# Patient Record
Sex: FEMALE | Race: WHITE | NOT HISPANIC OR LATINO | Employment: FULL TIME | URBAN - METROPOLITAN AREA
[De-identification: names, ages, dates, MRNs, and addresses within clinical notes are randomized per-mention and may not be internally consistent; named-entity substitution may affect disease eponyms.]

---

## 2017-01-03 ENCOUNTER — OFFICE VISIT (OUTPATIENT)
Dept: PSYCHOLOGY | Facility: CLINIC | Age: 62
End: 2017-01-03
Payer: COMMERCIAL

## 2017-01-03 DIAGNOSIS — F41.1 GENERALIZED ANXIETY DISORDER: Primary | ICD-10-CM

## 2017-01-03 PROCEDURE — 90834 PSYTX W PT 45 MINUTES: CPT | Performed by: MARRIAGE & FAMILY THERAPIST

## 2017-01-03 NOTE — PROGRESS NOTES
Progress Note    Client Name: Brissa Mayer  Date: 1/3/17         Service Type: Individual      Session Start Time: 8:00 a  Session End Time: 8:50 p      Session Length: 60     Session #: 4     Attendees: Client attended alone    Treatment Plan Last Reviewed: 12/13/16  PHQ-9 / JOCELINE-7 : 11/22/16     DATA      Progress Since Last Session (Related to Symptoms / Goals / Homework):   Symptoms: Improved    Homework: Achieved / completed to satisfaction - keeps cognitive distortions list at her desk at work      Episode of Care Goals: Satisfactory progress - ACTION (Actively working towards change); Intervened by reinforcing change plan / affirming steps taken. Brissa has made efforts to remove herself when a person is becoming angry or defensive. She states that she wishes she could control the anxiety and tightness she feels in her body when these things happen. Shared examples of co-workers and a friend who seek her support and get angry with her when she does not do things their way. Examined feelings of guilt and whether they are appropriate.     Current / Ongoing Stressors and Concerns:   Strain at work  Social problems     Treatment Objective(s) Addressed in This Session:    Alternative thinking used to decrease intense emotions   Practice acceptance of self and others      Intervention:    Identify healthy self-talk and challenge core beliefs that have contributed to negative mood and thinking  Focused on REBT concepts and practice alternative thinking       ASSESSMENT: Current Emotional / Mental Status (status of significant symptoms):   Risk status (Self / Other harm or suicidal ideation)   Client does not report  current fears or concerns for personal safety.   Client does not report  current or recent suicidal ideation or behaviors.   Client does not report  current or recent homicidal ideation or behaviors.   Client does not report  current or recent self  injurious behavior or ideation.   Client does not report  other safety concerns.   A safety and risk management plan has not been developed at this time, however client was given the after-hours number / 911 should there be a change in any of these risk factors.     Appearance:   Appropriate    Eye Contact:   Good    Psychomotor Behavior: Normal    Attitude:   Cooperative    Orientation:   All   Speech   Rate / Production:  Normal    Volume:   Normal    Mood:    Anxious  Normal   Affect:    Appropriate    Thought Content:  Clear    Thought Form:  Coherent  Logical    Insight:    Good      Medication Review:   No changes to current psychiatric medication(s)     Medication Compliance:   Yes     Changes in Health Issues:   Yes: Improved breathing and is using inhalers less/not at all     Chemical Use Review:   Substance Use: Chemical use reviewed, no active concerns identified      Tobacco Use: No current tobacco use.       Collateral Reports Completed:   Not Applicable    PLAN: (Client Tasks / Therapist Tasks / Other)  Follow-up on psychological health hand-out and QTIP concepts. Follow-up on examining guilty response and considering its appropriate Re-frame idea that anxiety symptoms are bad and use them instead as a helpful cue. Update PHQ-9 and JOCELINE-7.        DC Limon                                                         ________________________________________________________________________    Treatment Plan    Client's Name: Brissa Mayer  YOB: 1955    Date: 12/13/16    DSM-V Diagnoses: 300.02 (F41.1) Generalized Anxiety Disorder  Psychosocial / Contextual Factors: Strain at work; Social problems  WHODAS: Difficulties with work and joining social activities and completing household tasks    Referral / Collaboration:  Referral to another professional/service is not indicated at this time.    Anticipated number of session or this episode of care: 6-8      Measurable Treatment  Goal(s) related to diagnosis / functional impairment(s)  Goal 1: Client will report an even mood at least 8 out of 10 days.    I will know I've met my goal when I have more of a sense of calm or peace.      Objective #A (Client Action)    Client will share at each session about alternative thinking used to decrease intense emotions  Status: New - Date: 12/13/16     Intervention(s)  Therapist will work with client to identify healthy self-talk and challenge core beliefs that have contributed to negative mood and thinking    Objective #B  Client will practice acceptance of self and others between sessions and share at each session  Status: New - Date: 12/13/16     Intervention(s)  Therapist will work with client to practice acceptance and rational thinking  Therapist will teach client breathing and calming techniques to increase rational thinking    Goal 2: Client will     I will know I've met my goal when I do not feel the urge to do a negative behavior like shop or spend to make me feel better.      Objective #A (Client Action)    Status: New - Date: 12/16/16     Client will identify worries and stress and practice healthy ways of coping.    Intervention(s)  Therapist will work with client to address underlying issues leading to unhealthy coping    Objective #B  Client will use rational thinking patterns to off-set distress/stressful situations  Status: New - Date: 12/13/16     Intervention(s)  Therapist will teach REBT concepts and practice alternative thinking        Client has reviewed and agreed to the above plan.      Irena Cortes, LMFT  December 13, 2016

## 2017-01-03 NOTE — Clinical Note
Nicho Darby. I met with Brissa this morning. She shared that she is doing very well on the Paxil. She is actively participating in therapy and practicing outside of session. She notices a decrease in anger and continues to work on reducing anxiety. She has also noticed that she has not needed her inhalers recently and is feeling good about that. She will talk to you more about how this is going when she see you again.  Have a good day.  Rosie Cortes MS, LMFT

## 2017-02-02 ENCOUNTER — TELEPHONE (OUTPATIENT)
Dept: FAMILY MEDICINE | Facility: CLINIC | Age: 62
End: 2017-02-02

## 2017-02-02 NOTE — TELEPHONE ENCOUNTER
Brissa Mayer is a 61 year old female who calls with cold and cough symptoms.  Patient reports that this would be her second round of being sick, reports that she was sick about a month ago.  Reports that she has had nasal congestion, mild nasal drainage, cough.  Reports that she thinks she has had some fevers, but is not checking regularly.  Reports some chills, headaches, increase in fatigue and non productive cough.  Denies ear pain, throat pain.  Patient denies any nausea, vomiting, reports that she is able to take fluids.      NURSING ASSESSMENT:  Description:  Cough and cold symptoms.   Onset/duration:  Started over the weekend,   Precip. factors:  Same symptoms about a month ago.    Associated symptoms:  Headache, nasal drainage, cough  Improves/worsens symptoms:  No improvement.   Pain scale (0-10)   4/10  Last exam/Treatment:  10/10/2016  Allergies:   Allergies   Allergen Reactions     Compazine      Anxiety     Flagyl [Metronidazole Hcl] Nausea and Vomiting     NURSING PLAN: Routed to provider Yes    RECOMMENDED DISPOSITION:  Patient is requesting a appointment with PCP, she is aware that PCP is currently out of office.  Patient was encouraged to increase fluids, monitor temps, OTC pain medications for headaches, OTC cold medications, OTC cough medications, saline spray 4 times daily and PRN, and rest.   Will comply with recommendation: Yes  If further questions/concerns or if symptoms do not improve, worsen or new symptoms develop, call your PCP or [facility] as soon as possible.    Guideline used:  Cold   Telephone Triage Protocols for Nurses, Fifth Edition, Yue Tolentino RN

## 2017-02-02 NOTE — TELEPHONE ENCOUNTER
Reason for call:  Patient reporting a symptom    Symptom or request: chest cold, congestion at beginning of week, now head congestion, no energy    Duration (how long have symptoms been present): around a week    Have you been treated for this before? No    Additional comments: patient is asking to talk to a nurse, she is wondering about going to the er or being seen    Phone Number patient can be reached at:  Cell number on file:    Telephone Information:   Mobile [phone]       Best Time:  As soon as possible    Can we leave a detailed message on this number:  YES    Call taken on 2/2/2017 at 8:09 AM by Carlos Martinez

## 2017-02-03 NOTE — TELEPHONE ENCOUNTER
Pt called back and stated she is feeling a little better today. She is at work and didn't see her messages til now. She would like to wait out the weekend and see how it goes. If she's feeling worse on Monday or still not feeling better, she will call back then and schedule. If you need to talk to her, please call her back.

## 2017-03-30 ENCOUNTER — OFFICE VISIT (OUTPATIENT)
Dept: PSYCHOLOGY | Facility: CLINIC | Age: 62
End: 2017-03-30
Payer: COMMERCIAL

## 2017-03-30 DIAGNOSIS — F32.9 MAJOR DEPRESSIVE DISORDER, SINGLE EPISODE, UNSPECIFIED: ICD-10-CM

## 2017-03-30 DIAGNOSIS — F41.1 GENERALIZED ANXIETY DISORDER: Primary | ICD-10-CM

## 2017-03-30 PROCEDURE — 90834 PSYTX W PT 45 MINUTES: CPT | Performed by: MARRIAGE & FAMILY THERAPIST

## 2017-03-30 ASSESSMENT — ANXIETY QUESTIONNAIRES
1. FEELING NERVOUS, ANXIOUS, OR ON EDGE: SEVERAL DAYS
3. WORRYING TOO MUCH ABOUT DIFFERENT THINGS: NOT AT ALL
2. NOT BEING ABLE TO STOP OR CONTROL WORRYING: NOT AT ALL
IF YOU CHECKED OFF ANY PROBLEMS ON THIS QUESTIONNAIRE, HOW DIFFICULT HAVE THESE PROBLEMS MADE IT FOR YOU TO DO YOUR WORK, TAKE CARE OF THINGS AT HOME, OR GET ALONG WITH OTHER PEOPLE: NOT DIFFICULT AT ALL
7. FEELING AFRAID AS IF SOMETHING AWFUL MIGHT HAPPEN: NOT AT ALL
6. BECOMING EASILY ANNOYED OR IRRITABLE: NOT AT ALL
5. BEING SO RESTLESS THAT IT IS HARD TO SIT STILL: NOT AT ALL
GAD7 TOTAL SCORE: 1

## 2017-03-30 ASSESSMENT — PATIENT HEALTH QUESTIONNAIRE - PHQ9: 5. POOR APPETITE OR OVEREATING: NOT AT ALL

## 2017-03-30 NOTE — MR AVS SNAPSHOT
MRN:[id number]                      After Visit Summary   3/30/2017    Brissa Mayer    MRN: [id number]           Visit Information        Provider Department      3/30/2017 8:00 AM Irena Cortes LMFT [facility]      Your next 10 appointments already scheduled     Apr 14, 2017  2:30 PM CDT   [facility] with [facility] Haq [facility]   [facility]    [address] [place] [id number] [phone]            Apr 27, 2017  8:00 AM CDT   Return Visit with DC Limon   [facility]    [address]  [facility] [address] [phone]              MyChart Information     [employer] gives you secure access to your electronic health record. If you see a primary care provider, you can also send messages to your care team and make appointments. If you have questions, please call your primary care clinic.  If you do not have a primary care provider, please call [phone] and they will assist you.        Care EveryWhere ID     This is your Care EveryWhere ID. This could be used by other organizations to access your [facility] medical records  [id number]

## 2017-03-30 NOTE — PROGRESS NOTES
"                                             Progress Note    Client Name: Brissa Mayer  Date: 3/30/17         Service Type: Individual      Session Start Time: 8:00 a  Session End Time: 8:50 p      Session Length: 50     Session #: 5     Attendees: Client attended alone    Treatment Plan Last Reviewed: 12/13/16  PHQ-9 / JOCELINE-7: 3/30/17     DATA      Progress Since Last Session (Related to Symptoms / Goals / Homework):   Symptoms: Improved    Homework: Achieved / completed to satisfaction       Episode of Care Goals: Satisfactory progress - MAINTENANCE (Working to maintain change, with risk of relapse); Intervened by continuing to positively reinforce healthy behavior choice . Brissa shared that she had one \"small meltdown\" Monday but was able to work through it and not get too down on herself. She recognized that the Prednisone she was on, along with the mounting stress with a work event. Examined her reactivity to people's dysfunction and ways she can help herself let go.     Current / Ongoing Stressors and Concerns:   Strain at work  Social problems     Treatment Objective(s) Addressed in This Session:    Alternative thinking used to decrease intense emotions   Practice acceptance of self and others      Intervention:    Identify healthy self-talk and challenge core beliefs that have contributed to negative mood and thinking  Focused on REBT concepts and practice alternative thinking       ASSESSMENT: Current Emotional / Mental Status (status of significant symptoms):   Risk status (Self / Other harm or suicidal ideation)   Client does not report  current fears or concerns for personal safety.   Client does not report  current or recent suicidal ideation or behaviors.   Client does not report  current or recent homicidal ideation or behaviors.   Client does not report  current or recent self injurious behavior or ideation.   Client does not report  other safety concerns.   A safety and risk management plan has " not been developed at this time, however client was given the after-hours number / 911 should there be a change in any of these risk factors.     Appearance:   Appropriate    Eye Contact:   Good    Psychomotor Behavior: Normal    Attitude:   Cooperative    Orientation:   All   Speech   Rate / Production:  Normal    Volume:   Normal    Mood:    Anxious  Normal   Affect:    Appropriate    Thought Content:  Clear    Thought Form:  Coherent  Logical    Insight:    Good      Medication Review:   No changes to current psychiatric medication(s)     Medication Compliance:   Yes     Changes in Health Issues:   Yes: Recent illness and asthma attack, mood reactivity to using Prednisone     Chemical Use Review:   Substance Use: Chemical use reviewed, no active concerns identified      Tobacco Use: No current tobacco use.       Collateral Reports Completed:   Not Applicable    PLAN: (Client Tasks / Therapist Tasks / Other)  Prepare for closing; follow-up on coaching self to accept others as they are and appreciate self.        DC Limon                                                         ________________________________________________________________________    Treatment Plan    Client's Name: Brissa Mayer  YOB: 1955    Date: 12/13/16    DSM-V Diagnoses: 300.02 (F41.1) Generalized Anxiety Disorder  Psychosocial / Contextual Factors: Strain at work; Social problems  WHODAS: Difficulties with work and joining social activities and completing household tasks    Referral / Collaboration:  Referral to another professional/service is not indicated at this time.    Anticipated number of session or this episode of care: 6-8      Measurable Treatment Goal(s) related to diagnosis / functional impairment(s)  Goal 1: Client will report an even mood at least 8 out of 10 days.    I will know I've met my goal when I have more of a sense of calm or peace.      Objective #A (Client Action)    Client will  share at each session about alternative thinking used to decrease intense emotions  Status: New - Date: 12/13/16     Intervention(s)  Therapist will work with client to identify healthy self-talk and challenge core beliefs that have contributed to negative mood and thinking    Objective #B  Client will practice acceptance of self and others between sessions and share at each session  Status: New - Date: 12/13/16     Intervention(s)  Therapist will work with client to practice acceptance and rational thinking  Therapist will teach client breathing and calming techniques to increase rational thinking    Goal 2: Client will     I will know I've met my goal when I do not feel the urge to do a negative behavior like shop or spend to make me feel better.      Objective #A (Client Action)    Status: New - Date: 12/16/16     Client will identify worries and stress and practice healthy ways of coping.    Intervention(s)  Therapist will work with client to address underlying issues leading to unhealthy coping    Objective #B  Client will use rational thinking patterns to off-set distress/stressful situations  Status: New - Date: 12/13/16     Intervention(s)  Therapist will teach REBT concepts and practice alternative thinking        Client has reviewed and agreed to the above plan.      Irena Cortes, LMFT  December 13, 2016

## 2017-03-31 ASSESSMENT — ANXIETY QUESTIONNAIRES: GAD7 TOTAL SCORE: 1

## 2017-03-31 ASSESSMENT — PATIENT HEALTH QUESTIONNAIRE - PHQ9: SUM OF ALL RESPONSES TO PHQ QUESTIONS 1-9: 0

## 2017-04-05 ENCOUNTER — TELEPHONE (OUTPATIENT)
Dept: DERMATOLOGY | Facility: CLINIC | Age: 62
End: 2017-04-05

## 2017-04-05 NOTE — TELEPHONE ENCOUNTER
Dermatology Pre-visit Call:    Reason for visit : Moles / Red bumps on face    Any personal history of skin cancers: No    Was the patient referred: No    If the patient was referred, are records obtained: No. (If no, then obtain records).    Has the patient seen a dermatologist in the past: No. (If yes, obtain records)    Patient Reminders Given:  --Please, make sure you bring an updated list of your medications.   --Plan on being in our facility for approximately one hour, this includes the registration process, office visit, education and check-out process.  If you are having a procedure, more time may be required.     --If you are having a procedure, please, present 15 minutes early.  --Location reviewed.   --If you need to cancel or reschedule, call XXXX  --We look forward to seeing you in Dermatology Clinic.     José Antonio Rossi Medical Assistant

## 2017-05-09 ENCOUNTER — FCC EXTENDED DOCUMENTATION (OUTPATIENT)
Dept: PSYCHOLOGY | Facility: CLINIC | Age: 62
End: 2017-05-09

## 2017-05-09 NOTE — PROGRESS NOTES
Discharge Summary  Multiple Sessions    Client Name: Brissa Mayer MRN#: [id number] YOB: 1955      Intake / Discharge Date: 16-17      DSM5 Diagnoses: (Sustained by DSM5 Criteria Listed Above)  300.02 (F41.1) Generalized Anxiety Disorder, in Partial Remission  Psychosocial & Contextual Factors: Recent death of father and now both parents are , worry about sister's health, family strain, financial issues, issues with co-workers/management  WHODAS 2.0 (12 item) Score: 18          Presenting Concern:  Anxiety and anger interfering with work relationships      Reason for Discharge:  Client is satisfied with progress and Client did not return      Disposition at Time of Last Encounter:   Comments:   Stable     Risk Management:   Client denies a history of suicidal ideation, suicide attempts, self-injurious behavior, homicidal ideation, homicidal behavior and and other safety concerns  A safety and risk management plan has not been developed at this time, however client was given the after-hours number / 911 should there be a change in any of these risk factors.      Referred To:  Return to [facility] if needed.        Irena Cortes, DC   2017

## 2017-05-09 NOTE — Clinical Note
Hi Dr. Darby. I met with Brissa for several psychotherapy sessions. She was a pleasure to work with and made great progress toward her goals. Rosie Cortes MS, LMFT

## 2017-06-23 ENCOUNTER — TELEPHONE (OUTPATIENT)
Dept: FAMILY MEDICINE | Facility: CLINIC | Age: 62
End: 2017-06-23

## 2017-06-23 NOTE — TELEPHONE ENCOUNTER
----- Message from Brissa Mayer sent at 6/23/2017 10:39 AM CDT -----  Regarding: Appointment Request ()  Contact: [phone]  Appointment Request From: Brissa Mayer    With Provider: Fuentes Darby MD [-Primary Care Physician-]    Preferred Date Range: From 6/26/2017 To 6/30/2017    Preferred Times: Any    Reason: To address the following health maintenance concerns.  Asthma Control Test Q6 Mos    Comments:  Could other overdue tests be done at this appointment?

## 2017-07-24 ENCOUNTER — OFFICE VISIT (OUTPATIENT)
Dept: INTERNAL MEDICINE | Facility: CLINIC | Age: 62
End: 2017-07-24
Payer: COMMERCIAL

## 2017-07-24 VITALS
HEART RATE: 86 BPM | WEIGHT: 120 LBS | BODY MASS INDEX: 22.08 KG/M2 | RESPIRATION RATE: 16 BRPM | HEIGHT: 62 IN | TEMPERATURE: 97.5 F | SYSTOLIC BLOOD PRESSURE: 98 MMHG | OXYGEN SATURATION: 98 % | DIASTOLIC BLOOD PRESSURE: 66 MMHG

## 2017-07-24 DIAGNOSIS — J45.20 MILD INTERMITTENT ASTHMA WITHOUT COMPLICATION: Primary | ICD-10-CM

## 2017-07-24 DIAGNOSIS — F33.0 MAJOR DEPRESSIVE DISORDER, RECURRENT EPISODE, MILD (H): ICD-10-CM

## 2017-07-24 DIAGNOSIS — J30.1 CHRONIC SEASONAL ALLERGIC RHINITIS DUE TO POLLEN: ICD-10-CM

## 2017-07-24 PROCEDURE — 99214 OFFICE O/P EST MOD 30 MIN: CPT | Performed by: INTERNAL MEDICINE

## 2017-07-24 RX ORDER — CETIRIZINE HYDROCHLORIDE 10 MG/1
10 TABLET ORAL EVERY EVENING
Qty: 30 TABLET | Refills: 1 | COMMUNITY
Start: 2017-07-24 | End: 2022-01-05

## 2017-07-24 ASSESSMENT — PAIN SCALES - GENERAL: PAINLEVEL: NO PAIN (0)

## 2017-07-24 NOTE — MR AVS SNAPSHOT
"              After Visit Summary   7/24/2017    Brissa Mayer    MRN: [id number]           Patient Information     Date Of Birth          1955        Visit Information        Provider Department      7/24/2017 4:00 PM Fuentes Darby MD [facility]         Follow-ups after your visit        Who to contact     If you have questions or need follow up information about today's clinic visit or your schedule please contact [facility] directly at [phone].  Normal or non-critical lab and imaging results will be communicated to you by PeopleMatterhart, letter or phone within 4 business days after the clinic has received the results. If you do not hear from us within 7 days, please contact the clinic through PeopleMatterhart or phone. If you have a critical or abnormal lab result, we will notify you by phone as soon as possible.  Submit refill requests through goodideazs or call your pharmacy and they will forward the refill request to us. Please allow 3 business days for your refill to be completed.          Additional Information About Your Visit        MyChart Information     [employer] gives you secure access to your electronic health record. If you see a primary care provider, you can also send messages to your care team and make appointments. If you have questions, please call your primary care clinic.  If you do not have a primary care provider, please call [phone] and they will assist you.        Care EveryWhere ID     This is your Care EveryWhere ID. This could be used by other organizations to access your [facility] medical records  [id number]        Your Vitals Were     Pulse Temperature Respirations Height Pulse Oximetry BMI (Body Mass Index)    86 97.5  F (36.4  C) (Temporal) 16 5' 1.75\" (1.568 m) 98% 22.13 kg/m2       Blood Pressure from Last 3 Encounters:   07/24/17 98/66   10/10/16 104/76   04/19/16 128/84    Weight from Last 3 Encounters:   07/24/17 120 lb (54.4 kg)   10/10/16 111 lb " (50.3 kg)   04/19/16 112 lb (50.8 kg)              Today, you had the following     No orders found for display         Today's Medication Changes          These changes are accurate as of: 7/24/17  4:17 PM.  If you have any questions, ask your nurse or doctor.               These medicines have changed or have updated prescriptions.        Dose/Directions    fluticasone-salmeterol 250-50 MCG/DOSE diskus inhaler   Commonly known as:  ADVAIR   This may have changed:  Another medication with the same name was removed. Continue taking this medication, and follow the directions you see here.   Used for:  Mild intermittent asthma without complication   Changed by:  Fuentes Darby MD        Dose:  1 puff   Inhale 1 puff into the lungs 2 times daily   Quantity:  3 Inhaler   Refills:  1                Primary Care Provider Office Phone # Fax #    Fuentes Darby [phone]       [facility] [address] [place] [address]        Equal Access to Services     NATHANAEL [facility] AH: Hadii aad ku hadasho Soomaali, waaxda luqadaha, qaybta kaalmada adeegyada, waxay idiin hayaan gladys kharash liam . So [facility] [phone].    ATENCIÓN: Si habla español, tiene a ashby disposición servicios gratuitos de asistencia lingüística. Llame al [phone].    We comply with applicable federal civil rights laws and [place] laws. We do not discriminate on the basis of race, color, national origin, age, disability sex, sexual orientation or gender identity.            Thank you!     Thank you for choosing [facility]  for your care. Our goal is always to provide you with excellent care. Hearing back from our patients is one way we can continue to improve our services. Please take a few minutes to complete the written survey that you may receive in the mail after your visit with us. Thank you!             Your Updated Medication List - Protect others around you: Learn how to safely use, store and  throw away your medicines at www.disposemymeds.org.          This list is accurate as of: 7/24/17  4:17 PM.  Always use your most recent med list.                   Brand Name Dispense Instructions for use Diagnosis    albuterol 108 (90 BASE) MCG/ACT Inhaler    PROAIR HFA/PROVENTIL HFA/VENTOLIN HFA    1 Inhaler    Inhale 2 puffs into the lungs every 6 hours as needed for shortness of breath / dyspnea    Mild persistent asthma without complication       fluticasone 50 MCG/ACT spray    FLONASE    1 Package    Spray 2 sprays into both nostrils daily    Chronic rhinitis       fluticasone-salmeterol 250-50 MCG/DOSE diskus inhaler    ADVAIR    3 Inhaler    Inhale 1 puff into the lungs 2 times daily    Mild intermittent asthma without complication       levothyroxine 50 MCG tablet    SYNTHROID/LEVOTHROID    90 tablet    Take 1 tablet (50 mcg) by mouth daily    Hypothyroidism due to acquired atrophy of thyroid       OMEPRAZOLE PO      Take 40 mg by mouth as needed        PARoxetine 12.5 MG 24 hr tablet    PAXIL-CR    180 tablet    Take 2 tablets (25 mg) by mouth every morning    Generalized anxiety disorder, Major depressive disorder, single episode, unspecified

## 2017-07-24 NOTE — NURSING NOTE
"Chief Complaint   Patient presents with     Health Maintenance     AAP, ACT, DAP and Hept C     Allergies       Initial BP 98/66  Pulse 86  Temp 97.5  F (36.4  C) (Temporal)  Resp 16  Ht 5' 1.75\" (1.568 m)  Wt 120 lb (54.4 kg)  SpO2 98%  BMI 22.13 kg/m2 Estimated body mass index is 22.13 kg/(m^2) as calculated from the following:    Height as of this encounter: 5' 1.75\" (1.568 m).    Weight as of this encounter: 120 lb (54.4 kg).  Medication Reconciliation: complete    "

## 2017-07-24 NOTE — PROGRESS NOTES
SUBJECTIVE:                                                    Brissa Mayer is a 62 year old female who presents to clinic today for the following health issues:      Chief Complaint   Patient presents with     Health Maintenance     AAP, ACT, DAP and Hept C     Allergies     Allergies are being worse then usual, trying some nasal spray doing a mist style.      Lump on the neck has been up and down. Asthma is hit and miss, worse with the humidity. Uses her inhaler some.  Ran out of the Advair and then better.      Sinuses drain quite a bit as well, work has had a summer cold.    Not on antihistamine but will try some zyrtec.      Paxil is doing ok, helps her quite a bit.  Helps with stress and sister's illness.  Was seeing a counselor and doesn't think she needs any right now. Work is going well, some stress there.      Past Medical History:   Diagnosis Date     Anxiety      Asthma, mild intermittent      Migraines      Tension headaches      Unspecified hypothyroidism      Current Outpatient Prescriptions   Medication     cetirizine (ZYRTEC) 10 MG tablet     OMEPRAZOLE PO     fluticasone-salmeterol (ADVAIR) 250-50 MCG/DOSE diskus inhaler     albuterol (PROAIR HFA, PROVENTIL HFA, VENTOLIN HFA) 108 (90 BASE) MCG/ACT inhaler     levothyroxine (SYNTHROID, LEVOTHROID) 50 MCG tablet     PARoxetine (PAXIL-CR) 12.5 MG 24 hr tablet     fluticasone (FLONASE) 50 MCG/ACT nasal spray     [DISCONTINUED] ADVAIR DISKUS 500-50 MCG/DOSE diskus inhaler     [DISCONTINUED] simvastatin (ZOCOR) 20 MG tablet     No current facility-administered medications for this visit.      Social History   Substance Use Topics     Smoking status: Never Smoker     Smokeless tobacco: Never Used     Alcohol use Yes      Comment: Occasional wine 3X's /week     Review of Systems  Constitutional-No fevers, chills, or weight changes..  ENT-No earpain, sore throat, voice changes or rhinitis.  Cardiac-No chest pain or palpitations.  Respiratory-No cough,  "sob, or hemoptysis.  GI-No nausea, vomitting, diarrhea, constipation, or blood in the stool.  Psych- depresso is doigwell..    Physical Exam  BP 98/66  Pulse 86  Temp 97.5  F (36.4  C) (Temporal)  Resp 16  Ht 5' 1.75\" (1.568 m)  Wt 120 lb (54.4 kg)  SpO2 98%  BMI 22.13 kg/m2  General Appearance-healthy, alert, no distress  ENT-ENT exam normal, no neck nodes or sinus tenderness  Cardiac-regular rate and rhythm  with normal S1, S2 ; no murmur, rub or gallops  Lungs-clear to auscultation      ASSESSMENT:   depression -patient is overall doing well, she'll continue Paxil. She is not needing counseling     Asthma-not controlled due to allergies now, should stay inside when extremely hot and humid, will get better when allergies are improved, zyrtec may help this.    Allergies-zyrtec will start this daily and should help allergy symtoms.    Electronically signed by Fuentes Darby MD    "

## 2017-07-25 DIAGNOSIS — F41.1 GENERALIZED ANXIETY DISORDER: ICD-10-CM

## 2017-07-25 DIAGNOSIS — F32.9 MAJOR DEPRESSIVE DISORDER, SINGLE EPISODE, UNSPECIFIED: ICD-10-CM

## 2017-07-25 ASSESSMENT — ASTHMA QUESTIONNAIRES: ACT_TOTALSCORE: 17

## 2017-07-25 NOTE — TELEPHONE ENCOUNTER
PARoxetine (PAXIL-CR) 12.5 MG 24 hr tablet     Last Written Prescription Date: 7/14/16  Last Fill Quantity: 180, # refills: 3  Last Office Visit with [facility] primary care provider:  7/25/17        Last PHQ-9 score on record=   PHQ-9 SCORE 3/30/2017   Total Score -   Total Score 0

## 2017-07-26 RX ORDER — PAROXETINE HYDROCHLORIDE HEMIHYDRATE 25 MG/1
25 TABLET, FILM COATED, EXTENDED RELEASE ORAL EVERY MORNING
Qty: 90 TABLET | Refills: 1 | Status: SHIPPED | OUTPATIENT
Start: 2017-07-26 | End: 2018-04-03

## 2017-07-26 NOTE — TELEPHONE ENCOUNTER
Please clarify dose/ directions.... Brissa states dose should have been increased at last appointment.   Pharmacy last filled Paroxetine ER 25mg with directions to take one tablet daily... This was last filled #30 March 2nd    Thanks  Roopa Lezama [facility]   [phone]

## 2017-09-04 ENCOUNTER — NURSE TRIAGE (OUTPATIENT)
Dept: NURSING | Facility: CLINIC | Age: 62
End: 2017-09-04

## 2017-09-04 NOTE — TELEPHONE ENCOUNTER
Reason for Disposition    [1] SEVERE pain (e.g., excruciating, unable to do any normal activities) AND [2] not improved after 2 hours of pain medicine    Additional Information    Negative: Shock suspected (e.g., cold/pale/clammy skin, too weak to stand, low BP, rapid pulse)    Negative: Passed out (i.e., lost consciousness, collapsed and was not responding)    Negative: Sounds like a life-threatening emergency to the triager    Negative: Looks like a broken bone or dislocated joint (e.g., crooked or deformed)    Negative: Sounds like a life-threatening emergency to the triager    Negative: Followed a hip injury    Negative: Leg pain is main symptom    Negative: Back pain radiating (shooting) into hip    Negative: [1] SEVERE pain (e.g., excruciating, unable to do any normal activities) AND [2] fever    Negative: Can't stand (bear weight) or walk    Negative: [1] Red area or streak AND [2] fever    Negative: Patient sounds very sick or weak to the triager    Protocols used: HIP PAIN-ADULT-AH, PELVIC PAIN - FEMALE-ADULT-AH  Patient is having pain in right hip area. Patient did not injure the hip. States pain started after tooth extraction and started on antibiotic.

## 2017-09-05 ENCOUNTER — TELEPHONE (OUTPATIENT)
Dept: INTERNAL MEDICINE | Facility: CLINIC | Age: 62
End: 2017-09-05

## 2017-09-05 NOTE — TELEPHONE ENCOUNTER
Reason for call:  Patient reporting a symptom    Symptom or request: Patient had a tooth extracted on Thursday and surgeon put her on an antibiotic. Since then she has had extreme body aches. Is is difficult for her to walk. Wondering if she should come in to be seen.     Duration (how long have symptoms been present): 4 days    Have you been treated for this before? No    Additional comments:     Phone Number patient can be reached at:  Home number on file [phone] (home)    Best Time:  any    Can we leave a detailed message on this number:  YES    Call taken on 9/5/2017 at 7:34 AM by Mariana Leonard

## 2017-09-05 NOTE — TELEPHONE ENCOUNTER
": 1955  PHONE #'s: [phone] (home) [phone] (work)    PRESENTING PROBLEM:  Pt had a molar tooth pulled and the oral surgeon's office on . C/O feeling achy all over the very next day and is wondering if it is from the infection in her tooth or what?    NURSING ASSESSMENT  Description:   \" I was given Vicodin and Amoxicillin to take for the infection. I started feeling achy the very next day. I am NOT running a fever, but just not feeling good since this whole ordeal.\"  Onset/duration:   17.  Precip. factors:   Had an infected tooth pulled. \"It was a crown that had a crack in the root.\"  Assoc. Sx:  Her joints are sore since onset of Amoxicillin, Especially her Right hip.   Improves/worsens Sx:   same  Pain scale (1-10)   NA  Sx specific meds:   Vicodin and Amoxicilling  LMP/preg/breast feeding:   NA  Last exam/Tx:  17- day of tooth extraction.     RECOMMENDED DISPOSITION:  Home care advice - I would have her call the oral surgeon to report her symptoms. They may want to switch antibiotics for her. She is welcome to call back if they cannot help her.   Will comply with recommendation: YES   If further questions/concerns or if Sx do not improve, worsen or new Sx develop, call your PCP or [facility] as soon as possible.    NOTES:  Disposition was determined by the first positive assessment question, therefore all previous assessment questions were negative.  Informed to check provider manual or call insurance company to assure coverage.    Guideline used:  Amoxicillin Clinical References    Colleen Jackson RN    "

## 2017-09-27 ENCOUNTER — NURSE TRIAGE (OUTPATIENT)
Dept: NURSING | Facility: CLINIC | Age: 62
End: 2017-09-27

## 2017-09-27 ENCOUNTER — TELEPHONE (OUTPATIENT)
Dept: FAMILY MEDICINE | Facility: CLINIC | Age: 62
End: 2017-09-27

## 2017-09-27 ENCOUNTER — OFFICE VISIT (OUTPATIENT)
Dept: INTERNAL MEDICINE | Facility: CLINIC | Age: 62
End: 2017-09-27
Payer: OTHER MISCELLANEOUS

## 2017-09-27 VITALS
SYSTOLIC BLOOD PRESSURE: 122 MMHG | DIASTOLIC BLOOD PRESSURE: 78 MMHG | RESPIRATION RATE: 16 BRPM | BODY MASS INDEX: 22.83 KG/M2 | WEIGHT: 123.8 LBS | OXYGEN SATURATION: 96 % | HEART RATE: 102 BPM | TEMPERATURE: 97.6 F

## 2017-09-27 DIAGNOSIS — S39.011A ABDOMINAL MUSCLE STRAIN, INITIAL ENCOUNTER: Primary | ICD-10-CM

## 2017-09-27 PROCEDURE — 99213 OFFICE O/P EST LOW 20 MIN: CPT | Performed by: INTERNAL MEDICINE

## 2017-09-27 ASSESSMENT — PATIENT HEALTH QUESTIONNAIRE - PHQ9: SUM OF ALL RESPONSES TO PHQ QUESTIONS 1-9: 3

## 2017-09-27 ASSESSMENT — PAIN SCALES - GENERAL: PAINLEVEL: SEVERE PAIN (7)

## 2017-09-27 NOTE — PROGRESS NOTES
SUBJECTIVE:   Brissa Mayer is a 62 year old female who presents to clinic today for the following health issues:    Work Comp 9/26/17 was injured. Left side of rib cage    Yesterday was pulling  down to cut and felt something in her left upper abdomen, couldn't do it anymore with pulling lever down or pushing down.  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  Has a convention next week.      No nausea, no vomiting.      Past Medical History:   Diagnosis Date     Anxiety      Asthma, mild intermittent      Migraines      Tension headaches      Unspecified hypothyroidism      Current Outpatient Prescriptions   Medication     PARoxetine (PAXIL-CR) 25 MG 24 hr tablet     cetirizine (ZYRTEC) 10 MG tablet     fluticasone-salmeterol (ADVAIR) 250-50 MCG/DOSE diskus inhaler     albuterol (PROAIR HFA, PROVENTIL HFA, VENTOLIN HFA) 108 (90 BASE) MCG/ACT inhaler     levothyroxine (SYNTHROID, LEVOTHROID) 50 MCG tablet     fluticasone (FLONASE) 50 MCG/ACT nasal spray     [DISCONTINUED] simvastatin (ZOCOR) 20 MG tablet     No current facility-administered medications for this visit.      Physical Exam  /78 (BP Location: Right arm, Patient Position: Chair, Cuff Size: Adult Regular)  Pulse 102  Temp 97.6  F (36.4  C) (Temporal)  Resp 16  Wt 123 lb 12.8 oz (56.2 kg)  SpO2 96%  Breastfeeding? No  BMI 22.83 kg/m2  General Appearance-healthy, alert, no distress  Cardiac-regular rate and rhythm  with normal S1, S2 ; no murmur, rub or gallops  Lungs-clear to auscultation  GI-Soft, nontender.  Normal bowel sounds.  No hepatosplenomegaly or abnormal masses--tender in the upper left abdomen area.    ASSESSMENT:  Patient has a abdominal muscular strain from work. She was pulling down on a  and strained the left upper abdominal muscles. She has no palpable hernias. She can use NSAIDS and rest and ice to the area. She is given work restrictions, no more than 15 pounds and pull or push for the next 7  days.    Electronically signed by Fuentes Darby MD

## 2017-09-27 NOTE — TELEPHONE ENCOUNTER
Tried to reach patient, left message for patient to call the clinic back.  Alyssa Galarza, [facility]

## 2017-09-27 NOTE — TELEPHONE ENCOUNTER
Reason for Call:  Same Day Appointment, Requested Provider:  Fuentes Darby MD    PCP: Fuentes Darby    Reason for visit: WC pulled muscle in rib cage     Duration of symptoms: 1 day   Have you been treated for this in the past? No    Additional comments: Would like to be seen today if possible.     Can we leave a detailed message on this number? YES    Phone number patient can be reached at: Cell number on file:    Telephone Information:   Mobile [phone]       Best Time:      Call taken on 9/27/2017 at 10:40 AM by Sylwia Barkley

## 2017-09-27 NOTE — MR AVS SNAPSHOT
After Visit Summary   9/27/2017    Brissa Mayer    MRN: [id number]           Patient Information     Date Of Birth          1955        Visit Information        Provider Department      9/27/2017 3:45 PM Fuentes Darby MD [facility]         Follow-ups after your visit        Who to contact     If you have questions or need follow up information about today's clinic visit or your schedule please contact [facility] directly at [phone].  Normal or non-critical lab and imaging results will be communicated to you by Stylehivehart, letter or phone within 4 business days after the clinic has received the results. If you do not hear from us within 7 days, please contact the clinic through Live Calendarst or phone. If you have a critical or abnormal lab result, we will notify you by phone as soon as possible.  Submit refill requests through Vayable or call your pharmacy and they will forward the refill request to us. Please allow 3 business days for your refill to be completed.          Additional Information About Your Visit        Stylehivehart Information     Vayable gives you secure access to your electronic health record. If you see a primary care provider, you can also send messages to your care team and make appointments. If you have questions, please call your primary care clinic.  If you do not have a primary care provider, please call [phone] and they will assist you.        Care EveryWhere ID     This is your Care EveryWhere ID. This could be used by other organizations to access your [facility] [place] medical records  [id number]        Your Vitals Were     Pulse Temperature Respirations Pulse Oximetry Breastfeeding? BMI (Body Mass Index)    102 97.6  F (36.4  C) (Temporal) 16 96% No 22.83 kg/m2       Blood Pressure from Last 3 Encounters:   09/27/17 122/78   07/24/17 98/66   10/10/16 104/76    Weight from Last 3 Encounters:   09/27/17 123 lb 12.8 oz (56.2 kg)   07/24/17 120 lb  (54.4 kg)   10/10/16 111 lb (50.3 kg)              Today, you had the following     No orders found for display       Primary Care Provider Office Phone # Fax #    Fuentes Darby [phone]       [facility] [address] [place] [address]        Equal Access to Services     [facility] HARITHA : Hadii aad ku hadasho Soomaali, waaxda luqadaha, qaybta kaalmada adeegyada, waxay idiin hayaan adeeg kharash la'aan . So [facility] [phone].    ATENCIÓN: Si habla español, tiene a ashby disposición servicios gratuitos de asistencia lingüística. Llame al [phone].    We comply with applicable federal civil rights laws and [place] laws. We do not discriminate on the basis of race, color, national origin, age, disability sex, sexual orientation or gender identity.            Thank you!     Thank you for choosing [facility]  for your care. Our goal is always to provide you with excellent care. Hearing back from our patients is one way we can continue to improve our services. Please take a few minutes to complete the written survey that you may receive in the mail after your visit with us. Thank you!             Your Updated Medication List - Protect others around you: Learn how to safely use, store and throw away your medicines at www.disposemymeds.org.          This list is accurate as of: 9/27/17  3:56 PM.  Always use your most recent med list.                   Brand Name Dispense Instructions for use Diagnosis    albuterol 108 (90 BASE) MCG/ACT Inhaler    PROAIR HFA/PROVENTIL HFA/VENTOLIN HFA    1 Inhaler    Inhale 2 puffs into the lungs every 6 hours as needed for shortness of breath / dyspnea    Mild persistent asthma without complication       cetirizine 10 MG tablet    zyrTEC    30 tablet    Take 1 tablet (10 mg) by mouth every evening    Chronic seasonal allergic rhinitis due to pollen       fluticasone 50 MCG/ACT spray    FLONASE    1 Package    Spray 2 sprays into both  nostrils daily    Chronic rhinitis       fluticasone-salmeterol 250-50 MCG/DOSE diskus inhaler    ADVAIR    3 Inhaler    Inhale 1 puff into the lungs 2 times daily    Mild intermittent asthma without complication       levothyroxine 50 MCG tablet    SYNTHROID/LEVOTHROID    90 tablet    Take 1 tablet (50 mcg) by mouth daily    Hypothyroidism due to acquired atrophy of thyroid       PARoxetine 25 MG 24 hr tablet    PAXIL-CR    90 tablet    Take 1 tablet (25 mg) by mouth every morning    Generalized anxiety disorder, Major depressive disorder, single episode, unspecified

## 2017-09-27 NOTE — TELEPHONE ENCOUNTER
Reason for Disposition    [1] MODERATE pain (e.g., interferes with normal activities) AND [2] pain is WORSE with breathing    Additional Information    Negative: Sounds like a life-threatening emergency to the triager    Negative: Chest Injury from a direct blow or fall    Negative: Chest injury knife, gun shot, or other penetrating trauma    Negative: Chest injury with cut or laceration    Negative: Chest pain not from an injury    Negative: Sounds like a serious injury to the triager    Negative: SEVERE chest or rib pain (e.g., excruciating, unable to do any normal activities)    Protocols used: CHEST INJURY - BENDING, LIFTING, OR TWISTING-ADULT-

## 2017-09-27 NOTE — NURSING NOTE
"Chief Complaint   Patient presents with     Work Comp       Initial /78 (BP Location: Right arm, Patient Position: Chair, Cuff Size: Adult Regular)  Pulse 102  Temp 97.6  F (36.4  C) (Temporal)  Resp 16  Wt 123 lb 12.8 oz (56.2 kg)  SpO2 96%  Breastfeeding? No  BMI 22.83 kg/m2 Estimated body mass index is 22.83 kg/(m^2) as calculated from the following:    Height as of 7/24/17: 5' 1.75\" (1.568 m).    Weight as of this encounter: 123 lb 12.8 oz (56.2 kg).  Medication Reconciliation: complete     Health Maintenance Due   Topic Date Due     HEPATITIS C SCREENING  03/03/1971     ADVANCE DIRECTIVE PLANNING Q5 YRS  07/26/2016     INFLUENZA VACCINE (SYSTEM ASSIGNED)  09/01/2017     PHQ-9 Q6 MONTHS  09/30/2017     Alyssa Galarza CMA      "

## 2017-09-27 NOTE — LETTER
[address] [address]  Phone: [phone]    September 27, 2017        Brissa Mayer  [address] [address]          To whom it may concern:    RE: Brissa Mayer    Patient was seen and treated today at our clinic and missed work.  Patient may return to work 9/28/2017 with the following:  Limited lifting and not more then 15 pounds, no pulling or pushing the lever on cutter, restricted for the next 7 days.      Please contact me for questions or concerns.      Sincerely,        Fuentes Darby MD

## 2017-09-27 NOTE — TELEPHONE ENCOUNTER
"Patient calling reporting yesterday at work she was using a \"cutter machine.\" Reporting after pushing down on machine \"I pulled something right under left rib.\"     Patient reporting area is \"swollen.\" Tender to touch.     Harper Brown RN  [facility]      "

## 2017-10-01 DIAGNOSIS — J45.20 MILD INTERMITTENT ASTHMA WITHOUT COMPLICATION: ICD-10-CM

## 2017-10-02 NOTE — TELEPHONE ENCOUNTER
fluticasone-salmeterol (ADVAIR) 250-50 MCG/DOSE diskus inhaler       Last Written Prescription Date: 10/10/16  Last Fill Quantity: 3, # refills: 1    Last Office Visit with [facility], P or [facility] prescribing provider:  9/27/17   Future Office Visit:       Date of Last Asthma Action Plan Letter:   Asthma Action Plan Q1 Year    Topic Date Due     Asthma Action Plan - yearly  07/24/2018      Asthma Control Test:   ACT Total Scores 7/24/2017   ACT TOTAL SCORE -   ASTHMA ER VISITS -   ASTHMA HOSPITALIZATIONS -   ACT TOTAL SCORE (Goal Greater than or Equal to 20) 17   In the past 12 months, how many times did you visit the emergency room for your asthma without being admitted to the hospital? 0   In the past 12 months, how many times were you hospitalized overnight because of your asthma? 0       Date of Last Spirometry Test:   No results found for this or any previous visit.

## 2017-10-03 NOTE — TELEPHONE ENCOUNTER
Routing refill request to provider for review/approval because:  ACT score of less then 20.    Shawnee Tolentino RN

## 2017-10-19 ENCOUNTER — E-VISIT (OUTPATIENT)
Dept: INTERNAL MEDICINE | Facility: CLINIC | Age: 62
End: 2017-10-19

## 2017-10-19 DIAGNOSIS — F41.1 GENERALIZED ANXIETY DISORDER: Primary | ICD-10-CM

## 2017-10-19 NOTE — TELEPHONE ENCOUNTER
Brissa Mayer is a 62 year old female-     NURSING ASSESSMENT:  Description:  Patient has been experiencing heart palpitations, feeling jittery and lightheaded on/off for a few days. Currently she denies all of these symptoms. She felt as though it was related to a new  of her Paxil. She discussed this with her pharmacist and they did not feel as though it was likely, but recommended that she address this with Dr. Darby.  She states that the symptoms only happen in the morning and subside and today they were less than before. She has been dealing with a cold as well so thinks that this may play a part. She denies pain, difficulty breathing, lightheadedness, numbness or tingling, heart racing or palpitations or fevers. She is drinking plenty of water and appetite is normal. Patient declines an office appt at this time. She would like to monitor and will schedule if needed. Will route to provider to review.     Last exam/Treatment:  10/19/2017  Allergies:   Allergies   Allergen Reactions     Compazine      Anxiety     Flagyl [Metronidazole Hcl] Nausea and Vomiting       Trisha Graves RN

## 2017-10-19 NOTE — TELEPHONE ENCOUNTER
I can't say this is from the medication.     I think she needs to be triaged and maybe a visit if jittery heart and other symptoms

## 2017-10-19 NOTE — TELEPHONE ENCOUNTER
I have contacted pt and scheduled her for 10/24/2017. Kyung Hilliard CMA ([facility])

## 2017-10-19 NOTE — MR AVS SNAPSHOT
After Visit Summary   10/19/2017    Brissa Mayer    MRN: [id number]           Patient Information     Date Of Birth          1955        Visit Information        Provider Department      10/19/2017 9:48 AM Fuentes Darby MD [facility]        Today's Diagnoses     Generalized anxiety disorder    -  1       Follow-ups after your visit        Your next 10 appointments already scheduled     Oct 24, 2017  2:00 PM CDT   SHORT with Fuentes Darby MD   [facility]    [address] [place] [address] [phone]              Who to contact     If you have questions or need follow up information about today's clinic visit or your schedule please contact [facility] directly at [phone].  Normal or non-critical lab and imaging results will be communicated to you by MyChart, letter or phone within 4 business days after the clinic has received the results. If you do not hear from us within 7 days, please contact the clinic through MyChart or phone. If you have a critical or abnormal lab result, we will notify you by phone as soon as possible.  Submit refill requests through [employer] or call your pharmacy and they will forward the refill request to us. Please allow 3 business days for your refill to be completed.          Additional Information About Your Visit        MyChart Information     [employer] gives you secure access to your electronic health record. If you see a primary care provider, you can also send messages to your care team and make appointments. If you have questions, please call your primary care clinic.  If you do not have a primary care provider, please call [phone] and they will assist you.        Care EveryWhere ID     This is your Care EveryWhere ID. This could be used by other organizations to access your [place] medical records  [id number]         Blood Pressure from Last 3 Encounters:    09/27/17 122/78   07/24/17 98/66   10/10/16 104/76    Weight from Last 3 Encounters:   09/27/17 123 lb 12.8 oz (56.2 kg)   07/24/17 120 lb (54.4 kg)   10/10/16 111 lb (50.3 kg)              Today, you had the following     No orders found for display       Primary Care Provider Office Phone # Fax #    Fuentes Darby [phone]       [facility] [address] [place] [address]        Equal Access to Services     : Hadii aad ku hadasho Soomaali, waaxda luqadaha, qaybta kaalmada adeegyamelanie, susannah wheeler . So [facility] [phone].    ATENCIÓN: Si habla español, tiene a ashby disposición servicios gratuitos de asistencia lingüística. Ll[facility] [phone].    We comply with applicable federal civil rights laws and [place] laws. We do not discriminate on the basis of race, color, national origin, age, disability, sex, sexual orientation, or gender identity.            Thank you!     Thank you for choosing [facility]  for your care. Our goal is always to provide you with excellent care. Hearing back from our patients is one way we can continue to improve our services. Please take a few minutes to complete the written survey that you may receive in the mail after your visit with us. Thank you!             Your Updated Medication List - Protect others around you: Learn how to safely use, store and throw away your medicines at www.disposemymeds.org.          This list is accurate as of: 10/19/17  5:40 PM.  Always use your most recent med list.                   Brand Name Dispense Instructions for use Diagnosis    ADVAIR DISKUS 250-50 MCG/DOSE diskus inhaler   Generic drug:  fluticasone-salmeterol     3 Inhaler    INHALE ONE PUFF BY MOUTH TWICE A DAY    Mild intermittent asthma without complication       albuterol 108 (90 BASE) MCG/ACT Inhaler    PROAIR HFA/PROVENTIL HFA/VENTOLIN HFA    1 Inhaler    Inhale 2 puffs into the lungs every 6  hours as needed for shortness of breath / dyspnea    Mild persistent asthma without complication       cetirizine 10 MG tablet    zyrTEC    30 tablet    Take 1 tablet (10 mg) by mouth every evening    Chronic seasonal allergic rhinitis due to pollen       fluticasone 50 MCG/ACT spray    FLONASE    1 Package    Spray 2 sprays into both nostrils daily    Chronic rhinitis       levothyroxine 50 MCG tablet    SYNTHROID/LEVOTHROID    90 tablet    Take 1 tablet (50 mcg) by mouth daily    Hypothyroidism due to acquired atrophy of thyroid       PARoxetine 25 MG 24 hr tablet    PAXIL-CR    90 tablet    Take 1 tablet (25 mg) by mouth every morning    Generalized anxiety disorder, Major depressive disorder, single episode, unspecified

## 2017-10-19 NOTE — TELEPHONE ENCOUNTER
See 10-24-16 Tel Enc.  (scanned in 12-2-16)  We faxed ok on strips 11-8-16 to [facility] 3-4 times daily for a years supply.      Was this form coming from somewhere else?   Left message patient home ph to call clinic.      PRINCESS Redmond      She probably needs an office visit, can do an acute next week.

## 2017-10-30 DIAGNOSIS — E03.4 HYPOTHYROIDISM DUE TO ACQUIRED ATROPHY OF THYROID: ICD-10-CM

## 2017-10-31 RX ORDER — LEVOTHYROXINE SODIUM 50 UG/1
TABLET ORAL
Qty: 90 TABLET | Refills: 3 | Status: SHIPPED | OUTPATIENT
Start: 2017-10-31 | End: 2018-10-29

## 2017-10-31 NOTE — TELEPHONE ENCOUNTER
Prescription approved per [facility] Refill Protocol.  Vera Barkley RN

## 2017-11-01 ENCOUNTER — MYC MEDICAL ADVICE (OUTPATIENT)
Dept: INTERNAL MEDICINE | Facility: CLINIC | Age: 62
End: 2017-11-01

## 2017-11-01 ENCOUNTER — TELEPHONE (OUTPATIENT)
Dept: BEHAVIORAL HEALTH | Facility: CLINIC | Age: 62
End: 2017-11-01

## 2017-11-01 NOTE — TELEPHONE ENCOUNTER
Phone Encounter   C made attempt to reach patient, by PCP request. LM for patient requesting a returned call and provided call back number.

## 2017-11-01 NOTE — TELEPHONE ENCOUNTER
Phone Encounter   Patient returned call to [facility]. She states that she was interested in finding support group for caregivers as she feels she has taken on this role with her sister. Patient reports that her sister owns the house they live in and her sister struggles with diabetes and depression. Patient reports that she has had to take on almost every task at home and she finds this frustrating and notices her anxiety and depression symptoms have increased, somewhat. Informed patient that she can contact [facility] as they have a family support group that was offered in [place]. Patient was provided the number to look into this, [phone]. Patient also asked about meeting with this writer to do some counseling and process everything that she has been coping with. Patient scheduled an appointment with this writer for 11/8/17.

## 2017-11-08 ENCOUNTER — OFFICE VISIT (OUTPATIENT)
Dept: BEHAVIORAL HEALTH | Facility: CLINIC | Age: 62
End: 2017-11-08
Payer: COMMERCIAL

## 2017-11-08 DIAGNOSIS — F41.1 GENERALIZED ANXIETY DISORDER: Primary | ICD-10-CM

## 2017-11-08 PROCEDURE — 90832 PSYTX W PT 30 MINUTES: CPT | Performed by: MARRIAGE & FAMILY THERAPIST

## 2017-11-08 ASSESSMENT — PATIENT HEALTH QUESTIONNAIRE - PHQ9
5. POOR APPETITE OR OVEREATING: SEVERAL DAYS
SUM OF ALL RESPONSES TO PHQ QUESTIONS 1-9: 6

## 2017-11-08 ASSESSMENT — ANXIETY QUESTIONNAIRES
3. WORRYING TOO MUCH ABOUT DIFFERENT THINGS: SEVERAL DAYS
6. BECOMING EASILY ANNOYED OR IRRITABLE: SEVERAL DAYS
7. FEELING AFRAID AS IF SOMETHING AWFUL MIGHT HAPPEN: SEVERAL DAYS
1. FEELING NERVOUS, ANXIOUS, OR ON EDGE: SEVERAL DAYS
5. BEING SO RESTLESS THAT IT IS HARD TO SIT STILL: NOT AT ALL
GAD7 TOTAL SCORE: 6
2. NOT BEING ABLE TO STOP OR CONTROL WORRYING: SEVERAL DAYS
IF YOU CHECKED OFF ANY PROBLEMS ON THIS QUESTIONNAIRE, HOW DIFFICULT HAVE THESE PROBLEMS MADE IT FOR YOU TO DO YOUR WORK, TAKE CARE OF THINGS AT HOME, OR GET ALONG WITH OTHER PEOPLE: SOMEWHAT DIFFICULT

## 2017-11-08 NOTE — Clinical Note
Hi Dr. Darby, I met with Lara last week. She states that she feels she is doing better and knows she can schedule with me, as needed. She plans to try a support group through [facility] as it is for family members of those with mental health issues. Thanks, Trisha

## 2017-11-08 NOTE — MR AVS SNAPSHOT
After Visit Summary   11/8/2017    Brissa Mayer    MRN: [id number]           Patient Information     Date Of Birth          1955        Visit Information        Provider Department      11/8/2017 2:00 PM Trisha Oconnell LMFT [facility]        Today's Diagnoses     Generalized anxiety disorder    -  1       Follow-ups after your visit        Who to contact     If you have questions or need follow up information about today's clinic visit or your schedule please contact [facility] directly at [phone].  Normal or non-critical lab and imaging results will be communicated to you by ThirdLovehart, letter or phone within 4 business days after the clinic has received the results. If you do not hear from us within 7 days, please contact the clinic through myFairPartnert or phone. If you have a critical or abnormal lab result, we will notify you by phone as soon as possible.  Submit refill requests through Addepar or call your pharmacy and they will forward the refill request to us. Please allow 3 business days for your refill to be completed.          Additional Information About Your Visit        MyChart Information     [facility] gives you secure access to your electronic health record. If you see a primary care provider, you can also send messages to your care team and make appointments. If you have questions, please call your primary care clinic.  If you do not have a primary care provider, please call [phone] and they will assist you.        Care EveryWhere ID     This is your Care EveryWhere ID. This could be used by other organizations to access your [place] medical records  [id number]         Blood Pressure from Last 3 Encounters:   09/27/17 122/78   07/24/17 98/66   10/10/16 104/76    Weight from Last 3 Encounters:   09/27/17 56.2 kg (123 lb 12.8 oz)   07/24/17 54.4 kg (120 lb)   10/10/16 50.3 kg (111 lb)              Today, you had the following     No orders  found for display       Primary Care Provider Office Phone # Fax #    Fuentes Darby [phone]       [facility] [address] [place] [address]        Equal Access to Services     PILAR MG : Hadii aad ku hadasho Soomaali, waaxda luqadaha, qaybta kaalmada adeegyada, waxjo ninain bintan gladys bernard laEsvindeidre mcnair. So [facility] [phone].    ATENCIÓN: Si habla español, tiene a ashby disposición servicios gratuitos de asistencia lingüística. Llame al [phone].    We comply with applicable federal civil rights laws and [place] laws. We do not discriminate on the basis of race, color, national origin, age, disability, sex, sexual orientation, or gender identity.            Thank you!     Thank you for choosing [facility]  for your care. Our goal is always to provide you with excellent care. Hearing back from our patients is one way we can continue to improve our services. Please take a few minutes to complete the written survey that you may receive in the mail after your visit with us. Thank you!             Your Updated Medication List - Protect others around you: Learn how to safely use, store and throw away your medicines at www.disposemymeds.org.          This list is accurate as of: 11/8/17 11:59 PM.  Always use your most recent med list.                   Brand Name Dispense Instructions for use Diagnosis    ADVAIR DISKUS 250-50 MCG/DOSE diskus inhaler   Generic drug:  fluticasone-salmeterol     3 Inhaler    INHALE ONE PUFF BY MOUTH TWICE A DAY    Mild intermittent asthma without complication       albuterol 108 (90 BASE) MCG/ACT Inhaler    PROAIR HFA/PROVENTIL HFA/VENTOLIN HFA    1 Inhaler    Inhale 2 puffs into the lungs every 6 hours as needed for shortness of breath / dyspnea    Mild persistent asthma without complication       cetirizine 10 MG tablet    zyrTEC    30 tablet    Take 1 tablet (10 mg) by mouth every evening    Chronic seasonal allergic rhinitis  due to pollen       fluticasone 50 MCG/ACT spray    FLONASE    1 Package    Spray 2 sprays into both nostrils daily    Chronic rhinitis       levothyroxine 50 MCG tablet    SYNTHROID/LEVOTHROID    90 tablet    TAKE ONE TABLET BY MOUTH DAILY    Hypothyroidism due to acquired atrophy of thyroid       PARoxetine 25 MG 24 hr tablet    PAXIL-CR    90 tablet    Take 1 tablet (25 mg) by mouth every morning    Generalized anxiety disorder, Major depressive disorder, single episode, unspecified

## 2017-11-08 NOTE — PROGRESS NOTES
[facility]3]  November 8, 2017      Behavioral Health Clinician Progress Note    Patient Name: Brissa Mayer           Service Type:  Individual      Service Location:   Face to Face in Clinic     Session Start Time: 2:10  Session End Time: 2:40      Session Length: 16 - 37      Attendees: Patient    Visit Activities (Refresh list every visit): [facility] Only    Diagnostic Assessment Date: 11/22/2016   Treatment Plan Review Date: due 3rd visit  See Flowsheets for today's PHQ-9 and JOCELINE-7 results  Previous PHQ-9:   PHQ-9 SCORE 11/22/2016 3/30/2017 9/27/2017   Total Score - - -   Total Score 5 0 3     Previous JOCELINE-7:   JOCELINE-7 SCORE 10/12/2015 11/22/2016 3/30/2017   Total Score - - -   Total Score 0 7 1       AMALIA LEVEL:  AMALIA Score (Last Two) 1/17/2011 10/23/2012   AMALIA Raw Score 48 51   Activation Score 80 91.6   AMALIA Level 4 4       DATA  Extended Session (60+ minutes): No  Interactive Complexity: No  Crisis: No  [facility] Patient: No    Treatment Objective(s) Addressed in This Session:  Target Behavior(s): anxiety, relationships    Anxiety: will experience a reduction in anxiety, will develop more effective coping skills to manage anxiety symptoms, will develop healthy cognitive patterns and beliefs and will increase ability to function adaptively  Relationship Problems: will address relationship difficulties in a more adaptive manner    Current Stressors / Issues:  Patient reports that there has been a lot going on. She is experiencing stress with her sister and dynamics at home. She states that bills weren't being paid. Patient is having to use part of her pension to get caught up. She and her sister have agreed for her to take over the finances and her sister will be giving her money to contribute. Patient states that she contacted [facility] and will try attending the support group in [place] for family members of those with mental illness. Reinforced patient attending this group for additional  support.    Patient talked about increased stress at work. She states that she has talked things through with her supervisor and she feels better with how things stand now. Processed recent events. Discussed interpersonal effectiveness skills and boundary setting in the workplace.      Progress on Treatment Objective(s) / Homework:  Minimal progress - ACTION (Actively working towards change); Intervened by reinforcing change plan / affirming steps taken    Motivational Interviewing    MI Intervention: Expressed Empathy/Understanding, Supported Autonomy, Collaboration, Evocation, Permission to raise concern or advise, Open-ended questions, Reflections: simple and complex, Change talk (evoked) and Reframe     Change Talk Expressed by the Patient: Desire to change Ability to change Reasons to change Need to change Committment to change Activation Taking steps    Provider Response to Change Talk: E - Evoked more info from patient about behavior change, A - Affirmed patient's thoughts, decisions, or attempts at behavior change, R - Reflected patient's change talk and S - Summarized patient's change talk statements    Also provided psychoeducation about behavioral health condition, symptoms, and treatment options      Care Plan review completed: Yes    Medication Review:No changes to current psychiatric medication(s)    Medication Compliance:  Yes    Changes in Health Issues:   None reported    Chemical Use Review:   Substance Use: Chemical use reviewed, no active concerns identified      Tobacco Use: No current tobacco use.      Assessment: Current Emotional / Mental Status (status of significant symptoms):  Risk status (Self / Other harm or suicidal ideation)  Patient denies a history of suicidal ideation, suicide attempts, self-injurious behavior, homicidal ideation, homicidal behavior and and other safety concerns  Patient denies current fears or concerns for personal safety.  Patient denies current or recent suicidal  ideation or behaviors.  Patient denies current or recent homicidal ideation or behaviors.  Patient denies current or recent self injurious behavior or ideation.  Patient denies other safety concerns.  A safety and risk management plan has not been developed at this time, however patient was encouraged to call [place] [phone] should there be a change in any of these risk factors.    Appearance:   Appropriate   Eye Contact:   Good   Psychomotor Behavior: Normal   Attitude:   Cooperative   Orientation:   All  Speech   Rate / Production: Normal    Volume:  Normal   Mood:    Anxious  Normal  Affect:    Appropriate   Thought Content:  Clear   Thought Form:  Coherent  Logical   Insight:    Good     Diagnoses:  1. Generalized anxiety disorder        Collateral Reports Completed:  Routed note to PCP    Plan: (Homework, other):  Patient was given information about behavioral services and encouraged to schedule a follow up appointment with the clinic [facility] as needed.  She was also given information about mental health symptoms and treatment options  and Cognitive Behavioral Therapy skills to practice when experiencing anxiety.  CD Recommendations: No indications of CD issues. Patient plans to start a support group through [facility]. DC [place], [facility]

## 2017-11-09 ASSESSMENT — ANXIETY QUESTIONNAIRES: GAD7 TOTAL SCORE: 6

## 2017-12-06 ENCOUNTER — NURSE ONLY (OUTPATIENT)
Dept: LAB | Age: 62
End: 2017-12-06
Payer: OTHER GOVERNMENT

## 2017-12-06 DIAGNOSIS — Z23 NEED FOR PROPHYLACTIC VACCINATION AND INOCULATION AGAINST INFLUENZA: Primary | ICD-10-CM

## 2017-12-06 PROCEDURE — 90471 IMMUNIZATION ADMIN: CPT | Performed by: FAMILY MEDICINE

## 2017-12-06 PROCEDURE — 90686 IIV4 VACC NO PRSV 0.5 ML IM: CPT | Performed by: FAMILY MEDICINE

## 2017-12-06 NOTE — PROGRESS NOTES
Have you had an allergic reaction to the flu (influenza) shot? no  Are you allergic to eggs or any component of the flu vaccine? no  Do you have a history of Guillain-Ansonia Syndrome (GBS), which is paralysis after receiving the flu vaccine? no  Are you feeling well today? yes  Flu vaccine given as ordered. Patient tolerated it well. No questions re: VIS information.

## 2018-03-06 ENCOUNTER — TELEPHONE (OUTPATIENT)
Dept: INTERNAL MEDICINE | Facility: CLINIC | Age: 63
End: 2018-03-06

## 2018-03-06 DIAGNOSIS — J11.1 INFLUENZA-LIKE SYNDROME: Primary | ICD-10-CM

## 2018-03-06 RX ORDER — OSELTAMIVIR PHOSPHATE 75 MG/1
75 CAPSULE ORAL 2 TIMES DAILY
Qty: 10 CAPSULE | Refills: 0 | Status: SHIPPED | OUTPATIENT
Start: 2018-03-06 | End: 2018-05-23

## 2018-03-06 NOTE — TELEPHONE ENCOUNTER
RN called pt to inform her that DR. Darby has approved the Kathleen-Flu for her. RX has been sent to the pharmacy for her. She may pick that up.......LAQUITA Jack

## 2018-03-06 NOTE — TELEPHONE ENCOUNTER
Reason for call:  Patient reporting a symptom    Symptom or request: Flu - bodyaches, fever, Chills, etc...    Duration (how long have symptoms been present): na    Have you been treated for this before? No    Additional comments: Pt requesting Tamiflu    Phone Number patient can be reached at:  Home number on file [phone] (home)    Best Time:  any    Can we leave a detailed message on this number:  YES    Call taken on 3/6/2018 at 8:27 AM by Janel Duarte

## 2018-03-06 NOTE — TELEPHONE ENCOUNTER
Influenza-Like Illness (ROMERO) Protocol    Brissa Mayer      Age: 63 year old     YOB: 1955    Are you currently sick or have you had close contact with someone who is currently sick?   Yes, this patient is currently sick. Wondering if has the influenza? Aches all over, S. T. KHAN, stomach upset. Fatigue, nausea.  NO cough. UNKNOWN temp. Day 2 of Sx. Asking for KRISTINA-FLU.  [facility]      Adult Clinical Evaluation    Is this patient experiencing ANY of the following?  Unconsciousness or unresponsiveness No   Difficulty breathing or swallowing No   Blue or dusky lips, skin, or nail beds No   Chest pain No   Severe confusion or delirium No   Seizure activity: ongoing or stopped No   Severe dehydration or signs of shock NO   Patient sounds very sick on the phone Yes, she sounds tired and congested in the nose.      Is this patient experiencing ANY of the following?  Fever > 104 or shaking chills No   Wheezing with minimal response to usual wheezing medications or new wheezing NO   Repeated vomiting or diarrhea with signs of dehydration (no urination within last 12 hours) No   Flu-like symptoms that initially improved but returned with fever and a worse cough No   Stiff or painful neck No   Severe headache HA, but NOT severe.      Does the patient have any of the following?  Measured fever > 100 degrees unknown   Chills or feels very warm to the touch Yes   Cough No   Sore throat Yes   Muscle/ body aches Yes   Headaches Yes   Fatigue (tiredness) Yes     Nursing Plan  Routed chart to provider to review and make recommendation of TamiFlu or not ( NO fever no cough. )   I had it about 6 years ago and didn't have any issues with it.  I use [facility]    Provided home care instructions    General home care instruction:      Avoid contact with people in your household who are at increased risk for more severe complications of influenza (such as pregnant women or people who have a  chronic health condition, for example diabetes, heart disease, asthma, or emphysema).    Stay home from work, school, childcare or other public places until your fever (37.8 degrees Celsius [100 degrees Fahrenheit]) has been gone for at least 24 hours, except to seek medical care. (Fever should be gone without the use of fever-reducing medications.) Use a surgical mask if available, or cover your mouth and nose with a tissue if possible if you need to seek medical care. Contact your work place, school, or  as they may have longer exclusion times.    You may continue to shed virus after your fever is gone. Limit your contact with high-risk individuals for 10 days after your symptoms started and be especially careful to cover your coughs/sneezes and wash your hands.    Cover your cough and wash your hands often, and especially after coughing, sneezing, blowing your nose.    Drink plenty of fluids (such as water, broth, sports drinks, electrolyte beverages for children) to prevent dehydration.    Avoid tobacco and second hand smoke.    Get plenty of rest.    Use over-the-counter pain relievers as needed per  instructions.    Do not give aspirin (acetylsalicylic acid) or products that contain aspirin (e.g. bismuth subsalicylate - Pepto Bismol) to children or teenagers 18 years or younger.    A small number of people with influenza do not have fever. If you have respiratory symptoms and are at increased risk for complications of influenza, contact your health care provider to discuss these symptoms.    For parents of infants:    If possible, only family members who are not sick should care for infants.    Wash your hands with soap and water, or an alcohol-based hand rub (if your hands are not visibly soiled) before caring for your infant.    Cover your mouth and nose with a tissue when coughing or sneezing, and clean your hands.    Contact a health care provider to discuss your illness within 1-2 days  if you are    Pregnant    Immunocompromised    Call 911 if you experience:    Difficulty breathing or shortness of breath    Pain or pressure in the chest    Confusion or less responsive than normal    Seizure activity: ongoing or stopped    Severe dehydration or signs of shock     Blue or dusky lips, skin, or nail beds    If further questions/concerns or if new symptoms develop, call your PCP or [facility] as soon as possible.    When to seek medical attention    Contact your health care provider right away if you experience:    A painful sore throat accompanied by fever persists for more than 48 hours    Ear pain, sinus pain, persistent vomiting and/or diarrhea    Oral temperature greater than 104  Fahrenheit (40  Celsius)    Dehydration (e.g., mouth feeling dry, dizzy when sitting/standing, decreased urine output)    Severe or persistent vomiting; unable to keep fluids down    Improvement in flu-like symptoms (fever and cough or sore throat) but then return of fever and worse cough or sore throat    Not drinking enough fluid    Any other concerns not stated above      Additional educational resources include:    http://www.[place].com    http://www.cdc.gov/flu/  Colleen Jackson RN

## 2018-04-03 PROBLEM — F43.23 ADJUSTMENT DISORDER WITH MIXED ANXIETY AND DEPRESSED MOOD: Status: ACTIVE | Noted: 2018-04-03

## 2018-04-26 ENCOUNTER — TELEPHONE (OUTPATIENT)
Dept: INTERNAL MEDICINE | Facility: CLINIC | Age: 63
End: 2018-04-26

## 2018-04-26 NOTE — TELEPHONE ENCOUNTER
"Brissa Mayer is a 63 year old female who calls with abdominal pain.    NURSING ASSESSMENT:  Description:  Patient is reporting that for the past 3 weeks she has been having neck pain from repetitive work.  She was taking \"mass quantities\" of Ibuprofen for the past 3 weeks and knows she should not have done that due to Ibuprofen causing her stomach issues in the past.  She is reporting that the past 2 days she has stopped taking Ibuprofen, but started having severe pain that is getting worse every day.  She states she is vomiting stomach bile every morning and is having a lot of bloating and gas pain.  She is rating her pain at a constant 9/10.  She states she was thinking of going to the ED as she is in so much pain.  She is informed that if she is in that much pain, she should be seen in the ED.  Patient understands and agrees to this plan.     Onset/duration:  2 days  Precip. factors:  Ibuprofen excess  Associated symptoms:  See above  Improves/worsens symptoms:  worsening  Pain scale (0-10)   9/10  Last exam/Treatment:  9/27/17  Allergies:   Allergies   Allergen Reactions     Compazine      Anxiety     Flagyl [Metronidazole Hcl] Nausea and Vomiting       NURSING PLAN: Nursing advice to patient to ED for pain    RECOMMENDED DISPOSITION:  To ED, another person to drive   Will comply with recommendation: Yes  If further questions/concerns or if symptoms do not improve, worsen or new symptoms develop, call your PCP or [facility] as soon as possible.      Guideline used:  Abdominal Pain, Adult  Telephone Triage Protocols for Nurses, Fifth Edition, Yue Owens RN    "

## 2018-05-16 ENCOUNTER — TELEPHONE (OUTPATIENT)
Dept: INTERNAL MEDICINE | Facility: CLINIC | Age: 63
End: 2018-05-16

## 2018-05-16 DIAGNOSIS — J45.20 MILD INTERMITTENT ASTHMA WITHOUT COMPLICATION: ICD-10-CM

## 2018-05-16 NOTE — TELEPHONE ENCOUNTER
Reason for Call:  Other prescription    Detailed comments: Brissa states she needs her Advair filled ASAP as she is completely out.  She would like to pick this up tonight.    Brissa states she just called this refill in to Pharm    Phone Number Patient can be reached at: Home number on file [phone] (home)    Best Time: any    Can we leave a detailed message on this number? YES    Call taken on 5/16/2018 at 10:30 AM by Janel Duarte

## 2018-05-23 ENCOUNTER — OFFICE VISIT (OUTPATIENT)
Dept: INTERNAL MEDICINE | Facility: CLINIC | Age: 63
End: 2018-05-23
Payer: COMMERCIAL

## 2018-05-23 VITALS
HEART RATE: 74 BPM | SYSTOLIC BLOOD PRESSURE: 134 MMHG | BODY MASS INDEX: 21.71 KG/M2 | RESPIRATION RATE: 16 BRPM | HEIGHT: 62 IN | WEIGHT: 118 LBS | DIASTOLIC BLOOD PRESSURE: 84 MMHG | OXYGEN SATURATION: 100 % | TEMPERATURE: 97.5 F

## 2018-05-23 DIAGNOSIS — M21.611 BUNION, RIGHT: Primary | ICD-10-CM

## 2018-05-23 DIAGNOSIS — J45.20 MILD INTERMITTENT ASTHMA WITHOUT COMPLICATION: ICD-10-CM

## 2018-05-23 DIAGNOSIS — Z01.818 PREOP GENERAL PHYSICAL EXAM: ICD-10-CM

## 2018-05-23 DIAGNOSIS — F43.23 ADJUSTMENT DISORDER WITH MIXED ANXIETY AND DEPRESSED MOOD: ICD-10-CM

## 2018-05-23 DIAGNOSIS — E03.4 HYPOTHYROIDISM DUE TO ACQUIRED ATROPHY OF THYROID: ICD-10-CM

## 2018-05-23 LAB
ANION GAP SERPL CALCULATED.3IONS-SCNC: 7 MMOL/L (ref 3–14)
BUN SERPL-MCNC: 16 MG/DL (ref 7–30)
CALCIUM SERPL-MCNC: 8.8 MG/DL (ref 8.5–10.1)
CHLORIDE SERPL-SCNC: 104 MMOL/L (ref 94–109)
CO2 SERPL-SCNC: 29 MMOL/L (ref 20–32)
CREAT SERPL-MCNC: 0.61 MG/DL (ref 0.52–1.04)
ERYTHROCYTE [DISTWIDTH] IN BLOOD BY AUTOMATED COUNT: 13.3 % (ref 10–15)
GFR SERPL CREATININE-BSD FRML MDRD: >90 ML/MIN/1.7M2
GLUCOSE SERPL-MCNC: 85 MG/DL (ref 70–99)
HCT VFR BLD AUTO: 39.6 % (ref 35–47)
HGB BLD-MCNC: 12.8 G/DL (ref 11.7–15.7)
MCH RBC QN AUTO: 30.2 PG (ref 26.5–33)
MCHC RBC AUTO-ENTMCNC: 32.3 G/DL (ref 31.5–36.5)
MCV RBC AUTO: 93 FL (ref 78–100)
PLATELET # BLD AUTO: 203 10E9/L (ref 150–450)
POTASSIUM SERPL-SCNC: 3.8 MMOL/L (ref 3.4–5.3)
RBC # BLD AUTO: 4.24 10E12/L (ref 3.8–5.2)
SODIUM SERPL-SCNC: 140 MMOL/L (ref 133–144)
TSH SERPL DL<=0.005 MIU/L-ACNC: 1.41 MU/L (ref 0.4–4)
WBC # BLD AUTO: 3.8 10E9/L (ref 4–11)

## 2018-05-23 PROCEDURE — 93000 ELECTROCARDIOGRAM COMPLETE: CPT | Performed by: INTERNAL MEDICINE

## 2018-05-23 PROCEDURE — 80048 BASIC METABOLIC PNL TOTAL CA: CPT | Performed by: INTERNAL MEDICINE

## 2018-05-23 PROCEDURE — 36415 COLL VENOUS BLD VENIPUNCTURE: CPT | Performed by: INTERNAL MEDICINE

## 2018-05-23 PROCEDURE — 84443 ASSAY THYROID STIM HORMONE: CPT | Performed by: INTERNAL MEDICINE

## 2018-05-23 PROCEDURE — 85027 COMPLETE CBC AUTOMATED: CPT | Performed by: INTERNAL MEDICINE

## 2018-05-23 PROCEDURE — 99214 OFFICE O/P EST MOD 30 MIN: CPT | Performed by: INTERNAL MEDICINE

## 2018-05-23 ASSESSMENT — PAIN SCALES - GENERAL: PAINLEVEL: NO PAIN (0)

## 2018-05-23 NOTE — MR AVS SNAPSHOT
After Visit Summary   5/23/2018    Brissa Mayer    MRN: [id number]           Patient Information     Date Of Birth          1955        Visit Information        Provider Department      5/23/2018 7:30 AM Fuentes Darby MD [facility]        Today's Diagnoses     Preop general physical exam    -  1      Care Instructions      Before Your Surgery      Call your surgeon if there is any change in your health. This includes signs of a cold or flu (such as a sore throat, runny nose, cough, rash or fever).    Do not smoke, drink alcohol or take over the counter medicine (unless your surgeon or primary care doctor tells you to) for the 24 hours before and after surgery.    If you take prescribed drugs: Follow your doctor s orders about which medicines to take and which to stop until after surgery.    Eating and drinking prior to surgery: follow the instructions from your surgeon    Take a shower or bath the night before surgery. Use the soap your surgeon gave you to gently clean your skin. If you do not have soap from your surgeon, use your regular soap. Do not shave or scrub the surgery site.  Wear clean pajamas and have clean sheets on your bed.           Follow-ups after your visit        Who to contact     If you have questions or need follow up information about today's clinic visit or your schedule please contact [facility] directly at [phone].  Normal or non-critical lab and imaging results will be communicated to you by MyChart, letter or phone within 4 business days after the clinic has received the results. If you do not hear from us within 7 days, please contact the clinic through MyChart or phone. If you have a critical or abnormal lab result, we will notify you by phone as soon as possible.  Submit refill requests through [employer] or call your pharmacy and they will forward the refill request to us. Please allow 3 business days for your refill to be  "completed.          Additional Information About Your Visit        [employer] or [employer] Information     [employer] gives you secure access to your electronic health record. If you see a primary care provider, you can also send messages to your care team and make appointments. If you have questions, please call your primary care clinic.  If you do not have a primary care provider, please call [phone] and they will assist you.        Care EveryWhere ID     This is your Care EveryWhere ID. This could be used by other organizations to access your [facility] medical records  [id number]        Your Vitals Were     Pulse Temperature Respirations Height Pulse Oximetry BMI (Body Mass Index)    74 97.5  F (36.4  C) (Temporal) 16 5' 1.75\" (1.568 m) 100% 21.76 kg/m2       Blood Pressure from Last 3 Encounters:   05/23/18 144/74   09/27/17 122/78   07/24/17 98/66    Weight from Last 3 Encounters:   05/23/18 118 lb (53.5 kg)   09/27/17 123 lb 12.8 oz (56.2 kg)   07/24/17 120 lb (54.4 kg)              Today, you had the following     No orders found for display       Primary Care Provider Office Phone # Fax #    Fuentes Darby [phone]       [address] [facility] [address]        Equal Access to Services     PILAR MG : Hadii aad ku hadasho Soomaali, waaxda luqadaha, qaybta kaalmada adeegyada, waxay idiin hayaan gladys kharaenrique wheeler . So [facility] [phone].    ATENCIÓN: Si habla español, tiene a ashby disposición servicios gratuitos de asistencia lingüística. Llame al [phone].    We comply with applicable federal civil rights laws and [place] laws. We do not discriminate on the basis of race, color, national origin, age, disability, sex, sexual orientation, or gender identity.            Thank you!     Thank you for choosing [facility]  for your care. Our goal is always to provide you with excellent care. Hearing back from our patients is one way we can continue to improve our services. " Please take a few minutes to complete the written survey that you may receive in the mail after your visit with us. Thank you!             Your Updated Medication List - Protect others around you: Learn how to safely use, store and throw away your medicines at www.disposemymeds.org.          This list is accurate as of 5/23/18  7:34 AM.  Always use your most recent med list.                   Brand Name Dispense Instructions for use Diagnosis    ADVAIR DISKUS 250-50 MCG/DOSE diskus inhaler   Generic drug:  fluticasone-salmeterol     1 Inhaler    INHALE ONE PUFF BY MOUTH TWICE A DAY    Mild intermittent asthma without complication       albuterol 108 (90 Base) MCG/ACT Inhaler    PROAIR HFA/PROVENTIL HFA/VENTOLIN HFA    1 Inhaler    Inhale 2 puffs into the lungs every 6 hours as needed for shortness of breath / dyspnea    Mild persistent asthma without complication       cetirizine 10 MG tablet    zyrTEC    30 tablet    Take 1 tablet (10 mg) by mouth every evening    Chronic seasonal allergic rhinitis due to pollen       fluticasone 50 MCG/ACT spray    FLONASE    1 Package    Spray 2 sprays into both nostrils daily    Chronic rhinitis       levothyroxine 50 MCG tablet    SYNTHROID/LEVOTHROID    90 tablet    TAKE ONE TABLET BY MOUTH DAILY    Hypothyroidism due to acquired atrophy of thyroid       PARoxetine 25 MG 24 hr tablet    PAXIL-CR    90 tablet    TAKE ONE TABLET BY MOUTH EVERY MORNING    Generalized anxiety disorder, Adjustment disorder with mixed anxiety and depressed mood

## 2018-05-23 NOTE — PROGRESS NOTES
[address] [address] [phone]  Dept: [phone]    PRE-OP EVALUATION:  Today's date: 2018    Brissa Mayer (: 1955) presents for pre-operative evaluation assessment as requested by Dr. Kline.  She requires evaluation and anesthesia risk assessment prior to undergoing surgery/procedure for treatment of right bunion .    Fax number for surgical facility: [phone]  Primary Physician: Fuentes Darby  Type of Anesthesia Anticipated:     Patient has a Health Care Directive or Living Will:  NO    Preop Questions 2018   Who is doing your surgery? [facility]   What are you having done? bunionectomy   Date of Surgery/Procedure: 2018   Facility or Hospital where procedure/surgery will be performed: [address]   1.  Do you have a history of Heart attack, stroke, stent, coronary bypass surgery, or other heart surgery? No   2.  Do you ever have any pain or discomfort in your chest? No   3.  Do you have a history of  Heart Failure? No   4.   Are you troubled by shortness of breath when:  walking on a level surface, or up a slight hill, or at night? No   5.  Do you currently have a cold, bronchitis or other respiratory infection? No   6.  Do you have a cough, shortness of breath, or wheezing? No   7.  Do you sometimes get pains in the calves of your legs when you walk? No   8. Do you or anyone in your family have previous history of blood clots? No   9.  Do you or does anyone in your family have a serious bleeding problem such as prolonged bleeding following surgeries or cuts? No   10. Have you ever had problems with anemia or been told to take iron pills? No   11. Have you had any abnormal blood loss such as black, tarry or bloody stools, or abnormal vaginal bleeding? No   12. Have you ever had a blood transfusion? No   13. Have you or any of your relatives ever had problems with anesthesia? No   14. Do you have sleep  apnea, excessive snoring or daytime drowsiness? No   15. Do you have any prosthetic heart valves? No   16. Do you have prosthetic joints? No   17. Is there any chance that you may be pregnant? No         HPI:     HPI related to upcoming procedure:bunion on the right foot, worse with jogging, painful        See problem list for active medical problems.  Problems all longstanding and stable, except as noted/documented.  See ROS for pertinent symptoms related to these conditions.                                                                                                                                                          .    MEDICAL HISTORY:     Patient Active Problem List    Diagnosis Date Noted     Adjustment disorder with mixed anxiety and depressed mood 04/03/2018     Priority: Medium     Gastroesophageal reflux disease without esophagitis 10/10/2016     Priority: Medium     Hypothyroidism 08/01/2016     Priority: Medium     Major depressive disorder, recurrent episode, mild (HCC) 01/22/2016     Priority: Medium     Generalized anxiety disorder 10/07/2013     Priority: Medium     Hyperlipidemia LDL goal <130 10/23/2012     Priority: Medium     Advanced directives, counseling/discussion 07/26/2011     Priority: Medium     Parent voices understanding and acceptance of this advice and will call back if any further questions or concerns.         CARDIOVASCULAR SCREENING; LDL GOAL LESS THAN 160 10/31/2010     Priority: Medium     Asthma, mild intermittent      Priority: Medium      Past Medical History:   Diagnosis Date     Anxiety      Asthma, mild intermittent      Migraines      Tension headaches      Unspecified hypothyroidism      Past Surgical History:   Procedure Laterality Date     BUNIONECTOMY       OTHER SURGICAL HISTORY  1980    Bunionectomy     Current Outpatient Prescriptions   Medication Sig Dispense Refill     ADVAIR DISKUS 250-50 MCG/DOSE diskus inhaler INHALE ONE PUFF BY MOUTH TWICE A DAY 1  "Inhaler 5     albuterol (PROAIR HFA, PROVENTIL HFA, VENTOLIN HFA) 108 (90 BASE) MCG/ACT inhaler Inhale 2 puffs into the lungs every 6 hours as needed for shortness of breath / dyspnea 1 Inhaler 12     cetirizine (ZYRTEC) 10 MG tablet Take 1 tablet (10 mg) by mouth every evening 30 tablet 1     fluticasone (FLONASE) 50 MCG/ACT nasal spray Spray 2 sprays into both nostrils daily 1 Package 1     levothyroxine (SYNTHROID/LEVOTHROID) 50 MCG tablet TAKE ONE TABLET BY MOUTH DAILY 90 tablet 3     PARoxetine (PAXIL-CR) 25 MG 24 hr tablet TAKE ONE TABLET BY MOUTH EVERY MORNING 90 tablet 0     [DISCONTINUED] simvastatin (ZOCOR) 20 MG tablet Take 1 tablet (20 mg) by mouth At Bedtime 90 tablet 3     OTC products: None, except as noted above    Allergies   Allergen Reactions     Compazine      Anxiety     Flagyl [Metronidazole Hcl] Nausea and Vomiting      Latex Allergy: NO    Social History   Substance Use Topics     Smoking status: Never Smoker     Smokeless tobacco: Never Used     Alcohol use Yes      Comment: Occasional wine 3X's /week     History   Drug Use No       REVIEW OF SYSTEMS:   Constitutional, neuro, ENT, endocrine, pulmonary, cardiac, gastrointestinal, genitourinary, musculoskeletal, integument and psychiatric systems are negative, except as otherwise noted.    EXAM:   /84  Pulse 74  Temp 97.5  F (36.4  C) (Temporal)  Resp 16  Ht 5' 1.75\" (1.568 m)  Wt 118 lb (53.5 kg)  SpO2 100%  BMI 21.76 kg/m2    GENERAL APPEARANCE: healthy, alert and no distress     HENT: ear canals and TM's normal and nose and mouth without ulcers or lesions     NECK: no adenopathy, no asymmetry, masses, or scars and thyroid normal to palpation     RESP: lungs clear to auscultation - no rales, rhonchi or wheezes     CV: regular rates and rhythm, normal S1 S2, no S3 or S4 and no murmur, click or rub     MS: extremities normal- no gross deformities noted, no evidence of inflammation in joints, FROM in all extremities.     SKIN: no " suspicious lesions or rashes     NEURO: Normal strength and tone, sensory exam grossly normal, mentation intact and speech normal    DIAGNOSTICS:   EKG: appears normal, NSR, normal axis, normal intervals, no acute ST/T changes c/w ischemia, no LVH by voltage criteria, unchanged from previous tracings    Recent Labs   Lab Test  12/16/15   1645  10/12/15   0917   HGB  12.5  13.7   PLT  195  223   NA  140  138   POTASSIUM  3.4  3.6   CR  0.62  0.44*         IMPRESSION:   Reason for surgery/procedure: bunion    The proposed surgical procedure is considered LOW risk.    REVISED CARDIAC RISK INDEX  The patient has the following serious cardiovascular risks for perioperative complications such as (MI, PE, VFib and 3  AV Block):  No serious cardiac risks  INTERPRETATION: 1 risks: Class II (low risk - 0.9% complication rate)    The patient has the following additional risks for perioperative complications:  No identified additional risks      ICD-10-CM    1. Preop general physical exam Z01.818        RECOMMENDATIONS:         --Patient is to take all scheduled medications on the day of surgery EXCEPT for modifications listed below.    APPROVAL GIVEN to proceed with proposed procedure, without further diagnostic evaluation       Signed Electronically by: Fuentes Darby MD    Copy of this evaluation report is provided to requesting physician.    [facility] Preop Guidelines    Revised Cardiac Risk Index

## 2018-05-24 ASSESSMENT — ASTHMA QUESTIONNAIRES: ACT_TOTALSCORE: 25

## 2018-06-06 ENCOUNTER — TELEPHONE (OUTPATIENT)
Dept: FAMILY MEDICINE | Facility: CLINIC | Age: 63
End: 2018-06-06

## 2018-06-06 NOTE — TELEPHONE ENCOUNTER
Reason for Call: Request for an order or referral:    Order or referral being requested: [facility] [place] [facility] stating that they need the pre-op information because the patient is having surgery tomorrow.     Date needed: as soon as possible    Has the patient been seen by the PCP for this problem? YES    Additional comments: please fax to [phone]    Phone number Patient can be reached at:  Home number on file [phone] (home)    Best Time:  any    Can we leave a detailed message on this number?  YES    Call taken on 6/6/2018 at 10:15 AM by Linda Hudson

## 2018-06-28 ENCOUNTER — TRANSFERRED RECORDS (OUTPATIENT)
Dept: HEALTH INFORMATION MANAGEMENT | Facility: CLINIC | Age: 63
End: 2018-06-28

## 2018-07-15 DIAGNOSIS — F41.1 GENERALIZED ANXIETY DISORDER: ICD-10-CM

## 2018-07-15 DIAGNOSIS — F43.23 ADJUSTMENT DISORDER WITH MIXED ANXIETY AND DEPRESSED MOOD: ICD-10-CM

## 2018-07-16 NOTE — TELEPHONE ENCOUNTER
"Requested Prescriptions   Pending Prescriptions Disp Refills     PARoxetine (PAXIL-CR) 25 MG 24 hr tablet [Pharmacy Med Name: PAROXETINE HCL ER 25MG TB24] 90 tablet 0    Last Written Prescription Date:  04/03/2018  Last Fill Quantity: 90,  # refills: 0   Last office visit: 5/23/2018 with prescribing provider:  05/23/2018   Future Office Visit:     Sig: TAKE ONE TABLET BY MOUTH EVERY MORNING    SSRIs Protocol Failed    7/15/2018  5:36 PM       Failed - Recent (12 mo) or future (30 days) visit within the authorizing provider's specialty    Patient had office visit in the last 12 months or has a visit in the next 30 days with authorizing provider or within the authorizing provider's specialty.  See \"Patient Info\" tab in inbasket, or \"Choose Columns\" in Meds & Orders section of the refill encounter.           Passed - Patient is age 18 or older       Passed - No active pregnancy on record       Passed - No positive pregnancy test in last 12 months          "

## 2018-07-18 RX ORDER — PAROXETINE HYDROCHLORIDE HEMIHYDRATE 25 MG/1
TABLET, FILM COATED, EXTENDED RELEASE ORAL
Qty: 90 TABLET | Refills: 0 | Status: SHIPPED | OUTPATIENT
Start: 2018-07-18 | End: 2018-10-24

## 2018-08-27 ENCOUNTER — TRANSFERRED RECORDS (OUTPATIENT)
Dept: HEALTH INFORMATION MANAGEMENT | Facility: CLINIC | Age: 63
End: 2018-08-27

## 2018-10-24 DIAGNOSIS — F41.1 GENERALIZED ANXIETY DISORDER: ICD-10-CM

## 2018-10-24 DIAGNOSIS — F43.23 ADJUSTMENT DISORDER WITH MIXED ANXIETY AND DEPRESSED MOOD: ICD-10-CM

## 2018-10-24 RX ORDER — PAROXETINE HYDROCHLORIDE HEMIHYDRATE 25 MG/1
TABLET, FILM COATED, EXTENDED RELEASE ORAL
Qty: 90 TABLET | Refills: 0 | Status: SHIPPED | OUTPATIENT
Start: 2018-10-24 | End: 2019-01-31

## 2018-10-24 NOTE — TELEPHONE ENCOUNTER
"paroxetine   Last Written Prescription Date:  07/18/2018  Last Fill Quantity: 90,  # refills: 0   Last office visit: 5/23/2018 with prescribing provider:     Future Office Visit:      Requested Prescriptions   Pending Prescriptions Disp Refills     PARoxetine (PAXIL-CR) 25 MG 24 hr tablet [Pharmacy Med Name: PAROXETINE HCL ER 25MG TB24] 90 tablet 0     Sig: TAKE ONE TABLET BY MOUTH EVERY MORNING    SSRIs Protocol Failed    10/24/2018 11:34 AM       Failed - Recent (12 mo) or future (30 days) visit within the authorizing provider's specialty    Patient had office visit in the last 12 months or has a visit in the next 30 days with authorizing provider or within the authorizing provider's specialty.  See \"Patient Info\" tab in inbasket, or \"Choose Columns\" in Meds & Orders section of the refill encounter.             Passed - Patient is age 18 or older       Passed - No active pregnancy on record       Passed - No positive pregnancy test in last 12 months        Prescription approved per [facility] Refill Protocol.  Vera Barkley RN on 10/24/2018 at 12:14 PM    "

## 2018-10-29 DIAGNOSIS — E03.4 HYPOTHYROIDISM DUE TO ACQUIRED ATROPHY OF THYROID: ICD-10-CM

## 2018-10-30 RX ORDER — LEVOTHYROXINE SODIUM 50 UG/1
TABLET ORAL
Qty: 90 TABLET | Refills: 1 | Status: SHIPPED | OUTPATIENT
Start: 2018-10-30 | End: 2019-05-01

## 2018-10-30 NOTE — TELEPHONE ENCOUNTER
"Prescription approved per RN refill protocol.  Trisha Graves RN, BSN        Synthroid   Last Written Prescription Date:  10/31/2017  Last Fill Quantity: 90,  # refills: 3   Last office visit: 5/23/2018 with prescribing provider:  5/23/2018   Future Office Visit:      Requested Prescriptions   Pending Prescriptions Disp Refills     levothyroxine (SYNTHROID/LEVOTHROID) 50 MCG tablet [Pharmacy Med Name: LEVOTHYROXINE SODIUM 50MCG TABS] 90 tablet 3     Sig: TAKE ONE TABLET BY MOUTH DAILY    Thyroid Protocol Passed    10/29/2018 11:09 AM       Passed - Patient is 12 years or older       Passed - Recent (12 mo) or future (30 days) visit within the authorizing provider's specialty    Patient had office visit in the last 12 months or has a visit in the next 30 days with authorizing provider or within the authorizing provider's specialty.  See \"Patient Info\" tab in inbasket, or \"Choose Columns\" in Meds & Orders section of the refill encounter.             Passed - Normal TSH on file in past 12 months    Recent Labs   Lab Test  05/23/18   0812   TSH  1.41             Passed - No active pregnancy on record    If patient is pregnant or has had a positive pregnancy test, please check TSH.         Passed - No positive pregnancy test in past 12 months    If patient is pregnant or has had a positive pregnancy test, please check TSH.          Trisha Graves RN on 10/30/2018 at 2:53 PM  "

## 2019-01-02 DIAGNOSIS — J45.30 MILD PERSISTENT ASTHMA WITHOUT COMPLICATION: ICD-10-CM

## 2019-01-03 NOTE — TELEPHONE ENCOUNTER
"Requested Prescriptions   Pending Prescriptions Disp Refills     VENTOLIN  (90 Base) MCG/ACT inhaler [Pharmacy Med Name: VENTOLIN HFA 108MCG/ACT AERS] 18 g 12    Last Written Prescription Date:  10/10/16  Last Fill Quantity: 1 inhaler,  # refills: 12   Last office visit: 5/23/2018 with prescribing provider:     Future Office Visit:     Sig: INHALE TWO PUFFS BY MOUTH EVERY 6 HOURS AS NEEDED FOR SHORTNESS OF BREATH/DYSPNEA    Asthma Maintenance Inhalers - Anticholinergics Failed - 1/2/2019  9:45 AM       Failed - Asthma control assessment score within normal limits in last 6 months    Please review ACT score.   ACT Total Scores 10/10/2016 7/24/2017 5/23/2018   ACT TOTAL SCORE - - -   ASTHMA ER VISITS - - -   ASTHMA HOSPITALIZATIONS - - -   ACT TOTAL SCORE (Goal Greater than or Equal to 20) 25 17 25   In the past 12 months, how many times did you visit the emergency room for your asthma without being admitted to the hospital? 0 0 0   In the past 12 months, how many times were you hospitalized overnight because of your asthma? 0 0 0            Failed - Recent (6 mo) or future (30 days) visit within the authorizing provider's specialty    Patient had office visit in the last 6 months or has a visit in the next 30 days with authorizing provider or within the authorizing provider's specialty.  See \"Patient Info\" tab in inbasket, or \"Choose Columns\" in Meds & Orders section of the refill encounter.           Passed - Patient is age 12 years or older      Routing refill request to provider for review/approval because:  Labs not current:  ACT    T'd up refill request for provider review.  Carol Ann Temple RN              "

## 2019-01-04 RX ORDER — ALBUTEROL SULFATE 90 UG/1
AEROSOL, METERED RESPIRATORY (INHALATION)
Qty: 18 G | Refills: 2 | Status: SHIPPED | OUTPATIENT
Start: 2019-01-04 | End: 2020-03-10

## 2019-01-10 ENCOUNTER — MYC MEDICAL ADVICE (OUTPATIENT)
Dept: INTERNAL MEDICINE | Facility: CLINIC | Age: 64
End: 2019-01-10

## 2019-01-10 DIAGNOSIS — E78.5 HYPERLIPIDEMIA LDL GOAL <130: Primary | ICD-10-CM

## 2019-01-14 LAB — TSH SERPL DL<=0.005 MIU/L-ACNC: 1.59 MU/L (ref 0.4–4)

## 2019-01-14 PROCEDURE — 84443 ASSAY THYROID STIM HORMONE: CPT | Performed by: INTERNAL MEDICINE

## 2019-01-14 PROCEDURE — 36415 COLL VENOUS BLD VENIPUNCTURE: CPT | Performed by: INTERNAL MEDICINE

## 2019-01-20 DIAGNOSIS — E78.5 HYPERLIPIDEMIA LDL GOAL <130: ICD-10-CM

## 2019-01-24 ENCOUNTER — TRANSFERRED RECORDS (OUTPATIENT)
Dept: HEALTH INFORMATION MANAGEMENT | Facility: CLINIC | Age: 64
End: 2019-01-24

## 2019-01-31 DIAGNOSIS — F43.23 ADJUSTMENT DISORDER WITH MIXED ANXIETY AND DEPRESSED MOOD: ICD-10-CM

## 2019-01-31 DIAGNOSIS — F41.1 GENERALIZED ANXIETY DISORDER: ICD-10-CM

## 2019-01-31 RX ORDER — PAROXETINE HYDROCHLORIDE HEMIHYDRATE 25 MG/1
TABLET, FILM COATED, EXTENDED RELEASE ORAL
Qty: 30 TABLET | Refills: 0 | Status: SHIPPED | OUTPATIENT
Start: 2019-01-31 | End: 2019-03-04

## 2019-01-31 NOTE — TELEPHONE ENCOUNTER
"Requested Prescriptions   Pending Prescriptions Disp Refills     PARoxetine (PAXIL-CR) 25 MG 24 hr tablet [Pharmacy Med Name: PAROXETINE HCL ER 25MG TB24] 90 tablet 0    Last Written Prescription Date:  10/24/18  Last Fill Quantity: 90,  # refills: 0   Last office visit: 5/23/2018 with prescribing provider:     Future Office Visit:     Sig: TAKE ONE TABLET BY MOUTH EVERY MORNING    SSRIs Protocol Failed - 1/31/2019  8:20 AM       Failed - Recent (12 mo) or future (30 days) visit within the authorizing provider's specialty    Patient had office visit in the last 12 months or has a visit in the next 30 days with authorizing provider or within the authorizing provider's specialty.  See \"Patient Info\" tab in inbasket, or \"Choose Columns\" in Meds & Orders section of the refill encounter.    PHQ-9 score:    PHQ-9 SCORE 4/4/2018   PHQ-9 Total Score -   PHQ-9 Total Score MyChart 3 (Minimal depression)   PHQ-9 Total Score 3          Passed - Medication is active on med list       Passed - Patient is age 18 or older       Passed - No active pregnancy on record       Passed - No positive pregnancy test in last 12 months      Routing refill request to provider for review/approval because:  Labs not current:  PHQ9    T'd up 1 month for provider review.    Carol Ann Temple RN          "

## 2019-02-07 ENCOUNTER — NURSE TRIAGE (OUTPATIENT)
Dept: NURSING | Facility: CLINIC | Age: 64
End: 2019-02-07

## 2019-02-07 NOTE — TELEPHONE ENCOUNTER
Monday Lara had chest pain and feels sick.  Today chest does not hurt but has a head cold and chills.  Lara was feeling very faint in shower today.  Lara is coughing.  Lara is not able to sleep at night.  Lara states that she will drive to urgent care tomorrow after snowing has stopped.     Reason for Disposition    [1] Continuous (nonstop) coughing interferes with work or school AND [2] no improvement using cough treatment per Care Advice    Additional Information    Negative: Severe difficulty breathing (e.g., struggling for each breath, speaks in single words)    Negative: Bluish lips, tongue, or face now    Negative: [1] Difficulty breathing AND [2] exposure to flames, smoke, or fumes    Negative: [1] Stridor AND [2] difficulty breathing    Negative: Sounds like a life-threatening emergency to the triager    Negative: Chest pain  (Exception: MILD central chest pain, present only when coughing)    Negative: Difficulty breathing    Negative: Patient sounds very sick or weak to the triager    Negative: [1] Coughed up blood AND [2] > 1 tablespoon (15 ml) (Exception: blood-tinged sputum)    Negative: Fever > 103 F (39.4 C)    Negative: [1] Fever > 101 F (38.3 C) AND [2] age > 60    Negative: [1] Fever > 101 F (38.3 C) AND [2] bedridden (e.g., nursing home patient, CVA, chronic illness, recovering from surgery)    Negative: [1] Fever > 100.5 F (38.1 C) AND [2] diabetes mellitus or weak immune system (e.g., HIV positive, cancer chemo, splenectomy, organ transplant, chronic steroids)    Negative: Wheezing is present    Negative: SEVERE coughing spells (e.g., whooping sound after coughing, vomiting after coughing)    Protocols used: COUGH - ACUTE PRODUCTIVE-ADULT-

## 2019-02-08 ENCOUNTER — HOSPITAL ENCOUNTER (EMERGENCY)
Facility: CLINIC | Age: 64
Discharge: HOME | End: 2019-02-08
Attending: EMERGENCY MEDICINE | Admitting: EMERGENCY MEDICINE
Payer: COMMERCIAL

## 2019-02-08 ENCOUNTER — APPOINTMENT (OUTPATIENT)
Dept: GENERAL RADIOLOGY | Facility: CLINIC | Age: 64
End: 2019-02-08
Attending: EMERGENCY MEDICINE
Payer: COMMERCIAL

## 2019-02-08 VITALS
BODY MASS INDEX: 25.63 KG/M2 | WEIGHT: 139 LBS | HEART RATE: 82 BPM | OXYGEN SATURATION: 99 % | TEMPERATURE: 98.3 F | DIASTOLIC BLOOD PRESSURE: 66 MMHG | SYSTOLIC BLOOD PRESSURE: 111 MMHG

## 2019-02-08 DIAGNOSIS — D64.9 ANEMIA, UNSPECIFIED TYPE: ICD-10-CM

## 2019-02-08 DIAGNOSIS — J10.1 INFLUENZA A: ICD-10-CM

## 2019-02-08 LAB
ANION GAP SERPL CALCULATED.3IONS-SCNC: 7 MMOL/L (ref 3–14)
BASOPHILS # BLD AUTO: 0.1 10E9/L (ref 0–0.2)
BASOPHILS NFR BLD AUTO: 2 %
BUN SERPL-MCNC: 12 MG/DL (ref 7–30)
CALCIUM SERPL-MCNC: 8.2 MG/DL (ref 8.5–10.1)
CHLORIDE SERPL-SCNC: 107 MMOL/L (ref 94–109)
CO2 SERPL-SCNC: 25 MMOL/L (ref 20–32)
CREAT SERPL-MCNC: 0.56 MG/DL (ref 0.52–1.04)
DIFFERENTIAL METHOD BLD: ABNORMAL
EOSINOPHIL # BLD AUTO: 0.2 10E9/L (ref 0–0.7)
EOSINOPHIL NFR BLD AUTO: 5.3 %
ERYTHROCYTE [DISTWIDTH] IN BLOOD BY AUTOMATED COUNT: 13.9 % (ref 10–15)
FLUAV+FLUBV AG SPEC QL: NEGATIVE
FLUAV+FLUBV AG SPEC QL: POSITIVE
GFR SERPL CREATININE-BSD FRML MDRD: >90 ML/MIN/{1.73_M2}
GLUCOSE SERPL-MCNC: 111 MG/DL (ref 70–99)
HCT VFR BLD AUTO: 28.6 % (ref 35–47)
HGB BLD-MCNC: 9.3 G/DL (ref 11.7–15.7)
LACTATE BLD-SCNC: 0.7 MMOL/L (ref 0.7–2)
LYMPHOCYTES # BLD AUTO: 0.7 10E9/L (ref 0.8–5.3)
LYMPHOCYTES NFR BLD AUTO: 24.3 %
MCH RBC QN AUTO: 31.3 PG (ref 26.5–33)
MCHC RBC AUTO-ENTMCNC: 32.5 G/DL (ref 31.5–36.5)
MCV RBC AUTO: 96 FL (ref 78–100)
MONOCYTES # BLD AUTO: 0.6 10E9/L (ref 0–1.3)
MONOCYTES NFR BLD AUTO: 21.3 %
NEUTROPHILS # BLD AUTO: 1.4 10E9/L (ref 1.6–8.3)
NEUTROPHILS NFR BLD AUTO: 47.1 %
PLATELET # BLD AUTO: 193 10E9/L (ref 150–450)
POTASSIUM SERPL-SCNC: 3.6 MMOL/L (ref 3.4–5.3)
RBC # BLD AUTO: 2.97 10E12/L (ref 3.8–5.2)
SODIUM SERPL-SCNC: 139 MMOL/L (ref 133–144)
SPECIMEN SOURCE: ABNORMAL
WBC # BLD AUTO: 3 10E9/L (ref 4–11)

## 2019-02-08 PROCEDURE — 85025 COMPLETE CBC W/AUTO DIFF WBC: CPT | Performed by: EMERGENCY MEDICINE

## 2019-02-08 PROCEDURE — 83605 ASSAY OF LACTIC ACID: CPT | Performed by: EMERGENCY MEDICINE

## 2019-02-08 PROCEDURE — 99284 EMERGENCY DEPT VISIT MOD MDM: CPT | Mod: Z6 | Performed by: EMERGENCY MEDICINE

## 2019-02-08 PROCEDURE — 96361 HYDRATE IV INFUSION ADD-ON: CPT | Performed by: EMERGENCY MEDICINE

## 2019-02-08 PROCEDURE — 80048 BASIC METABOLIC PNL TOTAL CA: CPT | Performed by: EMERGENCY MEDICINE

## 2019-02-08 PROCEDURE — 25000128 H RX IP 250 OP 636: Performed by: EMERGENCY MEDICINE

## 2019-02-08 PROCEDURE — 87804 INFLUENZA ASSAY W/OPTIC: CPT | Performed by: EMERGENCY MEDICINE

## 2019-02-08 PROCEDURE — 99284 EMERGENCY DEPT VISIT MOD MDM: CPT | Mod: 25 | Performed by: EMERGENCY MEDICINE

## 2019-02-08 PROCEDURE — 96374 THER/PROPH/DIAG INJ IV PUSH: CPT | Performed by: EMERGENCY MEDICINE

## 2019-02-08 PROCEDURE — 71046 X-RAY EXAM CHEST 2 VIEWS: CPT | Mod: TC

## 2019-02-08 RX ORDER — KETOROLAC TROMETHAMINE 15 MG/ML
15 INJECTION, SOLUTION INTRAMUSCULAR; INTRAVENOUS ONCE
Status: COMPLETED | OUTPATIENT
Start: 2019-02-08 | End: 2019-02-08

## 2019-02-08 RX ORDER — SODIUM CHLORIDE 9 MG/ML
1000 INJECTION, SOLUTION INTRAVENOUS CONTINUOUS
Status: DISCONTINUED | OUTPATIENT
Start: 2019-02-08 | End: 2019-02-08 | Stop reason: HOSPADM

## 2019-02-08 RX ADMIN — SODIUM CHLORIDE 1000 ML: 9 INJECTION, SOLUTION INTRAVENOUS at 08:46

## 2019-02-08 RX ADMIN — KETOROLAC TROMETHAMINE 15 MG: 15 INJECTION, SOLUTION INTRAMUSCULAR; INTRAVENOUS at 08:47

## 2019-02-08 NOTE — ED AVS SNAPSHOT
[facility] Emergency Department  [address] [place] [address]  Phone:  [phone]  Fax:  [phone]                                    Brissa Mayer   MRN: [id number]    Department:  [facility] Emergency Department   Date of Visit:  2/8/2019           After Visit Summary Signature Page    I have received my discharge instructions, and my questions have been answered. I have discussed any challenges I see with this plan with the nurse or doctor.    ..........................................................................................................................................  Patient/Patient Representative Signature      ..........................................................................................................................................  Patient Representative Print Name and Relationship to Patient    ..................................................               ................................................  Date                                   Time    ..........................................................................................................................................  Reviewed by Signature/Title    ...................................................              ..............................................  Date                                               Time          22EPIC Rev 08/18

## 2019-02-08 NOTE — ED PROVIDER NOTES
History     Chief Complaint   Patient presents with     Cough     HPI  Brissamarco a Mayer is a 63 year old female who presents to the ER with a cough. Sunday started having chest congestion and aching body and generalized weakness. Progressively it has worsened with chest congestion, nasal congestion, head congestion. Took mucinex but it thought it was making her sick - was nauseated. Hurts to cough, blow nose. Had ear congestion.  Feels like she has a sinus and chest infection.  Tried to go to work on Thursday, but took a shower and almost passed out. Yesterday she tried shoveling but felt so weak, tired and nauseated. Beginning of the week thought she might have had a fever. Goes between chills and feeling hot. Has been using her ventalin which helped. Feels like she can't sleep at night due to breathing problems and congestion and cough. No vomiting.     Allergies:  Allergies   Allergen Reactions     Compazine      Anxiety     Flagyl [Metronidazole Hcl] Nausea and Vomiting       Problem List:    Patient Active Problem List    Diagnosis Date Noted     Adjustment disorder with mixed anxiety and depressed mood 04/03/2018     Priority: Medium     Gastroesophageal reflux disease without esophagitis 10/10/2016     Priority: Medium     Hypothyroidism 08/01/2016     Priority: Medium     Major depressive disorder, recurrent episode, mild (HCC) 01/22/2016     Priority: Medium     Generalized anxiety disorder 10/07/2013     Priority: Medium     Hyperlipidemia LDL goal <130 10/23/2012     Priority: Medium     Advanced directives, counseling/discussion 07/26/2011     Priority: Medium     Parent voices understanding and acceptance of this advice and will call back if any further questions or concerns.         CARDIOVASCULAR SCREENING; LDL GOAL LESS THAN 160 10/31/2010     Priority: Medium     Asthma, mild intermittent      Priority: Medium        Past Medical History:    Past Medical History:   Diagnosis Date     Anxiety       Asthma, mild intermittent      Migraines      Tension headaches      Unspecified hypothyroidism        Past Surgical History:    Past Surgical History:   Procedure Laterality Date     BUNIONECTOMY       OTHER SURGICAL HISTORY  1980    Bunionectomy       Family History:    Family History   Problem Relation Age of Onset     Asthma Mother      Diabetes Mother      Cancer - colorectal Maternal Grandmother      Heart Disease Father         MI at age 55     Prostate Cancer Father      Asthma Father      Asthma Sister      Obesity Sister        Social History:  Marital Status:  Single [1]  Social History     Tobacco Use     Smoking status: Never Smoker     Smokeless tobacco: Never Used   Substance Use Topics     Alcohol use: Yes     Comment: Occasional wine 3X's /week     Drug use: No        Medications:      ADVAIR DISKUS 250-50 MCG/DOSE diskus inhaler   cetirizine (ZYRTEC) 10 MG tablet   fluticasone (FLONASE) 50 MCG/ACT nasal spray   levothyroxine (SYNTHROID/LEVOTHROID) 50 MCG tablet   PARoxetine (PAXIL-CR) 25 MG 24 hr tablet   VENTOLIN  (90 Base) MCG/ACT inhaler         Review of Systems   All other systems reviewed and are negative.      Physical Exam   BP: 129/74  Pulse: 82  Heart Rate: 88  Temp: 98.3  F (36.8  C)  Weight: 63 kg (139 lb)  SpO2: 99 %      Physical Exam   Constitutional: She appears well-developed. No distress.   Thin   HENT:   Head: Normocephalic and atraumatic.   Nose: Nose normal.   Mouth/Throat: Oropharynx is clear and moist. No oropharyngeal exudate.   Eyes: Conjunctivae and EOM are normal. Right eye exhibits no discharge. Left eye exhibits no discharge. No scleral icterus.   Neck: Normal range of motion. Neck supple.   Cardiovascular: Normal rate and normal heart sounds. Exam reveals no gallop and no friction rub.   No murmur heard.  Pulmonary/Chest: Effort normal and breath sounds normal. No stridor. No respiratory distress.   Abdominal: Soft. She exhibits no distension. There is no  tenderness. There is no rebound.   Musculoskeletal: Normal range of motion. She exhibits no edema.   Neurological: She is alert. No cranial nerve deficit. Coordination normal.   Skin: Skin is warm and dry. No rash noted. She is not diaphoretic. No erythema. No pallor.   Psychiatric: She has a normal mood and affect. Her behavior is normal.   Nursing note and vitals reviewed.      ED Course        Procedures            Results for orders placed or performed during the hospital encounter of 02/08/19 (from the past 24 hour(s))   CBC with platelets differential   Result Value Ref Range    WBC 3.0 (L) 4.0 - 11.0 10e9/L    RBC Count 2.97 (L) 3.8 - 5.2 10e12/L    Hemoglobin 9.3 (L) 11.7 - 15.7 g/dL    Hematocrit 28.6 (L) 35.0 - 47.0 %    MCV 96 78 - 100 fl    MCH 31.3 26.5 - 33.0 pg    MCHC 32.5 31.5 - 36.5 g/dL    RDW 13.9 10.0 - 15.0 %    Platelet Count 193 150 - 450 10e9/L    % Neutrophils 47.1 %    % Lymphocytes 24.3 %    % Monocytes 21.3 %    % Eosinophils 5.3 %    % Basophils 2.0 %    Absolute Neutrophil 1.4 (L) 1.6 - 8.3 10e9/L    Absolute Lymphocytes 0.7 (L) 0.8 - 5.3 10e9/L    Absolute Monocytes 0.6 0.0 - 1.3 10e9/L    Absolute Eosinophils 0.2 0.0 - 0.7 10e9/L    Absolute Basophils 0.1 0.0 - 0.2 10e9/L    Diff Method Automated Method    Basic metabolic panel   Result Value Ref Range    Sodium 139 133 - 144 mmol/L    Potassium 3.6 3.4 - 5.3 mmol/L    Chloride 107 94 - 109 mmol/L    Carbon Dioxide 25 20 - 32 mmol/L    Anion Gap 7 3 - 14 mmol/L    Glucose 111 (H) 70 - 99 mg/dL    Urea Nitrogen 12 7 - 30 mg/dL    Creatinine 0.56 0.52 - 1.04 mg/dL    GFR Estimate >90 >60 mL/min/[1.73_m2]    GFR Estimate If Black >90 >60 mL/min/[1.73_m2]    Calcium 8.2 (L) 8.5 - 10.1 mg/dL   Lactic acid whole blood   Result Value Ref Range    Lactic Acid 0.7 0.7 - 2.0 mmol/L   Influenza A/B antigen   Result Value Ref Range    Influenza A/B Agn Specimen Nasal     Influenza A Positive (A) NEG^Negative    Influenza B Negative NEG^Negative    XR Chest 2 Views    Narrative    CHEST TWO VIEW   2/8/2019 10:08 AM     HISTORY: Cough, shortness of breath.    COMPARISON: Chest x-rays dated 9/6/2015.    FINDINGS:  The lungs are minimally hyperaerated with mild flattening  of the hemidiaphragms on the lateral view. Lungs are otherwise grossly  clear. Heart size, mediastinum and pulmonary vascularity are within  normal limits. No fracture.      Impression    IMPRESSION:  1. Minimal hyperaeration could represent reactive airways disease or  chronic obstructive pulmonary disease in the appropriate setting.  2. No other evidence of acute cardiopulmonary disease is seen.         Medications   ketorolac (TORADOL) injection 15 mg (15 mg Intravenous Given 2/8/19 8357)   0.9% sodium chloride BOLUS (0 mLs Intravenous Stopped 2/8/19 0202)       Assessments & Plan (with Medical Decision Making)  63-year-old female here with influenza.  There is no evidence for pneumonia.  She has been symptomatic for greater than 48 hours so would Tamiflu.  She was treated with IV fluids and Toradol with improvement of her symptoms.  Secondly she has a new anemia.  The leukopenia is not new.  There is no thrombocytopenia. she has been having dark stools but not black stools.  She takes ranitidine but has not been having epigastric pain.  I have advised her to switch to omeprazole and avoid any NSAIDs.  I have also asked her to follow-up with her primary care provider to repeat her hemoglobin and check in on this as she may be having a slow GI bleed.  She may need an endoscopy.  Return to ER precautions were discussed specifically.       I have reviewed the nursing notes.    I have reviewed the findings, diagnosis, plan and need for follow up with the patient.         Medication List      There are no discharge medications for this visit.         Final diagnoses:   Influenza A   Anemia, unspecified type       2/8/2019   [facility] EMERGENCY DEPARTMENT     Mustapha Navarro,  MD  02/08/19 1457

## 2019-02-08 NOTE — ED TRIAGE NOTES
Here with cough, chest congestion, and nasal drainage times six days. States she has body aches and may have had a fever earlier in the week.

## 2019-02-08 NOTE — DISCHARGE INSTRUCTIONS
Return to the ER if new or worsening symptoms.  Follow-up with your doctor regarding your anemia.  Take Prilosec daily.  Return to the ER if you have bloody stools or black stools.  Avoid nonsteroidal anti-inflammatory drugs such as ibuprofen, Aleve.

## 2019-02-28 ENCOUNTER — OFFICE VISIT (OUTPATIENT)
Dept: FAMILY MEDICINE | Facility: CLINIC | Age: 64
End: 2019-02-28
Payer: COMMERCIAL

## 2019-02-28 VITALS
HEART RATE: 82 BPM | OXYGEN SATURATION: 99 % | HEIGHT: 61 IN | SYSTOLIC BLOOD PRESSURE: 118 MMHG | BODY MASS INDEX: 25.47 KG/M2 | WEIGHT: 134.9 LBS | TEMPERATURE: 97.7 F | DIASTOLIC BLOOD PRESSURE: 62 MMHG | RESPIRATION RATE: 14 BRPM

## 2019-02-28 DIAGNOSIS — Z87.19 HISTORY OF MELENA: ICD-10-CM

## 2019-02-28 DIAGNOSIS — D64.9 ANEMIA, UNSPECIFIED TYPE: Primary | ICD-10-CM

## 2019-02-28 LAB
ERYTHROCYTE [DISTWIDTH] IN BLOOD BY AUTOMATED COUNT: 13.7 % (ref 10–15)
HCT VFR BLD AUTO: 30 % (ref 35–47)
HGB BLD-MCNC: 9.4 G/DL (ref 11.7–15.7)
MCH RBC QN AUTO: 28.7 PG (ref 26.5–33)
MCHC RBC AUTO-ENTMCNC: 31.3 G/DL (ref 31.5–36.5)
MCV RBC AUTO: 92 FL (ref 78–100)
PLATELET # BLD AUTO: 322 10E9/L (ref 150–450)
RBC # BLD AUTO: 3.27 10E12/L (ref 3.8–5.2)
WBC # BLD AUTO: 3.6 10E9/L (ref 4–11)

## 2019-02-28 PROCEDURE — 85027 COMPLETE CBC AUTOMATED: CPT | Performed by: FAMILY MEDICINE

## 2019-02-28 PROCEDURE — 99214 OFFICE O/P EST MOD 30 MIN: CPT | Performed by: FAMILY MEDICINE

## 2019-02-28 PROCEDURE — 36415 COLL VENOUS BLD VENIPUNCTURE: CPT | Performed by: FAMILY MEDICINE

## 2019-02-28 ASSESSMENT — MIFFLIN-ST. JEOR: SCORE: 1100.89

## 2019-02-28 NOTE — PROGRESS NOTES
SUBJECTIVE:   Brissa Mayer is a 63 year old female who presents to clinic today for the following health issues:      ED/UC Followup:    Facility:  [facility]  Date of visit: 2/8/19  Reason for visit: Cough  Current Status: states still feels tired, states feels better             Problem list and histories reviewed & adjusted, as indicated.  Additional history: as documented        Reviewed and updated as needed this visit by clinical staff  Tobacco  Allergies  Meds  Med Hx  Surg Hx  Fam Hx  Soc Hx      Reviewed and updated as needed this visit by Provider        SUBJECTIVE:  Brissa  is a 63 year old female who presents for: Follow-up of her emergency room visit 3 weeks ago.  She was seen for body aches weakness slight cough.  Found to be anemic.  In interviewing her it sounds as though she had episode where she passed out.  And felt real weak when shoveling.  She does say she had some black stools.  She is been under a lot of stress she is taking care of her sister who has very fragile health.  He is in for follow-up today and to check on her hemoglobin.  She is had somewhat of a leukopenia in the past.  She has not had any more.  She was started on Prilosec and feels better.  Had a colonoscopy in 2014 which showed polyps and was recommended a 3-5-year repeat colonoscopy for surveillance.    Past Medical History:   Diagnosis Date     Anxiety      Asthma, mild intermittent      Migraines      Tension headaches      Unspecified hypothyroidism      Past Surgical History:   Procedure Laterality Date     BUNIONECTOMY       OTHER SURGICAL HISTORY  1980    Bunionectomy     Social History     Tobacco Use     Smoking status: Never Smoker     Smokeless tobacco: Never Used   Substance Use Topics     Alcohol use: Yes     Comment: Occasional wine 3X's /week     Current Outpatient Medications   Medication Sig Dispense Refill     ADVAIR DISKUS 250-50 MCG/DOSE diskus inhaler INHALE ONE PUFF BY MOUTH TWICE A DAY 1  "Inhaler 5     cetirizine (ZYRTEC) 10 MG tablet Take 1 tablet (10 mg) by mouth every evening 30 tablet 1     fluticasone (FLONASE) 50 MCG/ACT nasal spray Spray 2 sprays into both nostrils daily 1 Package 1     levothyroxine (SYNTHROID/LEVOTHROID) 50 MCG tablet TAKE ONE TABLET BY MOUTH DAILY 90 tablet 1     PARoxetine (PAXIL-CR) 25 MG 24 hr tablet TAKE ONE TABLET BY MOUTH EVERY MORNING 30 tablet 0     VENTOLIN  (90 Base) MCG/ACT inhaler INHALE TWO PUFFS BY MOUTH EVERY 6 HOURS AS NEEDED FOR SHORTNESS OF BREATH/DYSPNEA 18 g 2       REVIEW OF SYSTEMS:   5 point ROS negative except as noted above in HPI, including Gen., Resp, CV, GI &  system review.     OBJECTIVE:  Vitals: /62   Pulse 82   Temp 97.7  F (36.5  C) (Temporal)   Resp 14   Ht 1.56 m (5' 1.42\")   Wt 61.2 kg (134 lb 14.4 oz)   SpO2 99%   BMI 25.14 kg/m    BMI= Body mass index is 25.14 kg/m .  Appears well.  Eyes are normal.  Neck supple.  Lungs are clear.  Heart regular rhythm rate about 80.  Extremities normal.  Skin is a little pale.  Hemoglobin 9.4.  It was 9.3  3 weeks ago when she left the emergency room.    ASSESSMENT:  #1 anemia #2 melanotic stools    PLAN:  We will set her up for an EGD and colonoscopy.  To continue to avoid nonsteroidals will continue on Prilosec.  Follow-up with her primary following the results of these.  Discussed her lab today her hemoglobin is stable.  Her vitals are stable.        Fabrizio Barkley MD  [facility]            "

## 2019-03-01 ENCOUNTER — TELEPHONE (OUTPATIENT)
Dept: INTERNAL MEDICINE | Facility: CLINIC | Age: 64
End: 2019-03-01

## 2019-03-01 NOTE — LETTER
[address] [id number] [phone]        March 1, 2019    Brissa Mayer  [address] [facility] [address]

## 2019-03-01 NOTE — LETTER

## 2019-03-01 NOTE — TELEPHONE ENCOUNTER
Date of colonoscopy/EGD: 4/2  Surgeon: Dr. Cherry  Prep:Miralax  Packet:Colonoscopy/EGD instructions mailed to patient's home address.   Date: 3/1/2019      Surgery Scheduler

## 2019-03-04 DIAGNOSIS — F41.1 GENERALIZED ANXIETY DISORDER: ICD-10-CM

## 2019-03-04 DIAGNOSIS — F43.23 ADJUSTMENT DISORDER WITH MIXED ANXIETY AND DEPRESSED MOOD: ICD-10-CM

## 2019-03-06 RX ORDER — PAROXETINE HYDROCHLORIDE HEMIHYDRATE 25 MG/1
25 TABLET, FILM COATED, EXTENDED RELEASE ORAL EVERY MORNING
Qty: 30 TABLET | Refills: 0 | Status: SHIPPED | OUTPATIENT
Start: 2019-03-06 | End: 2019-04-07

## 2019-03-06 NOTE — TELEPHONE ENCOUNTER
"Requested Prescriptions   Pending Prescriptions Disp Refills     PARoxetine (PAXIL-CR) 25 MG 24 hr tablet [Pharmacy Med Name: PAROXETINE HCL ER 25MG TB24] 30 tablet 0    Last Written Prescription Date:  1/31/19  Last Fill Quantity: 30,  # refills: 0   Last office visit: 5/23/2018 with prescribing provider:     Future Office Visit:   Next 5 appointments (look out 90 days)    Mar 15, 2019  7:00 AM CDT  PHYSICAL with Fuentes Darby MD  [facility] ([address] [address] [phone]          Sig: TAKE ONE TABLET BY MOUTH EVERY MORNING    SSRIs Protocol Passed - 3/4/2019 11:08 AM       Passed - Recent (12 mo) or future (30 days) visit within the authorizing provider's specialty    Patient had office visit in the last 12 months or has a visit in the next 30 days with authorizing provider or within the authorizing provider's specialty.  See \"Patient Info\" tab in inbasket, or \"Choose Columns\" in Meds & Orders section of the refill encounter.      PHQ-9 score:    PHQ-9 SCORE 4/4/2018   PHQ-9 Total Score -   PHQ-9 Total Score MyChart 3 (Minimal depression)   PHQ-9 Total Score 3          Passed - Medication is active on med list       Passed - Patient is age 18 or older       Passed - No active pregnancy on record       Passed - No positive pregnancy test in last 12 months      Routing refill request to provider for review/approval because:  Patient needs to be seen because:  Follow up on this diagnosis is 6 months.    T'd up 1 month for provider review.    Will forward to schedulers to schedule patient for follow up office visit.  Carol Ann Temple RN            "

## 2019-04-01 ENCOUNTER — ANESTHESIA EVENT (OUTPATIENT)
Dept: GASTROENTEROLOGY | Facility: CLINIC | Age: 64
End: 2019-04-01
Payer: COMMERCIAL

## 2019-04-01 ENCOUNTER — TELEPHONE (OUTPATIENT)
Dept: INTERNAL MEDICINE | Facility: CLINIC | Age: 64
End: 2019-04-01

## 2019-04-01 NOTE — TELEPHONE ENCOUNTER
Prep mailed 3/1, the OR nurse called and spoke to patient on 3/28.  Radha in [place] was notified and will call patient.

## 2019-04-01 NOTE — TELEPHONE ENCOUNTER
Brissa has not had anyone call or give her instructions yet for her colonoscopy/endoscopy that she is having tomorrow.    Please call

## 2019-04-01 NOTE — ANESTHESIA PREPROCEDURE EVALUATION
Anesthesia Pre-Procedure Evaluation    Patient: Brissa Mayer   MRN: [id number] : 1955          Preoperative Diagnosis: Anemia   History of melena    Procedure(s):  ESOPHAGOSCOPY, GASTROSCOPY, DUODENOSCOPY (EGD)  COLONOSCOPY    Past Medical History:   Diagnosis Date     Anxiety      Asthma, mild intermittent      Migraines      Tension headaches      Unspecified hypothyroidism      Past Surgical History:   Procedure Laterality Date     BUNIONECTOMY       OTHER SURGICAL HISTORY      Bunionectomy       Anesthesia Evaluation     . Pt has had prior anesthetic. Type: General           ROS/MED HX    ENT/Pulmonary:     (+)Mild Persistent asthma , . .    Neurologic:     (+)migraines,     Cardiovascular:     (+) Dyslipidemia, ----. : . . . :. .       METS/Exercise Tolerance:  >4 METS   Hematologic:     (+) Anemia, -      Musculoskeletal:  - neg musculoskeletal ROS       GI/Hepatic:     (+) GERD       Renal/Genitourinary:         Endo:     (+) thyroid problem hypothyroidism, .      Psychiatric:     (+) psychiatric history anxiety and depression      Infectious Disease:  - neg infectious disease ROS       Malignancy:      - no malignancy   Other:    - neg other ROS                      Physical Exam  Normal systems: cardiovascular, pulmonary and dental    Airway   Mallampati: I  TM distance: >3 FB  Neck ROM: full    Dental     Cardiovascular   Rhythm and rate: regular and normal      Pulmonary    breath sounds clear to auscultation            Lab Results   Component Value Date    WBC 3.6 (L) 2019    HGB 9.4 (L) 2019    HCT 30.0 (L) 2019     2019    CRP <2.9 2015    SED 17 2010     2019    POTASSIUM 3.6 2019    CHLORIDE 107 2019    CO2 25 2019    BUN 12 2019    CR 0.56 2019     (H) 2019    NAY 8.2 (L) 2019    ALBUMIN 4.1 2015    PROTTOTAL 7.4 2015    ALT 20 2015    AST 19 2015    ALKPHOS  "64 12/16/2015    BILITOTAL 0.3 12/16/2015    LIPASE 135 12/16/2015    AMYLASE 84 04/01/2014    PTT 29 05/18/2005    INR 0.97 05/18/2005    TSH 1.59 01/14/2019    T4 1.12 02/23/2010       Preop Vitals  BP Readings from Last 3 Encounters:   02/28/19 118/62   02/08/19 111/66   05/23/18 134/84    Pulse Readings from Last 3 Encounters:   02/28/19 82   02/08/19 82   05/23/18 74      Resp Readings from Last 3 Encounters:   02/28/19 14   05/23/18 16   09/27/17 16    SpO2 Readings from Last 3 Encounters:   02/28/19 99%   02/08/19 99%   05/23/18 100%      Temp Readings from Last 1 Encounters:   02/28/19 97.7  F (36.5  C) (Temporal)    Ht Readings from Last 1 Encounters:   02/28/19 1.56 m (5' 1.42\")      Wt Readings from Last 1 Encounters:   02/28/19 61.2 kg (134 lb 14.4 oz)    Estimated body mass index is 25.14 kg/m  as calculated from the following:    Height as of 2/28/19: 1.56 m (5' 1.42\").    Weight as of 2/28/19: 61.2 kg (134 lb 14.4 oz).       Anesthesia Plan      History & Physical Review  History and physical reviewed and following examination; no interval change.    ASA Status:  2 .    NPO Status:  > 8 hours    Plan for MAC with Intravenous and Propofol induction. Reason for MAC:  Deep or markedly invasive procedure (G8)         Postoperative Care  Postoperative pain management:  IV analgesics.      Consents  Anesthetic plan, risks, benefits and alternatives discussed with:  Patient..                 HAZEL Barber CRNA  "

## 2019-04-02 ENCOUNTER — ANESTHESIA (OUTPATIENT)
Dept: GASTROENTEROLOGY | Facility: CLINIC | Age: 64
End: 2019-04-02
Payer: COMMERCIAL

## 2019-04-02 ENCOUNTER — HOSPITAL ENCOUNTER (OUTPATIENT)
Facility: CLINIC | Age: 64
Discharge: HOME | End: 2019-04-02
Attending: SURGERY | Admitting: SURGERY
Payer: COMMERCIAL

## 2019-04-02 VITALS
HEART RATE: 72 BPM | OXYGEN SATURATION: 99 % | SYSTOLIC BLOOD PRESSURE: 122 MMHG | DIASTOLIC BLOOD PRESSURE: 79 MMHG | TEMPERATURE: 98.1 F | RESPIRATION RATE: 18 BRPM

## 2019-04-02 LAB
COLONOSCOPY: NORMAL
UPPER GI ENDOSCOPY: NORMAL

## 2019-04-02 PROCEDURE — 25800030 ZZH RX IP 258 OP 636: Performed by: NURSE ANESTHETIST, CERTIFIED REGISTERED

## 2019-04-02 PROCEDURE — 37000008 ZZH ANESTHESIA TECHNICAL FEE, 1ST 30 MIN: Performed by: SURGERY

## 2019-04-02 PROCEDURE — 25000125 ZZHC RX 250: Performed by: SURGERY

## 2019-04-02 PROCEDURE — 25000125 ZZHC RX 250: Performed by: NURSE ANESTHETIST, CERTIFIED REGISTERED

## 2019-04-02 PROCEDURE — 37000009 ZZH ANESTHESIA TECHNICAL FEE, EACH ADDTL 15 MIN: Performed by: SURGERY

## 2019-04-02 PROCEDURE — 45385 COLONOSCOPY W/LESION REMOVAL: CPT | Performed by: SURGERY

## 2019-04-02 PROCEDURE — 43239 EGD BIOPSY SINGLE/MULTIPLE: CPT | Mod: 51 | Performed by: SURGERY

## 2019-04-02 PROCEDURE — 25000128 H RX IP 250 OP 636: Performed by: NURSE ANESTHETIST, CERTIFIED REGISTERED

## 2019-04-02 PROCEDURE — 43239 EGD BIOPSY SINGLE/MULTIPLE: CPT | Performed by: SURGERY

## 2019-04-02 PROCEDURE — 88305 TISSUE EXAM BY PATHOLOGIST: CPT | Performed by: SURGERY

## 2019-04-02 PROCEDURE — 88305 TISSUE EXAM BY PATHOLOGIST: CPT | Mod: 26 | Performed by: SURGERY

## 2019-04-02 RX ORDER — SODIUM CHLORIDE, SODIUM LACTATE, POTASSIUM CHLORIDE, CALCIUM CHLORIDE 600; 310; 30; 20 MG/100ML; MG/100ML; MG/100ML; MG/100ML
INJECTION, SOLUTION INTRAVENOUS CONTINUOUS
Status: DISCONTINUED | OUTPATIENT
Start: 2019-04-02 | End: 2019-04-02 | Stop reason: HOSPADM

## 2019-04-02 RX ORDER — ONDANSETRON 2 MG/ML
4 INJECTION INTRAMUSCULAR; INTRAVENOUS EVERY 6 HOURS PRN
Status: DISCONTINUED | OUTPATIENT
Start: 2019-04-02 | End: 2019-04-02 | Stop reason: HOSPADM

## 2019-04-02 RX ORDER — ACETAMINOPHEN 325 MG/1
325-650 TABLET ORAL PRN
COMMUNITY
End: 2020-03-10 | Stop reason: ALTCHOICE

## 2019-04-02 RX ORDER — LIDOCAINE 40 MG/G
CREAM TOPICAL
Status: DISCONTINUED | OUTPATIENT
Start: 2019-04-02 | End: 2019-04-02 | Stop reason: HOSPADM

## 2019-04-02 RX ORDER — OMEGA-3 FATTY ACIDS/FISH OIL 300-1000MG
400 CAPSULE ORAL PRN
COMMUNITY
End: 2022-01-27

## 2019-04-02 RX ORDER — LIDOCAINE 40 MG/G
CREAM TOPICAL
Status: DISCONTINUED | OUTPATIENT
Start: 2019-04-02 | End: 2019-04-02

## 2019-04-02 RX ORDER — FLUMAZENIL 0.1 MG/ML
0.2 INJECTION, SOLUTION INTRAVENOUS
Status: DISCONTINUED | OUTPATIENT
Start: 2019-04-02 | End: 2019-04-02 | Stop reason: HOSPADM

## 2019-04-02 RX ORDER — PROPOFOL 10 MG/ML
INJECTION, EMULSION INTRAVENOUS CONTINUOUS PRN
Status: DISCONTINUED | OUTPATIENT
Start: 2019-04-02 | End: 2019-04-02

## 2019-04-02 RX ORDER — PROPOFOL 10 MG/ML
INJECTION, EMULSION INTRAVENOUS PRN
Status: DISCONTINUED | OUTPATIENT
Start: 2019-04-02 | End: 2019-04-02

## 2019-04-02 RX ORDER — LIDOCAINE HYDROCHLORIDE 20 MG/ML
INJECTION, SOLUTION INFILTRATION; PERINEURAL PRN
Status: DISCONTINUED | OUTPATIENT
Start: 2019-04-02 | End: 2019-04-02

## 2019-04-02 RX ORDER — ONDANSETRON 4 MG/1
4 TABLET, ORALLY DISINTEGRATING ORAL EVERY 6 HOURS PRN
Status: DISCONTINUED | OUTPATIENT
Start: 2019-04-02 | End: 2019-04-02 | Stop reason: HOSPADM

## 2019-04-02 RX ORDER — FERROUS GLUCONATE 324(38)MG
324 TABLET ORAL
COMMUNITY
End: 2019-11-21

## 2019-04-02 RX ORDER — ONDANSETRON 2 MG/ML
4 INJECTION INTRAMUSCULAR; INTRAVENOUS
Status: DISCONTINUED | OUTPATIENT
Start: 2019-04-02 | End: 2019-04-02 | Stop reason: HOSPADM

## 2019-04-02 RX ORDER — NALOXONE HYDROCHLORIDE 0.4 MG/ML
.1-.4 INJECTION, SOLUTION INTRAMUSCULAR; INTRAVENOUS; SUBCUTANEOUS
Status: DISCONTINUED | OUTPATIENT
Start: 2019-04-02 | End: 2019-04-02 | Stop reason: HOSPADM

## 2019-04-02 RX ADMIN — PROPOFOL 50 MG: 10 INJECTION, EMULSION INTRAVENOUS at 07:36

## 2019-04-02 RX ADMIN — SODIUM CHLORIDE, POTASSIUM CHLORIDE, SODIUM LACTATE AND CALCIUM CHLORIDE: 600; 310; 30; 20 INJECTION, SOLUTION INTRAVENOUS at 08:04

## 2019-04-02 RX ADMIN — PROPOFOL 150 MCG/KG/MIN: 10 INJECTION, EMULSION INTRAVENOUS at 07:31

## 2019-04-02 RX ADMIN — LIDOCAINE HYDROCHLORIDE 1 ML: 10 INJECTION, SOLUTION EPIDURAL; INFILTRATION; INTRACAUDAL; PERINEURAL at 07:18

## 2019-04-02 RX ADMIN — SODIUM CHLORIDE, POTASSIUM CHLORIDE, SODIUM LACTATE AND CALCIUM CHLORIDE: 600; 310; 30; 20 INJECTION, SOLUTION INTRAVENOUS at 07:18

## 2019-04-02 RX ADMIN — PROPOFOL 20 MG: 10 INJECTION, EMULSION INTRAVENOUS at 07:40

## 2019-04-02 RX ADMIN — LIDOCAINE HYDROCHLORIDE 50 MG: 20 INJECTION, SOLUTION INFILTRATION; PERINEURAL at 07:31

## 2019-04-02 RX ADMIN — PROPOFOL 20 MG: 10 INJECTION, EMULSION INTRAVENOUS at 07:45

## 2019-04-02 NOTE — H&P
Patient seen for Endoscopy    HPI:  Patient is a 64 year old female with anemia and melena. She's also had adenomas of her colon in the past as well. Not taking blood thinning medications. No MI or CVA history. No issues with previous sedation. No recent acute illness.    Review Of Systems    Skin: negative  Ears/Nose/Throat: negative  Respiratory: No shortness of breath, dyspnea on exertion, cough, or hemoptysis  Cardiovascular: negative  Gastrointestinal: negative  Genitourinary: negative  Musculoskeletal: negative  Neurologic: negative  Hematologic/Lymphatic/Immunologic: negative  Endocrine: negative      Past Medical History:   Diagnosis Date     Anxiety      Asthma, mild intermittent      Migraines      Tension headaches      Unspecified hypothyroidism        Past Surgical History:   Procedure Laterality Date     BUNIONECTOMY       OTHER SURGICAL HISTORY  1980    Bunionectomy       Family History   Problem Relation Age of Onset     Asthma Mother      Diabetes Mother      Cancer - colorectal Maternal Grandmother      Heart Disease Father         MI at age 55     Prostate Cancer Father      Asthma Father      Asthma Sister      Obesity Sister        Social History     Socioeconomic History     Marital status: Single     Spouse name: Not on file     Number of children: Not on file     Years of education: Not on file     Highest education level: Not on file   Occupational History     Not on file   Social Needs     Financial resource strain: Not on file     Food insecurity:     Worry: Not on file     Inability: Not on file     Transportation needs:     Medical: Not on file     Non-medical: Not on file   Tobacco Use     Smoking status: Never Smoker     Smokeless tobacco: Never Used   Substance and Sexual Activity     Alcohol use: Yes     Comment: Occasional wine 3X's /week     Drug use: No     Sexual activity: Not Currently   Lifestyle     Physical activity:     Days per week: Not on file     Minutes per session: Not  on file     Stress: Not on file   Relationships     Social connections:     Talks on phone: Not on file     Gets together: Not on file     Attends Sabianism service: Not on file     Active member of club or organization: Not on file     Attends meetings of clubs or organizations: Not on file     Relationship status: Not on file     Intimate partner violence:     Fear of current or ex partner: Not on file     Emotionally abused: Not on file     Physically abused: Not on file     Forced sexual activity: Not on file   Other Topics Concern     Parent/sibling w/ CABG, MI or angioplasty before 65F 55M? No   Social History Narrative     Not on file       No current outpatient medications on file.       Medications and history reviewed    Physical exam:  Vitals: /74   Temp 98.1  F (36.7  C) (Oral)   Resp 16   SpO2 98%   BMI= There is no height or weight on file to calculate BMI.    Constitutional: Healthy, alert, non-distressed   Head: Normo-cephalic, atraumatic, no lesions, masses or tenderness   Cardiovascular: RRR, no new murmurs, +S1, +S2 heart sounds, no clicks, rubs or gallops   Respiratory: CTAB, no rales, rhonchi or wheezing, equal chest rise, good respiratory effort   Gastrointestinal: Soft, non-tender, non distended, no rebound rigidity or guarding, no masses or hernias palpated   : Deferred  Musculoskeletal: Moves all extremities, normal  strength, no deformities noted   Skin: No suspicious lesions or rashes   Psychiatric: Mentation appears normal, affect appropriate   Hematologic/Lymphatic/Immunologic: Normal cervical and supraclavicular lymph nodes   Patient able to get up on table without difficulty.    Labs show:  No results found for this or any previous visit (from the past 24 hour(s)).    Assessment: Endoscopy  Plan: Pt cleared for anesthesia for proposed procedure.    Ochoa Cherry DO

## 2019-04-02 NOTE — ANESTHESIA POSTPROCEDURE EVALUATION
Patient: Brissa Mayer    Procedure(s):  ESOPHAGOSCOPY, GASTROSCOPY, DUODENOSCOPY (EGD)  COMBINED COLONOSCOPY, REMOVE TUMOR/POLYP/LESION BY SNARE    Diagnosis:Anemia   History of melena  Diagnosis Additional Information: No value filed.    Anesthesia Type:  MAC    Note:  Anesthesia Post Evaluation    Patient location during evaluation: Phase 2  Patient participation: Able to fully participate in evaluation  Level of consciousness: awake and alert  Pain management: adequate  Airway patency: patent  Cardiovascular status: acceptable  Respiratory status: acceptable  Hydration status: acceptable  PONV: none     Anesthetic complications: None          Last vitals:  Vitals:    04/02/19 0705 04/02/19 0820   BP: 123/74 122/79   Resp: 16 18   Temp: 98.1  F (36.7  C)    SpO2: 98% 99%         Electronically Signed By: HAZEL Barber CRNA  April 2, 2019  8:26 AM

## 2019-04-02 NOTE — DISCHARGE INSTRUCTIONS
[facility]    Home Care Following Endoscopy          Activity:    You have just undergone an endoscopic procedure usually performed with conscious sedation.  Do not work or operate machinery (including a car) for at least 12 hours.      I encourage you to walk and attempt to pass this air as soon as possible.    Diet:    Return to the diet you were on before your procedure but eat lightly for the first 12-24 hours.    Drink plenty of water.    Resume any regular medications unless otherwise advised by your physician.  Please begin any new medication prescribed as a result of your procedure as directed by your physician.     If you had any biopsy or polyp removed please refrain from aspirin or aspirin products for 2 days.  If on Coumadin please restart as instructed by your physician.   Pain:    You may take Tylenol as needed for pain.  Expected Recovery:    You can expect some mild abdominal fullness and/or discomfort due to the air used to inflate your intestinal tract. It is also normal to have a mild sore throat after upper endoscopy.    Call Your Physician if You Have:    After Upper Endoscopy:  o Shoulder, back or chest pain.  o Difficulty breathing or swallowing.  o Vomiting blood.    After Colonoscopy:  o Worsening persisting abdominal pain which is worse with activity.  o Fevers (>101 degrees F), chills or shakes.  o Passage of continued blood with bowel movements.   Any questions or concerns about your recovery, please call [phone] or after hours [phone] Nurse Advice Line.    Follow-up Care:    You should receive a call or letter with your results within 1 week. Please call if you have not received a notification of your results.  If asked to return to clinic please make an appointment 1 week after your procedure.  Call [phone].

## 2019-04-02 NOTE — ANESTHESIA CARE TRANSFER NOTE
Patient: Brissa Mayer    Procedure(s):  ESOPHAGOSCOPY, GASTROSCOPY, DUODENOSCOPY (EGD)  COMBINED COLONOSCOPY, REMOVE TUMOR/POLYP/LESION BY SNARE    Diagnosis: Anemia   History of melena  Diagnosis Additional Information: No value filed.    Anesthesia Type:   MAC     Note:  Airway :Room Air  Patient transferred to:Phase II  Handoff Report: Identifed the Patient, Identified the Reponsible Provider, Reviewed the pertinent medical history, Discussed the surgical course, Reviewed Intra-OP anesthesia mangement and issues during anesthesia, Set expectations for post-procedure period and Allowed opportunity for questions and acknowledgement of understanding      Vitals: (Last set prior to Anesthesia Care Transfer)    CRNA VITALS  4/2/2019 0746 - 4/2/2019 0822      4/2/2019             Pulse:  82    SpO2:  100 %                Electronically Signed By: HAZEL Barber CRNA  April 2, 2019  8:22 AM

## 2019-04-02 NOTE — LETTER
Brissa Mayer  [address] [address]    April 8, 2019      Dear Brissa,  This letter is to inform you of the results of your pathology report from your endoscopies.  Your pathology report was:  FINAL DIAGNOSIS:   A. Gastroesophageal junction biopsies:   - Benign squamous and gastric glandular mucosa consistent with   gastroesophageal junction.   - Negative for Salguero's mucosa, atypical cellular proliferations or   inflammation.     B. Ascending colon polyp:   - Tubular adenoma.   - Negative for high grade dysplasia or malignancy.  No concerning findings from your upper endoscopy.  In the colon we found one adenomatous polyp. These are benign polyps. These types of polyps do carry a small risk of developing into a cancer over time if not removed. Yours were completely removed at the time of your colonoscopy. You should have another surveillance colonoscopy in 5 years.  If you have further questions please don t hesitate to call our clinic at [phone].   Sincerely,     Ochoa Cherry, DO

## 2019-04-05 ENCOUNTER — TELEPHONE (OUTPATIENT)
Dept: INTERNAL MEDICINE | Facility: CLINIC | Age: 64
End: 2019-04-05

## 2019-04-05 LAB — COPATH REPORT: NORMAL

## 2019-04-05 NOTE — TELEPHONE ENCOUNTER
Reason for call:  Patient reporting a symptom    Symptom or request: cough, not feeling well, feeling sick like a URI    Duration (how long have symptoms been present): couple days    Have you been treated for this before? No    Additional comments: Pt had an endoscopy on Tuesday and has these symptoms since then and getting worse. Pt wants to talk about symptoms and see if this is normal or if she caught a bug? Please call and advise.     Phone Number patient can be reached at:  Home number on file [phone] (home)    Best Time:  anytime    Can we leave a detailed message on this number:  YES    Call taken on 4/5/2019 at 9:41 AM by Kimi Metz

## 2019-04-05 NOTE — TELEPHONE ENCOUNTER
Brissa Mayer is a 64 year old female who calls with chest congestion, nasal congestion and phlemy cough.  She is certain that it is a common cold.  No further quesitons answered. Reviewed cold care.    RECOMMENDED DISPOSITION:  Home care advice - Common Cold Symptoms  Home Care Instructions      Take pain reliever of choice for fever and discomfort.  Do not give ASA to children.      Rest    Drink 6-8 glasses of liquid daily, especially warm liquids such as tea with lemon or honey    Take decongestant of choice for cough.  For child >1 year of age, give 1/2 TSP lemon juice    Suction secretions from infant's nose with a soft rubber suction bulb    Use saline nose drops or spray as needed for nasal congestion.  Place three drops in each nostril and wait 1 minute, then attempt to blow nose or suction with rubber suction bulb.    Apply petroleum jelly to nasal opening to protect from irritation.    Use vaporizer or humidifier to keep air moist, especially at night, change the water daily.    If throat is sore, gargle several times a day with warm water.  Use frozen cough drops or hard candy or sip warm chicken broth for additional relief if age >4 years old.    Clear nose of child before breast or bottle feeding    Avoid smoking or second hand smoke exposure    Will comply with recommendation: Yes  If further questions/concerns or if symptoms do not improve, worsen or new symptoms develop, call your PCP or [facility] as soon as possible.      Guideline used:  Telephone Triage Protocols for Nurses, Fifth Edition, Yue Barkley RN

## 2019-04-07 DIAGNOSIS — F43.23 ADJUSTMENT DISORDER WITH MIXED ANXIETY AND DEPRESSED MOOD: ICD-10-CM

## 2019-04-07 DIAGNOSIS — F41.1 GENERALIZED ANXIETY DISORDER: ICD-10-CM

## 2019-04-09 NOTE — TELEPHONE ENCOUNTER
"Routing refill request to provider for review/approval because:  Evelin given x1 and patient did not follow up, please advise  Pt due for updated Phq-9     Paxil  Last Written Prescription Date:  3/6/2019  Last Fill Quantity: 30,  # refills: 0   Last office visit: 5/23/2018 with prescribing provider:  Wilner   Future Office Visit:    PHQ-9 SCORE 9/27/2017 11/8/2017 4/4/2018   PHQ-9 Total Score - - -   PHQ-9 Total Score MyChart - - 3 (Minimal depression)   PHQ-9 Total Score 3 6 3     Requested Prescriptions   Pending Prescriptions Disp Refills     PARoxetine (PAXIL-CR) 25 MG 24 hr tablet [Pharmacy Med Name: PAROXETINE HCL ER 25MG TB24] 30 tablet 0     Sig: TAKE ONE TABLET BY MOUTH EVERY MORNING ( DUE FOR OFFICE VISIT  BEFORE FURTHER REFILLS )       SSRIs Protocol Passed - 4/7/2019  9:25 AM        Passed - Recent (12 mo) or future (30 days) visit within the authorizing provider's specialty     Patient had office visit in the last 12 months or has a visit in the next 30 days with authorizing provider or within the authorizing provider's specialty.  See \"Patient Info\" tab in inbasket, or \"Choose Columns\" in Meds & Orders section of the refill encounter.              Passed - Medication is active on med list        Passed - Patient is age 18 or older        Passed - No active pregnancy on record        Passed - No positive pregnancy test in last 12 months          Trisha Graves RN on 4/9/2019 at 11:35 AM    "

## 2019-04-10 RX ORDER — PAROXETINE HYDROCHLORIDE HEMIHYDRATE 25 MG/1
TABLET, FILM COATED, EXTENDED RELEASE ORAL
Qty: 30 TABLET | Refills: 0 | Status: SHIPPED | OUTPATIENT
Start: 2019-04-10 | End: 2019-05-10

## 2019-04-19 DIAGNOSIS — J45.20 MILD INTERMITTENT ASTHMA WITHOUT COMPLICATION: ICD-10-CM

## 2019-04-22 NOTE — TELEPHONE ENCOUNTER
ADVAIR DISKUS  Last Written Prescription Date:  5/16/19  Last Fill Quantity: 1 ihaler,  # refills: 5   Last office visit: 5/23/2018 with prescribing provider:  2/18/19 with Dr. Darby.   Future Office Visit:   Next 5 appointments (look out 90 days)    May 10, 2019  4:00 PM CDT  Office Visit with Fuentes Darby MD  [facility] ([address] [address] [phone]         Prescription approved per [facility] Refill Protocol for one month as ACTis below score of 20..................LAQUITA Jack

## 2019-04-30 ENCOUNTER — ANCILLARY PROCEDURE (OUTPATIENT)
Dept: GENERAL RADIOLOGY | Facility: CLINIC | Age: 64
End: 2019-04-30
Attending: PHYSICIAN ASSISTANT
Payer: COMMERCIAL

## 2019-04-30 ENCOUNTER — OFFICE VISIT (OUTPATIENT)
Dept: ORTHOPEDICS | Facility: CLINIC | Age: 64
End: 2019-04-30
Payer: COMMERCIAL

## 2019-04-30 VITALS — BODY MASS INDEX: 25.3 KG/M2 | HEIGHT: 61 IN | WEIGHT: 134 LBS

## 2019-04-30 DIAGNOSIS — M75.52 BURSITIS OF LEFT SHOULDER: Primary | ICD-10-CM

## 2019-04-30 DIAGNOSIS — M25.512 LEFT SHOULDER PAIN, UNSPECIFIED CHRONICITY: ICD-10-CM

## 2019-04-30 PROCEDURE — 73030 X-RAY EXAM OF SHOULDER: CPT | Mod: TC | Performed by: FAMILY MEDICINE

## 2019-04-30 PROCEDURE — 20610 DRAIN/INJ JOINT/BURSA W/O US: CPT | Performed by: PHYSICIAN ASSISTANT

## 2019-04-30 PROCEDURE — 99203 OFFICE O/P NEW LOW 30 MIN: CPT | Mod: 25 | Performed by: PHYSICIAN ASSISTANT

## 2019-04-30 RX ORDER — TRIAMCINOLONE ACETONIDE 40 MG/ML
40 INJECTION, SUSPENSION INTRA-ARTICULAR; INTRAMUSCULAR ONCE
Status: COMPLETED | OUTPATIENT
Start: 2019-04-30 | End: 2019-04-30

## 2019-04-30 RX ADMIN — TRIAMCINOLONE ACETONIDE 40 MG: 40 INJECTION, SUSPENSION INTRA-ARTICULAR; INTRAMUSCULAR at 12:38

## 2019-04-30 ASSESSMENT — PAIN SCALES - GENERAL: PAINLEVEL: EXTREME PAIN (8)

## 2019-04-30 ASSESSMENT — MIFFLIN-ST. JEOR: SCORE: 1091.86

## 2019-04-30 NOTE — PROGRESS NOTES
Sports Medicine Clinic Visit    PCP: Dr. Fuentes Darby    Brissa Mayer is a 64 year old female who is seen   presenting with left shoulder     Injury: lifting furniture about 4 months ago    Location of Pain: left shoulder  Duration of Pain: 4 months  month(s)  Rating of Pain at worst:10/10  Rating of Pain Currently: 8/10  Symptoms are better with: Ice, Heat and Other medications: biofreeze advil  Symptoms are worse with: extension and pressure   Additional Features:   Positive: numbness and clicking   Negative: instability, numbness and weakness  Other evaluation and/or treatments so far consists of: Ice and biofreeze  Prior History of related problems: no    Social History: enjoys running, however has not done that as of late    Review of Systems  Musculoskeletal: as above  Remainder of review of systems is negative including constitutional, CV, pulmonary, GI, Skin and Neurologic except as noted in HPI or medical history.    Past Medical History:   Diagnosis Date     Anxiety      Asthma, mild intermittent      Migraines      Tension headaches      Unspecified hypothyroidism      Past Surgical History:   Procedure Laterality Date     BUNIONECTOMY       ESOPHAGOSCOPY, GASTROSCOPY, DUODENOSCOPY (EGD), COMBINED N/A 4/2/2019    Procedure: ESOPHAGOSCOPY, GASTROSCOPY, DUODENOSCOPY (EGD);  Surgeon: Ochoa Cherry DO;  Location: [facility]     OTHER SURGICAL HISTORY  1980    Bunionectomy     Family History   Problem Relation Age of Onset     Asthma Mother      Diabetes Mother      Cancer - colorectal Maternal Grandmother      Heart Disease Father         MI at age 55     Prostate Cancer Father      Asthma Father      Asthma Sister      Obesity Sister      Social History     Socioeconomic History     Marital status: Single     Spouse name: Not on file     Number of children: Not on file     Years of education: Not on file     Highest education level: Not on file   Occupational History     Not on file   Social Needs  "    Financial resource strain: Not on file     Food insecurity:     Worry: Not on file     Inability: Not on file     Transportation needs:     Medical: Not on file     Non-medical: Not on file   Tobacco Use     Smoking status: Never Smoker     Smokeless tobacco: Never Used   Substance and Sexual Activity     Alcohol use: Yes     Comment: Occasional wine 3X's /week     Drug use: No     Sexual activity: Not Currently   Lifestyle     Physical activity:     Days per week: Not on file     Minutes per session: Not on file     Stress: Not on file   Relationships     Social connections:     Talks on phone: Not on file     Gets together: Not on file     Attends [employer] service: Not on file     Active member of club or organization: Not on file     Attends meetings of clubs or organizations: Not on file     Relationship status: Not on file     Intimate partner violence:     Fear of current or ex partner: Not on file     Emotionally abused: Not on file     Physically abused: Not on file     Forced sexual activity: Not on file   Other Topics Concern     Parent/sibling w/ CABG, MI or angioplasty before 65F 55M? No   Social History Narrative     Not on file       Objective  Ht 1.56 m (5' 1.42\")   Wt 60.8 kg (134 lb)   BMI 24.97 kg/m      GENERAL APPEARANCE: healthy, alert and no distress   GAIT: NORMAL  SKIN: no suspicious lesions or rashes  NEURO: Normal strength and tone, sensory exam grossly normal, mentation intact, speech normal and DTR symmetrically normal in lower extremities  PSYCH:  mentation appears normal and affect normal/bright    Exam:  Left Shoulder exam    ROM:        forward flexion 160        abduction 100       internal rotation complete       external rotation complete    Tender:        posterior shoulder       periscapular region    Non Tender:       remainder of shoulder    Strength:        abduction 4/5       internal rotation 5/5       external rotation 5/5       adduction 5/5    Impingement " testing:        neg (-) Neer       neg (-) Alvarez       neg (-) empty can       neg (-) crossover    Stability testing:       neg (-) apprehension       neg (-) sulcus sign    Skin:        no visible deformities       well perfused       capillary refill brisk    Sensation:        normal sensation over shoulder and upper extremity       Skin:       well perfused       capillary refill brisk    Radiology:  Study Result     XR SHOULDER LT G/E 3 VW 4/30/2019 10:14 AM     HISTORY: Left shoulder pain.     COMPARISON: None.     FINDINGS: Mild acromioclavicular degenerative change. Glenohumeral  joint appears normal. No fracture or malalignment.                                                                      IMPRESSION: Mild degenerative change.     LIZETH REDDY MD         Assessment:  1. Left shoulder pain, unspecified chronicity    2. Bursitis of left shoulder         Plan:  Discussed the assessment with the patient.  I am recommending she have a steroid injection today into the bursitis.  I have also recommended she begin with gentle range of motion exercises.  She is being referred for massage therapy.  If symptoms continue, then I am recommending physical therapy as well.  She is to return to clinic in 6 weeks if needed.    PROCEEDURE:  Benefits, risks, and alternatives of the procedure were discussed with the patient, including bleeding, infection, and pain.  Patient elected to proceed and informed consent was signed. Area was prepped with betadine and alcohol preps and Ethyl Chloride was used for topical anesthetic purposes.  Using standard precautions, a 25-gauge, 1.5-inch was used to inject 2 cc of 1% Lidocaine without Epinephrine and 40 mg Kenalog into epicenter of pain of posterior bursae.  I have recommended ibuprofen and ice should soreness and stiffness develop.  An educational handout was provided to the patient.  Prior to injection, verified patient identity using patient's name and date of  birth.  Due to injection administration, patient instructed to remain in clinic for 15 minutes  afterwards, and to report any adverse reaction to me immediately.    Joint injection was performed.      Drug Amount Wasted:  None.  Vial/Syringe: Single dose vial  Expiration Date:  11/2020    Kathy Galicia PA-C  [facility]                      Disclaimer: This note consists of symbols derived from keyboarding, dictation and/or voice recognition software. As a result, there may be errors in the script that have gone undetected. Please consider this when interpreting information found in this chart.

## 2019-04-30 NOTE — LETTER
4/30/2019         RE: Brissa Mayer  [address] [facility] [address]        Dear Colleague,    Thank you for referring your patient, Brissa Mayer, to the [facility]. Please see a copy of my visit note below.    Sports Medicine Clinic Visit    PCP: Dr. Fuentes Darby    Brissa Mayer is a 64 year old female who is seen   presenting with left shoulder     Injury: lifting furniture about 4 months ago    Location of Pain: left shoulder  Duration of Pain: 4 months  month(s)  Rating of Pain at worst:10/10  Rating of Pain Currently: 8/10  Symptoms are better with: Ice, Heat and Other medications: biofreeze advil  Symptoms are worse with: extension and pressure   Additional Features:   Positive: numbness and clicking   Negative: instability, numbness and weakness  Other evaluation and/or treatments so far consists of: Ice and biofreeze  Prior History of related problems: no    Social History: enjoys running, however has not done that as of late    Review of Systems  Musculoskeletal: as above  Remainder of review of systems is negative including constitutional, CV, pulmonary, GI, Skin and Neurologic except as noted in HPI or medical history.    Past Medical History:   Diagnosis Date     Anxiety      Asthma, mild intermittent      Migraines      Tension headaches      Unspecified hypothyroidism      Past Surgical History:   Procedure Laterality Date     BUNIONECTOMY       ESOPHAGOSCOPY, GASTROSCOPY, DUODENOSCOPY (EGD), COMBINED N/A 4/2/2019    Procedure: ESOPHAGOSCOPY, GASTROSCOPY, DUODENOSCOPY (EGD);  Surgeon: Ochoa Cherry DO;  Location:  GI     OTHER SURGICAL HISTORY  1980    Bunionectomy     Family History   Problem Relation Age of Onset     Asthma Mother      Diabetes Mother      Cancer - colorectal Maternal Grandmother      Heart Disease Father         MI at age 55     Prostate Cancer Father      Asthma Father      Asthma Sister      Obesity Sister      Social History  "    Socioeconomic History     Marital status: Single     Spouse name: Not on file     Number of children: Not on file     Years of education: Not on file     Highest education level: Not on file   Occupational History     Not on file   Social Needs     Financial resource strain: Not on file     Food insecurity:     Worry: Not on file     Inability: Not on file     Transportation needs:     Medical: Not on file     Non-medical: Not on file   Tobacco Use     Smoking status: Never Smoker     Smokeless tobacco: Never Used   Substance and Sexual Activity     Alcohol use: Yes     Comment: Occasional wine 3X's /week     Drug use: No     Sexual activity: Not Currently   Lifestyle     Physical activity:     Days per week: Not on file     Minutes per session: Not on file     Stress: Not on file   Relationships     Social connections:     Talks on phone: Not on file     Gets together: Not on file     Attends Sabianist service: Not on file     Active member of club or organization: Not on file     Attends meetings of clubs or organizations: Not on file     Relationship status: Not on file     Intimate partner violence:     Fear of current or ex partner: Not on file     Emotionally abused: Not on file     Physically abused: Not on file     Forced sexual activity: Not on file   Other Topics Concern     Parent/sibling w/ CABG, MI or angioplasty before 65F 55M? No   Social History Narrative     Not on file       Objective  Ht 1.56 m (5' 1.42\")   Wt 60.8 kg (134 lb)   BMI 24.97 kg/m       GENERAL APPEARANCE: healthy, alert and no distress   GAIT: NORMAL  SKIN: no suspicious lesions or rashes  NEURO: Normal strength and tone, sensory exam grossly normal, mentation intact, speech normal and DTR symmetrically normal in lower extremities  PSYCH:  mentation appears normal and affect normal/bright    Exam:  Left Shoulder exam    ROM:        forward flexion 160        abduction 100       internal rotation complete       external rotation " complete    Tender:        posterior shoulder       periscapular region    Non Tender:       remainder of shoulder    Strength:        abduction 4/5       internal rotation 5/5       external rotation 5/5       adduction 5/5    Impingement testing:        neg (-) Neer       neg (-) Alvarez       neg (-) empty can       neg (-) crossover    Stability testing:       neg (-) apprehension       neg (-) sulcus sign    Skin:        no visible deformities       well perfused       capillary refill brisk    Sensation:        normal sensation over shoulder and upper extremity       Skin:       well perfused       capillary refill brisk    Radiology:  Study Result     XR SHOULDER LT G/E 3 VW 4/30/2019 10:14 AM     HISTORY: Left shoulder pain.     COMPARISON: None.     FINDINGS: Mild acromioclavicular degenerative change. Glenohumeral  joint appears normal. No fracture or malalignment.                                                                      IMPRESSION: Mild degenerative change.     LIZETH REDDY MD         Assessment:  1. Left shoulder pain, unspecified chronicity    2. Bursitis of left shoulder         Plan:  Discussed the assessment with the patient.  I am recommending she have a steroid injection today into the bursitis.  I have also recommended she begin with gentle range of motion exercises.  She is being referred for massage therapy.  If symptoms continue, then I am recommending physical therapy as well.  She is to return to clinic in 6 weeks if needed.    PROCEEDURE:  Benefits, risks, and alternatives of the procedure were discussed with the patient, including bleeding, infection, and pain.  Patient elected to proceed and informed consent was signed. Area was prepped with betadine and alcohol preps and Ethyl Chloride was used for topical anesthetic purposes.  Using standard precautions, a 25-gauge, 1.5-inch was used to inject 2 cc of 1% Lidocaine without Epinephrine and 40 mg Kenalog into epicenter of pain  of posterior bursae.  I have recommended ibuprofen and ice should soreness and stiffness develop.  An educational handout was provided to the patient.    Kathy Galicia PA-C  [facility]                      Disclaimer: This note consists of symbols derived from keyboarding, dictation and/or voice recognition software. As a result, there may be errors in the script that have gone undetected. Please consider this when interpreting information found in this chart.        Again, thank you for allowing me to participate in the care of your patient.        Sincerely,        Kathy Galicia PA-C, SINDY

## 2019-05-01 ENCOUNTER — TELEPHONE (OUTPATIENT)
Dept: INTERNAL MEDICINE | Facility: CLINIC | Age: 64
End: 2019-05-01

## 2019-05-01 DIAGNOSIS — E03.4 HYPOTHYROIDISM DUE TO ACQUIRED ATROPHY OF THYROID: ICD-10-CM

## 2019-05-01 NOTE — TELEPHONE ENCOUNTER
Reason for call:  Form   Our goal is to have forms completed within 72 hours, however some forms may require a visit or additional information.     Who is the form from? Patient's employer (if other please explain)  Where did the form come from? Patient or family brought in     What clinic location was the form placed at? [facility]  Where was the form placed? DR box  What number is listed as a contact on the form? patient    Phone call message - patient request for a letter, form or note:     Date needed: by next week  Please fax to [phone]  Has the patient signed a consent form for release of information? NO    Additional comments: would like [facility] paperwork completed by end of this week if possible    Type of letter, form or note: [facility]    Phone number to reach patient:  Home number on file [phone] (home)    Best Time:  Anytime      Can we leave a detailed message on this number?  YES

## 2019-05-02 RX ORDER — LEVOTHYROXINE SODIUM 50 UG/1
50 TABLET ORAL DAILY
Qty: 30 TABLET | Refills: 0 | Status: SHIPPED | OUTPATIENT
Start: 2019-05-02 | End: 2019-05-10

## 2019-05-02 NOTE — TELEPHONE ENCOUNTER
"Requested Prescriptions   Pending Prescriptions Disp Refills     levothyroxine (SYNTHROID/LEVOTHROID) 50 MCG tablet [Pharmacy Med Name: LEVOTHYROXINE SODIUM 50MCG TABS] 90 tablet 1     Sig: TAKE ONE TABLET BY MOUTH DAILY   Last Written Prescription Date:  10/30/18  Last Fill Quantity: 90,  # refills: 1   Last office visit: 5/23/2018 with prescribing provider:     Future Office Visit:   Next 5 appointments (look out 90 days)    May 10, 2019  4:00 PM CDT  [facility] [address] [place] [id number]  [phone]             Thyroid Protocol Passed - 5/1/2019  8:27 AM        Passed - Patient is 12 years or older        Passed - Recent (12 mo) or future (30 days) visit within the authorizing provider's specialty     Patient had office visit in the last 12 months or has a visit in the next 30 days with authorizing provider or within the authorizing provider's specialty.  See \"Patient Info\" tab in inbasket, or \"Choose Columns\" in Meds & Orders section of the refill encounter.              Passed - Medication is active on med list        Passed - Normal TSH on file in past 12 months     Recent Labs   Lab Test 01/14/19  0715   TSH 1.59              Passed - No active pregnancy on record     If patient is pregnant or has had a positive pregnancy test, please check TSH.          Passed - No positive pregnancy test in past 12 months     If patient is pregnant or has had a positive pregnancy test, please check TSH.            Rx refilled per RN protocol.  1 month to get to OV on 5/10/19.  CASTRO RicoN, RN      "

## 2019-05-10 ENCOUNTER — OFFICE VISIT (OUTPATIENT)
Dept: INTERNAL MEDICINE | Facility: CLINIC | Age: 64
End: 2019-05-10
Payer: COMMERCIAL

## 2019-05-10 VITALS
DIASTOLIC BLOOD PRESSURE: 66 MMHG | SYSTOLIC BLOOD PRESSURE: 118 MMHG | WEIGHT: 137 LBS | OXYGEN SATURATION: 98 % | BODY MASS INDEX: 25.53 KG/M2 | HEART RATE: 84 BPM | RESPIRATION RATE: 16 BRPM | TEMPERATURE: 97.5 F

## 2019-05-10 DIAGNOSIS — D64.9 ANEMIA, UNSPECIFIED TYPE: ICD-10-CM

## 2019-05-10 DIAGNOSIS — J45.20 MILD INTERMITTENT ASTHMA WITHOUT COMPLICATION: ICD-10-CM

## 2019-05-10 DIAGNOSIS — E03.4 HYPOTHYROIDISM DUE TO ACQUIRED ATROPHY OF THYROID: ICD-10-CM

## 2019-05-10 DIAGNOSIS — F43.23 ADJUSTMENT DISORDER WITH MIXED ANXIETY AND DEPRESSED MOOD: Primary | ICD-10-CM

## 2019-05-10 LAB
ERYTHROCYTE [DISTWIDTH] IN BLOOD BY AUTOMATED COUNT: 17.7 % (ref 10–15)
HCT VFR BLD AUTO: 39.3 % (ref 35–47)
HGB BLD-MCNC: 12.1 G/DL (ref 11.7–15.7)
MCH RBC QN AUTO: 27.8 PG (ref 26.5–33)
MCHC RBC AUTO-ENTMCNC: 30.8 G/DL (ref 31.5–36.5)
MCV RBC AUTO: 90 FL (ref 78–100)
PLATELET # BLD AUTO: 287 10E9/L (ref 150–450)
RBC # BLD AUTO: 4.36 10E12/L (ref 3.8–5.2)
TSH SERPL DL<=0.005 MIU/L-ACNC: 1.84 MU/L (ref 0.4–4)
WBC # BLD AUTO: 5.2 10E9/L (ref 4–11)

## 2019-05-10 PROCEDURE — 99214 OFFICE O/P EST MOD 30 MIN: CPT | Performed by: INTERNAL MEDICINE

## 2019-05-10 PROCEDURE — 84443 ASSAY THYROID STIM HORMONE: CPT | Performed by: INTERNAL MEDICINE

## 2019-05-10 PROCEDURE — 85027 COMPLETE CBC AUTOMATED: CPT | Performed by: INTERNAL MEDICINE

## 2019-05-10 PROCEDURE — 36415 COLL VENOUS BLD VENIPUNCTURE: CPT | Performed by: INTERNAL MEDICINE

## 2019-05-10 RX ORDER — LEVOTHYROXINE SODIUM 50 UG/1
50 TABLET ORAL DAILY
Qty: 90 TABLET | Refills: 3 | Status: SHIPPED | OUTPATIENT
Start: 2019-05-10 | End: 2020-03-10

## 2019-05-10 RX ORDER — PAROXETINE HYDROCHLORIDE HEMIHYDRATE 37.5 MG/1
37.5 TABLET, FILM COATED, EXTENDED RELEASE ORAL EVERY MORNING
Qty: 90 TABLET | Refills: 1 | Status: SHIPPED | OUTPATIENT
Start: 2019-05-10 | End: 2019-11-01

## 2019-05-10 ASSESSMENT — PAIN SCALES - GENERAL: PAINLEVEL: NO PAIN (0)

## 2019-05-10 ASSESSMENT — PATIENT HEALTH QUESTIONNAIRE - PHQ9: SUM OF ALL RESPONSES TO PHQ QUESTIONS 1-9: 3

## 2019-05-10 NOTE — PROGRESS NOTES
SUBJECTIVE:   Brissa Mayer is a 64 year old female who presents to clinic today for the following   health issues:    Chief Complaint   Patient presents with     Stress       She has to everything at home as her sister is sick and not helping her much.  Feels like when she cared for her dad.  Sister can't breath very well.      She has been off work to care for her sister. Not really any support at work.  Patient is going back to work on Monday.  Stressed at work, works at [employer].  Lots of work.  Not treated well with her age at work.      Saw a counselor at [facility] this week, hour session.  He specializes in caregiver support.  Has session weekly for the next month.      She has been on paxil for years.      She needs advair refill, tried off it but realized she needs it. Made a difference.    Needs thyroid recheck, stable on 50 mcg a day.      Also had anemia and needs recheck today.     Past Medical History:   Diagnosis Date     Anxiety      Asthma, mild intermittent      Migraines      Tension headaches      Unspecified hypothyroidism        Current Outpatient Medications   Medication     acetaminophen (TYLENOL) 325 MG tablet     ADVAIR DISKUS 250-50 MCG/DOSE inhaler     cetirizine (ZYRTEC) 10 MG tablet     fluticasone (FLONASE) 50 MCG/ACT nasal spray     ibuprofen (ADVIL/MOTRIN) 200 MG capsule     levothyroxine (SYNTHROID/LEVOTHROID) 50 MCG tablet     PARoxetine (PAXIL-CR) 25 MG 24 hr tablet     VENTOLIN  (90 Base) MCG/ACT inhaler     ferrous gluconate (FERGON) 324 (38 Fe) MG tablet     No current facility-administered medications for this visit.        Social History     Tobacco Use     Smoking status: Never Smoker     Smokeless tobacco: Never Used   Substance Use Topics     Alcohol use: Yes     Comment: Occasional wine 3X's /week     Drug use: No       Review of Systems  Constitutional-No fevers, chills, or weight changes..  Cardiac-No chest pain or palpitations.  Respiratory-No cough,  sob, or hemoptysis.  GI-No nausea, vomitting, diarrhea, constipation, or blood in the stool.  Psych-Depressed.    Physical Exam  Vitals: /66   Pulse 84   Temp 97.5  F (36.4  C) (Temporal)   Resp 16   Wt 62.1 kg (137 lb)   SpO2 98%   BMI 25.53 kg/m    BMI= Body mass index is 25.53 kg/m .    General Appearance-healthy, alert, no distress  Cardiac-regular rate and rhythm  with normal S1, S2 ; no murmur, rub or gallops  Lungs-clear to auscultation  Extremities-no peripheral edema, peripheral pulses normal  Psych-appears appropriate, stable.      ASSESSMENT:  64-year-old woman who is here because of more stress in her life.  She is now taking care of her sister who has multiple health issues.  This is given her flashbacks to when her dad was sick.  She also has stress at work she feels she is having age discrimination.  She would like to work for another year or 2.  Her work is very demanding and stressful she states.  She has been on Paxil 25 mg a day.  We discussed increasing this to 37.5 mg.  She is already seeing a counselor at [facility] and she had a very nice conversation for an hour there she will continue this weekly.    Fatigue and history of anemia she did have endoscopy performed which was okay but she needs to repeat hemoglobin labs drawn today.    With fatigue we will check her thyroid and then do thyroid refills for her.  TSH and hemoglobin were both normal today.      Electronically signed by Fuentes Darby MD

## 2019-05-11 ASSESSMENT — ASTHMA QUESTIONNAIRES: ACT_TOTALSCORE: 25

## 2019-08-01 ENCOUNTER — TRANSFERRED RECORDS (OUTPATIENT)
Dept: HEALTH INFORMATION MANAGEMENT | Facility: CLINIC | Age: 64
End: 2019-08-01

## 2019-08-08 ENCOUNTER — TRANSFERRED RECORDS (OUTPATIENT)
Dept: HEALTH INFORMATION MANAGEMENT | Facility: CLINIC | Age: 64
End: 2019-08-08

## 2019-08-08 LAB — PHQ9 SCORE: 2

## 2019-09-11 ENCOUNTER — TRANSFERRED RECORDS (OUTPATIENT)
Dept: HEALTH INFORMATION MANAGEMENT | Facility: CLINIC | Age: 64
End: 2019-09-11

## 2019-11-01 DIAGNOSIS — F43.23 ADJUSTMENT DISORDER WITH MIXED ANXIETY AND DEPRESSED MOOD: ICD-10-CM

## 2019-11-01 RX ORDER — PAROXETINE HYDROCHLORIDE HEMIHYDRATE 37.5 MG/1
TABLET, FILM COATED, EXTENDED RELEASE ORAL
Qty: 30 TABLET | Refills: 0 | Status: SHIPPED | OUTPATIENT
Start: 2019-11-01 | End: 2019-12-02

## 2019-11-01 NOTE — TELEPHONE ENCOUNTER
Routing refill request to provider for review/approval because:  Drug interaction warning    Luz Martínez RN on 11/1/2019 at 1:40 PM

## 2019-11-01 NOTE — TELEPHONE ENCOUNTER
"Requested Prescriptions   Pending Prescriptions Disp Refills     PARoxetine (PAXIL-CR) 37.5 MG 24 hr tablet [Pharmacy Med Name: PAROXETINE HCL ER 37.5MG TB24] 90 tablet 1     Sig: TAKE ONE TABLET BY MOUTH EVERY MORNING   Last Written Prescription Date:  5/10/19  Last Fill Quantity: 90,  # refills: 1   Last office visit: 5/10/2019 with prescribing provider:  Wilner   Future Office Visit:        SSRIs Protocol Passed - 11/1/2019 12:53 PM        Passed - PHQ-9 score less than 5 in past 6 months     Please review last PHQ-9 score.   PHQ-9 score:    PHQ-9 SCORE 5/10/2019   PHQ-9 Total Score -   PHQ-9 Total Score MyChart -   PHQ-9 Total Score 3           Passed - Medication is active on med list        Passed - Patient is age 18 or older        Passed - No active pregnancy on record        Passed - No positive pregnancy test in last 12 months        Passed - Recent (6 mo) or future (30 days) visit within the authorizing provider's specialty     Patient had office visit in the last 6 months or has a visit in the next 30 days with authorizing provider or within the authorizing provider's specialty.  See \"Patient Info\" tab in inbasket, or \"Choose Columns\" in Meds & Orders section of the refill encounter.              "

## 2019-11-21 ENCOUNTER — OFFICE VISIT (OUTPATIENT)
Dept: FAMILY MEDICINE | Facility: CLINIC | Age: 64
End: 2019-11-21
Payer: COMMERCIAL

## 2019-11-21 VITALS
BODY MASS INDEX: 25.63 KG/M2 | WEIGHT: 137.5 LBS | HEART RATE: 107 BPM | RESPIRATION RATE: 22 BRPM | DIASTOLIC BLOOD PRESSURE: 80 MMHG | TEMPERATURE: 97.5 F | OXYGEN SATURATION: 97 % | SYSTOLIC BLOOD PRESSURE: 128 MMHG

## 2019-11-21 DIAGNOSIS — J01.10 ACUTE FRONTAL SINUSITIS, RECURRENCE NOT SPECIFIED: Primary | ICD-10-CM

## 2019-11-21 PROCEDURE — 99213 OFFICE O/P EST LOW 20 MIN: CPT | Performed by: NURSE PRACTITIONER

## 2019-11-21 RX ORDER — CEFUROXIME AXETIL 500 MG/1
500 TABLET ORAL 2 TIMES DAILY
Qty: 20 TABLET | Refills: 0 | Status: SHIPPED | OUTPATIENT
Start: 2019-11-21 | End: 2020-03-10

## 2019-11-21 NOTE — PROGRESS NOTES
Subjective     Brissa Mayer is a 64 year old female who presents to clinic today for the following health issues:    HPI   Acute Illness   Acute illness concerns: uri, tired  Onset: about 1 month    Fever: YES- feels feverish, but not checked. 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  She was very congested, had a headache, drainage, sore throat.  And it went to her chest and she had a cough.  She states the cough has resolved.  She still has some bloody nasal drainage in the morning when she blows her nose.  She is in the habit of taking an antihistamine on a daily basis, so that has helped with the nasal drainage.  She just feels very tired, generalized malaise.    BP Readings from Last 3 Encounters:   11/21/19 128/80   05/10/19 118/66   04/02/19 122/79    Wt Readings from Last 3 Encounters:   11/21/19 62.4 kg (137 lb 8 oz)   05/10/19 62.1 kg (137 lb)   04/30/19 60.8 kg (134 lb)                    Reviewed and updated as needed this visit by Provider         Review of Systems   ROS COMP: Constitutional, HEENT, cardiovascular, pulmonary, gi and gu systems are negative, except as otherwise noted.      Objective    /80   Pulse 107   Temp 97.5  F (36.4  C) (Temporal)   Resp 22   Wt 62.4 kg (137 lb 8 oz)   SpO2 97%   BMI 25.63 kg/m    Body mass index is 25.63 kg/m .  Physical Exam   GENERAL: Well-nourished, well-hydrated.  In no  acute distress  EYES: Eyes grossly normal to inspection, PERRL and conjunctivae and sclerae normal  HENT: Ear canals are clear, TMs pearly gray.  Oropharynx unremarkable.  No tenderness over the maxillary sinuses, but she has some tenderness over the frontal sinuses and reports increased pressure with forward bending.    NECK: no adenopathy, no asymmetry, masses, or scars and thyroid normal to palpation  RESP: lungs clear to auscultation - no rales, rhonchi or wheezes  CV: regular rate and rhythm, normal S1 S2, no S3 or S4, no murmur, click or rub, no peripheral edema and peripheral pulses strong  ABDOMEN: soft, nontender, no hepatosplenomegaly, no masses and bowel sounds normal  MS: no gross musculoskeletal defects noted, no edema            Assessment & Plan       ICD-10-CM    1. Acute frontal sinusitis, recurrence not specified J01.10 cefuroxime (CEFTIN) 500 MG tablet          Ceftin 500 mg twice daily for 10 days.  Saline nasal spray, warm moist packs to the face.  Increase oral fluids and rest.    No follow-ups on file.    HAZEL Real CNP  [facility]

## 2019-11-22 ASSESSMENT — ASTHMA QUESTIONNAIRES: ACT_TOTALSCORE: 19

## 2019-12-19 ENCOUNTER — TRANSFERRED RECORDS (OUTPATIENT)
Dept: HEALTH INFORMATION MANAGEMENT | Facility: CLINIC | Age: 64
End: 2019-12-19

## 2019-12-30 DIAGNOSIS — J45.20 MILD INTERMITTENT ASTHMA WITHOUT COMPLICATION: ICD-10-CM

## 2019-12-30 NOTE — TELEPHONE ENCOUNTER
Advair Diskus  Last Written Prescription Date:  5/10/19  Last Fill Quantity: 1 inhaler,  # refills: 3   Last office visit: 5/10/2019 with Deb Crooks CNP  Future Office Visit: NONE  ACT Total Scores 5/23/2018 5/10/2019 11/21/2019   ACT TOTAL SCORE - - -   ASTHMA ER VISITS - - -   ASTHMA HOSPITALIZATIONS - - -   ACT TOTAL SCORE (Goal Greater than or Equal to 20) 25 25 19   In the past 12 months, how many times did you visit the emergency room for your asthma without being admitted to the hospital? 0 0 0   In the past 12 months, how many times were you hospitalized overnight because of your asthma? 0 0 0     Prescription approved per [facility] Refill Protocol.  For one month as her ACT score < 20. She needs to Follow-up with provider.   LAQUITA Jack

## 2020-02-11 DIAGNOSIS — F43.23 ADJUSTMENT DISORDER WITH MIXED ANXIETY AND DEPRESSED MOOD: ICD-10-CM

## 2020-02-12 RX ORDER — PAROXETINE HYDROCHLORIDE HEMIHYDRATE 37.5 MG/1
TABLET, FILM COATED, EXTENDED RELEASE ORAL
Qty: 30 TABLET | Refills: 1 | Status: SHIPPED | OUTPATIENT
Start: 2020-02-12 | End: 2020-03-10

## 2020-02-12 NOTE — TELEPHONE ENCOUNTER
"Requested Prescriptions   Pending Prescriptions Disp Refills     PARoxetine (PAXIL-CR) 37.5 MG 24 hr tablet [Pharmacy Med Name: PAROXETINE HCL ER 37.5MG TB24] 30 tablet 1     Sig: TAKE ONE TABLET BY MOUTH EVERY MORNING (NEEDS OFFICE VISIT FOR FURTHER REFILLS)   Last Written Prescription Date:  12/4/19  Last Fill Quantity: 30,  # refills: 1   Last office visit: 5/10/2019 with prescribing provider:  Wilner   Future Office Visit:        SSRIs Protocol Failed - 2/11/2020  8:01 AM        Failed - PHQ-9 score less than 5 in past 6 months     Please review last PHQ-9 score.   PHQ-9 score:    PHQ-9 SCORE 12/4/2019   PHQ-9 Total Score -   PHQ-9 Total Score MyChart -   PHQ-9 Total Score 11           Passed - Medication is active on med list        Passed - Patient is age 18 or older        Passed - No active pregnancy on record        Passed - No positive pregnancy test in last 12 months        Passed - Recent (6 mo) or future (30 days) visit within the authorizing provider's specialty     Patient had office visit in the last 6 months or has a visit in the next 30 days with authorizing provider or within the authorizing provider's specialty.  See \"Patient Info\" tab in inbasket, or \"Choose Columns\" in Meds & Orders section of the refill encounter.              "

## 2020-02-12 NOTE — TELEPHONE ENCOUNTER
Routing refill request to provider for review/approval because:  Labs out of range:  phq9    Luz Martínez RN on 2/12/2020 at 9:28 AM

## 2020-02-24 DIAGNOSIS — J45.20 MILD INTERMITTENT ASTHMA WITHOUT COMPLICATION: ICD-10-CM

## 2020-02-26 NOTE — TELEPHONE ENCOUNTER
ADVAIR DISKUS  Last Written Prescription Date:  12/30/19  Last Fill Quantity: 1 inhaler,  # refills: 0 Pt was supposed to schedule an appt with provider as ACT < 20   Last office visit: 5/10/2019 with prescribing provider:  Dr. Darby   Future Office Visit:  NONE    Associated Diagnoses   Mild intermittent asthma without complication [J45.20]        ACT Total Scores 5/23/2018 5/10/2019 11/21/2019   ACT TOTAL SCORE - - -   ASTHMA ER VISITS - - -   ASTHMA HOSPITALIZATIONS - - -   ACT TOTAL SCORE (Goal Greater than or Equal to 20) 25 25 19   In the past 12 months, how many times did you visit the emergency room for your asthma without being admitted to the hospital? 0 0 0   In the past 12 months, how many times were you hospitalized overnight because of your asthma? 0 0 0   Prescription approved per [facility] Refill Protocol. For one time refill. MUST BE SEEN FOR CONT MED USE.   RN will forward to schedulers to call and assist scheduling an appt. Before the end of March to be seen in clinic for her Asthma. ....LAQUITA Jack

## 2020-03-10 ENCOUNTER — OFFICE VISIT (OUTPATIENT)
Dept: INTERNAL MEDICINE | Facility: CLINIC | Age: 65
End: 2020-03-10
Payer: COMMERCIAL

## 2020-03-10 VITALS
DIASTOLIC BLOOD PRESSURE: 74 MMHG | HEIGHT: 61 IN | TEMPERATURE: 97.2 F | OXYGEN SATURATION: 98 % | BODY MASS INDEX: 24.35 KG/M2 | RESPIRATION RATE: 16 BRPM | HEART RATE: 96 BPM | SYSTOLIC BLOOD PRESSURE: 122 MMHG | WEIGHT: 129 LBS

## 2020-03-10 DIAGNOSIS — Z11.59 NEED FOR HEPATITIS C SCREENING TEST: ICD-10-CM

## 2020-03-10 DIAGNOSIS — E78.5 HYPERLIPIDEMIA LDL GOAL <130: ICD-10-CM

## 2020-03-10 DIAGNOSIS — E28.39 ESTROGEN DEFICIENCY: ICD-10-CM

## 2020-03-10 DIAGNOSIS — Z12.31 VISIT FOR SCREENING MAMMOGRAM: ICD-10-CM

## 2020-03-10 DIAGNOSIS — J45.20 MILD INTERMITTENT ASTHMA WITHOUT COMPLICATION: ICD-10-CM

## 2020-03-10 DIAGNOSIS — J45.30 MILD PERSISTENT ASTHMA WITHOUT COMPLICATION: ICD-10-CM

## 2020-03-10 DIAGNOSIS — Z23 NEED FOR PNEUMOCOCCAL VACCINE: ICD-10-CM

## 2020-03-10 DIAGNOSIS — E03.4 HYPOTHYROIDISM DUE TO ACQUIRED ATROPHY OF THYROID: ICD-10-CM

## 2020-03-10 DIAGNOSIS — F43.23 ADJUSTMENT DISORDER WITH MIXED ANXIETY AND DEPRESSED MOOD: Primary | ICD-10-CM

## 2020-03-10 PROCEDURE — 99214 OFFICE O/P EST MOD 30 MIN: CPT | Performed by: INTERNAL MEDICINE

## 2020-03-10 PROCEDURE — 90732 PPSV23 VACC 2 YRS+ SUBQ/IM: CPT | Performed by: INTERNAL MEDICINE

## 2020-03-10 PROCEDURE — 90471 IMMUNIZATION ADMIN: CPT | Performed by: INTERNAL MEDICINE

## 2020-03-10 RX ORDER — LEVOTHYROXINE SODIUM 50 UG/1
50 TABLET ORAL DAILY
Qty: 90 TABLET | Refills: 3 | Status: SHIPPED | OUTPATIENT
Start: 2020-03-10 | End: 2021-06-03

## 2020-03-10 RX ORDER — PAROXETINE HYDROCHLORIDE HEMIHYDRATE 37.5 MG/1
TABLET, FILM COATED, EXTENDED RELEASE ORAL
Qty: 90 TABLET | Refills: 3 | Status: SHIPPED | OUTPATIENT
Start: 2020-03-10 | End: 2021-03-17

## 2020-03-10 RX ORDER — ALBUTEROL SULFATE 90 UG/1
AEROSOL, METERED RESPIRATORY (INHALATION)
Qty: 18 G | Refills: 3 | Status: SHIPPED | OUTPATIENT
Start: 2020-03-10 | End: 2020-06-18

## 2020-03-10 ASSESSMENT — MIFFLIN-ST. JEOR: SCORE: 1061.48

## 2020-03-10 ASSESSMENT — PAIN SCALES - GENERAL: PAINLEVEL: NO PAIN (0)

## 2020-03-10 NOTE — PROGRESS NOTES
Prior to immunization administration, verified patients identity using patient s name and date of birth. Please see Immunization Activity for additional information.     Screening Questionnaire for Adult Immunization    Are you sick today?   No   Do you have allergies to medications, food, a vaccine component or latex?   Yes   Have you ever had a serious reaction after receiving a vaccination?   No   Do you have a long-term health problem with heart, lung, kidney, or metabolic disease (e.g., diabetes), asthma, a blood disorder, no spleen, complement component deficiency, a cochlear implant, or a spinal fluid leak?  Are you on long-term aspirin therapy?   Yes   Do you have cancer, leukemia, HIV/AIDS, or any other immune system problem?   No   Do you have a parent, brother, or sister with an immune system problem?   No   In the past 3 months, have you taken medications that affect  your immune system, such as prednisone, other steroids, or anticancer drugs; drugs for the treatment of rheumatoid arthritis, Crohn s disease, or psoriasis; or have you had radiation treatments?   No   Have you had a seizure, or a brain or other nervous system problem?   No   During the past year, have you received a transfusion of blood or blood    products, or been given immune (gamma) globulin or antiviral drug?   No   For women: Are you pregnant or is there a chance you could become       pregnant during the next month?   No   Have you received any vaccinations in the past 4 weeks?   No     Immunization questionnaire was positive for at least one answer.  Notified yes.        Per orders of Dr. Fuentes Darby, injection of pneumococcal 23 given by Twila Barkley CMA. Patient instructed to remain in clinic for 15 minutes afterwards, and to report any adverse reaction to me immediately.       Screening performed by Twila Barkley CMA on 3/10/2020 at 8:36 AM.

## 2020-03-10 NOTE — PROGRESS NOTES
"Subjective     Brissa Mayer is a 65 year old female who presents to clinic today for the following health issues:    HPI   Chief Complaint   Patient presents with     Recheck Medication     Thyroid  Depression/Anxiety     Feeling ok other then some allergies with the weather change.Uses zyrtec.      Had two days without levothyroxine.      Saw Orthopedics and had an injection in her back, doing well.      Due for mammogram.     Mood is doing well, taking Paxil.      Past Medical History:   Diagnosis Date     Anxiety      Asthma, mild intermittent      Migraines      Tension headaches      Unspecified hypothyroidism      Current Outpatient Medications   Medication     albuterol (VENTOLIN HFA) 108 (90 Base) MCG/ACT inhaler     cetirizine (ZYRTEC) 10 MG tablet     fluticasone (FLONASE) 50 MCG/ACT nasal spray     fluticasone-salmeterol (ADVAIR DISKUS) 250-50 MCG/DOSE inhaler     ibuprofen (ADVIL/MOTRIN) 200 MG capsule     levothyroxine (SYNTHROID/LEVOTHROID) 50 MCG tablet     PARoxetine (PAXIL-CR) 37.5 MG 24 hr tablet     No current facility-administered medications for this visit.      Social History     Tobacco Use     Smoking status: Never Smoker     Smokeless tobacco: Never Used   Substance Use Topics     Alcohol use: Yes     Comment: Occasional wine 3X's /week     Drug use: No     Review of Systems  Constitutional-No fevers, chills, or weight changes..  ENT-Rhinitis and allergies.  Cardiac-No chest pain or palpitations.  Respiratory-No cough, sob, or hemoptysis.  GI-No nausea, vomitting, diarrhea, constipation, or blood in the stool.    Physical Exam  /74   Pulse 96   Temp 97.2  F (36.2  C) (Temporal)   Resp 16   Ht 1.556 m (5' 1.25\")   Wt 58.5 kg (129 lb)   SpO2 98%   BMI 24.18 kg/m    General Appearance-healthy, alert, no distress  Cardiac-regular rate and rhythm  with normal S1, S2 ; no murmur, rub or gallops  Lungs-clear to auscultation  Extremities-no peripheral edema, peripheral pulses " normal    ASSESSMENT:  65-year-old woman who is here for recheck of her medications.  She is overall doing well she is having a few more allergies.  She takes care of her sister.  She continues to work and is active physically at her job.    Depression and mood she is doing well with Paxil she sees a counselor every 2 weeks she will continue her Paxil dose medication is refilled.    Asthma is stable she does use Advair and albuterol as needed.  She needed more albuterol when she ran out of her Advair.  We will update her pneumonia shot today.    Hypothyroidism she missed 2 doses of levothyroxine as well on a recheck her TSH in a week when she is been taking her meds consistently.    We will do a screening for her cholesterol and hepatitis C.  We will also do a screening for breast cancer and osteoporosis.    Follow-up in 1 year.    Electronically signed by Fuentes Darby MD

## 2020-04-30 ENCOUNTER — VIRTUAL VISIT (OUTPATIENT)
Dept: FAMILY MEDICINE | Facility: OTHER | Age: 65
End: 2020-04-30

## 2020-04-30 NOTE — PROGRESS NOTES
"Date: 2020 11:47:43  Clinician: Nadia Stone  Clinician NPI: [id number]  Patient: Brissa Mayer  Patient : 1955  Patient Address: [address] [place] [address]  Patient Phone: [phone]  Visit Protocol: URI  Patient Summary:  Brissa is a 65 year old ( : 1955 ) female who initiated a Visit for COVID-19 (Coronavirus) evaluation and screening. When asked the question \"Please sign me up to receive news, health information and promotions. \", Brissa responded \"Yes\".    Brissa states her symptoms started gradually 3-4 days ago. After her symptoms started, they improved and then got worse again.   Her symptoms consist of malaise, myalgia, rhinitis, facial pain or pressure, a sore throat, a cough, nasal congestion, nausea, tooth pain, a headache, and chills. Brissa also feels feverish.   Symptom details     Nasal secretions: The color of her mucus is blood-tinged.    Cough: Brissa coughs a few times an hour and her cough is not more bothersome at night. Phlegm comes into her throat when she coughs. She believes her cough is caused by post-nasal drip. The color of the phlegm is white.     Sore throat: Brissa reports having moderate throat pain (4-6 on a 10 point pain scale), does not have exudate on her tonsils, and can swallow liquids. She is not sure if the lymph nodes in her neck are enlarged. A rash has not appeared on the skin since the sore throat started.     Temperature: Her current temperature is 99.2 degrees Fahrenheit.     Facial pain or pressure: The facial pain or pressure feels worse when bending over or leaning forward.     Headache: She states the headache is moderate (4-6 on a 10 point pain scale).     Tooth pain: The tooth pain is not caused by a cavity, recent dental work, or other mouth problems.      Brissa denies having vomiting, ageusia, anosmia, diarrhea, ear pain, and wheezing. She also denies taking antibiotic medication for the symptoms and having recent " facial or sinus surgery in the past 60 days. She is not experiencing dyspnea.   Precipitating events  Within the past week, Brissa has not been exposed to someone with strep throat. She has not recently been exposed to someone with influenza. Brissa has been in close contact with the following high risk individuals: people with asthma, heart disease or diabetes and immunocompromised people.   Pertinent COVID-19 (Coronavirus) information  Brissa does not work or volunteer as healthcare worker or a  and does not work or volunteer in a healthcare facility.   She does not live with a healthcare worker.   Brissa has not had a close contact with a laboratory-confirmed COVID-19 patient within 14 days of symptom onset. She also has not had a close contact with a suspected COVID-19 patient within 14 days of symptom onset.   Pertinent medical history  Brissa had 1 sinus infection within the past year.   Brissa does not get yeast infections when she takes antibiotics.   Brissa does not need a return to work/school note.   Weight: 123 lbs   Brissa does not smoke or use smokeless tobacco.   Additional information as reported by the patient (free text): I have asthma   Weight: 123 lbs    MEDICATIONS: Flonase Allergy Relief nasal, albuterol sulfate inhalation, Paxil oral, levothyroxine oral, Advair Diskus inhalation, ALLERGIES: Flagyl, Compazine  Clinician Response:  Dear Brissa,  Based on the information provided, you have viral sinusitis, also known as a sinus infection. Sinus infections are caused by bacteria or a virus and symptoms are almost always identical. The difference between the 2 types of infections is timing.  Sinus infections start as viral infections and symptoms improve on their own in about 7 days. If symptoms have not improved after 7 days or have even worsened, a bacterial infection may have developed.  Medication information  Because you have a viral infection, antibiotics will not help  you get better. Treating a viral infection with antibiotics could actually make you feel worse.  Self care  Steps you can take to be as comfortable as possible:     Rest.    Drink plenty of fluids.    Take a warm shower to loosen congestion    Use a cool-mist humidifier.    Use throat lozenges.    Suck on frozen items such as popsicles.    Drink hot tea with lemon and honey.    Gargle with warm salt water (1/4 teaspoon of salt per 8 ounce glass of water).    Take a spoonful of honey to reduce your cough.     When to seek care  Please be seen in a clinic or urgent care if any of the following occur:   New symptoms develop, or symptoms become worse   Call ahead before going to the clinic or urgent care.  Call [phone] or go to the emergency room if you feel that your throat is closing off, you suddenly develop a rash, you are unable to swallow fluids, you are drooling, or you are having difficulty breathing.  Additional treatment plan   Your symptoms show that you may have coronavirus (COVID-19). This illness can cause fever, cough and trouble breathing. Many people get a mild case and get better on their own. Some people can get very sick.   Will I be tested for COVID-19?  We would recommend you be tested for coronavirus. Here is how to get that scheduled:   Call [phone]. Tell them you were referred by [facility] to have a COVID-19 test. You will be scheduled at one of our [facility] testing locations (drive-up). Please have your [facility] visit information ready when you call including your visit ID number to verify you were referred.    How can I protect others in the meantime?  First, stay home and away from others (self-isolate) until:   You've had no fever---and no medicine that reduces fever---for 3 full days (72 hours). And...    Your other symptoms have gotten better. For example, your cough or breathing has improved. And...    At least 7 days have passed since your symptoms started.   During this time:   Don't go to  work, school or anywhere else.     Stay away from others in your home. No hugging, kissing or shaking hands.    Don't let anyone visit.    Cover your mouth and nose with a mask, tissue or wash cloth to avoid spreading germs.    Wash your hands and face often. Use soap and water.   How can I take care of myself?  1.Take Tylenol (acetaminophen) for fever or pain. If you have liver or kidney problems, ask your family doctor if it's okay to take Tylenol.   Adults can take either:    650 mg (two 325 mg pills) every 4 to 6 hours, or...    1,000 mg (two 500 mg pills) every 8 hours as needed.     Note: Don't take more than 3,000 mg in one day.   For children, check the Tylenol bottle for the right dose. The dose is based on the child's age or weight.  2.If you have other health problems (like cancer, heart failure, an organ transplant or severe kidney disease): Call your specialty clinic if you don't feel better in the next 2 days.  3.Know when to call 911: If your breathing is so bad that it keeps you from doing normal activities, call 911 or go to the emergency room. Tell them that you've been staying home and may have COVID-19.  4.Sign up for [employer]. We know it's scary to hear that you might have COVID-19. We want to track your symptoms to make sure you're okay over the next 2 weeks. Please look for an email from [employer]---this is a free, online program that we'll use to keep in touch. To sign up, follow the link in the email. Learn more at [email or web address].  Where can I get more information?  To learn more about COVID-19 and how to care for yourself at home, please visit the CDC website at https://www.cdc.gov/coronavirus/2019-ncov/about/steps-when-sick.html.  For more about your care at [facility], please visit [email or web address]     Diagnosis: Cough  Diagnosis ICD: R05

## 2020-05-01 ENCOUNTER — OFFICE VISIT (OUTPATIENT)
Dept: URGENT CARE | Facility: URGENT CARE | Age: 65
End: 2020-05-01
Payer: COMMERCIAL

## 2020-05-01 DIAGNOSIS — Z20.822 SUSPECTED 2019 NOVEL CORONAVIRUS INFECTION: Primary | ICD-10-CM

## 2020-05-01 PROCEDURE — 87635 SARS-COV-2 COVID-19 AMP PRB: CPT | Mod: 90 | Performed by: FAMILY MEDICINE

## 2020-05-01 PROCEDURE — 99000 SPECIMEN HANDLING OFFICE-LAB: CPT | Performed by: FAMILY MEDICINE

## 2020-05-01 PROCEDURE — 99207 ZZC NO BILLABLE SERVICE THIS VISIT: CPT

## 2020-05-02 LAB
SARS-COV-2 RNA SPEC QL NAA+PROBE: NOT DETECTED
SPECIMEN SOURCE: NORMAL

## 2020-05-05 ENCOUNTER — VIRTUAL VISIT (OUTPATIENT)
Dept: FAMILY MEDICINE | Facility: OTHER | Age: 65
End: 2020-05-05

## 2020-05-05 NOTE — PROGRESS NOTES
"Date: 2020 13:07:42  Clinician: Hesham Lin  Clinician NPI: [id number]  Patient: Brissa Mayer  Patient : 1955  Patient Address: [address] [place] [address]  Patient Phone: [phone]  Visit Protocol: URI  Patient Summary:  Brissa is a 65 year old ( : 1955 ) female who initiated a Visit for cold, sinus infection, or influenza. When asked the question \"Please sign me up to receive news, health information and promotions. \", Brissa responded \"Yes\".    Brissa states her symptoms started gradually 5-6 days ago. After her symptoms started, they improved and then got worse again.   Her symptoms consist of myalgia, rhinitis, facial pain or pressure, a cough, nasal congestion, nausea, ear pain, a headache, malaise, and chills.   Symptom details     Nasal secretions: The color of her mucus is blood-tinged.    Cough: rBissa coughs a few times an hour and her cough is not more bothersome at night. Phlegm comes into her throat when she coughs. She believes her cough is caused by post-nasal drip. The color of the phlegm is clear.     Facial pain or pressure: The facial pain or pressure feels worse when bending over or leaning forward.     Headache: She states the headache is moderate (4-6 on a 10 point pain scale).      Brissa denies having fever, sore throat, vomiting, teeth pain, ageusia, anosmia, diarrhea, and wheezing. She also denies taking antibiotic medication for the symptoms and having recent facial or sinus surgery in the past 60 days. She is not experiencing dyspnea.   Precipitating events  She has not recently been exposed to someone with influenza. Brissa has been in close contact with the following high risk individuals: people with asthma, heart disease or diabetes and immunocompromised people.   Pertinent COVID-19 (Coronavirus) information  Brissa does not work or volunteer as healthcare worker or a  and does not work or volunteer in a healthcare facility. "   She does not live with a healthcare worker.   Brissa has not had a close contact with a laboratory-confirmed COVID-19 patient within 14 days of symptom onset. She also has not had a close contact with a suspected COVID-19 patient within 14 days of symptom onset.   Pertinent medical history  Brissa had 2 sinus infections within the past year.   Brissa does not get yeast infections when she takes antibiotics.   Brissa does not need a return to work/school note.   Weight: 123 lbs   Brissa does not smoke or use smokeless tobacco.   Additional information as reported by the patient (free text): Asthma   Weight: 123 lbs    MEDICATIONS: Flonase Allergy Relief nasal, albuterol sulfate inhalation, Paxil oral, levothyroxine oral, Advair Diskus inhalation, ALLERGIES: Compazine, Flagyl  Clinician Response:  Dear Brissa,   Your symptoms show that you may have coronavirus (COVID-19). This illness can cause fever, cough and trouble breathing. Many people get a mild case and get better on their own. Some people can get very sick.   Will I be tested for COVID-19?  We would recommend you be tested for coronavirus. Here is how to get that scheduled:   Call [phone]. Tell them you were [facility] COVID-19 [facility]. You will be scheduled at one of our [facility] testing locations (drive-up). Please have your [facility] visit information ready when you call including your visit ID number to verify you were referred.    How can I protect others in the meantime?  First, stay home and away from others (self-isolate) until:   You've had no fever---and no medicine that reduces fever---for 3 full days (72 hours). And...    Your other symptoms have gotten better. For example, your cough or breathing has improved. And...    At least 7 days have passed since your symptoms started.   During this time:   Don't go to work, school or anywhere else.     Stay away from others in your home. No hugging, kissing or shaking hands.     Don't let anyone visit.    Cover your mouth and nose with a mask, tissue or wash cloth to avoid spreading germs.    Wash your hands and face often. Use soap and water.   How can I take care of myself?  1.Take Tylenol (acetaminophen) for fever or pain. If you have liver or kidney problems, ask your family doctor if it's okay to take Tylenol.   Adults can take either:    650 mg (two 325 mg pills) every 4 to 6 hours, or...    1,000 mg (two 500 mg pills) every 8 hours as needed.     Note: Don't take more than 3,000 mg in one day.   For children, check the Tylenol bottle for the right dose. The dose is based on the child's age or weight.  2.If you have other health problems (like cancer, heart failure, an organ transplant or severe kidney disease): Call your specialty clinic if you don't feel better in the next 2 days.  3.Know when to call 911: If your breathing is so bad that it keeps you from doing normal activities, call 911 or go to the emergency room. Tell them that you've been staying home and may have COVID-19.  4.Sign up for [employer]. We know it's scary to hear that you might have COVID-19. We want to track your symptoms to make sure you're okay over the next 2 weeks. Please look for an email from [employer]---this is a free, online program that we'll use to keep in touch. To sign up, follow the link in the email. Learn more at [email or web address].  Where can I get more information?  To learn more about COVID-19 and how to care for yourself at home, please visit the CDC website at https://www.cdc.gov/coronavirus/2019-ncov/about/steps-when-sick.html.  For more about your care at [facility], please visit [email or web address]     Diagnosis: Mild intermittent asthma with status asthmaticus  Diagnosis ICD: J45.22

## 2020-05-07 ENCOUNTER — VIRTUAL VISIT (OUTPATIENT)
Dept: FAMILY MEDICINE | Facility: OTHER | Age: 65
End: 2020-05-07

## 2020-05-07 ENCOUNTER — NURSE TRIAGE (OUTPATIENT)
Dept: NURSING | Facility: CLINIC | Age: 65
End: 2020-05-07

## 2020-05-07 NOTE — PROGRESS NOTES
"Date: 2020 09:14:30  Clinician: Yovana Hopkins  Clinician NPI: [id number]  Patient: Brissa Mayer  Patient : 1955  Patient Address: [address] [place] [address]  Patient Phone: [phone]  Visit Protocol: URI  Patient Summary:  Brissa is a 65 year old ( : 1955 ) female who initiated a Visit for COVID-19 (Coronavirus) evaluation and screening. When asked the question \"Please sign me up to receive news, health information and promotions. \", Brissa responded \"Yes\".    Brissa states her symptoms started gradually 5-6 days ago. After her symptoms started, they improved and then got worse again.   Her symptoms consist of myalgia, rhinitis, facial pain or pressure, a cough, nasal congestion, nausea, tooth pain, ear pain, a headache, malaise, enlarged lymph nodes, and chills. She is experiencing mild difficulty breathing with activities but can speak normally in full sentences. Brissa also feels feverish.   Symptom details     Nasal secretions: The color of her mucus is blood-tinged and white.    Cough: Brissa coughs a few times an hour and her cough is not more bothersome at night. Phlegm comes into her throat when she coughs. She believes her cough is caused by post-nasal drip. The color of the phlegm is white.     Temperature: Her current temperature is 98.6 degrees Fahrenheit.     Facial pain or pressure: The facial pain or pressure feels worse when bending over or leaning forward.     Headache: She states the headache is moderate (4-6 on a 10 point pain scale).     Tooth pain: The tooth pain is not caused by a cavity, recent dental work, or other mouth problems.      Brissa denies having sore throat, vomiting, ageusia, anosmia, diarrhea, and wheezing. She also denies taking antibiotic medication for the symptoms and having recent facial or sinus surgery in the past 60 days.   Precipitating events  She has not recently been exposed to someone with influenza. Brissa has been in " close contact with the following high risk individuals: people with asthma, heart disease or diabetes and immunocompromised people.   Pertinent COVID-19 (Coronavirus) information  Brissa does not work or volunteer as healthcare worker or a  and does not work or volunteer in a healthcare facility.   She does not live with a healthcare worker.   Brissa has not had a close contact with a laboratory-confirmed COVID-19 patient within 14 days of symptom onset. She also has not had a close contact with a suspected COVID-19 patient within 14 days of symptom onset.   Pertinent medical history  Brissa had 2 sinus infections within the past year.   Brissa does not get yeast infections when she takes antibiotics.   Brissa does not need a return to work/school note.   Weight: 123 lbs   Brissa does not smoke or use smokeless tobacco.   Additional information as reported by the patient (free text): I had the Covid-19 test on Friday May 1- haven't heard back. Haven't gotten an email from [facility]- I feel weak and achy all the time.   Weight: 123 lbs    MEDICATIONS: Flonase Allergy Relief nasal, albuterol sulfate inhalation, Paxil oral, levothyroxine oral, Advair Diskus inhalation, ALLERGIES: Compazine, Flagyl  Clinician Response:  Dear Brissa,   Dear Brissa  It is unfortunate you have not heard back on the COVID-19 test results. &nbsp;Please know this may take longer than expected due to an increase in tests over the past 3 weeks.&nbsp;  Below you will find information on signing up for the [facility] - With your symptoms, I feel this is very important. &nbsp;I have submitted your information to the [facility] team again today, I am sorry you have not heard back from the initial visit. &nbsp;Please look for that email today.&nbsp;  How can I protect others?  Without a test, we can't know for sure that you have COVID-19. For safety, it's very important to follow these rules.  First, stay home and away from  others (self-isolate) until:   You've had no fever---and no medicine that reduces fever---for 3 full days (72 hours). And...    Your other symptoms have gotten better. For example, your cough or breathing has improved. And...   At least 10 days have passed since your symptoms started.   During this time:   Don't go to work, school or anywhere else.    Stay away from others in your home. No hugging, kissing or shaking hands.   Don't let anyone visit.   Cover your mouth and nose with a mask, tissue or wash cloth to avoid spreading germs.   Wash your hands and face often. Use soap and water.   How can I take care of myself?  1.Take Tylenol (acetaminophen) for fever or pain. If you have liver or kidney problems, ask your family doctor if it's okay to take Tylenol.   Adults can take either:    650 mg (two 325 mg pills) every 4 to 6 hours, or...   1,000 mg (two 500 mg pills) every 8 hours as needed.    Note: Don't take more than 3,000 mg in one day.  For children, check the Tylenol bottle for the right dose. The dose is based on the child's age or weight.   2.If you have other health problems (like cancer, heart failure, an organ transplant or severe kidney disease): Call your specialty clinic if you don't feel better in the next 2 days.  3.Know when to call 911: If your breathing is so bad that it keeps you from doing normal activities, call 911 or go to the emergency room. Tell them that you've been staying home and may have COVID-19.  4.Sign up for [email or web address]. We know it's scary to hear that you might have COVID-19. We want to track your symptoms to make sure you're okay over the next 2 weeks. Please look for an email from [email or web address]---this is a free, online program that we'll use to keep in touch. To sign up, follow the link in the email. Learn more at [email or web address].  Where can I get more information?  To learn more about COVID-19 and how to care for yourself at home, please visit the CDC  website at https://www.cdc.gov/coronavirus/2019-ncov/about/steps-when-sick.html.  For more options for care at [facility], please visit our website at [email or web address]   If you are interested in becoming part of a [facility] clinic trial related to COVID19 please go to [email or web address] for information, if you qualify.     Diagnosis: Cough  Diagnosis ICD: R05

## 2020-05-07 NOTE — TELEPHONE ENCOUNTER
"  Call from pt       Last time checked in with [facility] was the 5th     -- COVID test on Friday (throat swab)      \"Feeling a lot worse\" - since then      > Generalized \"feel like I have the flu\"    > Chills Aches HA - \"really bad weakness\"      >Cough seems to be be getting a little bit worse to her       > Fever is gone     Not sound SOB on the phone - clear voice - full sentences         A/P:   > Directed her to re-connect with [facility] for further eval with change in status   - I did note the [facility] contact information as well       Con't with home care as previously directed       She will reconnect with [facility] now         Jeff Layton RN         Additional Information    Nursing judgment     Directed back to [facility] for further evaluation    Protocols used: NO PROTOCOL AVAILABLE - SICK ADULT-A-[place]      "

## 2020-05-18 ENCOUNTER — TELEPHONE (OUTPATIENT)
Dept: INTERNAL MEDICINE | Facility: CLINIC | Age: 65
End: 2020-05-18

## 2020-05-18 ENCOUNTER — OFFICE VISIT (OUTPATIENT)
Dept: INTERNAL MEDICINE | Facility: CLINIC | Age: 65
End: 2020-05-18
Payer: COMMERCIAL

## 2020-05-18 ENCOUNTER — TELEPHONE (OUTPATIENT)
Dept: URGENT CARE | Facility: CLINIC | Age: 65
End: 2020-05-18

## 2020-05-18 ENCOUNTER — HOSPITAL ENCOUNTER (OUTPATIENT)
Dept: GENERAL RADIOLOGY | Facility: CLINIC | Age: 65
Discharge: HOME | End: 2020-05-18
Attending: INTERNAL MEDICINE | Admitting: INTERNAL MEDICINE
Payer: COMMERCIAL

## 2020-05-18 VITALS — OXYGEN SATURATION: 100 % | HEART RATE: 68 BPM | DIASTOLIC BLOOD PRESSURE: 95 MMHG | SYSTOLIC BLOOD PRESSURE: 137 MMHG

## 2020-05-18 DIAGNOSIS — Z20.822 SUSPECTED COVID-19 VIRUS INFECTION: ICD-10-CM

## 2020-05-18 DIAGNOSIS — R07.9 CHEST PAIN, UNSPECIFIED TYPE: ICD-10-CM

## 2020-05-18 DIAGNOSIS — R06.02 SOB (SHORTNESS OF BREATH): ICD-10-CM

## 2020-05-18 DIAGNOSIS — R05.9 COUGH: Primary | ICD-10-CM

## 2020-05-18 DIAGNOSIS — R05.9 COUGH: ICD-10-CM

## 2020-05-18 DIAGNOSIS — R06.02 SHORTNESS OF BREATH: Primary | ICD-10-CM

## 2020-05-18 LAB
ALBUMIN SERPL-MCNC: 4.5 G/DL (ref 3.4–5)
ALP SERPL-CCNC: 54 U/L (ref 40–150)
ALT SERPL W P-5'-P-CCNC: 21 U/L (ref 0–50)
ANION GAP SERPL CALCULATED.3IONS-SCNC: 5 MMOL/L (ref 3–14)
AST SERPL W P-5'-P-CCNC: 21 U/L (ref 0–45)
BILIRUB SERPL-MCNC: 0.6 MG/DL (ref 0.2–1.3)
BUN SERPL-MCNC: 8 MG/DL (ref 7–30)
CALCIUM SERPL-MCNC: 8.7 MG/DL (ref 8.5–10.1)
CHLORIDE SERPL-SCNC: 105 MMOL/L (ref 94–109)
CO2 SERPL-SCNC: 28 MMOL/L (ref 20–32)
CREAT SERPL-MCNC: 0.61 MG/DL (ref 0.52–1.04)
ERYTHROCYTE [DISTWIDTH] IN BLOOD BY AUTOMATED COUNT: 14 % (ref 10–15)
GFR SERPL CREATININE-BSD FRML MDRD: >90 ML/MIN/{1.73_M2}
GLUCOSE SERPL-MCNC: 99 MG/DL (ref 70–99)
HCT VFR BLD AUTO: 43.5 % (ref 35–47)
HGB BLD-MCNC: 14.3 G/DL (ref 11.7–15.7)
MCH RBC QN AUTO: 30.6 PG (ref 26.5–33)
MCHC RBC AUTO-ENTMCNC: 32.9 G/DL (ref 31.5–36.5)
MCV RBC AUTO: 93 FL (ref 78–100)
NT-PROBNP SERPL-MCNC: 40 PG/ML (ref 0–125)
PLATELET # BLD AUTO: 230 10E9/L (ref 150–450)
POTASSIUM SERPL-SCNC: 4 MMOL/L (ref 3.4–5.3)
PROT SERPL-MCNC: 8.4 G/DL (ref 6.8–8.8)
RBC # BLD AUTO: 4.68 10E12/L (ref 3.8–5.2)
SODIUM SERPL-SCNC: 138 MMOL/L (ref 133–144)
TROPONIN I SERPL-MCNC: <0.015 UG/L (ref 0–0.04)
WBC # BLD AUTO: 4.3 10E9/L (ref 4–11)

## 2020-05-18 PROCEDURE — 85027 COMPLETE CBC AUTOMATED: CPT | Performed by: INTERNAL MEDICINE

## 2020-05-18 PROCEDURE — 99207 ZZC NO BILLABLE SERVICE THIS VISIT: CPT | Performed by: FAMILY MEDICINE

## 2020-05-18 PROCEDURE — 80053 COMPREHEN METABOLIC PANEL: CPT | Performed by: INTERNAL MEDICINE

## 2020-05-18 PROCEDURE — 83880 ASSAY OF NATRIURETIC PEPTIDE: CPT | Performed by: INTERNAL MEDICINE

## 2020-05-18 PROCEDURE — 99214 OFFICE O/P EST MOD 30 MIN: CPT | Performed by: INTERNAL MEDICINE

## 2020-05-18 PROCEDURE — 84484 ASSAY OF TROPONIN QUANT: CPT | Performed by: INTERNAL MEDICINE

## 2020-05-18 PROCEDURE — 71046 X-RAY EXAM CHEST 2 VIEWS: CPT | Mod: TC

## 2020-05-18 PROCEDURE — 36415 COLL VENOUS BLD VENIPUNCTURE: CPT | Performed by: INTERNAL MEDICINE

## 2020-05-18 RX ORDER — AZITHROMYCIN 250 MG/1
TABLET, FILM COATED ORAL
Qty: 6 TABLET | Refills: 0 | Status: SHIPPED | OUTPATIENT
Start: 2020-05-18 | End: 2020-07-31

## 2020-05-18 RX ORDER — PREDNISONE 20 MG/1
20 TABLET ORAL DAILY
Qty: 7 TABLET | Refills: 0 | Status: SHIPPED | OUTPATIENT
Start: 2020-05-18 | End: 2020-07-31

## 2020-05-18 NOTE — TELEPHONE ENCOUNTER
Reason for Call:  Other appointment    Detailed comments: Patient calling was seen by [facility] and did the get well loop was tested for COVID and was negative, has had SOB for 3+ weeks and was told to contact primary doctor for appointment. Please advise     Phone Number Patient can be reached at: Other phone number:  [phone]    Best Time: Anytime today     Can we leave a detailed message on this number? YES    Call taken on 5/18/2020 at 9:48 AM by Miranda Seipiel Vaccari

## 2020-05-18 NOTE — PROGRESS NOTES
Subjective     Brissa Mayer is a 65 year old female who presents to clinic today for the following health issues:    HPI   Chief Complaint   Patient presents with     Shortness of Breath     increasing sob for three weeks, chest hurts all the time and just doesn't feel like herself     She did have aches and chills, some headaches. Sore throat is gone.  First thought she had a sinus infection but now that is good.      Sob is the main problem.      She has done [facility] and had negative testing in May    She can still smell things.      No recent fevers.      She normally does the housework and walks her dogs but that is getting harder.  She doesn't feel like herself, like she could pass out.      Has cough at night and some during the day. Some phlegm but not much.      Working from home but not doing much work, no energy to do that work. Sleeping a lot .      Patient Active Problem List   Diagnosis     Asthma, mild intermittent     CARDIOVASCULAR SCREENING; LDL GOAL LESS THAN 160     Advanced directives, counseling/discussion     Hyperlipidemia LDL goal <130     Generalized anxiety disorder     Major depressive disorder, recurrent episode, mild (HCC)     Hypothyroidism     Gastroesophageal reflux disease without esophagitis     Adjustment disorder with mixed anxiety and depressed mood     Past Surgical History:   Procedure Laterality Date     BUNIONECTOMY       ESOPHAGOSCOPY, GASTROSCOPY, DUODENOSCOPY (EGD), COMBINED N/A 4/2/2019    Procedure: ESOPHAGOSCOPY, GASTROSCOPY, DUODENOSCOPY (EGD);  Surgeon: Ochoa Cherry DO;  Location:  GI     OTHER SURGICAL HISTORY  1980    Bunionectomy       Social History     Tobacco Use     Smoking status: Never Smoker     Smokeless tobacco: Never Used   Substance Use Topics     Alcohol use: Yes     Comment: Occasional wine 3X's /week     Family History   Problem Relation Age of Onset     Asthma Mother      Diabetes Mother      Cancer - colorectal Maternal Grandmother       Heart Disease Father         MI at age 55     Prostate Cancer Father      Asthma Father      Asthma Sister      Obesity Sister          Current Outpatient Medications   Medication Sig Dispense Refill     albuterol (VENTOLIN HFA) 108 (90 Base) MCG/ACT inhaler INHALE TWO PUFFS BY MOUTH EVERY 6 HOURS AS NEEDED FOR SHORTNESS OF BREATH/DYSPNEA 18 g 3     cetirizine (ZYRTEC) 10 MG tablet Take 1 tablet (10 mg) by mouth every evening 30 tablet 1     fluticasone (FLONASE) 50 MCG/ACT nasal spray Spray 2 sprays into both nostrils daily 1 Package 1     fluticasone-salmeterol (ADVAIR DISKUS) 250-50 MCG/DOSE inhaler INHALE ONE PUFF BY MOUTH TWICE A DAY 3 Inhaler 3     ibuprofen (ADVIL/MOTRIN) 200 MG capsule Take 400 mg by mouth as needed for fever       levothyroxine (SYNTHROID/LEVOTHROID) 50 MCG tablet Take 1 tablet (50 mcg) by mouth daily 90 tablet 3     PARoxetine (PAXIL-CR) 37.5 MG 24 hr tablet TAKE ONE TABLET BY MOUTH EVERY MORNING 90 tablet 3         Reviewed and updated as needed this visit by Provider         Review of Systems   CONSTITUTIONAL:NEGATIVE for fever, chills, change in weight  ENT/MOUTH: sore throat and sinuses are better   RESP:sob with exertion   CV: atypical chest pain   GI: NEGATIVE for nausea, abdominal pain, heartburn, or change in bowel habits  MUSCULOSKELETAL: NEGATIVE for significant arthralgias or myalgia  NEURO: NEGATIVE for weakness, dizziness or paresthesias      Objective    There were no vitals taken for this visit.  There is no height or weight on file to calculate BMI.  Physical Exam   GENERAL: healthy, alert and no distress  HENT: ear canals and TM's normal, nose and mouth without ulcers or lesions  NECK: no adenopathy, no asymmetry, masses, or scars and thyroid normal to palpation  RESP: lungs clear to auscultation - no rales, rhonchi or wheezes  CV: regular rate and rhythm, normal S1 S2, no S3 or S4, no murmur, click or rub, no peripheral edema and peripheral pulses strong  MS: no gross  musculoskeletal defects noted, no edema  SKIN: no suspicious lesions or rashes  NEURO: Normal strength and tone, mentation intact and speech normal  PSYCH: mentation appears normal, affect normal/bright    Diagnostic Test Results:  Labs reviewed in Epic        Assessment & Plan       ICD-10-CM    1. Cough  R05 CBC with platelets     Comprehensive metabolic panel     XR Chest 2 Views     BNP-N terminal pro     azithromycin (ZITHROMAX) 250 MG tablet     predniSONE (DELTASONE) 20 MG tablet   2. SOB (shortness of breath)  R06.02 Troponin I     CBC with platelets     Comprehensive metabolic panel     XR Chest 2 Views     BNP-N terminal pro     azithromycin (ZITHROMAX) 250 MG tablet     predniSONE (DELTASONE) 20 MG tablet   3. Chest pain, unspecified type  R07.9 Troponin I     CBC with platelets     Comprehensive metabolic panel     XR Chest 2 Views     BNP-N terminal pro   4. Suspected Covid-19 Virus Infection  Z20.828      This is a 65-year-old woman who has a history of asthma.  She is normally on Advair, albuterol, Synthroid and Paxil.  She had a couple colleagues at work who had COVID-19 but she was not really exposed to them.  For the last 2 to 3 weeks she has had some low-grade fevers or chills, body aches, sore throat and sinus that is gotten better.  She continues to have shortness of breath though and is what really is affecting her.  She cannot walk her dogs cannot walk 5 blocks she gets in atypical chest pain and shortness of breath.  Using her inhaler more often.  She was negative for COVID-19 testing early in May.  She is talked [facility] care twice and they recommended she come in.  Her lungs are clear on exam her heart is regular she does not have any palpitations or extra beats.  Her oxygen saturation is 100% she was some slightly hypertensive today.    I am in a work-up with doing a chest x-ray labs for troponin, CBC, comprehensive panel and BNP.  Then presumably were in a treat her as a bronchitis and give her  an antibiotic of a Z-Emmanuel and prednisone to help her asthma even though she is not wheezing today.  This still could be COVID-19 no specific treatment for that.  Possibly her testing was negative I do not know if there is a value to retesting her.  This should improve over the next week.        No follow-ups on file.    Fuentes Darby MD  [facility]

## 2020-05-18 NOTE — TELEPHONE ENCOUNTER
SUBJECTIVE  HPI: Called pt from [facility], a previously healthy 66 yo F with PMH of asthma on 3rd week of illness with respiratory symptoms. It sounds like her SOB has not changed today, but that it has been worse over this weekend. Her O2 sats are around 92 at rest, sometimes dipping down into high 80s. She is speaking in complete sentences and can walk to the bathroom without stopping to catch her breath. She is afebrile. She was using rescue inhaler every 2 hours yesterday. Some wheezing at rest at night, lots with activity. Hydration has been good.     OBJECTIVE    COVID-19 PCR Results  5/1/20    COVID-19 Virus PCR to [facility] - Result  Not Detected    COVID-19 Virus PCR to [facility] - Source  Nasopharyngeal        ASSESSMENT/PLAN  Although Brissa's SOB is manageable with walking short distances and at rest, she is 3 weeks into an unknown respiratory illness with low-range pulse ox measurements, escalating use of her rescue inhaler, and more wheezing both with activity and with rest and should be seen in person to be able to evaluate cause for worsening respiratory symptoms.  - Recommended her calling her clinic at [facility] to be seen in person.    I have reviewed this patient with the attending physician, Dr. Kellogg.  Court Garcia, MS4  [employer]    I was present during this telephone visit with the medical student who participated in the service and in the documentation of the note. I have verified the history and personally performed the physical exam and medical decision making. Suggested that Ms. Mayer seek care at her clinic to discuss symptoms lasting longer than typical for COVID and with a Negative test.     I personally spent a total of 8 minutes speaking with Brissa Mayer during today s visit.     Fabrizio Kellogg MD  10:21 AM, May 18, 2020

## 2020-05-19 ENCOUNTER — MYC MEDICAL ADVICE (OUTPATIENT)
Dept: INTERNAL MEDICINE | Facility: CLINIC | Age: 65
End: 2020-05-19

## 2020-06-18 ENCOUNTER — MYC REFILL (OUTPATIENT)
Dept: INTERNAL MEDICINE | Facility: CLINIC | Age: 65
End: 2020-06-18

## 2020-06-18 DIAGNOSIS — J45.30 MILD PERSISTENT ASTHMA WITHOUT COMPLICATION: ICD-10-CM

## 2020-06-18 DIAGNOSIS — J45.20 MILD INTERMITTENT ASTHMA WITHOUT COMPLICATION: ICD-10-CM

## 2020-06-18 NOTE — TELEPHONE ENCOUNTER
Patient has been seen in the past 30 days, 5/18/20 virtual visit with Dr. Darby  Routing to PCP to advise.    Ventolin HFA Inhaler  Last Written Prescription Date:  3/10/20  Last Fill Quantity: 18g,  # refills: 3   Last office visit: 5/18/2020 with prescribing provider:  Dr. Darby   Future Office Visit:  NONE    Advair Diskus  Last Written Prescription Date:  3/10/20  Last Fill Quantity: 3,  # refills: 3   Last office visit: 5/18/2020 with prescribing provider:   Dr. darby   Future Office Visit:  NONE    ACT Total Scores 5/23/2018 5/10/2019 11/21/2019   ACT TOTAL SCORE - - -   ASTHMA ER VISITS - - -   ASTHMA HOSPITALIZATIONS - - -   ACT TOTAL SCORE (Goal Greater than or Equal to 20) 25 25 19   In the past 12 months, how many times did you visit the emergency room for your asthma without being admitted to the hospital? 0 0 0   In the past 12 months, how many times were you hospitalized overnight because of your asthma? 0 0 0   LAQUITA Jack

## 2020-06-19 RX ORDER — ALBUTEROL SULFATE 90 UG/1
AEROSOL, METERED RESPIRATORY (INHALATION)
Qty: 18 G | Refills: 3 | Status: SHIPPED | OUTPATIENT
Start: 2020-06-19 | End: 2023-08-18

## 2020-07-29 ENCOUNTER — TELEPHONE (OUTPATIENT)
Dept: INTERNAL MEDICINE | Facility: CLINIC | Age: 65
End: 2020-07-29

## 2020-07-29 NOTE — TELEPHONE ENCOUNTER
Patient is due for a PHQ-9.  Index start date:4/05/2020  Index end date:8/03/2020    Please call patient. Pt is active on [employer]. I have sent a PHQ-9 via [employer] and will postpone the encounter. Kyung Hilliard CMA ([facility])

## 2020-07-30 NOTE — PROGRESS NOTES
Sports Medicine Clinic Visit    PCP: Fuentes Darby    CC: Patient presents with:  Left Hip - Pain      HPI:  Brissa Mayer is a 65 year old female who is seen as a self referral.   She notes left hip pain that began ~ 1 month ago when she was pushing a coffee table up the stairs.  She notes she felt a pull in the lateral hip.  She notes it got slightly better however it is still bothering her.  She notes she has tried to walk/job and that is bothersome for her.  She rates the pain at a 10+/10 at its worst and a 5/10 currently.  Symptoms are relieved with Ibuprofen and biofreeze. Symptoms are worsened by lying on her left hip, prolonged sitting. She endorses pain.   She denies popping, grinding, numbness, tingling and weakness.  Other treatment has included rest. She did have a pinched nerve in the past and was treated with an AMINTA, she notes this pain does not feel similar to that.       Review of Systems:  Musculoskeletal: as above  Remainder of review of systems is negative including constitutional, eyes, ENT, CV, pulmonary, GI, , endocrine, skin, hematologic, and neurologic except as noted in HPI or medical history.    History reviewed. No pertinent past surgical/medical/family/social history other than as mentioned in HPI.    Patient Active Problem List   Diagnosis     Asthma, mild intermittent     CARDIOVASCULAR SCREENING; LDL GOAL LESS THAN 160     Advanced directives, counseling/discussion     Hyperlipidemia LDL goal <130     Generalized anxiety disorder     Major depressive disorder, recurrent episode, mild (HCC)     Hypothyroidism     Gastroesophageal reflux disease without esophagitis     Adjustment disorder with mixed anxiety and depressed mood     Past Medical History:   Diagnosis Date     Anxiety      Asthma, mild intermittent      Migraines      Tension headaches      Unspecified hypothyroidism      Past Surgical History:   Procedure Laterality Date     BUNIONECTOMY       ESOPHAGOSCOPY,  GASTROSCOPY, DUODENOSCOPY (EGD), COMBINED N/A 4/2/2019    Procedure: ESOPHAGOSCOPY, GASTROSCOPY, DUODENOSCOPY (EGD);  Surgeon: Ochoa Cherry DO;  Location: [facility]     OTHER SURGICAL HISTORY  1980    Bunionectomy     Family History   Problem Relation Age of Onset     Asthma Mother      Diabetes Mother      Cancer - colorectal Maternal Grandmother      Heart Disease Father         MI at age 55     Prostate Cancer Father      Asthma Father      Asthma Sister      Obesity Sister      Social History     Socioeconomic History     Marital status: Single     Spouse name: Not on file     Number of children: Not on file     Years of education: Not on file     Highest education level: Not on file   Occupational History     Not on file   Social Needs     Financial resource strain: Not on file     Food insecurity     Worry: Not on file     Inability: Not on file     Transportation needs     Medical: Not on file     Non-medical: Not on file   Tobacco Use     Smoking status: Never Smoker     Smokeless tobacco: Never Used   Substance and Sexual Activity     Alcohol use: Yes     Comment: Occasional wine 3X's /week     Drug use: No     Sexual activity: Not Currently   Lifestyle     Physical activity     Days per week: Not on file     Minutes per session: Not on file     Stress: Not on file   Relationships     Social connections     Talks on phone: Not on file     Gets together: Not on file     Attends Rastafari service: Not on file     Active member of club or organization: Not on file     Attends meetings of clubs or organizations: Not on file     Relationship status: Not on file     Intimate partner violence     Fear of current or ex partner: Not on file     Emotionally abused: Not on file     Physically abused: Not on file     Forced sexual activity: Not on file   Other Topics Concern     Parent/sibling w/ CABG, MI or angioplasty before 65F 55M? 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     Current Outpatient Medications   Medication     fluticasone (FLONASE) 50 MCG/ACT nasal spray     fluticasone-salmeterol (ADVAIR DISKUS) 250-50 MCG/DOSE inhaler     ibuprofen (ADVIL/MOTRIN) 200 MG capsule     levothyroxine (SYNTHROID/LEVOTHROID) 50 MCG tablet     PARoxetine (PAXIL-CR) 37.5 MG 24 hr tablet     albuterol (VENTOLIN HFA) 108 (90 Base) MCG/ACT inhaler     cetirizine (ZYRTEC) 10 MG tablet     No current facility-administered medications for this visit.      Allergies   Allergen Reactions     Compazine      Anxiety     Flagyl [Metronidazole Hcl] Nausea and Vomiting         Objective:  /71   Pulse 103   Ht 1.556 m (5' 1.25\")   Wt 49.9 kg (110 lb)   BMI 20.61 kg/m      General: Alert and in no distress    Head: Normocephalic, atraumatic  Eyes: no scleral icterus or conjunctival erythema   Skin: no erythema, petechiae, or jaundice  CV: regular rhythm by palpation, 2+ distal pulses  Resp: normal respiratory effort without conversational dyspnea   Psych: normal mood and affect    Gait: Non-antalgic, appropriate coordination and balance.  Mild pain over the left lateral superior hip with ambulation.   Neuro: Motor strength and sensation as noted below    Musculoskeletal:    Bilateral hip exam    Inspection:      no edema or ecchymosis in hip area    Palpation:  -Mildly tender over the left lateral superior hip.  No tenderness over the left greater trochanteric bursa or groin.    ROM:     Full active and passive ROM bilaterally without pain    Strength:   Left:  5/5 hip flexion/abduction/adduction, 5/5 knee extension/flexion, 5/5 ankle dorsiflexion/plantarflexion, 5/5 great toe extension/flexion  Right:  5/5 hip flexion/abduction/adduction, 5/5 knee extension/flexion, 5/5 ankle dorsiflexion/plantarflexion, 5/5 great toe extension/flexion    Sensation: grossly intact to light touch in the lower extremities bilaterally    Radiology:  X-rays ordered and independent visualization of images " performed and reviewed with Lara.    Recent Results (from the past 744 hour(s))   XR Pelvis and Hip Left 1 View    Narrative    PELVIS AND HIP LEFT ONE VIEW   7/31/2020 9:15 AM     HISTORY: Left hip pain.    COMPARISON: CT abdomen/pelvis dated 12/16/2015. Pelvis and right hip  x-rays dated 8/23/2013.    FINDINGS:  No acute fractures or dislocations. Alignment is anatomic.  Soft tissues are normal.   Hip and SI joints are normal in appearance.  Degenerative changes are partially imaged in the lower lumbar spine.      Impression    IMPRESSION:   1. No significant etiology for patient's left hip pain is identified.       Assessment:  1. Left hip pain        Plan:  Discussed the assessment with the patient and developed a plan together:  -Pain is superior lateral hip.  Does not have pain with hip ROM on exam today.  Radiographs normal.   -Recommend beginning with physical therapy.  Lara is in agreement with this plan.  -Ice or heat 15-20 minutes as needed (Avoid sleeping on a heating pad or ice)  -Patient's preferred over the counter medications as directed on packaging as needed for pain or soreness.    -Avoid aggravating activities.    -If pain worsens or does not improve, could consider advanced imaging of the hip versus diagnostic/therapeutic steroid injection of the hip joint.    -Follow up as needed..  Please call with questions or concerns.    Cindy Meade MD, [facility] Sports Medicine  [facility]

## 2020-07-30 NOTE — TELEPHONE ENCOUNTER
Pt completed PHQ-9.    PHQ 7/29/2020   PHQ-9 Total Score 0   Q9: Thoughts of better off dead/self-harm past 2 weeks Not at all     Kyung Hilliard CMA ([facility])

## 2020-07-31 ENCOUNTER — OFFICE VISIT (OUTPATIENT)
Dept: ORTHOPEDICS | Facility: CLINIC | Age: 65
End: 2020-07-31
Payer: COMMERCIAL

## 2020-07-31 ENCOUNTER — ANCILLARY PROCEDURE (OUTPATIENT)
Dept: GENERAL RADIOLOGY | Facility: CLINIC | Age: 65
End: 2020-07-31
Attending: PHYSICAL MEDICINE & REHABILITATION
Payer: COMMERCIAL

## 2020-07-31 VITALS
WEIGHT: 110 LBS | HEART RATE: 103 BPM | DIASTOLIC BLOOD PRESSURE: 71 MMHG | HEIGHT: 61 IN | BODY MASS INDEX: 20.77 KG/M2 | SYSTOLIC BLOOD PRESSURE: 125 MMHG

## 2020-07-31 DIAGNOSIS — M25.552 LEFT HIP PAIN: ICD-10-CM

## 2020-07-31 DIAGNOSIS — M25.552 LEFT HIP PAIN: Primary | ICD-10-CM

## 2020-07-31 PROCEDURE — 99204 OFFICE O/P NEW MOD 45 MIN: CPT | Performed by: PHYSICAL MEDICINE & REHABILITATION

## 2020-07-31 PROCEDURE — 73502 X-RAY EXAM HIP UNI 2-3 VIEWS: CPT | Mod: TC

## 2020-07-31 ASSESSMENT — MIFFLIN-ST. JEOR: SCORE: 975.3

## 2020-07-31 NOTE — LETTER
7/31/2020         RE: Brissa Mayer  [address] [id number]        Dear Colleague,    Thank you for referring your patient, Brissa Mayer, to the [facility]. Please see a copy of my visit note below.    Sports Medicine Clinic Visit    PCP: Fuentes Darby    CC: Patient presents with:  Left Hip - Pain      HPI:  Brissa Mayer is a 65 year old female who is seen as a self referral.   She notes left hip pain that began ~ 1 month ago when she was pushing a coffee table up the stairs.  She notes she felt a pull in the lateral hip.  She notes it got slightly better however it is still bothering her.  She notes she has tried to walk/job and that is bothersome for her.  She rates the pain at a 10+/10 at its worst and a 5/10 currently.  Symptoms are relieved with Ibuprofen and biofreeze. Symptoms are worsened by lying on her left hip, prolonged sitting. She endorses pain.   She denies popping, grinding, numbness, tingling and weakness.  Other treatment has included rest. She did have a pinched nerve in the past and was treated with an AMINTA, she notes this pain does not feel similar to that.       Review of Systems:  Musculoskeletal: as above  Remainder of review of systems is negative including constitutional, eyes, ENT, CV, pulmonary, GI, , endocrine, skin, hematologic, and neurologic except as noted in HPI or medical history.    History reviewed. No pertinent past surgical/medical/family/social history other than as mentioned in HPI.    Patient Active Problem List   Diagnosis     Asthma, mild intermittent     CARDIOVASCULAR SCREENING; LDL GOAL LESS THAN 160     Advanced directives, counseling/discussion     Hyperlipidemia LDL goal <130     Generalized anxiety disorder     Major depressive disorder, recurrent episode, mild (HCC)     Hypothyroidism     Gastroesophageal reflux disease without esophagitis     Adjustment disorder with mixed anxiety and depressed mood     Past Medical  History:   Diagnosis Date     Anxiety      Asthma, mild intermittent      Migraines      Tension headaches      Unspecified hypothyroidism      Past Surgical History:   Procedure Laterality Date     BUNIONECTOMY       ESOPHAGOSCOPY, GASTROSCOPY, DUODENOSCOPY (EGD), COMBINED N/A 4/2/2019    Procedure: ESOPHAGOSCOPY, GASTROSCOPY, DUODENOSCOPY (EGD);  Surgeon: Ochoa Cherry DO;  Location: [facility]     OTHER SURGICAL HISTORY  1980    Bunionectomy     Family History   Problem Relation Age of Onset     Asthma Mother      Diabetes Mother      Cancer - colorectal Maternal Grandmother      Heart Disease Father         MI at age 55     Prostate Cancer Father      Asthma Father      Asthma Sister      Obesity Sister      Social History     Socioeconomic History     Marital status: Single     Spouse name: Not on file     Number of children: Not on file     Years of education: Not on file     Highest education level: Not on file   Occupational History     Not on file   Social Needs     Financial resource strain: Not on file     Food insecurity     Worry: Not on file     Inability: Not on file     Transportation needs     Medical: Not on file     Non-medical: Not on file   Tobacco Use     Smoking status: Never Smoker     Smokeless tobacco: Never Used   Substance and Sexual Activity     Alcohol use: Yes     Comment: Occasional wine 3X's /week     Drug use: No     Sexual activity: Not Currently   Lifestyle     Physical activity     Days per week: Not on file     Minutes per session: Not on file     Stress: Not on file   Relationships     Social connections     Talks on phone: Not on file     Gets together: Not on file     Attends Yazidi service: Not on file     Active member of club or organization: Not on file     Attends meetings of clubs or organizations: Not on file     Relationship status: Not on file     Intimate partner violence     Fear of current or ex partner: Not on file     Emotionally abused: Not on file      "Physically abused: Not on file     Forced sexual activity: Not on file   Other Topics Concern     Parent/sibling w/ CABG, MI or angioplasty before 65F 55M? No   Social History Narrative     Not on file       She works as a support specalist.     Current Outpatient Medications   Medication     fluticasone (FLONASE) 50 MCG/ACT nasal spray     fluticasone-salmeterol (ADVAIR DISKUS) 250-50 MCG/DOSE inhaler     ibuprofen (ADVIL/MOTRIN) 200 MG capsule     levothyroxine (SYNTHROID/LEVOTHROID) 50 MCG tablet     PARoxetine (PAXIL-CR) 37.5 MG 24 hr tablet     albuterol (VENTOLIN HFA) 108 (90 Base) MCG/ACT inhaler     cetirizine (ZYRTEC) 10 MG tablet     No current facility-administered medications for this visit.      Allergies   Allergen Reactions     Compazine      Anxiety     Flagyl [Metronidazole Hcl] Nausea and Vomiting         Objective:  /71   Pulse 103   Ht 1.556 m (5' 1.25\")   Wt 49.9 kg (110 lb)   BMI 20.61 kg/m      General: Alert and in no distress    Head: Normocephalic, atraumatic  Eyes: no scleral icterus or conjunctival erythema   Skin: no erythema, petechiae, or jaundice  CV: regular rhythm by palpation, 2+ distal pulses  Resp: normal respiratory effort without conversational dyspnea   Psych: normal mood and affect    Gait: Non-antalgic, appropriate coordination and balance.  Mild pain over the left lateral superior hip with ambulation.   Neuro: Motor strength and sensation as noted below    Musculoskeletal:    Bilateral hip exam    Inspection:      no edema or ecchymosis in hip area    Palpation:  -Mildly tender over the left lateral superior hip.  No tenderness over the left greater trochanteric bursa or groin.    ROM:     Full active and passive ROM bilaterally without pain    Strength:   Left:  5/5 hip flexion/abduction/adduction, 5/5 knee extension/flexion, 5/5 ankle dorsiflexion/plantarflexion, 5/5 great toe extension/flexion  Right:  5/5 hip flexion/abduction/adduction, 5/5 knee " extension/flexion, 5/5 ankle dorsiflexion/plantarflexion, 5/5 great toe extension/flexion    Sensation: grossly intact to light touch in the lower extremities bilaterally    Radiology:  X-rays ordered and independent visualization of images performed and reviewed with Lara.    Recent Results (from the past 744 hour(s))   XR Pelvis and Hip Left 1 View    Narrative    PELVIS AND HIP LEFT ONE VIEW   7/31/2020 9:15 AM     HISTORY: Left hip pain.    COMPARISON: CT abdomen/pelvis dated 12/16/2015. Pelvis and right hip  x-rays dated 8/23/2013.    FINDINGS:  No acute fractures or dislocations. Alignment is anatomic.  Soft tissues are normal.   Hip and SI joints are normal in appearance.  Degenerative changes are partially imaged in the lower lumbar spine.      Impression    IMPRESSION:   1. No significant etiology for patient's left hip pain is identified.       Assessment:  1. Left hip pain        Plan:  Discussed the assessment with the patient and developed a plan together:  -Pain is superior lateral hip.  Does not have pain with hip ROM on exam today.  Radiographs normal.   -Recommend beginning with physical therapy.  Lara is in agreement with this plan.  -Ice or heat 15-20 minutes as needed (Avoid sleeping on a heating pad or ice)  -Patient's preferred over the counter medications as directed on packaging as needed for pain or soreness.    -Avoid aggravating activities.    -If pain worsens or does not improve, could consider advanced imaging of the hip versus diagnostic/therapeutic steroid injection of the hip joint.    -Follow up as needed..  Please call with questions or concerns.    Cindy Meade MD, [facility] Sports Medicine  [facility]    Again, thank you for allowing me to participate in the care of your patient.        Sincerely,        Cami Meade MD

## 2020-09-03 ENCOUNTER — HOSPITAL ENCOUNTER (OUTPATIENT)
Dept: BONE DENSITY | Facility: CLINIC | Age: 65
Discharge: HOME | End: 2020-09-03
Attending: INTERNAL MEDICINE | Admitting: INTERNAL MEDICINE
Payer: COMMERCIAL

## 2020-09-03 DIAGNOSIS — E28.39 ESTROGEN DEFICIENCY: ICD-10-CM

## 2020-09-03 PROCEDURE — 77080 DXA BONE DENSITY AXIAL: CPT

## 2020-09-09 ENCOUNTER — IMMUNIZATION (OUTPATIENT)
Dept: FAMILY MEDICINE | Facility: CLINIC | Age: 65
End: 2020-09-09
Payer: COMMERCIAL

## 2020-09-09 PROCEDURE — 90662 IIV NO PRSV INCREASED AG IM: CPT

## 2020-09-09 PROCEDURE — 90471 IMMUNIZATION ADMIN: CPT

## 2020-09-22 ENCOUNTER — MYC MEDICAL ADVICE (OUTPATIENT)
Dept: INTERNAL MEDICINE | Facility: CLINIC | Age: 65
End: 2020-09-22

## 2020-09-22 DIAGNOSIS — M81.0 AGE-RELATED OSTEOPOROSIS WITHOUT CURRENT PATHOLOGICAL FRACTURE: Primary | ICD-10-CM

## 2020-09-22 RX ORDER — FOSAMPRENAVIR CALCIUM 700 MG/1
TABLET, COATED ORAL 2 TIMES DAILY
Status: CANCELLED | OUTPATIENT
Start: 2020-09-22

## 2020-09-22 RX ORDER — ALENDRONATE SODIUM 70 MG/1
70 TABLET ORAL
Qty: 4 TABLET | Refills: 2 | Status: SHIPPED | OUTPATIENT
Start: 2020-09-22 | End: 2020-11-06

## 2020-09-23 ENCOUNTER — VIRTUAL VISIT (OUTPATIENT)
Dept: FAMILY MEDICINE | Facility: OTHER | Age: 65
End: 2020-09-23

## 2020-09-23 NOTE — PROGRESS NOTES
"Date: 2020 08:48:12  Clinician: Lynn Ho  Clinician NPI: [id number]  Patient: Brissa Mayer  Patient : 1955  Patient Address: [address] [place] [address]  Patient Phone: [phone]  Visit Protocol: URI  Patient Summary:  Brissa is a 65 year old ( : 1955 ) female who initiated a [facility] Visit for cold, sinus infection, or influenza. When asked the question \"Please sign me up to receive news, health information and promotions. \", Brissa responded \"Yes\".   A synchronous visit is necessary because the patient reported the following abnormal symptoms:   Bloody mucus (requires clarification)   Brissa states her symptoms started 1-2 days ago.   Her symptoms consist of chills, malaise, facial pain or pressure, a sore throat, a cough, nasal congestion, a headache, ear pain, rhinitis, nausea, wheezing, and myalgia. Brissa also feels feverish but was unable to measure her temperature.   Symptom details     Nasal secretions: The color of her mucus is yellow and bloody.    Cough: Brissa coughs a few times an hour and her cough is more bothersome at night. Phlegm comes into her throat when she coughs. She believes her cough is caused by post-nasal drip. The color of the phlegm is clear.     Sore throat: Brissa reports having mild throat pain (1-3 on a 10 point pain scale), does not have exudate on her tonsils, and can swallow liquids. The lymph nodes in her neck are not enlarged. A rash has not appeared on the skin since the sore throat started.     Wheezing: Brissa has been diagnosed with asthma. Additional wheezing details as reported by the patient (free text): It's bothersome, especially at night.       Facial pain or pressure: The facial pain or pressure feels worse when bending over or leaning forward.     Headache: She states the headache is mild (1-3 on a 10 point pain scale).      Brissa denies having teeth pain, ageusia, diarrhea, anosmia, vomiting, and enlarged lymph nodes. " She also denies taking antibiotic medication in the past month and having recent facial or sinus surgery in the past 60 days. She is not experiencing dyspnea.   Precipitating events  Within the past week, Brissa has not been exposed to someone with strep throat. She has not recently been exposed to someone with influenza. Brissa has been in close contact with the following high risk individuals: immunocompromised people and people with asthma, heart disease or diabetes.   Pertinent COVID-19 (Coronavirus) information  In the past 14 days, Brissa has not worked in a congregate living setting.   She does not work or volunteer as healthcare worker or a  and does not work or volunteer in a healthcare facility.   Brissa also has not lived in a congregate living setting in the past 14 days. She does not live with a healthcare worker.   Brissa has not had a close contact with a laboratory-confirmed COVID-19 patient within 14 days of symptom onset.   Since December 2019, Brissa and has had upper respiratory infection (URI) or influenza-like illness. Has not been diagnosed with lab-confirmed COVID-19 test      Date(s) of previous URI or influenza-like illness (free-text): May 2020     Symptoms Brissa experienced during previous URI or influenza-like illness as reported by the patient (free-text): Same as what I'm experiencing now.        Pertinent medical history  Brissa had 2 sinus infections within the past year.   Brissa does not get yeast infections when she takes antibiotics.   Brissa needs a return to work/school note.   Weight: 109 lbs   Brissa does not smoke or use smokeless tobacco.   Weight: 109 lbs    MEDICATIONS: levothyroxine oral, Advair Diskus inhalation, Flonase Allergy Relief nasal, albuterol sulfate inhalation, Paxil oral, ALLERGIES: Compazine, Flagyl  Clinician Response:  Dear Brissa,  Based on the information provided, you have a viral upper respiratory infection, otherwise known  as a cold. Symptoms vary from person to person, but can include sneezing, coughing, a runny nose, sore throat, and headache and range from mild to severe.  Unfortunately, there are no medications that can cure a cold, so treatment is focused on controlling symptoms as much as possible. Most people gradually feel better until symptoms are gone in 1-2 weeks.  Medication information  Because you have a viral infection, antibiotics will not help you get better. Treating a viral infection with antibiotics could actually make you feel worse.  I am prescribing:     Fluticasone 50 mcg/actuation nasal spray. Inhale 2 sprays in each nostril 1 time per day; after 1 week, may adjust to 1 - 2 sprays in each nostril 1 time per day. This medication takes several days to start working, so keep taking it even if it doesn't help right away. There are no refills with this prescription.   Unless you are allergic to the over-the-counter medication(s) below, I recommend using:       Acetaminophen (Tylenol or store brand) oral tablet. Take 1-2 tablets by mouth every 4-6 hours to help with the discomfort.      Ibuprofen (Advil or store brand) 200 mg oral tablet. Take 1-3 tablets (200-600 mg) by mouth every 8 hours to help with the discomfort. Make sure to take the ibuprofen with food. Do not exceed 2400 mg in 24 hours.    A decongestant such as Sudafed PE or store brand.     Over-the-counter medications do not require a prescription. Ask the pharmacist if you have any questions.  Self care  Steps you can take to be as comfortable as possible:     Rest.    Drink plenty of fluids.    Take a warm shower to loosen congestion    Use a cool-mist humidifier.    Use throat lozenges.    Suck on frozen items such as popsicles.    Drink hot tea with lemon and honey.    Gargle with warm salt water (1/4 teaspoon of salt per 8 ounce glass of water).    Take a spoonful of honey to reduce your cough.     When to seek care  Please be seen in a clinic or  urgent care if any of the following occur:   New symptoms develop, or symptoms become worse   Call ahead before going to the clinic or urgent care.  Call 911 or go to the emergency room if you feel that your throat is closing off, you suddenly develop a rash, you are unable to swallow fluids, you are drooling, or you are having difficulty breathing.  Additional treatment plan   Your symptoms show that you may have coronavirus (COVID-19). This illness can cause fever, cough and trouble breathing. Many people get a mild case and get better on their own. Some people can get very sick.  Based on the symptoms you have shared, I would like you to be re-checked in 2 to 3 days. Please call your family clinic to set up a video or phone visit.  Will I be tested for COVID-19?  We would like to test you for this virus.   Please call [phone] to schedule your visit. Explain that you were referred by [facility] to have a COVID-19 test. Be ready to [facility].   The following will serve as your written order for this COVID Test, ordered by me, for the indication of suspected COVID [Z20.828]: The test will be ordered in [employer], our electronic health record, after you are scheduled. It will show as ordered and authorized by Pj Dangelo MD.  Order: COVID-19 (Coronavirus) PCR for SYMPTOMATIC testing from [facility].  1.When it's time for your COVID test:   Stay at least 6 feet away from others. (If someone will drive you to your test, stay in the backseat, as far away from the  as you can.)   Cover your mouth and nose with a mask, tissue or washcloth.  Go straight to the testing site. Don't make any stops on the way there or back.      2.Starting now: Stay home and away from others (self-isolate) until:   You've had no fever---and no medicine that reduces fever---for one full day (24 hours). And...   Your other symptoms have gotten better. For example, your cough or breathing has improved. And...   At least 10  "days have passed since your symptoms started.       During this time, don't leave the house except for testing or medical care.   Stay in your own room, even for meals. Use your own bathroom if you can.   Stay away from others in your home. No hugging, kissing or shaking hands. No visitors.  Don't go to work, school or anywhere else.    Clean \"high touch\" surfaces often (doorknobs, counters, handles, etc.). Use a household cleaning spray or wipes. You'll find a full list of  on the EPA website: www.epa.gov/pesticide-registration/list-n-disinfectants-use-against-sars-cov-2.   Cover your mouth and nose with a mask, tissue or washcloth to avoid spreading germs.  Wash your hands and face often. Use soap and water.  Caregivers in these groups are at risk for severe illness due to COVID-19:  o People 65 years and older  o People who live in a nursing home or long-term care facility  o People with chronic disease (lung, heart, cancer, diabetes, kidney, liver, immunologic)   o People who have a weakened immune system, including those who:   Are in cancer treatment  Take medicine that weakens the immune system, such as corticosteroids  Had a bone marrow or organ transplant  Have an immune deficiency  Have poorly controlled HIV or AIDS  Are obese (body mass index of 40 or higher)  Smoke regularly   o Caregivers should wear gloves while washing dishes, handling laundry and cleaning bedrooms and bathrooms.  o Use caution when washing and drying laundry: Don't shake dirty laundry, and use the warmest water setting that you can.  o For more tips, go to www.cdc.gov/coronavirus/2019-ncov/downloads/10Things.pdf.      How can I take care of myself?   Get lots of rest. Drink extra fluids (unless a doctor has told you not to)   Take Tylenol (acetaminophen) for fever or pain. If you have liver or kidney problems, ask your family doctor if it's okay to take Tylenol.   Adults can take either:    650 mg (two 325 mg pills) every 4 " to 6 hours, or...   1,000 mg (two 500 mg pills) every 8 hours as needed.    Note: Don't take more than 3,000 mg in one day. Acetaminophen is found in many medicines (both prescribed and over-the-counter medicines). Read all labels to be sure you don't take too much.   For children, check the Tylenol bottle for the right dose. The dose is based on the child's age or weight.    If you have other health problems (like cancer, heart failure, an organ transplant or severe kidney disease): Call your specialty clinic if you don't feel better in the next 2 days.       Know when to call 911. Emergency warning signs include:    Trouble breathing or shortness of breath Pain or pressure in the chest that doesn't go away Feeling confused like you haven't felt before, or not being able to wake up Bluish-colored lips or face  Where can I get more information?   [facility] -- About COVID-19: [email or web address]   CDC -- What to Do If You're Sick: www.cdc.gov/coronavirus/2019-ncov/about/steps-when-sick.html   CDC -- Ending Home Isolation: www.cdc.gov/coronavirus/2019-ncov/hcp/disposition-in-home-patients.html   CDC -- Caring for Someone: www.cdc.gov/coronavirus/2019-ncov/if-you-are-sick/care-for-someone.html   [facility] -- Interim Guidance for Hospital Discharge to Home: www.health.[facility].mn.us/diseases/coronavirus/hcp/hospdischarge.pdf   [facility] clinical trials (COVID-19 research studies): clinicalaffairs.[facility]/umn-clinical-trials    Below are the COVID-19 hotlines at the [place] [facility] ([facility]). Interpreters are available.    For health questions: Call 330-716-1407 or 1-224.584.6957 (7 a.m. to 7 p.m.) For questions about schools and childcare: Call 782-790-0613 or 1-483.788.8945 (7 a.m. to 7 p.m.)       Diagnosis: Nasal congestion  Diagnosis ICD: R09.81  Triage Notes: I reviewed the patient's history, verified their identity, and explained the [facility] Visit process.    mucus is blood  tinged.  Synchronous Triage: phone, status: completed, duration: 73 seconds  Prescription: fluticasone 50 mcg/actuation nasal spray,suspension 1 120 spray aerosol with adapter (grams), 30 days supply. Inhale 2 sprays in each nostril 1 time per day; after 1 week, may adjust to 1 - 2 sprays in each nostril 1 time per day.. Refills: 0, Refill as needed: no, Allow substitutions: yes

## 2020-09-30 DIAGNOSIS — Z20.822 COVID-19 RULED OUT: ICD-10-CM

## 2020-09-30 DIAGNOSIS — Z20.822 SUSPECTED 2019 NOVEL CORONAVIRUS INFECTION: Primary | ICD-10-CM

## 2020-10-02 DIAGNOSIS — Z20.822 SUSPECTED 2019 NOVEL CORONAVIRUS INFECTION: ICD-10-CM

## 2020-10-02 DIAGNOSIS — Z20.822 COVID-19 RULED OUT: ICD-10-CM

## 2020-10-02 PROCEDURE — U0003 INFECTIOUS AGENT DETECTION BY NUCLEIC ACID (DNA OR RNA); SEVERE ACUTE RESPIRATORY SYNDROME CORONAVIRUS 2 (SARS-COV-2) (CORONAVIRUS DISEASE [COVID-19]), AMPLIFIED PROBE TECHNIQUE, MAKING USE OF HIGH THROUGHPUT TECHNOLOGIES AS DESCRIBED BY CMS-2020-01-R: HCPCS | Performed by: FAMILY MEDICINE

## 2020-10-03 LAB
SARS-COV-2 RNA SPEC QL NAA+PROBE: NOT DETECTED
SPECIMEN SOURCE: NORMAL

## 2020-11-02 ENCOUNTER — VIRTUAL VISIT (OUTPATIENT)
Dept: FAMILY MEDICINE | Facility: OTHER | Age: 65
End: 2020-11-02

## 2020-11-02 NOTE — PROGRESS NOTES
"Date: 2020 16:35:08  Clinician: Hesham Lin  Clinician NPI: [id number]  Patient: Brissa Mayer  Patient : 1955  Patient Address: [address] [place] [address]  Patient Phone: [phone]  Visit Protocol: URI  Patient Summary:  Brissa is a 65 year old ( : 1955 ) female who initiated a [facility] Visit for cold, sinus infection, or influenza. When asked the question \"Please sign me up to receive news, health information and promotions. \", Brissa responded \"Yes\".    Brissa states her symptoms started gradually 10-13 days ago. After her symptoms started, they improved and then got worse again.   Her symptoms consist of rhinitis, facial pain or pressure, myalgia, chills, malaise, a sore throat, diarrhea, a headache, wheezing, a cough, nasal congestion, and nausea. She is experiencing mild difficulty breathing with activities but can speak normally in full sentences. Brissa also feels feverish but was unable to measure her temperature.   Symptom details     Nasal secretions: The color of her mucus is clear.    Cough: Brissa coughs a few times an hour and her cough is more bothersome at night. Phlegm comes into her throat when she coughs. She does not believe her cough is caused by post-nasal drip. The color of the phlegm is clear.     Sore throat: Brissa reports having moderate throat pain (4-6 on a 10 point pain scale), does not have exudate on her tonsils, and can swallow liquids. She is not sure if the lymph nodes in her neck are enlarged. A rash has not appeared on the skin since the sore throat started.     Wheezing: Brissa has been diagnosed with asthma. Additional wheezing details as reported by the patient (free text): Wheezing mostly at night when I lay flat       Facial pain or pressure: The facial pain or pressure feels worse when bending over or leaning forward.     Headache: She states the headache is moderate (4-6 on a 10 point pain scale).      Brissa denies having " vomiting, teeth pain, ageusia, ear pain, and anosmia. She also denies taking antibiotic medication in the past month and having recent facial or sinus surgery in the past 60 days.   Precipitating events  Within the past week, Brissa has not been exposed to someone with strep throat. She has not recently been exposed to someone with influenza. Brissa has been in close contact with the following high risk individuals: immunocompromised people and people with asthma, heart disease or diabetes.   Pertinent COVID-19 (Coronavirus) information  Brissa does not work or volunteer as healthcare worker or a . In the past 14 days, Brissa has not worked or volunteered at a healthcare facility or group living setting.   In the past 14 days, she also has not lived in a congregate living setting.   Brissa has not had a close contact with a laboratory-confirmed COVID-19 patient within 14 days of symptom onset.    Since December 2019, Brissa has been tested for COVID-19 and has had upper respiratory infection (URI) or influenza-like illness.      Result of COVID-19 test: Negative     Date of her COVID-19 test: 10/02/2020     Date(s) of previous URI or influenza-like illness (free-text): October 16th to current     Symptoms Brissa experienced during previous URI or influenza-like illness as reported by the patient (free-text): Severe shortness of breath; aches; nausea; extremely fatigued.        Pertinent medical history  Brissa had 3 sinus infections within the past year.   Brissa does not get yeast infections when she takes antibiotics.   Brissa needs a return to work/school note.   Weight: 105 lbs   Brissa does not smoke or use smokeless tobacco.   Weight: 105 lbs    MEDICATIONS: levothyroxine oral, Advair Diskus inhalation, Flonase Allergy Relief nasal, albuterol sulfate inhalation, Paxil oral, ALLERGIES: Compazine, Flagyl  Clinician Response:  Dear Brissa,  Based on the information provided, you have  acute bacterial sinusitis, also known as a sinus infection. Sinus infections are caused by bacteria or a virus and symptoms are almost always identical. The difference between the 2 types of infections is timing.  Sinus infections start as viral infections and symptoms improve on their own in about 7 days. If symptoms have not improved after 7 days or have even worsened, a bacterial infection may have developed.  Medication information  I am prescribing:     Amoxicillin 500 mg oral tablet. Take 1 tablet by mouth every 8 hours for 10 days. There are no refills with this prescription.   Yeast infections can be a common side effect of antibiotics. The most common symptom of a yeast infection is itchiness in and around the vagina. Other signs and symptoms include burning, redness, or a thick, white vaginal discharge that looks like cottage cheese and does not have a bad smell.  Self care  Steps you can take to be as comfortable as possible:     Rest.    Drink plenty of fluids.    Take a warm shower to loosen congestion    Use a cool-mist humidifier.    Use throat lozenges.    Suck on frozen items such as popsicles.    Drink hot tea with lemon and honey.    Gargle with warm salt water (1/4 teaspoon of salt per 8 ounce glass of water).    Take a spoonful of honey to reduce your cough.     When to seek care  Please be seen in a clinic or urgent care if any of the following occur:     New symptoms develop, or symptoms become worse    Symptoms do not start to improve after 3 days of treatment     Call ahead before going to the clinic or urgent care.  It is possible to have an allergic reaction to an antibiotic even if you have not had one in the past. If you notice a new rash, significant swelling, or difficulty breathing, stop taking this medication immediately and go to a clinic or urgent care.  Call 911 or go to the emergency room if you feel that your throat is closing off, you suddenly develop a rash, you are unable to  "swallow fluids, you are drooling, or you are having difficulty breathing.  Additional treatment plan   Your symptoms show that you may have coronavirus (COVID-19). This illness can cause fever, cough and trouble breathing. Many people get a mild case and get better on their own. Some people can get very sick.  What should I do?  We would like to test you for this virus.   1. Please call [phone] to schedule your visit. Explain that you were referred by [facility] to have a COVID-19 test. Be ready to share your [facility] visit ID number.  The following will serve as your written order for this COVID Test, ordered by me, for the indication of suspected COVID [Z20.828]: The test will be ordered in [employer], our electronic health record, after you are scheduled. It will show as ordered and authorized by Pj Dangelo MD.  Order: COVID-19 (Coronavirus) PCR for SYMPTOMATIC testing from [facility].   2. When it's time for your COVID test:  Stay at least 6 feet away from others. (If someone will drive you to your test, stay in the backseat, as far away from the  as you can.)   Cover your mouth and nose with a mask, tissue or washcloth.  Go straight to the testing site. Don't make any stops on the way there or back.      3.Starting now: Stay home and away from others (self-isolate) until:   You've had no fever---and no medicine that reduces fever---for one full day (24 hours). And...   Your other symptoms have gotten better. For example, your cough or breathing has improved. And...   At least 10 days have passed since your symptoms started.       During this time, don't leave the house except for testing or medical care.   Stay in your own room, even for meals. Use your own bathroom if you can.   Stay away from others in your home. No hugging, kissing or shaking hands. No visitors.  Don't go to work, school or anywhere else.    Clean \"high touch\" surfaces often (doorknobs, counters, handles, etc.). Use a household cleaning spray or " wipes. You'll find a full list of  on the EPA website: www.epa.gov/pesticide-registration/list-n-disinfectants-use-against-sars-cov-2.   Cover your mouth and nose with a mask, tissue or washcloth to avoid spreading germs.  Wash your hands and face often. Use soap and water.  Caregivers in these groups are at risk for severe illness due to COVID-19:  o People 65 years and older  o People who live in a nursing home or long-term care facility  o People with chronic disease (lung, heart, cancer, diabetes, kidney, liver, immunologic)  o People who have a weakened immune system, including those who:   Are in cancer treatment  Take medicine that weakens the immune system, such as corticosteroids  Had a bone marrow or organ transplant  Have an immune deficiency  Have poorly controlled HIV or AIDS  Are obese (body mass index of 40 or higher)  Smoke regularly   o Caregivers should wear gloves while washing dishes, handling laundry and cleaning bedrooms and bathrooms.  o Use caution when washing and drying laundry: Don't shake dirty laundry, and use the warmest water setting that you can.  o For more tips, go to www.cdc.gov/coronavirus/2019-ncov/downloads/10Things.pdf.    How can I take care of myself?    Get lots of rest. Drink extra fluids (unless a doctor has told you not to).   Take Tylenol (acetaminophen) for fever or pain. If you have liver or kidney problems, ask your family doctor if it's okay to take Tylenol.   Adults can take either:    650 mg (two 325 mg pills) every 4 to 6 hours, or...   1,000 mg (two 500 mg pills) every 8 hours as needed.    Note: Don't take more than 3,000 mg in one day. Acetaminophen is found in many medicines (both prescribed and over-the-counter medicines). Read all labels to be sure you don't take too much.   For children, check the Tylenol bottle for the right dose. The dose is based on the child's age or weight.    If you have other health problems (like cancer, heart failure, an  organ transplant or severe kidney disease): Call your specialty clinic if you don't feel better in the next 2 days.       Know when to call 911. Emergency warning signs include:    Trouble breathing or shortness of breath Pain or pressure in the chest that doesn't go away Feeling confused like you haven't felt before, or not being able to wake up Bluish-colored lips or face.  Where can I get more information?   [facility] -- About COVID-19: [email or web address]   CDC -- What to Do If You're Sick: www.cdc.gov/coronavirus/2019-ncov/about/steps-when-sick.html   CDC -- Ending Home Isolation: www.cdc.gov/coronavirus/2019-ncov/hcp/disposition-in-home-patients.html   [facility] -- Caring for Someone: www.cdc.gov/coronavirus/2019-ncov/if-you-are-sick/care-for-someone.html   [facility] -- Interim Guidance for Hospital Discharge to Home: www.[facility].mn./diseases/coronavirus/hcp/hospdischarge.pdf   [facility] clinical trials (COVID-19 research studies): clinicalaffairs.[facility]-clinical-trials    Below are the COVID-19 hotlines at the [facility] ([facility]). Interpreters are available.    For health questions: Call 879-379-6190 or 1-768.527.6925 (7 a.m. to 7 p.m.) For questions about schools and childcare: Call 435-015-6467 or 1-386.880.9812 (7 a.m. to 7 p.m.)    Diagnosis: Contact with and (suspected) exposure to other viral communicable diseases  Diagnosis ICD: Z20.828  Prescription: amoxicillin 500 mg oral tablet 30 tablet, 10 days supply. Take 1 tablet by mouth every 8 hours for 10 days. Refills: 0, Refill as needed: no, Allow substitutions: yes  Pharmacy: [facility] - [phone] - [address],  [place] [address]

## 2020-11-06 ENCOUNTER — VIRTUAL VISIT (OUTPATIENT)
Dept: FAMILY MEDICINE | Facility: OTHER | Age: 65
End: 2020-11-06

## 2020-11-06 ENCOUNTER — OFFICE VISIT (OUTPATIENT)
Dept: INTERNAL MEDICINE | Facility: CLINIC | Age: 65
End: 2020-11-06
Payer: COMMERCIAL

## 2020-11-06 DIAGNOSIS — U07.1 2019 NOVEL CORONAVIRUS DISEASE (COVID-19): Primary | ICD-10-CM

## 2020-11-06 PROCEDURE — 99213 OFFICE O/P EST LOW 20 MIN: CPT | Performed by: INTERNAL MEDICINE

## 2020-11-06 ASSESSMENT — PAIN SCALES - GENERAL: PAINLEVEL: MODERATE PAIN (5)

## 2020-11-06 NOTE — PROGRESS NOTES
"Date: 2020 07:05:23  Clinician: Marie Becerra  Clinician NPI: [id number]  Patient: Brissa Mayer  Patient : 1955  Patient Address: [address] [place] [address]  Patient Phone: [phone]  Visit Protocol: URI  Patient Summary:  Brissa is a 65 year old ( : 1955 ) female who initiated a [facility] Visit for cold, sinus infection, or influenza. When asked the question \"Please sign me up to receive news, health information and promotions. \", Brissa responded \"Yes\".    Brissa states her symptoms started gradually 10-13 days ago.   Her symptoms consist of rhinitis, myalgia, chills, malaise, tooth pain, a headache, wheezing, a cough, and nasal congestion. Brissa also feels feverish.   Symptom details     Nasal secretions: The color of her mucus is white.    Cough: Brissa coughs a few times an hour and her cough is more bothersome at night. Phlegm comes into her throat when she coughs. She believes her cough is caused by post-nasal drip. The color of the phlegm is clear.     Temperature: Her current temperature is 98.7 degrees Fahrenheit.     Wheezing: Brissa has been diagnosed with asthma. Additional wheezing details as reported by the patient (free text): More at night but even in the morning       Headache: She states the headache is severe (7-9 on a 10 point pain scale).     Tooth pain: The tooth pain is not caused by a cavity, recent dental work, or other mouth problems.      Brissa denies having vomiting, facial pain or pressure, sore throat, ageusia, diarrhea, ear pain, nausea, and anosmia. She also denies having recent facial or sinus surgery in the past 60 days and double sickening (worsening symptoms after initial improvement).   Precipitating events  She has not recently been exposed to someone with influenza. Brissa has been in close contact with the following high risk individuals: immunocompromised people and people with asthma, heart disease or diabetes.   Pertinent " COVID-19 (Coronavirus) information  Brissa does not work or volunteer as healthcare worker or a . In the past 14 days, Brissa has not worked or volunteered at a healthcare facility or group living setting.   In the past 14 days, she also has not lived in a congregate living setting.   Brissa has not had a close contact with a laboratory-confirmed COVID-19 patient within 14 days of symptom onset.    Since December 2019, Brissa has been tested for COVID-19 and has had upper respiratory infection (URI) or influenza-like illness.      Result of COVID-19 test: Negative     Date of her COVID-19 test: 10/07/2020     Date(s) of previous URI or influenza-like illness (free-text): May throughout the last six months     Symptoms Brissa experienced during previous URI or influenza-like illness as reported by the patient (free-text): Run down, shortness of breath, aching        Triage Point(s) temporarily suspended for COVID-19 (Coronavirus) screening  Brissa reported the following symptoms which were previously protocol referral points. These protocol referral points have temporarily been removed for purposes of COVID-19 (Coronavirus) screening.   Difficulty breathing even when resting and can only speak in phrase(s)   Pertinent medical history  Brissa had 3 sinus infections within the past year.   Brissa has taken an antibiotic medication in the past month. Antibiotic details as reported by the patient (free text): Was just prescribed amoxicillin 500mg twice a day- been on it for 3 days   Brissa does not get yeast infections when she takes antibiotics.   Brissa needs a return to work/school note.   Weight: 105 lbs   Brissa does not smoke or use smokeless tobacco.   Additional information as reported by the patient (free text): I have severe pain in chest &amp; back when I breathe   Weight: 105 lbs    MEDICATIONS: levothyroxine oral, Advair Diskus inhalation, Flonase Allergy Relief nasal, albuterol  sulfate inhalation, Paxil oral, ALLERGIES: Compazine, Flagyl  Clinician Response:  Dear Brissa,  I am sorry you are not feeling well. Your health is our priority. Based on the information you have provided, you may be at risk for COVID-19 (Coronavirus).  You will not be charged for this [facility] Visit.&nbsp;Thank you for trusting us with our care.  COVID-19 (Coronavirus) General Information  Because there is currently no vaccine to prevent infection, the best way to protect yourself is to avoid being exposed to this virus. Common symptoms of COVID-19 include but are not limited to fever, cough, and shortness of breath. These symptoms appear 2-14 days after you are exposed to the virus that causes COVID-19. Click here for more information from the CDC on how to protect yourself.  If you are sick with COVID-19 or suspect you are infected with the virus that causes COVID-19, follow the steps here from the CDC to help prevent the disease from spreading to people in your home and community.  Click here for general information from the CDC on testing.  If you develop any of these emergency warning signs for COVID-19, get medical attention immediately:     Trouble breathing    Persistent pain or pressure in the chest    New confusion or inability to arouse    Bluish lips or face      Call your doctor or clinic before going in. Call 911 if you have a medical emergency and notify the  you have or think you may have COVID-19.  For more detailed and up to date information on COVID-19 (Coronavirus), please visit the CDC website.   Diagnosis: Refer for additional evaluation - COVID-19 (Coronavirus) concern  Diagnosis ICD: R69  Additional Clinician Notes:  Based on the severity of your symptoms you need to be evaluated at an ER or urgent care. &nbsp;

## 2020-11-06 NOTE — PROGRESS NOTES
"Subjective     Brissamarco a Mayer is a 65 year old female who presents to clinic today for the following health issues:    HPI         Chief Complaint   Patient presents with     Shortness of Breath     Follow up [facility] visit. Still has symptoms. chest hurts when she breaths, sinus issues, cough, sneezing. body aches, chills and fatigue. on day 3 of antibiotics no improvement. Did not have covid test         EMR reviewed including: History of chief complaint, pertinent distant history, medications, concurrent diagnoses.             Chief Complaint:  #1   Patient has Covid symptoms.  Slight cough,  Clear sinus drainage and posterior nasal drainage.  No fever or chills.  No diarrhea or constipation.  Mild dyspnea with exertion.  Was recently placed on antibiotics through \"[facility]\".  Refuses to get Covid tested because she does not want anything stuck in her nose.  Roommate recently tested positive for Covid infection                             PAST, FAMILY,SOCIAL HISTORY:     Medical  History:   has a past medical history of Anxiety, Asthma, mild intermittent, Migraines, Tension headaches, and Unspecified hypothyroidism.     Surgical History:   has a past surgical history that includes other surgical history (1980); Bunionectomy; and Esophagoscopy, gastroscopy, duodenoscopy (EGD), combined (N/A, 4/2/2019).     Social History:   reports that she has never smoked. She has never used smokeless tobacco. She reports current alcohol use. She reports that she does not use drugs.     Family History:  family history includes Asthma in her father, mother, and sister; Cancer - colorectal in her maternal grandmother; Diabetes in her mother; Heart Disease in her father; Obesity in her sister; Prostate Cancer in her father.            MEDICATIONS  Current Outpatient Medications   Medication Sig Dispense Refill     albuterol (VENTOLIN HFA) 108 (90 Base) MCG/ACT inhaler INHALE TWO PUFFS BY MOUTH EVERY 6 HOURS AS NEEDED FOR SHORTNESS OF " BREATH/DYSPNEA 18 g 3     fluticasone (FLONASE) 50 MCG/ACT nasal spray Spray 2 sprays into both nostrils daily 1 Package 1     fluticasone-salmeterol (ADVAIR DISKUS) 250-50 MCG/DOSE inhaler INHALE ONE PUFF BY MOUTH TWICE A DAY 3 Inhaler 3     ibuprofen (ADVIL/MOTRIN) 200 MG capsule Take 400 mg by mouth as needed for fever       levothyroxine (SYNTHROID/LEVOTHROID) 50 MCG tablet Take 1 tablet (50 mcg) by mouth daily 90 tablet 3     PARoxetine (PAXIL-CR) 37.5 MG 24 hr tablet TAKE ONE TABLET BY MOUTH EVERY MORNING 90 tablet 3     cetirizine (ZYRTEC) 10 MG tablet Take 1 tablet (10 mg) by mouth every evening 30 tablet 1         --------------------------------------------------------------------------------------------------------------------                              Review of Systems       LUNGS: Pt denies: hemoptysis, or shortness of breath at rest.  Does have some dyspnea with exertion which is mild..   HEART: Pt denies: chest pain, arrhythmia, syncope, tachy or bradyarrhythmia.   GI: Pt denies: nausea, vomiting, diarrhea, constipation, melena, or hematochezia.   NEURO: Pt denies: seizures, strokes, diplopia, weakness, paraesthesias, or paralysis.   SKIN: Pt denies: itching, rashes, discoloration, or specific lesions of concern. Denies recent hair loss.   PSYCH: The patient denies significant depression, anxiety, mood imbalance. Specifically denies any suicidal ideation.   ENT: Pt denies: sore throat.  Has significant posterior nasal drainage.          Examination    There were no vitals taken for this visit.    Constitutional: The patient appears to be in no acute distress. The patient appears to be adequately hydrated. No acute respiratory or hemodynamic distress is noted at this time.   LUNGS: clear bilaterally, airflow is brisk, no intercostal retraction or stridor is noted. No coughing is noted during visit.   HEART:  regular without rubs, clicks, gallops, or murmurs. PMI is nondisplaced. Upstrokes are brisk.  S1,S2 are heard.   GI: Abdomen is soft, without rebound, guarding or tenderness. Bowel sounds are appropriate. No renal bruits are heard.   NEURO: Pt is alert and appropriate. No neurologic lateralization is noted. Cranial nerves 2-12 are intact. Peripheral sensory and motor function are grossly normal.    SKIN:  warm and dry. No erythema, or rashes are noted. No specific lesions of concern are noted.    PSYCH: The patient appears grossly appropriate. Maintains good eye contact, does not have any jittery or atypical motion. Displays appropriate affect.   ENT: Pharynx is non-erythemous, moderate/clear PND, no significant nasal obstruction, TM's not red or retracted, hearing intact bilaterally. No carotid bruits are heard. No JVD seen. Thyroid is not nodular or enlarged.       Decision Making       1. 2019 novel coronavirus disease (COVID-19)  Isolation for 14 days or until afebrile 48 hours without antipyretic  Tylenol  Fluids  Rest  Fluids  Follow-up oxygen saturation (patient has machine).  If less than 90, notify.  Fluids  Diet as tolerated  Fluids                         FOLLOW UP   I have asked the patient to make an appointment for followup with me or her primary care provider as needed            I have carefully explained the diagnosis and treatment options to the patient.  The patient has displayed an understanding of the above, and all subsequent questions were answered.      DO SANAM Mitchell    Portions of this note were produced using [employer]  Although every attempt at real-time proof reading has been made, occasional grammar/syntax errors may have been missed.

## 2020-11-23 ENCOUNTER — MYC MEDICAL ADVICE (OUTPATIENT)
Dept: INTERNAL MEDICINE | Facility: CLINIC | Age: 65
End: 2020-11-23

## 2020-11-23 NOTE — TELEPHONE ENCOUNTER
Patient states she was seen on 11/6/2020 for COVID.  She refused to be tested because she didn't want something stuck up her nose.  Her roommate tested positive prior to that visit.    It has not been 2 weeks, she is not getting better and states she is getting worse.  She states her wheezing is all night and she has to get up in the middle of the night to use her inhaler to breathe.     She is given home care measures for better breathing, and is scheduled with Dr. Dunlap on Wednesday, 11/25/2020, to have her lungs listened to.    She understands that if she has severe SOB or other concerning symptoms, she will go to the ED for further evaluation.  Closing this encounter.  Radha Owens, CASTRON, RN

## 2020-11-25 ENCOUNTER — OFFICE VISIT (OUTPATIENT)
Dept: FAMILY MEDICINE | Facility: CLINIC | Age: 65
End: 2020-11-25
Payer: COMMERCIAL

## 2020-11-25 ENCOUNTER — TELEPHONE (OUTPATIENT)
Dept: INTERNAL MEDICINE | Facility: CLINIC | Age: 65
End: 2020-11-25

## 2020-11-25 DIAGNOSIS — J45.41 MODERATE PERSISTENT ASTHMA WITH EXACERBATION: ICD-10-CM

## 2020-11-25 DIAGNOSIS — Z20.822 SUSPECTED COVID-19 VIRUS INFECTION: Primary | ICD-10-CM

## 2020-11-25 DIAGNOSIS — J20.9 ACUTE BRONCHITIS WITH SYMPTOMS > 10 DAYS: ICD-10-CM

## 2020-11-25 LAB
FLUAV+FLUBV AG SPEC QL: NEGATIVE
FLUAV+FLUBV AG SPEC QL: NEGATIVE
SPECIMEN SOURCE: NORMAL

## 2020-11-25 PROCEDURE — 87804 INFLUENZA ASSAY W/OPTIC: CPT | Performed by: FAMILY MEDICINE

## 2020-11-25 PROCEDURE — U0003 INFECTIOUS AGENT DETECTION BY NUCLEIC ACID (DNA OR RNA); SEVERE ACUTE RESPIRATORY SYNDROME CORONAVIRUS 2 (SARS-COV-2) (CORONAVIRUS DISEASE [COVID-19]), AMPLIFIED PROBE TECHNIQUE, MAKING USE OF HIGH THROUGHPUT TECHNOLOGIES AS DESCRIBED BY CMS-2020-01-R: HCPCS | Performed by: FAMILY MEDICINE

## 2020-11-25 PROCEDURE — 99214 OFFICE O/P EST MOD 30 MIN: CPT | Performed by: FAMILY MEDICINE

## 2020-11-25 RX ORDER — PREDNISONE 20 MG/1
60 TABLET ORAL DAILY
Qty: 15 TABLET | Refills: 0 | Status: SHIPPED | OUTPATIENT
Start: 2020-11-25 | End: 2020-11-30

## 2020-11-25 RX ORDER — AZITHROMYCIN 250 MG/1
TABLET, FILM COATED ORAL
Qty: 6 TABLET | Refills: 0 | Status: SHIPPED | OUTPATIENT
Start: 2020-11-25 | End: 2021-03-19

## 2020-11-25 NOTE — PROGRESS NOTES
Subjective     Brissa Mayer is a 65 year old female who presents to clinic today for the following health issues:    HPI         Acute Illness  Acute illness concerns:   Onset/Duration: 3 week  Symptoms:  Fever: YES  Chills/Sweats: YES  Headache (location?): YES  Sinus Pressure: YES  Conjunctivitis:  no  Ear Pain: YES: both  Rhinorrhea: YES  Congestion: YES  Sore Throat: YES  Cough: YES-non-productive  Wheeze: YES  Decreased Appetite: YES  Nausea: YES  Vomiting: no  Diarrhea: no  Dysuria/Freq.: no  Dysuria or Hematuria: no  Fatigue/Achiness: YES  Sick/Strep Exposure: no  Therapies tried and outcome: Tea and fluids    Brissa is here today for 3-week history of URI symptoms and they are getting worse.  Been sick with same symptoms since 9/23/2020 and since then she was seen 3 times virtually.  She was treated for sinus infection initially with amoxicillin about 3 weeks ago with no improvement or effect.  Covid screening on 10/2/2020 was normal/negative.  She was then seen in the office on 11/06/2020 for worsening of the same symptoms and she was treated for COVID-19 conservatively with isolation, fluids, resting and OTC meds for symptoms.  Stated that overall she is feeling worse slowly.  Continues to have the runny nose which is clear, the headache, nasal congestion with sinus pressure/pain.  No sore throat.  Been coughing which is nonproductive but congested.  Cough is worse at night which also keeps her up at night.  No nausea, vomiting, diarrhea, or constipation.  Decreases in appetite but been drinking a lot of water.  She is known to have asthma which is usually controlled with the Advair - uses the rescue inhaler rarely.  Since being sick, she has been wheezing with coughing and chest tightness sensation - feels like she can't take a deep breathi.  Her chest feels tight - similar to her previous asthma attacks.  The cough is nonproductive and worse at night; keeps her up at night.  No diarrhea or  constipation.  No fever but has the chill.  No orthopnea or leg swelling .  Her sister sister who she lives with has similar symptoms.  No recent history of traveling.  No exposure to Covid that she knows of.  UTD for the flu vaccination.  Not taking the Zyrtec but is using the Flonase daily.  She is known to have allergic rhinitis.  No other concern.    Review of Systems   Constitutional, HEENT, cardiovascular, pulmonary, gi and gu systems are negative, except as otherwise noted.      Objective    /89   Pulse 85   Temp 98.8  F (37.1  C)   SpO2 96%   There is no height or weight on file to calculate BMI.  Physical Exam   GENERAL: healthy, alert and no distress.  Speak in full sentences.  HENT: ear canals and TM's normal.  Nares are congested with clear drainage.  Oropharynx is pink and moist.  No tonsilar redness, exudate or hypertrophy.  No tender with palpation to the sinuses.  NECK: no adenopathy.  RESP: Decreased breath sounds throughout with expiratory wheezing  CV: regular rate and rhythm, no murmur.  No peripheral edema  ABDOMEN: soft, non-tender and bowel sounds normal      Results for orders placed or performed in visit on 11/25/20   Symptomatic COVID-19 Virus (Coronavirus) by PCR     Status: None    Specimen: Nasopharyngeal   Result Value Ref Range    COVID-19 Virus PCR to [facility] - Source Nasopharyngeal     COVID-19 Virus PCR to [facility] - Result Not Detected    Influenza A/B antigen     Status: None   Result Value Ref Range    Influenza A/B Agn Specimen Nasopharyngeal     Influenza A Negative NEG^Negative    Influenza B Negative NEG^Negative           Assessment & Plan       ICD-10-CM    1. Suspected COVID-19 virus infection  Z20.828 Influenza A/B antigen     Symptomatic COVID-19 Virus (Coronavirus) by PCR   2. Moderate persistent asthma with exacerbation  J45.41 azithromycin (ZITHROMAX) 250 MG tablet     predniSONE (DELTASONE) 20 MG tablet   3. Acute bronchitis with symptoms > 10 days  J20.9       Acute exacerbation due to acute URI.  Vital signs stable and she does not look acutely sick.  Routine COVID 19 precaution initiated.  Discussed with patient about nature of the condition. Continue with Flonase and recommended to restart Zyrtec.  Flu test was negative.  COVID-19 screening obtained today.  Instructed to continue with isolation and symptomatic treatment until further instructed based on its result.  Started her on Zithromax for acute bronchitis.  Started her on 5 days course of prednisone - 60 mg daily and side effect discussed.  Continued with the rescue inhaler and nebilizer as needed.  Recommended adequate fluid intake and resting.  Call in if not improve or worse.  ER if develops breathing problem.  Recommended chest x-ray but she wanted to hold it off for now.  Will get the chest x-ray if the symptom persists or gets worse      Return in about 1 month (around 12/25/2020) for Physical Exam.    Dragan Person Mai, MD  [facility]

## 2020-11-25 NOTE — TELEPHONE ENCOUNTER
Patient having symptoms for over 3 weeks.  She is having some mild shortness of and wheezing off and on.  She is requesting a steroid for her breathing.  She is having some very mild fevers in the afternoon. She will be seen in clinic today in a isolation room.     Shawnee Tolentino RN

## 2020-11-26 LAB
SARS-COV-2 RNA SPEC QL NAA+PROBE: NOT DETECTED
SPECIMEN SOURCE: NORMAL

## 2020-11-28 VITALS
OXYGEN SATURATION: 96 % | SYSTOLIC BLOOD PRESSURE: 138 MMHG | TEMPERATURE: 98.8 F | DIASTOLIC BLOOD PRESSURE: 89 MMHG | HEART RATE: 85 BPM

## 2021-01-15 ENCOUNTER — HEALTH MAINTENANCE LETTER (OUTPATIENT)
Age: 66
End: 2021-01-15

## 2021-03-10 ENCOUNTER — IMMUNIZATION (OUTPATIENT)
Dept: FAMILY MEDICINE | Facility: CLINIC | Age: 66
End: 2021-03-10
Payer: COMMERCIAL

## 2021-03-10 PROCEDURE — 0011A PR COVID VAC MODERNA 100 MCG/0.5 ML IM: CPT

## 2021-03-10 PROCEDURE — 91301 PR COVID VAC MODERNA 100 MCG/0.5 ML IM: CPT

## 2021-03-16 DIAGNOSIS — F43.23 ADJUSTMENT DISORDER WITH MIXED ANXIETY AND DEPRESSED MOOD: ICD-10-CM

## 2021-03-17 ENCOUNTER — MYC MEDICAL ADVICE (OUTPATIENT)
Dept: INTERNAL MEDICINE | Facility: CLINIC | Age: 66
End: 2021-03-17

## 2021-03-17 RX ORDER — PAROXETINE HYDROCHLORIDE HEMIHYDRATE 37.5 MG/1
TABLET, FILM COATED, EXTENDED RELEASE ORAL
Qty: 90 TABLET | Refills: 0 | Status: SHIPPED | OUTPATIENT
Start: 2021-03-17 | End: 2021-06-16

## 2021-03-17 NOTE — TELEPHONE ENCOUNTER
Routing refill request to provider for review/approval because:  PHQ-9 overdue    CASTRO VillavicencioN, RN  [facility]

## 2021-03-17 NOTE — TELEPHONE ENCOUNTER
Dr. Darby I do not see any open slots on your schedule next week only Virtual areas or same day are you okay if one of these are used?     Sharifa Friedman MA

## 2021-03-19 ENCOUNTER — OFFICE VISIT (OUTPATIENT)
Dept: INTERNAL MEDICINE | Facility: CLINIC | Age: 66
End: 2021-03-19
Payer: COMMERCIAL

## 2021-03-19 VITALS
HEART RATE: 86 BPM | WEIGHT: 99.7 LBS | TEMPERATURE: 96.6 F | RESPIRATION RATE: 16 BRPM | BODY MASS INDEX: 18.35 KG/M2 | OXYGEN SATURATION: 100 % | SYSTOLIC BLOOD PRESSURE: 138 MMHG | HEIGHT: 62 IN | DIASTOLIC BLOOD PRESSURE: 72 MMHG

## 2021-03-19 DIAGNOSIS — R23.0 BLUE TOES: ICD-10-CM

## 2021-03-19 DIAGNOSIS — J45.20 MILD INTERMITTENT ASTHMA WITHOUT COMPLICATION: ICD-10-CM

## 2021-03-19 DIAGNOSIS — R63.4 WEIGHT LOSS: Primary | ICD-10-CM

## 2021-03-19 DIAGNOSIS — I99.8 VASCULAR INSUFFICIENCY OF EXTREMITY: ICD-10-CM

## 2021-03-19 DIAGNOSIS — E03.4 HYPOTHYROIDISM DUE TO ACQUIRED ATROPHY OF THYROID: ICD-10-CM

## 2021-03-19 DIAGNOSIS — R20.9 BILATERAL COLD FEET: ICD-10-CM

## 2021-03-19 LAB
ALBUMIN SERPL-MCNC: 4.6 G/DL (ref 3.4–5)
ALP SERPL-CCNC: 49 U/L (ref 40–150)
ALT SERPL W P-5'-P-CCNC: 25 U/L (ref 0–50)
ANION GAP SERPL CALCULATED.3IONS-SCNC: 4 MMOL/L (ref 3–14)
AST SERPL W P-5'-P-CCNC: 19 U/L (ref 0–45)
BILIRUB SERPL-MCNC: 0.8 MG/DL (ref 0.2–1.3)
BUN SERPL-MCNC: 21 MG/DL (ref 7–30)
CALCIUM SERPL-MCNC: 10 MG/DL (ref 8.5–10.1)
CHLORIDE SERPL-SCNC: 100 MMOL/L (ref 94–109)
CHOLEST SERPL-MCNC: 256 MG/DL
CO2 SERPL-SCNC: 31 MMOL/L (ref 20–32)
CREAT SERPL-MCNC: 0.61 MG/DL (ref 0.52–1.04)
ERYTHROCYTE [DISTWIDTH] IN BLOOD BY AUTOMATED COUNT: 13.8 % (ref 10–15)
GFR SERPL CREATININE-BSD FRML MDRD: >90 ML/MIN/{1.73_M2}
GLUCOSE SERPL-MCNC: 94 MG/DL (ref 70–99)
HCT VFR BLD AUTO: 42.2 % (ref 35–47)
HDLC SERPL-MCNC: 108 MG/DL
HGB BLD-MCNC: 14 G/DL (ref 11.7–15.7)
LDLC SERPL CALC-MCNC: 137 MG/DL
MCH RBC QN AUTO: 30.9 PG (ref 26.5–33)
MCHC RBC AUTO-ENTMCNC: 33.2 G/DL (ref 31.5–36.5)
MCV RBC AUTO: 93 FL (ref 78–100)
NONHDLC SERPL-MCNC: 148 MG/DL
PLATELET # BLD AUTO: 187 10E9/L (ref 150–450)
POTASSIUM SERPL-SCNC: 3.8 MMOL/L (ref 3.4–5.3)
PROT SERPL-MCNC: 8.5 G/DL (ref 6.8–8.8)
RBC # BLD AUTO: 4.53 10E12/L (ref 3.8–5.2)
SODIUM SERPL-SCNC: 135 MMOL/L (ref 133–144)
TRIGL SERPL-MCNC: 56 MG/DL
TSH SERPL DL<=0.005 MIU/L-ACNC: 1.31 MU/L (ref 0.4–4)
WBC # BLD AUTO: 3.7 10E9/L (ref 4–11)

## 2021-03-19 PROCEDURE — 36415 COLL VENOUS BLD VENIPUNCTURE: CPT | Performed by: INTERNAL MEDICINE

## 2021-03-19 PROCEDURE — 99215 OFFICE O/P EST HI 40 MIN: CPT | Performed by: INTERNAL MEDICINE

## 2021-03-19 PROCEDURE — 80053 COMPREHEN METABOLIC PANEL: CPT | Performed by: INTERNAL MEDICINE

## 2021-03-19 PROCEDURE — 80061 LIPID PANEL: CPT | Performed by: INTERNAL MEDICINE

## 2021-03-19 PROCEDURE — 85027 COMPLETE CBC AUTOMATED: CPT | Performed by: INTERNAL MEDICINE

## 2021-03-19 PROCEDURE — 84443 ASSAY THYROID STIM HORMONE: CPT | Performed by: INTERNAL MEDICINE

## 2021-03-19 RX ORDER — ASPIRIN 325 MG
325 TABLET ORAL DAILY
COMMUNITY
Start: 2021-03-19 | End: 2021-08-05

## 2021-03-19 ASSESSMENT — PATIENT HEALTH QUESTIONNAIRE - PHQ9
SUM OF ALL RESPONSES TO PHQ QUESTIONS 1-9: 3
5. POOR APPETITE OR OVEREATING: SEVERAL DAYS

## 2021-03-19 ASSESSMENT — ANXIETY QUESTIONNAIRES
2. NOT BEING ABLE TO STOP OR CONTROL WORRYING: SEVERAL DAYS
GAD7 TOTAL SCORE: 7
6. BECOMING EASILY ANNOYED OR IRRITABLE: SEVERAL DAYS
3. WORRYING TOO MUCH ABOUT DIFFERENT THINGS: SEVERAL DAYS
1. FEELING NERVOUS, ANXIOUS, OR ON EDGE: SEVERAL DAYS
5. BEING SO RESTLESS THAT IT IS HARD TO SIT STILL: SEVERAL DAYS
IF YOU CHECKED OFF ANY PROBLEMS ON THIS QUESTIONNAIRE, HOW DIFFICULT HAVE THESE PROBLEMS MADE IT FOR YOU TO DO YOUR WORK, TAKE CARE OF THINGS AT HOME, OR GET ALONG WITH OTHER PEOPLE: SOMEWHAT DIFFICULT
7. FEELING AFRAID AS IF SOMETHING AWFUL MIGHT HAPPEN: SEVERAL DAYS

## 2021-03-19 ASSESSMENT — PAIN SCALES - GENERAL: PAINLEVEL: SEVERE PAIN (6)

## 2021-03-19 ASSESSMENT — ASTHMA QUESTIONNAIRES
ACT_TOTALSCORE: 12
QUESTION_1 LAST FOUR WEEKS HOW MUCH OF THE TIME DID YOUR ASTHMA KEEP YOU FROM GETTING AS MUCH DONE AT WORK, SCHOOL OR AT HOME: SOME OF THE TIME
QUESTION_4 LAST FOUR WEEKS HOW OFTEN HAVE YOU USED YOUR RESCUE INHALER OR NEBULIZER MEDICATION (SUCH AS ALBUTEROL): ONE OR TWO TIMES PER DAY
QUESTION_5 LAST FOUR WEEKS HOW WOULD YOU RATE YOUR ASTHMA CONTROL: SOMEWHAT CONTROLLED
QUESTION_2 LAST FOUR WEEKS HOW OFTEN HAVE YOU HAD SHORTNESS OF BREATH: ONCE A DAY
QUESTION_3 LAST FOUR WEEKS HOW OFTEN DID YOUR ASTHMA SYMPTOMS (WHEEZING, COUGHING, SHORTNESS OF BREATH, CHEST TIGHTNESS OR PAIN) WAKE YOU UP AT NIGHT OR EARLIER THAN USUAL IN THE MORNING: TWO OR THREE NIGHTS A WEEK

## 2021-03-19 ASSESSMENT — MIFFLIN-ST. JEOR: SCORE: 927.55

## 2021-03-19 NOTE — PROGRESS NOTES
Assessment & Plan   Problem List Items Addressed This Visit     Asthma, mild intermittent    Relevant Orders    CBC with platelets    CT Chest Abdomen Pelvis w/o Contrast    Hypothyroidism    Relevant Orders    TSH with free T4 reflex      Other Visit Diagnoses     Weight loss    -  Primary    Relevant Orders    CBC with platelets    Comprehensive metabolic panel    TSH with free T4 reflex    CT Chest Abdomen Pelvis w/o Contrast    Blue toes        Relevant Orders    GENERAL SURG ADULT REFERRAL    Bilateral cold feet        Relevant Orders    GENERAL SURG ADULT REFERRAL         Is a 66-year-old woman who has a history of asthma, hypothyroidism.  She has not been in for about a year.  She probably had Covid last year but her tests are all negative.  She has had no appetite has lost 30 pounds.  She is down to 99 pounds with a BMI of 18.  She really cannot lose anymore weight.  She is also having bluish dusky toes.  She has positive pulses in her feet but.  Rest of her feet are cold, toes are not open at this time are infected but appear to be a vascular insufficient.     Start off by doing some labs today.  Her blood pressure is under control.  We will get her on an aspirin at a full dose.  We will also do a chest CT for the weight loss and I want her to see Dr. Itzel talavera to discuss other vascular issues.    Very complex case with her vascular insufficiency and weight loss I am quite concerned about her and like to see her back in 2 weeks.                 No follow-ups on file.2    Fuentes Darby MD  [facility]   TOBY is a 66 year old who presents for the following health issues     HPI   Chief Complaint   Patient presents with     Bleeding/Bruising     Toes bruised and painful     ACT Total Scores 5/10/2019 11/21/2019 3/19/2021   ACT TOTAL SCORE - - -   ASTHMA ER VISITS - - -   ASTHMA HOSPITALIZATIONS - - -   ACT TOTAL SCORE (Goal Greater than or Equal to 20) 25 19 12   In the  "past 12 months, how many times did you visit the emergency room for your asthma without being admitted to the hospital? 0 0 0   In the past 12 months, how many times were you hospitalized overnight because of your asthma? 0 0 0     PHQ 3/19/2021   PHQ-9 Total Score 3   Q9: Thoughts of better off dead/self-harm past 2 weeks Not at all     JOCELINE-7 SCORE 3/30/2017 11/8/2017 3/19/2021   Total Score - - -   Total Score 1 6 7     Both legs with dusky toes for 3 weeks, some dark sores and crusting even.  Painful. Has gotten worse, spread from toe to toe.  No injury.     Happened before covid vaccine.     Weight is down 30 pounds in the last year.  Was sick last spring and had stomach issues, appetite is down, forces herself to eat.      Works from home. 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     Feels better.     Past Medical History:   Diagnosis Date     Anxiety      Asthma, mild intermittent      Migraines      Tension headaches      Unspecified hypothyroidism      Current Outpatient Medications   Medication     albuterol (VENTOLIN HFA) 108 (90 Base) MCG/ACT inhaler     aspirin (ASA) 325 MG tablet     fluticasone (FLONASE) 50 MCG/ACT nasal spray     fluticasone-salmeterol (ADVAIR DISKUS) 250-50 MCG/DOSE inhaler     ibuprofen (ADVIL/MOTRIN) 200 MG capsule     levothyroxine (SYNTHROID/LEVOTHROID) 50 MCG tablet     PARoxetine (PAXIL-CR) 37.5 MG 24 hr tablet     cetirizine (ZYRTEC) 10 MG tablet     No current facility-administered medications for this visit.      Social History     Tobacco Use     Smoking status: Never Smoker     Smokeless tobacco: Never Used   Substance Use Topics     Alcohol use: Yes     Comment: Occasional wine 3X's /week     Drug use: No       Review of Systems         Objective    /72   Pulse 86   Temp 96.6  F (35.9  C) (Temporal)   Resp 16   Ht 1.562 m (5' 1.5\")   Wt 45.2 kg (99 lb 11.2 oz)   LMP  (LMP Unknown)   SpO2 100%   BMI 18.53 kg/m    Body mass index is 18.53 kg/m .  Physical " Exam   No acute distress  She is quite thin  Heart is regular  Lungs are clear  Hands are without any signs of ecchymosis or splinter hemorrhages  Extremities have positive dorsalis pedis pulse both legs but she does have some slight distal bruising with hemorrhages in the toes, cold feeling in her feet and toes.

## 2021-03-20 ASSESSMENT — ANXIETY QUESTIONNAIRES: GAD7 TOTAL SCORE: 7

## 2021-03-20 ASSESSMENT — ASTHMA QUESTIONNAIRES: ACT_TOTALSCORE: 12

## 2021-03-22 ENCOUNTER — OFFICE VISIT (OUTPATIENT)
Dept: SURGERY | Facility: CLINIC | Age: 66
End: 2021-03-22
Payer: COMMERCIAL

## 2021-03-22 ENCOUNTER — HOSPITAL ENCOUNTER (OUTPATIENT)
Dept: CT IMAGING | Facility: CLINIC | Age: 66
Discharge: HOME | End: 2021-03-22
Attending: SPECIALIST | Admitting: SPECIALIST
Payer: COMMERCIAL

## 2021-03-22 VITALS
DIASTOLIC BLOOD PRESSURE: 72 MMHG | SYSTOLIC BLOOD PRESSURE: 142 MMHG | TEMPERATURE: 97.9 F | WEIGHT: 101.9 LBS | BODY MASS INDEX: 18.94 KG/M2

## 2021-03-22 DIAGNOSIS — R01.1 MURMUR, CARDIAC: ICD-10-CM

## 2021-03-22 DIAGNOSIS — R07.9 LEFT-SIDED CHEST PAIN: ICD-10-CM

## 2021-03-22 DIAGNOSIS — I75.023 BLUE TOE SYNDROME OF BOTH LOWER EXTREMITIES (H): ICD-10-CM

## 2021-03-22 DIAGNOSIS — R63.4 WEIGHT LOSS: ICD-10-CM

## 2021-03-22 DIAGNOSIS — I74.3 EMBOLIC DISEASE OF TOE (H): Primary | ICD-10-CM

## 2021-03-22 PROCEDURE — 250N000011 HC RX IP 250 OP 636: Performed by: RADIOLOGY

## 2021-03-22 PROCEDURE — 250N000009 HC RX 250: Performed by: RADIOLOGY

## 2021-03-22 PROCEDURE — 99203 OFFICE O/P NEW LOW 30 MIN: CPT | Performed by: SPECIALIST

## 2021-03-22 PROCEDURE — 75635 CT ANGIO ABDOMINAL ARTERIES: CPT

## 2021-03-22 RX ORDER — IOPAMIDOL 755 MG/ML
500 INJECTION, SOLUTION INTRAVASCULAR ONCE
Status: COMPLETED | OUTPATIENT
Start: 2021-03-22 | End: 2021-03-22

## 2021-03-22 RX ADMIN — IOPAMIDOL 100 ML: 755 INJECTION, SOLUTION INTRAVENOUS at 09:58

## 2021-03-22 RX ADMIN — SODIUM CHLORIDE 100 ML: 9 INJECTION, SOLUTION INTRAVENOUS at 09:59

## 2021-03-22 NOTE — NURSING NOTE
Patient instructed to stop at specialty  and scheduled appointment with cardiology.......Brigitte VERMA CMA  ([facility])

## 2021-03-22 NOTE — PROGRESS NOTES
Consult requested by Dr. Darby    Reason for consultation - blue toes    HPI:   Patient is a 66-year-old white female who about a month ago developed initially some blue toes on her right foot then followed by the left.  They have been slow to heal since then.  She said some of the tips turned black and she got them off and is healed.  They are also quite painful at the time.  She denies any irregular heartbeat, frostbite or cold exposure.  She did have a cardiac echo when she was in her 40s and was told she had a murmur.  She has never smoked.  She denies any rest pain, claudication, stroke or TIAs.  She now presents to me for her toe pain and possible ischemia.      Past Medical History:   Diagnosis Date     Anxiety      Asthma, mild intermittent      Migraines      Tension headaches      Unspecified hypothyroidism      Past Surgical History:   Procedure Laterality Date     BUNIONECTOMY       ESOPHAGOSCOPY, GASTROSCOPY, DUODENOSCOPY (EGD), COMBINED N/A 4/2/2019    Procedure: ESOPHAGOSCOPY, GASTROSCOPY, DUODENOSCOPY (EGD);  Surgeon: Ochoa Cherry DO;  Location:  GI     OTHER SURGICAL HISTORY  1980    Bunionectomy     Current Outpatient Medications   Medication     albuterol (VENTOLIN HFA) 108 (90 Base) MCG/ACT inhaler     aspirin (ASA) 325 MG tablet     cetirizine (ZYRTEC) 10 MG tablet     fluticasone (FLONASE) 50 MCG/ACT nasal spray     fluticasone-salmeterol (ADVAIR DISKUS) 250-50 MCG/DOSE inhaler     ibuprofen (ADVIL/MOTRIN) 200 MG capsule     levothyroxine (SYNTHROID/LEVOTHROID) 50 MCG tablet     PARoxetine (PAXIL-CR) 37.5 MG 24 hr tablet     No current facility-administered medications for this visit.         Allergies   Allergen Reactions     Compazine      Anxiety     Flagyl [Metronidazole Hcl] Nausea and Vomiting     Social History     Socioeconomic History     Marital status: Single     Spouse name: Not on file     Number of children: Not on file     Years of education: Not on file      Highest education level: Not on file   Occupational History     Not on file   Social Needs     Financial resource strain: Not on file     Food insecurity     Worry: Not on file     Inability: Not on file     Transportation needs     Medical: Not on file     Non-medical: Not on file   Tobacco Use     Smoking status: Never Smoker     Smokeless tobacco: Never Used   Substance and Sexual Activity     Alcohol use: Yes     Comment: Occasional wine 3X's /week     Drug use: No     Sexual activity: Not Currently   Lifestyle     Physical activity     Days per week: Not on file     Minutes per session: Not on file     Stress: Not on file   Relationships     Social connections     Talks on phone: Not on file     Gets together: Not on file     Attends [employer] service: Not on file     Active member of club or organization: Not on file     Attends meetings of clubs or organizations: Not on file     Relationship status: Not on file     Intimate partner violence     Fear of current or ex partner: Not on file     Emotionally abused: Not on file     Physically abused: Not on file     Forced sexual activity: Not on file   Other Topics Concern     Parent/sibling w/ CABG, MI or angioplasty before 65F 55M? No   Social History Narrative     Not on file     Family History   Problem Relation Age of Onset     Asthma Mother      Diabetes Mother      Cancer - colorectal Maternal Grandmother      Heart Disease Father         MI at age 55     Prostate Cancer Father      Asthma Father      Asthma Sister      Obesity Sister       ROS: 10 point ROS neg other than the symptoms noted above in the HPI.    PE:  B/P: 142/72, T: 97.9, P: Data Unavailable, R: Data Unavailable  General: well developed, well nourished WF who appears their stated age  HEENT: NC/AT, EOMI, (-)icterus, (-)injection  Neck: Supple, No JVD  Chest: CTA  Heart: S1, S2, (+)murmur. RRR  Abd: Soft, non tender, non distended, non tender, no masses, no bruits  Ext; Warm, no edema,  (+)FEM/POP/PT pulses.  RLE - ischemia distal 2nd toe.  LLE - ischemia - distal - 2,3, 4 th toes.  - embolic petechiae.  Psych: AAOx3  Neuro: No focal deficits      CTA -     CTA CHEST, ABDOMEN, PELVIS RUNOFF WITH CONTRAST  3/22/2021 10:26 AM      HISTORY:  Blue toe syndrome. Concern for distal emboli. Evaluate for  embolic source.     COMPARISON: CT of the chest dated 4/18/2011     TECHNIQUE: CT angiogram of the chest, abdomen and pelvis with  bilateral lower extremity runoff was performed following the  administration of 100 cc intravenous contrast. Images are reviewed in  multiple planes and 3-D reconstructions were also performed.    Radiation dose for this scan was reduced using automated exposure  control, adjustment of the mA and/or kV according to patient size, or  iterative reconstruction technique.     FINDINGS:      Chest: The thoracic aorta at the level of the sinuses of Valsalva has  a maximum diameter of approximately 3.1 cm. The ascending thoracic  aorta has a maximum diameter of approximately 3.6 cm. The aorta then  tapers at the level of the thoracic aortic arch. The descending  thoracic aorta is of normal caliber without aneurysmal dilatation or  significant stenosis. There is no significant soft plaque in the  thoracic aorta. The great vessels arising from the thoracic aortic  arch are patent without significant stenoses.     Abdomen/pelvis: There is minimal scattered calcified plaque in the  abdominal aorta. No evidence for an aneurysmal dilatation or  significant stenosis. The celiac trunk, superior mesenteric artery,  renal arteries, and inferior mesenteric artery are patent without  significant stenoses.     The iliac arteries are patent without significant stenoses.     Right lower extremity angiogram:  Common femoral artery: Ectatic with a maximum diameter of 10 mm. No  significant stenosis.  Profunda femoris artery: No significant stenosis.  Superficial femoral artery: No significant  stenosis.  Popliteal artery: No significant stenosis.  Tibial arteries: Three-vessel runoff. No significant stenosis.     Left lower extremity angiogram:  Common femoral artery: No significant stenosis.  Profunda femoris artery: No significant stenosis.  Superficial femoral artery: No significant stenosis.  Popliteal artery: No significant stenosis.  Tibial arteries: Three-vessel runoff. No significant stenosis.     Soft tissues:     Chest: Right apical scarring. 7 mm nodule in the right upper lobe.     Abdomen/pelvis: There is a 2.5 x 1.8 cm angiomyolipoma at the lower  pole of the left kidney. Remaining solid organs in the abdomen appear  grossly unremarkable.     The bowel appears grossly unremarkable.                                                                      IMPRESSION:  1. Mild aneurysmal dilatation of the ascending thoracic aorta which  has a maximum diameter of approximately 3.6 cm.  2. No significant soft plaque in the arteries of the chest, abdomen,  pelvis and lower extremities that could serve as a source for distal  emboli.  3. 2.7 cm angiomyolipoma at the lower pole of the left kidney.  Consider urological follow-up and follow-up CT scan in one year to  monitor for any increase in size.      Impression/plan:  This is a 66-year-old lady presenting with embolic disease to both her feet.  Specifically her toes were affected.  She has palpable pulses throughout both lower extremities.  She did have a CTA that did not reveal any evidence of mural thrombus or atherosclerotic disease causing this.  She does have a known murmur which could be a possible etiology.  She also reports left-sided chest pain during a upper respiratory episode about a month ago.  I discussed these findings with the patient she expressed understanding.  After discussion with the patient the plan at this time is refer to cardiology for further evaluation of her murmur as a possible etiology as well as a cardiac echo.  She  knows to go to the ER should the symptoms return or worsen.  She will follow-up with me as necessary.    TOBY to follow up with Primary Care provider regarding elevated blood pressure.    Ermias Henry MD, FACS

## 2021-03-22 NOTE — LETTER
3/22/2021         RE: Brissa Mayer  [address] [facility] [address] [facility] [id number]        Dear Colleague,    Thank you for referring your patient, Brissa Mayer, to the [facility]. Please see a copy of my visit note below.    Consult requested by Dr. Darby    Reason for consultation - blue toes    HPI:   Patient is a 66-year-old white female who about a month ago developed initially some blue toes on her right foot then followed by the left.  They have been slow to heal since then.  She said some of the tips turned black and she got them off and is healed.  They are also quite painful at the time.  She denies any irregular heartbeat, frostbite or cold exposure.  She did have a cardiac echo when she was in her 40s and was told she had a murmur.  She has never smoked.  She denies any rest pain, claudication, stroke or TIAs.  She now presents to me for her toe pain and possible ischemia.      Past Medical History:   Diagnosis Date     Anxiety      Asthma, mild intermittent      Migraines      Tension headaches      Unspecified hypothyroidism      Past Surgical History:   Procedure Laterality Date     BUNIONECTOMY       ESOPHAGOSCOPY, GASTROSCOPY, DUODENOSCOPY (EGD), COMBINED N/A 4/2/2019    Procedure: ESOPHAGOSCOPY, GASTROSCOPY, DUODENOSCOPY (EGD);  Surgeon: Ochoa Cherry DO;  Location:  GI     OTHER SURGICAL HISTORY  1980    Bunionectomy     Current Outpatient Medications   Medication     albuterol (VENTOLIN HFA) 108 (90 Base) MCG/ACT inhaler     aspirin (ASA) 325 MG tablet     cetirizine (ZYRTEC) 10 MG tablet     fluticasone (FLONASE) 50 MCG/ACT nasal spray     fluticasone-salmeterol (ADVAIR DISKUS) 250-50 MCG/DOSE inhaler     ibuprofen (ADVIL/MOTRIN) 200 MG capsule     levothyroxine (SYNTHROID/LEVOTHROID) 50 MCG tablet     PARoxetine (PAXIL-CR) 37.5 MG 24 hr tablet     No current facility-administered medications for this visit.         Allergies   Allergen Reactions      Compazine      Anxiety     Flagyl [Metronidazole Hcl] Nausea and Vomiting     Social History     Socioeconomic History     Marital status: Single     Spouse name: Not on file     Number of children: Not on file     Years of education: Not on file     Highest education level: Not on file   Occupational History     Not on file   Social Needs     Financial resource strain: Not on file     Food insecurity     Worry: Not on file     Inability: Not on file     Transportation needs     Medical: Not on file     Non-medical: Not on file   Tobacco Use     Smoking status: Never Smoker     Smokeless tobacco: Never Used   Substance and Sexual Activity     Alcohol use: Yes     Comment: Occasional wine 3X's /week     Drug use: No     Sexual activity: Not Currently   Lifestyle     Physical activity     Days per week: Not on file     Minutes per session: Not on file     Stress: Not on file   Relationships     Social connections     Talks on phone: Not on file     Gets together: Not on file     Attends [employer] witness service: Not on file     Active member of club or organization: Not on file     Attends meetings of clubs or organizations: Not on file     Relationship status: Not on file     Intimate partner violence     Fear of current or ex partner: Not on file     Emotionally abused: Not on file     Physically abused: Not on file     Forced sexual activity: Not on file   Other Topics Concern     Parent/sibling w/ CABG, MI or angioplasty before 65F 55M? No   Social History Narrative     Not on file     Family History   Problem Relation Age of Onset     Asthma Mother      Diabetes Mother      Cancer - colorectal Maternal Grandmother      Heart Disease Father         MI at age 55     Prostate Cancer Father      Asthma Father      Asthma Sister      Obesity Sister       ROS: 10 point ROS neg other than the symptoms noted above in the HPI.    PE:  B/P: 142/72, T: 97.9, P: Data Unavailable, R: Data Unavailable  General: well developed, well  nourished WF who appears their stated age  HEENT: NC/AT, EOMI, (-)icterus, (-)injection  Neck: Supple, No JVD  Chest: CTA  Heart: S1, S2, (+)murmur. RRR  Abd: Soft, non tender, non distended, non tender, no masses, no bruits  Ext; Warm, no edema, (+)FEM/POP/PT pulses.  RLE - ischemia distal 2nd toe.  LLE - ischemia - distal - 2,3, 4 th toes.  - embolic petechiae.  Psych: AAOx3  Neuro: No focal deficits      CTA -     CTA CHEST, ABDOMEN, PELVIS RUNOFF WITH CONTRAST  3/22/2021 10:26 AM      HISTORY:  Blue toe syndrome. Concern for distal emboli. Evaluate for  embolic source.     COMPARISON: CT of the chest dated 4/18/2011     TECHNIQUE: CT angiogram of the chest, abdomen and pelvis with  bilateral lower extremity runoff was performed following the  administration of 100 cc intravenous contrast. Images are reviewed in  multiple planes and 3-D reconstructions were also performed.    Radiation dose for this scan was reduced using automated exposure  control, adjustment of the mA and/or kV according to patient size, or  iterative reconstruction technique.     FINDINGS:      Chest: The thoracic aorta at the level of the sinuses of Valsalva has  a maximum diameter of approximately 3.1 cm. The ascending thoracic  aorta has a maximum diameter of approximately 3.6 cm. The aorta then  tapers at the level of the thoracic aortic arch. The descending  thoracic aorta is of normal caliber without aneurysmal dilatation or  significant stenosis. There is no significant soft plaque in the  thoracic aorta. The great vessels arising from the thoracic aortic  arch are patent without significant stenoses.     Abdomen/pelvis: There is minimal scattered calcified plaque in the  abdominal aorta. No evidence for an aneurysmal dilatation or  significant stenosis. The celiac trunk, superior mesenteric artery,  renal arteries, and inferior mesenteric artery are patent without  significant stenoses.     The iliac arteries are patent without  significant stenoses.     Right lower extremity angiogram:  Common femoral artery: Ectatic with a maximum diameter of 10 mm. No  significant stenosis.  Profunda femoris artery: No significant stenosis.  Superficial femoral artery: No significant stenosis.  Popliteal artery: No significant stenosis.  Tibial arteries: Three-vessel runoff. No significant stenosis.     Left lower extremity angiogram:  Common femoral artery: No significant stenosis.  Profunda femoris artery: No significant stenosis.  Superficial femoral artery: No significant stenosis.  Popliteal artery: No significant stenosis.  Tibial arteries: Three-vessel runoff. No significant stenosis.     Soft tissues:     Chest: Right apical scarring. 7 mm nodule in the right upper lobe.     Abdomen/pelvis: There is a 2.5 x 1.8 cm angiomyolipoma at the lower  pole of the left kidney. Remaining solid organs in the abdomen appear  grossly unremarkable.     The bowel appears grossly unremarkable.                                                                      IMPRESSION:  1. Mild aneurysmal dilatation of the ascending thoracic aorta which  has a maximum diameter of approximately 3.6 cm.  2. No significant soft plaque in the arteries of the chest, abdomen,  pelvis and lower extremities that could serve as a source for distal  emboli.  3. 2.7 cm angiomyolipoma at the lower pole of the left kidney.  Consider urological follow-up and follow-up CT scan in one year to  monitor for any increase in size.      Impression/plan:  This is a 66-year-old lady presenting with embolic disease to both her feet.  Specifically her toes were affected.  She has palpable pulses throughout both lower extremities.  She did have a CTA that did not reveal any evidence of mural thrombus or atherosclerotic disease causing this.  She does have a known murmur which could be a possible etiology.  She also reports left-sided chest pain during a upper respiratory episode about a month ago.  I  discussed these findings with the patient she expressed understanding.  After discussion with the patient the plan at this time is refer to cardiology for further evaluation of her murmur as a possible etiology as well as a cardiac echo.  She knows to go to the ER should the symptoms return or worsen.  She will follow-up with me as necessary.    TOBY to follow up with Primary Care provider regarding elevated blood pressure.    Ermias Henry MD, FACS          Again, thank you for allowing me to participate in the care of your patient.        Sincerely,        Ermias Henry MD

## 2021-04-07 ENCOUNTER — IMMUNIZATION (OUTPATIENT)
Dept: FAMILY MEDICINE | Facility: CLINIC | Age: 66
End: 2021-04-07
Attending: FAMILY MEDICINE
Payer: COMMERCIAL

## 2021-04-07 PROCEDURE — 0012A PR COVID VAC MODERNA 100 MCG/0.5 ML IM: CPT

## 2021-04-07 PROCEDURE — 91301 PR COVID VAC MODERNA 100 MCG/0.5 ML IM: CPT

## 2021-04-15 ENCOUNTER — OFFICE VISIT (OUTPATIENT)
Dept: CARDIOLOGY | Facility: CLINIC | Age: 66
End: 2021-04-15
Attending: SPECIALIST
Payer: COMMERCIAL

## 2021-04-15 VITALS
OXYGEN SATURATION: 97 % | HEART RATE: 71 BPM | DIASTOLIC BLOOD PRESSURE: 76 MMHG | WEIGHT: 100.9 LBS | SYSTOLIC BLOOD PRESSURE: 126 MMHG | HEIGHT: 62 IN | BODY MASS INDEX: 18.57 KG/M2

## 2021-04-15 DIAGNOSIS — D72.819 LEUKOPENIA, UNSPECIFIED TYPE: Primary | ICD-10-CM

## 2021-04-15 DIAGNOSIS — I74.3 EMBOLIC DISEASE OF TOE (H): ICD-10-CM

## 2021-04-15 DIAGNOSIS — R01.1 MURMUR, CARDIAC: ICD-10-CM

## 2021-04-15 DIAGNOSIS — R07.9 LEFT-SIDED CHEST PAIN: ICD-10-CM

## 2021-04-15 LAB
BASOPHILS # BLD AUTO: 0 10E9/L (ref 0–0.2)
BASOPHILS NFR BLD AUTO: 0.6 %
D DIMER PPP FEU-MCNC: 0.3 UG/ML FEU (ref 0–0.5)
DIFFERENTIAL METHOD BLD: ABNORMAL
EOSINOPHIL # BLD AUTO: 0.4 10E9/L (ref 0–0.7)
EOSINOPHIL NFR BLD AUTO: 7.6 %
ERYTHROCYTE [DISTWIDTH] IN BLOOD BY AUTOMATED COUNT: 13.5 % (ref 10–15)
HCT VFR BLD AUTO: 39.8 % (ref 35–47)
HGB BLD-MCNC: 13.4 G/DL (ref 11.7–15.7)
IMM GRANULOCYTES # BLD: 0 10E9/L (ref 0–0.4)
IMM GRANULOCYTES NFR BLD: 0.6 %
LYMPHOCYTES # BLD AUTO: 2 10E9/L (ref 0.8–5.3)
LYMPHOCYTES NFR BLD AUTO: 41.9 %
MCH RBC QN AUTO: 31.8 PG (ref 26.5–33)
MCHC RBC AUTO-ENTMCNC: 33.7 G/DL (ref 31.5–36.5)
MCV RBC AUTO: 95 FL (ref 78–100)
MONOCYTES # BLD AUTO: 0.9 10E9/L (ref 0–1.3)
MONOCYTES NFR BLD AUTO: 18.7 %
NEUTROPHILS # BLD AUTO: 1.5 10E9/L (ref 1.6–8.3)
NEUTROPHILS NFR BLD AUTO: 30.6 %
NRBC # BLD AUTO: 0 10*3/UL
NRBC BLD AUTO-RTO: 0 /100
PLATELET # BLD AUTO: 198 10E9/L (ref 150–450)
RBC # BLD AUTO: 4.21 10E12/L (ref 3.8–5.2)
RETICS # AUTO: 33.7 10E9/L (ref 25–95)
RETICS/RBC NFR AUTO: 0.8 % (ref 0.5–2)
WBC # BLD AUTO: 4.8 10E9/L (ref 4–11)

## 2021-04-15 PROCEDURE — 85060 BLOOD SMEAR INTERPRETATION: CPT | Performed by: PATHOLOGY

## 2021-04-15 PROCEDURE — 99N1109 PR STATISTIC MORPHOLOGY W/INTERP HISTOLOGY TC 85060: Performed by: INTERNAL MEDICINE

## 2021-04-15 PROCEDURE — 93010 ELECTROCARDIOGRAM REPORT: CPT | Performed by: INTERNAL MEDICINE

## 2021-04-15 PROCEDURE — 36415 COLL VENOUS BLD VENIPUNCTURE: CPT | Performed by: INTERNAL MEDICINE

## 2021-04-15 PROCEDURE — 85025 COMPLETE CBC W/AUTO DIFF WBC: CPT | Performed by: INTERNAL MEDICINE

## 2021-04-15 PROCEDURE — 85045 AUTOMATED RETICULOCYTE COUNT: CPT | Performed by: INTERNAL MEDICINE

## 2021-04-15 PROCEDURE — 85379 FIBRIN DEGRADATION QUANT: CPT | Performed by: INTERNAL MEDICINE

## 2021-04-15 PROCEDURE — 99204 OFFICE O/P NEW MOD 45 MIN: CPT | Performed by: INTERNAL MEDICINE

## 2021-04-15 ASSESSMENT — MIFFLIN-ST. JEOR: SCORE: 932.99

## 2021-04-15 NOTE — PATIENT INSTRUCTIONS
1. Echocardiogram   2. Ziopatch monitor for 7 days  3. Follow up with Dr. Canas in 1 month   4. Labs today   5. If continued ulcerations in the toes please call us and we will rx a blood thinner

## 2021-04-15 NOTE — LETTER
4/15/2021    Fuentes Darby MD  [address] [facility] [address]    RE: Brissa SULLIVAN Moreno       Dear Colleague,    I had the pleasure of seeing Brissa Mayer in the [facility].      HPI: This is a pleasant 66 year old no significant PMH here for evaluation of blue toe syndrome. She is reporting that both right foot and left foot with dark blue and black changes. She saw vascular who noted a murmur and also some chest pain episode so referred to cardiology. Has h/o murmur in the past and echocardiogram a few years ago that was unremarkable.    Thinks she may have had COVID last year. She hasn't felt well since then. Breathing is with ongoing difficulty and she is using inhaler more often. Weight loss of 35 lbs unintentional, poor appetite, despite being normal weight at baseline. Having a lot of night sweats. No ongoing fevers/chills.     Started aspirin 325 mg daily with some resolution but still not completely normalized.     ASSESSMENT/PLAN:    In summary this is a very pleasant 66 year old with diffuse blue toe syndrome. Based on her physical exam I agree with primary referral in that this is appearing like a distal embolic event. Differential includes atrial fibrillation, proximal emboli (however CT chest/abdomen/pelvis) angiogram negative for large vessel proximal source. Intracardiac source (valvular disease) is possible but rare in absence of atrial fibrillation.     Agree with aspirin for now. If she should worsen, get ulcerations, worsening gangrene, then would suggest a short trial of anticoagulation therapy. Arterial duplex, PPGs, ABIs not needed given CT angiogram results are negative for larger vessel disease.    Plan:     1. Echocardiogram   2. LORRAINE to be considered based on above results of echocardiogram   3. Ziopatch for 7 days   4. Continue aspirin, consider DOAC if needed for worsening symptoms   5. Follow up in 1 month  6. Labs today  given weight loss, suggest close follow up with PCP     Hattie Canas MD MSc  [facility]     PAST MEDICAL HISTORY  Past Medical History:   Diagnosis Date     Anxiety      Asthma, mild intermittent      Migraines      Tension headaches      Unspecified hypothyroidism        CURRENT MEDICATIONS  Current Outpatient Medications   Medication Sig Dispense Refill     albuterol (VENTOLIN HFA) 108 (90 Base) MCG/ACT inhaler INHALE TWO PUFFS BY MOUTH EVERY 6 HOURS AS NEEDED FOR SHORTNESS OF BREATH/DYSPNEA 18 g 3     aspirin (ASA) 325 MG tablet Take 1 tablet (325 mg) by mouth daily       cetirizine (ZYRTEC) 10 MG tablet Take 1 tablet (10 mg) by mouth every evening 30 tablet 1     fluticasone (FLONASE) 50 MCG/ACT nasal spray Spray 2 sprays into both nostrils daily 1 Package 1     fluticasone-salmeterol (ADVAIR DISKUS) 250-50 MCG/DOSE inhaler INHALE ONE PUFF BY MOUTH TWICE A DAY 3 Inhaler 3     ibuprofen (ADVIL/MOTRIN) 200 MG capsule Take 400 mg by mouth as needed for fever       levothyroxine (SYNTHROID/LEVOTHROID) 50 MCG tablet Take 1 tablet (50 mcg) by mouth daily 90 tablet 3     PARoxetine (PAXIL-CR) 37.5 MG 24 hr tablet TAKE ONE TABLET BY MOUTH EVERY MORNING 90 tablet 0       PAST SURGICAL HISTORY:  Past Surgical History:   Procedure Laterality Date     BUNIONECTOMY       ESOPHAGOSCOPY, GASTROSCOPY, DUODENOSCOPY (EGD), COMBINED N/A 4/2/2019    Procedure: ESOPHAGOSCOPY, GASTROSCOPY, DUODENOSCOPY (EGD);  Surgeon: Ochoa Cherry DO;  Location:  GI     OTHER SURGICAL HISTORY  1980    Bunionectomy       ALLERGIES     Allergies   Allergen Reactions     Compazine      Anxiety     Flagyl [Metronidazole Hcl] Nausea and Vomiting       FAMILY HISTORY  Family History   Problem Relation Age of Onset     Asthma Mother      Diabetes Mother      Cancer - colorectal Maternal Grandmother      Heart Disease Father         MI at age 55     Prostate Cancer Father      Asthma Father      Asthma Sister      Obesity Sister         SOCIAL HISTORY  Social History     Socioeconomic History     Marital status: Single     Spouse name: Not on file     Number of children: Not on file     Years of education: Not on file     Highest education level: Not on file   Occupational History     Not on file   Social Needs     Financial resource strain: Not on file     Food insecurity     Worry: Not on file     Inability: Not on file     Transportation needs     Medical: Not on file     Non-medical: Not on file   Tobacco Use     Smoking status: Never Smoker     Smokeless tobacco: Never Used   Substance and Sexual Activity     Alcohol use: Yes     Comment: Occasional wine 3X's /week     Drug use: No     Sexual activity: Not Currently   Lifestyle     Physical activity     Days per week: Not on file     Minutes per session: Not on file     Stress: Not on file   Relationships     Social connections     Talks on phone: Not on file     Gets together: Not on file     Attends Congregational service: Not on file     Active member of club or organization: Not on file     Attends meetings of clubs or organizations: Not on file     Relationship status: Not on file     Intimate partner violence     Fear of current or ex partner: Not on file     Emotionally abused: Not on file     Physically abused: Not on file     Forced sexual activity: Not on file   Other Topics Concern     Parent/sibling w/ CABG, MI or angioplasty before 65F 55M? No   Social History Narrative     Not on file       ROS:   Constitutional: No fever, chills, or sweats. No weight gain/loss   ENT: No visual disturbance, ear ache, epistaxis, sore throat  Allergies/Immunologic: Negative  Respiratory: No cough, hemoptysia  Cardiovascular: As per HPI  GI: No nausea, vomiting, hematemesis, melena, or hematochezia  : No urinary frequency, dysuria, or hematuria  Integument: Negative  Psychiatric: Negative  Neuro: Negative  Endocrinology: Negative   Musculoskeletal: Negative  Vascular: No walking impairment,  "claudication, ischemic rest pain or nonhealing wounds    EXAM:  /76 (BP Location: Left arm, Patient Position: Sitting, Cuff Size: Adult Regular)   Pulse 71   Ht 1.562 m (5' 1.5\")   Wt 45.8 kg (100 lb 14.4 oz)   LMP  (LMP Unknown)   SpO2 97%   BMI 18.76 kg/m    In general, the patient is a pleasant female in no apparent distress.    HEENT: NC/AT.  PERRLA.  EOMI.  Sclerae white, not injected.  Nares clear.  Pharynx without erythema or exudate.  Dentition intact.    Neck: No adenopathy.  No thyromegaly. Carotids +2/2 bilaterally without bruits.  No jugular venous distension.   Heart: RRR. Normal S1, S2 splits physiologically. No murmur, rub, click, or gallop. The PMI is in the 5th ICS in the midclavicular line. There is no heave.    Lungs: CTA.  No ronchi, wheezes, rales.  No dullness to percussion.   Abdomen: Soft, nontender, nondistended. No organomegaly. No AAA.  No bruits.   Extremities: No clubbing, cyanosis, or edema.  No wounds. No varicose veins signs of chronic venous insufficiency.   Vascular: diffuse blue discoloration digits     Labs:  LIPID RESULTS:  Lab Results   Component Value Date    CHOL 256 (H) 03/19/2021     03/19/2021     (H) 03/19/2021    TRIG 56 03/19/2021    CHOLHDLRATIO 2.3 05/11/2015    NHDL 148 (H) 03/19/2021       LIVER ENZYME RESULTS:  Lab Results   Component Value Date    AST 19 03/19/2021    ALT 25 03/19/2021       CBC RESULTS:  Lab Results   Component Value Date    WBC 3.7 (L) 03/19/2021    RBC 4.53 03/19/2021    HGB 14.0 03/19/2021    HCT 42.2 03/19/2021    MCV 93 03/19/2021    MCH 30.9 03/19/2021    MCHC 33.2 03/19/2021    RDW 13.8 03/19/2021     03/19/2021       BMP RESULTS:  Lab Results   Component Value Date     03/19/2021    POTASSIUM 3.8 03/19/2021    CHLORIDE 100 03/19/2021    CO2 31 03/19/2021    ANIONGAP 4 03/19/2021    GLC 94 03/19/2021    BUN 21 03/19/2021    CR 0.61 03/19/2021    GFRESTIMATED >90 03/19/2021    GFRESTBLACK >90 03/19/2021 "    NAY 10.0 03/19/2021        A1C RESULTS:  No results found for: A1C    Thank you for allowing me to participate in the care of your patient.      Sincerely,     Hattie Canas MD     [facility]    cc:   Ermias Henry MD  [address] [place] [address] [place] [id number]

## 2021-04-15 NOTE — PROGRESS NOTES
HPI: This is a pleasant 66 year old no significant PMH here for evaluation of blue toe syndrome. She is reporting that both right foot and left foot with dark blue and black changes. She saw vascular who noted a murmur and also some chest pain episode so referred to cardiology. Has h/o murmur in the past and echocardiogram a few years ago that was unremarkable.    Thinks she may have had COVID last year. She hasn't felt well since then. Breathing is with ongoing difficulty and she is using inhaler more often. Weight loss of 35 lbs unintentional, poor appetite, despite being normal weight at baseline. Having a lot of night sweats. No ongoing fevers/chills.     Started aspirin 325 mg daily with some resolution but still not completely normalized.     ASSESSMENT/PLAN:    In summary this is a very pleasant 66 year old with diffuse blue toe syndrome. Based on her physical exam I agree with primary referral in that this is appearing like a distal embolic event. Differential includes atrial fibrillation, proximal emboli (however CT chest/abdomen/pelvis) angiogram negative for large vessel proximal source. Intracardiac source (valvular disease) is possible but rare in absence of atrial fibrillation.     Agree with aspirin for now. If she should worsen, get ulcerations, worsening gangrene, then would suggest a short trial of anticoagulation therapy. Arterial duplex, PPGs, ABIs not needed given CT angiogram results are negative for larger vessel disease.    Plan:     1. Echocardiogram   2. LORRAINE to be considered based on above results of echocardiogram   3. Ziopatch for 7 days   4. Continue aspirin, consider DOAC if needed for worsening symptoms   5. Follow up in 1 month  6. Labs today given weight loss, suggest close follow up with PCP     Hattie Canas MD MSc  [facility]     PAST MEDICAL HISTORY  Past Medical History:   Diagnosis Date     Anxiety      Asthma, mild intermittent      Migraines      Tension  headaches      Unspecified hypothyroidism        CURRENT MEDICATIONS  Current Outpatient Medications   Medication Sig Dispense Refill     albuterol (VENTOLIN HFA) 108 (90 Base) MCG/ACT inhaler INHALE TWO PUFFS BY MOUTH EVERY 6 HOURS AS NEEDED FOR SHORTNESS OF BREATH/DYSPNEA 18 g 3     aspirin (ASA) 325 MG tablet Take 1 tablet (325 mg) by mouth daily       cetirizine (ZYRTEC) 10 MG tablet Take 1 tablet (10 mg) by mouth every evening 30 tablet 1     fluticasone (FLONASE) 50 MCG/ACT nasal spray Spray 2 sprays into both nostrils daily 1 Package 1     fluticasone-salmeterol (ADVAIR DISKUS) 250-50 MCG/DOSE inhaler INHALE ONE PUFF BY MOUTH TWICE A DAY 3 Inhaler 3     ibuprofen (ADVIL/MOTRIN) 200 MG capsule Take 400 mg by mouth as needed for fever       levothyroxine (SYNTHROID/LEVOTHROID) 50 MCG tablet Take 1 tablet (50 mcg) by mouth daily 90 tablet 3     PARoxetine (PAXIL-CR) 37.5 MG 24 hr tablet TAKE ONE TABLET BY MOUTH EVERY MORNING 90 tablet 0       PAST SURGICAL HISTORY:  Past Surgical History:   Procedure Laterality Date     BUNIONECTOMY       ESOPHAGOSCOPY, GASTROSCOPY, DUODENOSCOPY (EGD), COMBINED N/A 4/2/2019    Procedure: ESOPHAGOSCOPY, GASTROSCOPY, DUODENOSCOPY (EGD);  Surgeon: Ochoa Cherry DO;  Location:  GI     OTHER SURGICAL HISTORY  1980    Bunionectomy       ALLERGIES     Allergies   Allergen Reactions     Compazine      Anxiety     Flagyl [Metronidazole Hcl] Nausea and Vomiting       FAMILY HISTORY  Family History   Problem Relation Age of Onset     Asthma Mother      Diabetes Mother      Cancer - colorectal Maternal Grandmother      Heart Disease Father         MI at age 55     Prostate Cancer Father      Asthma Father      Asthma Sister      Obesity Sister        SOCIAL HISTORY  Social History     Socioeconomic History     Marital status: Single     Spouse name: Not on file     Number of children: Not on file     Years of education: Not on file     Highest education level: Not on file  "  Occupational History     Not on file   Social Needs     Financial resource strain: Not on file     Food insecurity     Worry: Not on file     Inability: Not on file     Transportation needs     Medical: Not on file     Non-medical: Not on file   Tobacco Use     Smoking status: Never Smoker     Smokeless tobacco: Never Used   Substance and Sexual Activity     Alcohol use: Yes     Comment: Occasional wine 3X's /week     Drug use: No     Sexual activity: Not Currently   Lifestyle     Physical activity     Days per week: Not on file     Minutes per session: Not on file     Stress: Not on file   Relationships     Social connections     Talks on phone: Not on file     Gets together: Not on file     Attends Nondenominational service: Not on file     Active member of club or organization: Not on file     Attends meetings of clubs or organizations: Not on file     Relationship status: Not on file     Intimate partner violence     Fear of current or ex partner: Not on file     Emotionally abused: Not on file     Physically abused: Not on file     Forced sexual activity: Not on file   Other Topics Concern     Parent/sibling w/ CABG, MI or angioplasty before 65F 55M? No   Social History Narrative     Not on file       ROS:   Constitutional: No fever, chills, or sweats. No weight gain/loss   ENT: No visual disturbance, ear ache, epistaxis, sore throat  Allergies/Immunologic: Negative  Respiratory: No cough, hemoptysia  Cardiovascular: As per HPI  GI: No nausea, vomiting, hematemesis, melena, or hematochezia  : No urinary frequency, dysuria, or hematuria  Integument: Negative  Psychiatric: Negative  Neuro: Negative  Endocrinology: Negative   Musculoskeletal: Negative  Vascular: No walking impairment, claudication, ischemic rest pain or nonhealing wounds    EXAM:  /76 (BP Location: Left arm, Patient Position: Sitting, Cuff Size: Adult Regular)   Pulse 71   Ht 1.562 m (5' 1.5\")   Wt 45.8 kg (100 lb 14.4 oz)   LMP  (LMP " Unknown)   SpO2 97%   BMI 18.76 kg/m    In general, the patient is a pleasant female in no apparent distress.    HEENT: NC/AT.  PERRLA.  EOMI.  Sclerae white, not injected.  Nares clear.  Pharynx without erythema or exudate.  Dentition intact.    Neck: No adenopathy.  No thyromegaly. Carotids +2/2 bilaterally without bruits.  No jugular venous distension.   Heart: RRR. Normal S1, S2 splits physiologically. No murmur, rub, click, or gallop. The PMI is in the 5th ICS in the midclavicular line. There is no heave.    Lungs: CTA.  No ronchi, wheezes, rales.  No dullness to percussion.   Abdomen: Soft, nontender, nondistended. No organomegaly. No AAA.  No bruits.   Extremities: No clubbing, cyanosis, or edema.  No wounds. No varicose veins signs of chronic venous insufficiency.   Vascular: diffuse blue discoloration digits     Labs:  LIPID RESULTS:  Lab Results   Component Value Date    CHOL 256 (H) 03/19/2021     03/19/2021     (H) 03/19/2021    TRIG 56 03/19/2021    CHOLHDLRATIO 2.3 05/11/2015    NHDL 148 (H) 03/19/2021       LIVER ENZYME RESULTS:  Lab Results   Component Value Date    AST 19 03/19/2021    ALT 25 03/19/2021       CBC RESULTS:  Lab Results   Component Value Date    WBC 3.7 (L) 03/19/2021    RBC 4.53 03/19/2021    HGB 14.0 03/19/2021    HCT 42.2 03/19/2021    MCV 93 03/19/2021    MCH 30.9 03/19/2021    MCHC 33.2 03/19/2021    RDW 13.8 03/19/2021     03/19/2021       BMP RESULTS:  Lab Results   Component Value Date     03/19/2021    POTASSIUM 3.8 03/19/2021    CHLORIDE 100 03/19/2021    CO2 31 03/19/2021    ANIONGAP 4 03/19/2021    GLC 94 03/19/2021    BUN 21 03/19/2021    CR 0.61 03/19/2021    GFRESTIMATED >90 03/19/2021    GFRESTBLACK >90 03/19/2021    NAY 10.0 03/19/2021        A1C RESULTS:  No results found for: A1C

## 2021-04-16 LAB — COPATH REPORT: NORMAL

## 2021-05-10 ENCOUNTER — HOSPITAL ENCOUNTER (OUTPATIENT)
Dept: CARDIOLOGY | Facility: CLINIC | Age: 66
End: 2021-05-10
Attending: INTERNAL MEDICINE
Payer: COMMERCIAL

## 2021-05-10 DIAGNOSIS — I74.3 EMBOLIC DISEASE OF TOE (H): ICD-10-CM

## 2021-05-10 PROCEDURE — 93306 TTE W/DOPPLER COMPLETE: CPT | Mod: 26 | Performed by: INTERNAL MEDICINE

## 2021-05-10 PROCEDURE — 93306 TTE W/DOPPLER COMPLETE: CPT

## 2021-05-10 PROCEDURE — 93244 EXT ECG>48HR<7D REV&INTERPJ: CPT | Performed by: INTERNAL MEDICINE

## 2021-05-10 PROCEDURE — 93242 EXT ECG>48HR<7D RECORDING: CPT

## 2021-05-11 NOTE — RESULT ENCOUNTER NOTE
EF 65%; no WMAs; no significant valve disease. Follow up with Dr Canas on 6/16/21. Released to [facility]

## 2021-05-27 ENCOUNTER — APPOINTMENT (OUTPATIENT)
Dept: GENERAL RADIOLOGY | Facility: CLINIC | Age: 66
End: 2021-05-27
Attending: FAMILY MEDICINE
Payer: COMMERCIAL

## 2021-05-27 ENCOUNTER — HOSPITAL ENCOUNTER (EMERGENCY)
Facility: CLINIC | Age: 66
Discharge: HOME | End: 2021-05-27
Attending: FAMILY MEDICINE | Admitting: FAMILY MEDICINE
Payer: COMMERCIAL

## 2021-05-27 ENCOUNTER — NURSE TRIAGE (OUTPATIENT)
Dept: INTERNAL MEDICINE | Facility: CLINIC | Age: 66
End: 2021-05-27

## 2021-05-27 VITALS
DIASTOLIC BLOOD PRESSURE: 88 MMHG | BODY MASS INDEX: 18.87 KG/M2 | HEART RATE: 64 BPM | OXYGEN SATURATION: 98 % | WEIGHT: 101.5 LBS | TEMPERATURE: 98.2 F | SYSTOLIC BLOOD PRESSURE: 129 MMHG | RESPIRATION RATE: 12 BRPM

## 2021-05-27 DIAGNOSIS — R07.89 CHEST WALL PAIN: ICD-10-CM

## 2021-05-27 DIAGNOSIS — E03.4 HYPOTHYROIDISM DUE TO ACQUIRED ATROPHY OF THYROID: ICD-10-CM

## 2021-05-27 DIAGNOSIS — J45.20 MILD INTERMITTENT ASTHMA WITHOUT COMPLICATION: ICD-10-CM

## 2021-05-27 DIAGNOSIS — E78.5 HYPERLIPIDEMIA LDL GOAL <130: ICD-10-CM

## 2021-05-27 DIAGNOSIS — R07.89 ATYPICAL CHEST PAIN: ICD-10-CM

## 2021-05-27 DIAGNOSIS — J40 BRONCHITIS: ICD-10-CM

## 2021-05-27 LAB
ALBUMIN SERPL-MCNC: 4.1 G/DL (ref 3.4–5)
ALP SERPL-CCNC: 49 U/L (ref 40–150)
ALT SERPL W P-5'-P-CCNC: 21 U/L (ref 0–50)
ANION GAP SERPL CALCULATED.3IONS-SCNC: 5 MMOL/L (ref 3–14)
AST SERPL W P-5'-P-CCNC: 20 U/L (ref 0–45)
B BURGDOR IGG+IGM SER QL: 0.08 (ref 0–0.89)
BASOPHILS # BLD AUTO: 0 10E9/L (ref 0–0.2)
BASOPHILS NFR BLD AUTO: 0.8 %
BILIRUB SERPL-MCNC: 0.3 MG/DL (ref 0.2–1.3)
BUN SERPL-MCNC: 18 MG/DL (ref 7–30)
CALCIUM SERPL-MCNC: 9.2 MG/DL (ref 8.5–10.1)
CHLORIDE SERPL-SCNC: 104 MMOL/L (ref 94–109)
CO2 SERPL-SCNC: 31 MMOL/L (ref 20–32)
CREAT SERPL-MCNC: 0.66 MG/DL (ref 0.52–1.04)
D DIMER PPP FEU-MCNC: 0.4 UG/ML FEU (ref 0–0.5)
DIFFERENTIAL METHOD BLD: ABNORMAL
EOSINOPHIL # BLD AUTO: 0.5 10E9/L (ref 0–0.7)
EOSINOPHIL NFR BLD AUTO: 15.2 %
ERYTHROCYTE [DISTWIDTH] IN BLOOD BY AUTOMATED COUNT: 13.1 % (ref 10–15)
GFR SERPL CREATININE-BSD FRML MDRD: >90 ML/MIN/{1.73_M2}
GLUCOSE SERPL-MCNC: 94 MG/DL (ref 70–99)
HCT VFR BLD AUTO: 40.7 % (ref 35–47)
HGB BLD-MCNC: 13.5 G/DL (ref 11.7–15.7)
IMM GRANULOCYTES # BLD: 0 10E9/L (ref 0–0.4)
IMM GRANULOCYTES NFR BLD: 1.1 %
LACTATE BLD-SCNC: 1.3 MMOL/L (ref 0.7–2)
LYMPHOCYTES # BLD AUTO: 1.4 10E9/L (ref 0.8–5.3)
LYMPHOCYTES NFR BLD AUTO: 38.3 %
MCH RBC QN AUTO: 31.8 PG (ref 26.5–33)
MCHC RBC AUTO-ENTMCNC: 33.2 G/DL (ref 31.5–36.5)
MCV RBC AUTO: 96 FL (ref 78–100)
MONOCYTES # BLD AUTO: 0.8 10E9/L (ref 0–1.3)
MONOCYTES NFR BLD AUTO: 21.4 %
NEUTROPHILS # BLD AUTO: 0.8 10E9/L (ref 1.6–8.3)
NEUTROPHILS NFR BLD AUTO: 23.2 %
NRBC # BLD AUTO: 0 10*3/UL
NRBC BLD AUTO-RTO: 0 /100
PLATELET # BLD AUTO: 189 10E9/L (ref 150–450)
PLATELET # BLD EST: ABNORMAL 10*3/UL
POTASSIUM SERPL-SCNC: 4.2 MMOL/L (ref 3.4–5.3)
PROT SERPL-MCNC: 7.5 G/DL (ref 6.8–8.8)
RBC # BLD AUTO: 4.25 10E12/L (ref 3.8–5.2)
RBC MORPH BLD: NORMAL
SODIUM SERPL-SCNC: 140 MMOL/L (ref 133–144)
TROPONIN I SERPL-MCNC: <0.015 UG/L (ref 0–0.04)
WBC # BLD AUTO: 3.6 10E9/L (ref 4–11)

## 2021-05-27 PROCEDURE — 85379 FIBRIN DEGRADATION QUANT: CPT | Performed by: FAMILY MEDICINE

## 2021-05-27 PROCEDURE — 84484 ASSAY OF TROPONIN QUANT: CPT | Performed by: FAMILY MEDICINE

## 2021-05-27 PROCEDURE — 80053 COMPREHEN METABOLIC PANEL: CPT | Performed by: FAMILY MEDICINE

## 2021-05-27 PROCEDURE — 93005 ELECTROCARDIOGRAM TRACING: CPT | Performed by: FAMILY MEDICINE

## 2021-05-27 PROCEDURE — 86618 LYME DISEASE ANTIBODY: CPT | Performed by: FAMILY MEDICINE

## 2021-05-27 PROCEDURE — 71046 X-RAY EXAM CHEST 2 VIEWS: CPT

## 2021-05-27 PROCEDURE — 83605 ASSAY OF LACTIC ACID: CPT | Performed by: FAMILY MEDICINE

## 2021-05-27 PROCEDURE — 99285 EMERGENCY DEPT VISIT HI MDM: CPT | Mod: 25 | Performed by: FAMILY MEDICINE

## 2021-05-27 PROCEDURE — 93010 ELECTROCARDIOGRAM REPORT: CPT | Performed by: FAMILY MEDICINE

## 2021-05-27 PROCEDURE — 85025 COMPLETE CBC W/AUTO DIFF WBC: CPT | Performed by: FAMILY MEDICINE

## 2021-05-27 NOTE — ED PROVIDER NOTES
"  History     Chief Complaint   Patient presents with     Chest Pain     HPI  Brissa Mayer is a 66 year old female with a history of hyperlipidemia who presents to the emergency department with a 2-day history of chest pain.  She has had some \"bronchitis\" for the last week or so and has been using her inhaler with good benefit.  She has a little wheezing that resolves when she uses her inhaler.  She does not believe she needs steroids.  She is not short of breath.  No fever or chills.  She did not feel well yesterday so did not work.  Sometime during the day she developed left-sided chest pain and had it all day long and went to bed last night with that and woke up with it this morning.  She called her clinic and was directed to the emergency department.  It does hurt to move her torso bent forward cough or take a deep breath.  She is seen no rash to this area.  She has a history of shingles many years ago but does not believe it is shingles.  She is undergoing a cardiac work-up because she was found to have \"blue toe syndrome\" which were felt to be secondary to ischemic emboli.  She had a Zio patch and a cardiac echo and is seeing cardiology.  She is on an aspirin a day-325.  She had a CTA which showed no vascular disease.  She has no history of atrial fibrillation.    Cardiac echo 5/10/2021  Procedure  Complete Echo Adult.  ______________________________________________________________________________  Interpretation Summary     1. The left ventricle is normal in structure, function and size. The visual  ejection fraction is estimated at 65%.  2. The right ventricle is normal in structure, function and size.  3. No valve disease.     No previous echo for comparison.    Allergies:  Allergies   Allergen Reactions     Compazine      Anxiety     Flagyl [Metronidazole Hcl] Nausea and Vomiting       Problem List:    Patient Active Problem List    Diagnosis Date Noted     Adjustment disorder with mixed anxiety and " depressed mood 04/03/2018     Priority: Medium     Gastroesophageal reflux disease without esophagitis 10/10/2016     Priority: Medium     Hypothyroidism 08/01/2016     Priority: Medium     Major depressive disorder, recurrent episode, mild (HCC) 01/22/2016     Priority: Medium     Generalized anxiety disorder 10/07/2013     Priority: Medium     Hyperlipidemia LDL goal <130 10/23/2012     Priority: Medium     Advanced directives, counseling/discussion 07/26/2011     Priority: Medium     Parent voices understanding and acceptance of this advice and will call back if any further questions or concerns.         CARDIOVASCULAR SCREENING; LDL GOAL LESS THAN 160 10/31/2010     Priority: Medium     Asthma, mild intermittent      Priority: Medium        Past Medical History:    Past Medical History:   Diagnosis Date     Anxiety      Asthma, mild intermittent      Migraines      Tension headaches      Unspecified hypothyroidism        Past Surgical History:    Past Surgical History:   Procedure Laterality Date     BUNIONECTOMY       ESOPHAGOSCOPY, GASTROSCOPY, DUODENOSCOPY (EGD), COMBINED N/A 4/2/2019    Procedure: ESOPHAGOSCOPY, GASTROSCOPY, DUODENOSCOPY (EGD);  Surgeon: Ochoa Cherry DO;  Location: [facility]     OTHER SURGICAL HISTORY  1980    Bunionectomy       Family History:    Family History   Problem Relation Age of Onset     Asthma Mother      Diabetes Mother      Cancer - colorectal Maternal Grandmother      Heart Disease Father         MI at age 55     Prostate Cancer Father      Asthma Father      Asthma Sister      Obesity Sister        Social History:  Marital Status:  Single [1]  Social History     Tobacco Use     Smoking status: Never Smoker     Smokeless tobacco: Never Used   Substance Use Topics     Alcohol use: Yes     Comment: Occasional wine 3X's /week     Drug use: No        Medications:    albuterol (VENTOLIN HFA) 108 (90 Base) MCG/ACT inhaler  aspirin (ASA) 325 MG tablet  cetirizine (ZYRTEC) 10  MG tablet  fluticasone (FLONASE) 50 MCG/ACT nasal spray  fluticasone-salmeterol (ADVAIR DISKUS) 250-50 MCG/DOSE inhaler  ibuprofen (ADVIL/MOTRIN) 200 MG capsule  levothyroxine (SYNTHROID/LEVOTHROID) 50 MCG tablet  PARoxetine (PAXIL-CR) 37.5 MG 24 hr tablet          Review of Systems   All other systems reviewed and are negative.      Physical Exam   BP: (!) 155/92  Pulse: 63  Temp: 98.2  F (36.8  C)  Resp: 18  Weight: 46 kg (101 lb 8 oz)  SpO2: 100 %      Physical Exam  Vitals signs and nursing note reviewed.   Constitutional:       Appearance: She is well-developed.      Comments: Alert pleasant smiling 66-year-old female appears in no acute distress.vs  BP (!) 130/90   Pulse 71   Temp 98.2  F (36.8  C) (Oral)   Resp 12   Wt 46 kg (101 lb 8 oz)   LMP  (LMP Unknown)   SpO2 96%   BMI 18.87 kg/m       HENT:      Head: Normocephalic and atraumatic.      Right Ear: Tympanic membrane, ear canal and external ear normal.      Left Ear: Tympanic membrane, ear canal and external ear normal.      Nose: Nose normal.      Mouth/Throat:      Mouth: Mucous membranes are moist.   Eyes:      Extraocular Movements: Extraocular movements intact.      Pupils: Pupils are equal, round, and reactive to light.   Neck:      Musculoskeletal: Normal range of motion.   Cardiovascular:      Rate and Rhythm: Normal rate.      Pulses: Normal pulses.      Heart sounds: Normal heart sounds.      Comments: Patient has pain with deep inspiration.  No rash to suggest shingles.  No rub on auscultation with deep inspiration.  Pulmonary:      Effort: Pulmonary effort is normal.      Breath sounds: Normal breath sounds.      Comments: Lung sounds are clear to auscultation.  No wheezes.  No rhonchi  Abdominal:      General: Abdomen is flat.      Palpations: Abdomen is soft.      Tenderness: There is no abdominal tenderness.   Musculoskeletal: Normal range of motion.   Skin:     General: Skin is warm and dry.      Capillary Refill: Capillary refill  takes less than 2 seconds.   Neurological:      General: No focal deficit present.      Mental Status: She is alert and oriented to person, place, and time.   Psychiatric:         Mood and Affect: Mood normal.         Behavior: Behavior normal.         ED Course          EKG Interpretation:      Interpreted by Diego Segura MD   Symptoms at time of EKG: Chest pain  Rhythm: Normal sinus   Rate: Normal  Axis: Normal  Ectopy: None  Conduction: Normal  ST Segments/ T Waves: No ST-T wave changes and No acute ischemic changes  Q Waves: None  Comparison to prior: Unchanged from 4/15/2021    Clinical Impression: normal EKG--normal sinus rhythm at a rate of 64 with no acute ST-T changes         Procedures               Critical Care time:  none               Results for orders placed or performed during the hospital encounter of 05/27/21 (from the past 24 hour(s))   CBC with platelets differential   Result Value Ref Range    WBC 3.6 (L) 4.0 - 11.0 10e9/L    RBC Count 4.25 3.8 - 5.2 10e12/L    Hemoglobin 13.5 11.7 - 15.7 g/dL    Hematocrit 40.7 35.0 - 47.0 %    MCV 96 78 - 100 fl    MCH 31.8 26.5 - 33.0 pg    MCHC 33.2 31.5 - 36.5 g/dL    RDW 13.1 10.0 - 15.0 %    Platelet Count 189 150 - 450 10e9/L    Diff Method Automated Method     % Neutrophils 23.2 %    % Lymphocytes 38.3 %    % Monocytes 21.4 %    % Eosinophils 15.2 %    % Basophils 0.8 %    % Immature Granulocytes 1.1 %    Nucleated RBCs 0 0 /100    Absolute Neutrophil 0.8 (L) 1.6 - 8.3 10e9/L    Absolute Lymphocytes 1.4 0.8 - 5.3 10e9/L    Absolute Monocytes 0.8 0.0 - 1.3 10e9/L    Absolute Eosinophils 0.5 0.0 - 0.7 10e9/L    Absolute Basophils 0.0 0.0 - 0.2 10e9/L    Abs Immature Granulocytes 0.0 0 - 0.4 10e9/L    Absolute Nucleated RBC 0.0     RBC Morphology Normal     Platelet Estimate Confirming automated cell count    D dimer quantitative   Result Value Ref Range    D Dimer 0.4 0.0 - 0.50 ug/ml FEU   Comprehensive metabolic panel   Result Value Ref Range     "Sodium 140 133 - 144 mmol/L    Potassium 4.2 3.4 - 5.3 mmol/L    Chloride 104 94 - 109 mmol/L    Carbon Dioxide 31 20 - 32 mmol/L    Anion Gap 5 3 - 14 mmol/L    Glucose 94 70 - 99 mg/dL    Urea Nitrogen 18 7 - 30 mg/dL    Creatinine 0.66 0.52 - 1.04 mg/dL    GFR Estimate >90 >60 mL/min/[1.73_m2]    GFR Estimate If Black >90 >60 mL/min/[1.73_m2]    Calcium 9.2 8.5 - 10.1 mg/dL    Bilirubin Total 0.3 0.2 - 1.3 mg/dL    Albumin 4.1 3.4 - 5.0 g/dL    Protein Total 7.5 6.8 - 8.8 g/dL    Alkaline Phosphatase 49 40 - 150 U/L    ALT 21 0 - 50 U/L    AST 20 0 - 45 U/L   Lactic acid whole blood   Result Value Ref Range    Lactic Acid 1.3 0.7 - 2.0 mmol/L   Troponin I   Result Value Ref Range    Troponin I ES <0.015 0.000 - 0.045 ug/L   XR Chest 2 Views    Narrative    CHEST TWO VIEWS 5/27/2021 10:58 AM     HISTORY: Chest pain.    COMPARISON: May 18, 2020       Impression    IMPRESSION:  There are no acute infiltrates. The cardiac silhouette is  not enlarged. Pulmonary vasculature is unremarkable.     CARSON HOLCOMB MD       Medications - No data to display    Assessments & Plan (with Medical Decision Making)   [facility]--66-year-old female with a 2-day history of left-sided chest pain.  She has been coughing for 1 to 2 weeks and states it is her \"bronchitis\".  The pain is constant, left-sided and not associate with any shortness of breath nausea or sweating.  Despite this constant pain her EKG and troponin are normal.  Chest x-ray is clear.  She is afebrile, normal white count, normal lactic acid and lungs are clear to auscultation.  Her D-dimer is normal.  I suspect this is chest wall pain.  It is reproducible.  I reassured her in regards to her cardiac.  She is currently undergoing a cardiac/vascular work-up because of blue toe syndrome.  She has had a CT angio, cardiac echo and had a Zio patch.  CT angio and cardiac echo were unremarkable and Zio patch is pending.  She is in a normal sinus on her EKG here and on the cardiac " monitor.  She is on an adult aspirin a day.  Source of her emboli is unknown.  I do not believe her chest pain is cardiac or pulmonary in etiology.  Patient is on her inhaler.  She is not wheezing at this time.  I do not believe steroids were indicated and she agrees.  No antibiotics indicated at this time.  She is comfortable with this evaluation treatment and discharge plan and will be following up with cardiology as already planned to review her ongoing work-up.  At this time her feet look good.  No evidence of any emboli.  Circulation looks excellent.  Pedal pulses are strong and immediate capillary refill.  No evidence of any acute emboli in her feet.  I have reviewed the nursing notes.    I have reviewed the findings, diagnosis, plan and need for follow up with the patient.       Discharge Medication List as of 5/27/2021 11:30 AM          Final diagnoses:   Chest wall pain   Bronchitis   Atypical chest pain   Mild intermittent asthma without complication   Hypothyroidism due to acquired atrophy of thyroid   Hyperlipidemia LDL goal <130       5/27/2021   [facility] EMERGENCY DEPT     Imelda, Diego MCGRATH MD  05/27/21 [id number]

## 2021-05-27 NOTE — ED TRIAGE NOTES
"Pt stated \"I have been having this pain right here (pt pointing to left chest and under breast). I have had an halter monitor and I haven't received the results yet. I was also nauseated last night and throwing up bile. I am also having some stomach pain.\"     prescribed meds taken this AM  "

## 2021-05-27 NOTE — DISCHARGE INSTRUCTIONS
Your test today show no evidence of heart or lung disease.  Your EKG troponin and D-dimer are all normal.  The rest of your labs are normal.  I suspect the pain is from your coughing.  Your lungs are clear at this time and your chest x-ray is normal.  No steroids or antibiotics at this time.  Continue your inhaler.  Reevaluate if you develop fever or your cough becomes productive of colored or bloody sputum, or you develop shortness of breath.  Follow-up with your surgeon/primary care provider and cardiologist as planned in regards to your toes.

## 2021-05-27 NOTE — TELEPHONE ENCOUNTER
Throbbing aching in left side of chest since yesterday.  She also states she was sweating last night.    Per protocol she was advised to call 911.  She does not feel it is that bad and will drive herself.    She was advised to have someone else drive her.  She states she will have her sister come with her, and the patient will drive.    Luz Martínez RN on 5/27/2021 at 9:45 AM        Reason for Disposition    Chest pain lasting longer than 5 minutes and ANY of the following:* Over 45 years old* Over 30 years old and at least one cardiac risk factor (e.g., diabetes, high blood pressure, high cholesterol, smoker, or strong family history of heart disease)* History of heart disease (i.e., angina, heart attack, heart failure, bypass surgery, takes nitroglycerin)* Pain is crushing, pressure-like, or heavy    Additional Information    Negative: Severe difficulty breathing (e.g., struggling for each breath, speaks in single words)    Negative: Passed out (i.e., fainted, collapsed and was not responding)    Negative: Difficult to awaken or acting confused (e.g., disoriented, slurred speech)    Negative: Shock suspected (e.g., cold/pale/clammy skin, too weak to stand, low BP, rapid pulse)    Protocols used: CHEST PAIN-A-OH

## 2021-05-28 NOTE — RESULT ENCOUNTER NOTE
Final result for Lyme Disease Cassie with reflex to WB Serum is NEGATIVE.    No change in treatment per [facility] ED Lab Result Lyme Disease protocol.

## 2021-06-03 DIAGNOSIS — E03.4 HYPOTHYROIDISM DUE TO ACQUIRED ATROPHY OF THYROID: ICD-10-CM

## 2021-06-03 RX ORDER — LEVOTHYROXINE SODIUM 50 UG/1
TABLET ORAL
Qty: 90 TABLET | Refills: 3 | Status: SHIPPED | OUTPATIENT
Start: 2021-06-03 | End: 2022-06-08

## 2021-06-03 NOTE — TELEPHONE ENCOUNTER
Prescription approved per [facility] Refill Protocol.    Shawnee Tolentino RN

## 2021-06-14 DIAGNOSIS — F43.23 ADJUSTMENT DISORDER WITH MIXED ANXIETY AND DEPRESSED MOOD: ICD-10-CM

## 2021-06-16 ENCOUNTER — OFFICE VISIT (OUTPATIENT)
Dept: CARDIOLOGY | Facility: CLINIC | Age: 66
End: 2021-06-16
Attending: INTERNAL MEDICINE
Payer: COMMERCIAL

## 2021-06-16 VITALS
HEART RATE: 72 BPM | DIASTOLIC BLOOD PRESSURE: 72 MMHG | SYSTOLIC BLOOD PRESSURE: 116 MMHG | BODY MASS INDEX: 18.35 KG/M2 | WEIGHT: 99.7 LBS | OXYGEN SATURATION: 98 % | HEIGHT: 62 IN

## 2021-06-16 DIAGNOSIS — R01.1 MURMUR, CARDIAC: ICD-10-CM

## 2021-06-16 DIAGNOSIS — I75.023 BLUE TOE SYNDROME OF BOTH LOWER EXTREMITIES (H): Primary | ICD-10-CM

## 2021-06-16 PROCEDURE — 99213 OFFICE O/P EST LOW 20 MIN: CPT | Performed by: INTERNAL MEDICINE

## 2021-06-16 RX ORDER — PAROXETINE HYDROCHLORIDE HEMIHYDRATE 37.5 MG/1
TABLET, FILM COATED, EXTENDED RELEASE ORAL
Qty: 90 TABLET | Refills: 0 | Status: SHIPPED | OUTPATIENT
Start: 2021-06-16 | End: 2021-09-20

## 2021-06-16 ASSESSMENT — MIFFLIN-ST. JEOR: SCORE: 927.55

## 2021-06-16 NOTE — TELEPHONE ENCOUNTER
"Prescription approved per RN refill protocol.    paxil  Last Written Prescription Date:  3/17/2021  Last Fill Quantity: 90,  # refills: 0   Last office visit: 3/19/2021 with prescribing provider:  priya WINN 12/4/2019 7/29/2020 3/19/2021   PHQ-9 Total Score 11 0 3   Q9: Thoughts of better off dead/self-harm past 2 weeks Not at all Not at all Not at all     Requested Prescriptions   Pending Prescriptions Disp Refills     PARoxetine (PAXIL-CR) 37.5 MG 24 hr tablet [Pharmacy Med Name: PAROXETINE HCL ER 37.5MG TB24] 90 tablet 0     Sig: TAKE ONE TABLET BY MOUTH EVERY MORNING       SSR[facility] Protocol Passed - 6/14/2021  5:58 AM        Passed - PHQ-9 score less than 5 in past 6 months     Please review last PHQ-9 score.           Passed - Medication is active on med list        Passed - Patient is age 18 or older        Passed - No active pregnancy on record        Passed - No positive pregnancy test in last 12 months        Passed - Recent (6 mo) or future (30 days) visit within the authorizing provider's specialty     Patient had office visit in the last 6 months or has a visit in the next 30 days with authorizing provider or within the authorizing provider's specialty.  See \"Patient Info\" tab in inbasket, or \"Choose Columns\" in Meds & Orders section of the refill encounter.               Trisha Graves RN on 6/16/2021 at 9:16 AM    "

## 2021-06-16 NOTE — PATIENT INSTRUCTIONS
1. Arterial duplex ultrasound and PPGs of legs  2. Follow up 6 months  3. Continue 325 mg aspirin   4. Cholesterol to be addressed by PCP, recommend statin

## 2021-06-16 NOTE — PROGRESS NOTES
HPI: This is a pleasant 66 year old with no significant PMH here for follow up evaluation of blue toe syndrome.    From prior visit: she is reporting that both right foot and left foot with dark blue and black changes. She saw vascular who noted a murmur and also some chest pain episode so referred to cardiology. Has h/o murmur in the past and echocardiogram a few years ago that was unremarkable.     Thinks she may have had COVID last year. She hasn't felt well since then. Breathing is with ongoing difficulty and she is using inhaler more often. Weight loss of 35 lbs unintentional, poor appetite, despite being normal weight at baseline. Having a lot of night sweats. No ongoing fevers/chills.      Started aspirin 325 mg daily with some resolution but still not completely normalized.     Had echocardiogram which was overall normal (though limited in evaluation for intracardiac source). Ziopatch was normal and no AFIB over two weeks.      ASSESSMENT/PLAN:     In summary this is a very pleasant 66 year old with diffuse blue toe syndrome. Based on her physical exam I agree with primary referral in that this is appearing like a distal embolic event. Differential includes atrial fibrillation, proximal emboli (however CT chest/abdomen/pelvis angiogram negative for large vessel proximal source). Intracardiac source (valvular disease) is possible but rare in absence of atrial fibrillation. Agree with aspirin for now. If she should worsen, get ulcerations, worsening gangrene, then would suggest a short trial of anticoagulation therapy.     Will obtain arterial duplex ultrasound and leg PPGs as additional work up for her symptoms. Plan to follow up with PCP for cholesterol. Recommend possible statin and weight loss evaluation.      Will see back in 6 months.    Hattie Canas MD MSc  [facility] [place]     PAST MEDICAL HISTORY  Past Medical History:   Diagnosis Date     Anxiety      Asthma, mild intermittent       Migraines      Tension headaches      Unspecified hypothyroidism        CURRENT MEDICATIONS  Current Outpatient Medications   Medication Sig Dispense Refill     albuterol (VENTOLIN HFA) 108 (90 Base) MCG/ACT inhaler INHALE TWO PUFFS BY MOUTH EVERY 6 HOURS AS NEEDED FOR SHORTNESS OF BREATH/DYSPNEA 18 g 3     aspirin (ASA) 325 MG tablet Take 1 tablet (325 mg) by mouth daily       cetirizine (ZYRTEC) 10 MG tablet Take 1 tablet (10 mg) by mouth every evening 30 tablet 1     fluticasone (FLONASE) 50 MCG/ACT nasal spray Spray 2 sprays into both nostrils daily 1 Package 1     fluticasone-salmeterol (ADVAIR DISKUS) 250-50 MCG/DOSE inhaler INHALE ONE PUFF BY MOUTH TWICE A DAY 3 Inhaler 3     ibuprofen (ADVIL/MOTRIN) 200 MG capsule Take 400 mg by mouth as needed for fever       levothyroxine (SYNTHROID/LEVOTHROID) 50 MCG tablet TAKE ONE TABLET BY MOUTH ONCE DAILY 90 tablet 3     PARoxetine (PAXIL-CR) 37.5 MG 24 hr tablet TAKE ONE TABLET BY MOUTH EVERY MORNING 90 tablet 0       PAST SURGICAL HISTORY:  Past Surgical History:   Procedure Laterality Date     BUNIONECTOMY       ESOPHAGOSCOPY, GASTROSCOPY, DUODENOSCOPY (EGD), COMBINED N/A 4/2/2019    Procedure: ESOPHAGOSCOPY, GASTROSCOPY, DUODENOSCOPY (EGD);  Surgeon: Ochoa Cherry DO;  Location:  GI     OTHER SURGICAL HISTORY  1980    Bunionectomy       ALLERGIES     Allergies   Allergen Reactions     Compazine      Anxiety     Flagyl [Metronidazole Hcl] Nausea and Vomiting       FAMILY HISTORY  Family History   Problem Relation Age of Onset     Asthma Mother      Diabetes Mother      Cancer - colorectal Maternal Grandmother      Heart Disease Father         MI at age 55     Prostate Cancer Father      Asthma Father      Asthma Sister      Obesity Sister          SOCIAL HISTORY  Social History     Socioeconomic History     Marital status: Single     Spouse name: Not on file     Number of children: Not on file     Years of education: Not on file     Highest education  "level: Not on file   Occupational History     Not on file   Social Needs     Financial resource strain: Not on file     Food insecurity     Worry: Not on file     Inability: Not on file     Transportation needs     Medical: Not on file     Non-medical: Not on file   Tobacco Use     Smoking status: Never Smoker     Smokeless tobacco: Never Used   Substance and Sexual Activity     Alcohol use: Yes     Comment: Occasional wine 3X's /week     Drug use: No     Sexual activity: Not Currently   Lifestyle     Physical activity     Days per week: Not on file     Minutes per session: Not on file     Stress: Not on file   Relationships     Social connections     Talks on phone: Not on file     Gets together: Not on file     Attends Hinduism service: Not on file     Active member of club or organization: Not on file     Attends meetings of clubs or organizations: Not on file     Relationship status: Not on file     Intimate partner violence     Fear of current or ex partner: Not on file     Emotionally abused: Not on file     Physically abused: Not on file     Forced sexual activity: Not on file   Other Topics Concern     Parent/sibling w/ CABG, MI or angioplasty before 65F 55M? No   Social History Narrative     Not on file       EXAM:  /72 (BP Location: Right arm, Patient Position: Sitting, Cuff Size: Adult Regular)   Pulse 72   Ht 1.562 m (5' 1.5\")   Wt 45.2 kg (99 lb 11.2 oz)   LMP  (LMP Unknown)   SpO2 98%   BMI 18.53 kg/m    In general, the patient is a pleasant female in no apparent distress.    HEENT: NC/AT.  PERRLA.  EOMI.  Sclerae white, not injected.    Neck: No adenopathy.  No thyromegaly. Carotids +2/2 bilaterally without bruits.  No jugular venous distension.   Heart: RRR. Normal S1, S2 splits physiologically. No murmur, rub, click, or gallop.   Lungs: CTA.  No ronchi, wheezes, rales.  Abdomen: Soft, nontender, nondistended.  Extremities: No clubbing, cyanosis  Vascular: No bruits are " noted.    Labs:  LIPID RESULTS:  Lab Results   Component Value Date    CHOL 256 (H) 03/19/2021     03/19/2021     (H) 03/19/2021    TRIG 56 03/19/2021    CHOLHDLRATIO 2.3 05/11/2015    NHDL 148 (H) 03/19/2021       LIVER ENZYME RESULTS:  Lab Results   Component Value Date    AST 20 05/27/2021    ALT 21 05/27/2021       CBC RESULTS:  Lab Results   Component Value Date    WBC 3.6 (L) 05/27/2021    RBC 4.25 05/27/2021    HGB 13.5 05/27/2021    HCT 40.7 05/27/2021    MCV 96 05/27/2021    MCH 31.8 05/27/2021    MCHC 33.2 05/27/2021    RDW 13.1 05/27/2021     05/27/2021       BMP RESULTS:  Lab Results   Component Value Date     05/27/2021    POTASSIUM 4.2 05/27/2021    CHLORIDE 104 05/27/2021    CO2 31 05/27/2021    ANIONGAP 5 05/27/2021    GLC 94 05/27/2021    BUN 18 05/27/2021    CR 0.66 05/27/2021    GFRESTIMATED >90 05/27/2021    GFRESTBLACK >90 05/27/2021    NAY 9.2 05/27/2021        A1C RESULTS:  No results found for: A1C

## 2021-06-16 NOTE — LETTER
6/16/2021    Fuentes Darby MD  [address] [facility] [address]    RE: Brissa NATE Mayer       Dear Colleague,    I had the pleasure of seeing Brissa Mayer in the [facility].        HPI: This is a pleasant 66 year old with no significant PMH here for follow up evaluation of blue toe syndrome.    From prior visit: she is reporting that both right foot and left foot with dark blue and black changes. She saw vascular who noted a murmur and also some chest pain episode so referred to cardiology. Has h/o murmur in the past and echocardiogram a few years ago that was unremarkable.     Thinks she may have had COVID last year. She hasn't felt well since then. Breathing is with ongoing difficulty and she is using inhaler more often. Weight loss of 35 lbs unintentional, poor appetite, despite being normal weight at baseline. Having a lot of night sweats. No ongoing fevers/chills.      Started aspirin 325 mg daily with some resolution but still not completely normalized.     Had echocardiogram which was overall normal (though limited in evaluation for intracardiac source). Ziopatch was normal and no AFIB over two weeks.      ASSESSMENT/PLAN:     In summary this is a very pleasant 66 year old with diffuse blue toe syndrome. Based on her physical exam I agree with primary referral in that this is appearing like a distal embolic event. Differential includes atrial fibrillation, proximal emboli (however CT chest/abdomen/pelvis angiogram negative for large vessel proximal source). Intracardiac source (valvular disease) is possible but rare in absence of atrial fibrillation. Agree with aspirin for now. If she should worsen, get ulcerations, worsening gangrene, then would suggest a short trial of anticoagulation therapy.     Will obtain arterial duplex ultrasound and leg PPGs as additional work up for her symptoms. Plan to follow up with PCP for cholesterol. Recommend  possible statin and weight loss evaluation.      Will see back in 6 months.    Hattie Canas MD MSc  [facility]     PAST MEDICAL HISTORY  Past Medical History:   Diagnosis Date     Anxiety      Asthma, mild intermittent      Migraines      Tension headaches      Unspecified hypothyroidism        CURRENT MEDICATIONS  Current Outpatient Medications   Medication Sig Dispense Refill     albuterol (VENTOLIN HFA) 108 (90 Base) MCG/ACT inhaler INHALE TWO PUFFS BY MOUTH EVERY 6 HOURS AS NEEDED FOR SHORTNESS OF BREATH/DYSPNEA 18 g 3     aspirin (ASA) 325 MG tablet Take 1 tablet (325 mg) by mouth daily       cetirizine (ZYRTEC) 10 MG tablet Take 1 tablet (10 mg) by mouth every evening 30 tablet 1     fluticasone (FLONASE) 50 MCG/ACT nasal spray Spray 2 sprays into both nostrils daily 1 Package 1     fluticasone-salmeterol (ADVAIR DISKUS) 250-50 MCG/DOSE inhaler INHALE ONE PUFF BY MOUTH TWICE A DAY 3 Inhaler 3     ibuprofen (ADVIL/MOTRIN) 200 MG capsule Take 400 mg by mouth as needed for fever       levothyroxine (SYNTHROID/LEVOTHROID) 50 MCG tablet TAKE ONE TABLET BY MOUTH ONCE DAILY 90 tablet 3     PARoxetine (PAXIL-CR) 37.5 MG 24 hr tablet TAKE ONE TABLET BY MOUTH EVERY MORNING 90 tablet 0       PAST SURGICAL HISTORY:  Past Surgical History:   Procedure Laterality Date     BUNIONECTOMY       ESOPHAGOSCOPY, GASTROSCOPY, DUODENOSCOPY (EGD), COMBINED N/A 4/2/2019    Procedure: ESOPHAGOSCOPY, GASTROSCOPY, DUODENOSCOPY (EGD);  Surgeon: Ochoa Cherry DO;  Location:  GI     OTHER SURGICAL HISTORY  1980    Bunionectomy       ALLERGIES     Allergies   Allergen Reactions     Compazine      Anxiety     Flagyl [Metronidazole Hcl] Nausea and Vomiting       FAMILY HISTORY  Family History   Problem Relation Age of Onset     Asthma Mother      Diabetes Mother      Cancer - colorectal Maternal Grandmother      Heart Disease Father         MI at age 55     Prostate Cancer Father      Asthma Father      Asthma Sister   "    Obesity Sister          SOCIAL HISTORY  Social History     Socioeconomic History     Marital status: Single     Spouse name: Not on file     Number of children: Not on file     Years of education: Not on file     Highest education level: Not on file   Occupational History     Not on file   Social Needs     Financial resource strain: Not on file     Food insecurity     Worry: Not on file     Inability: Not on file     Transportation needs     Medical: Not on file     Non-medical: Not on file   Tobacco Use     Smoking status: Never Smoker     Smokeless tobacco: Never Used   Substance and Sexual Activity     Alcohol use: Yes     Comment: Occasional wine 3X's /week     Drug use: No     Sexual activity: Not Currently   Lifestyle     Physical activity     Days per week: Not on file     Minutes per session: Not on file     Stress: Not on file   Relationships     Social connections     Talks on phone: Not on file     Gets together: Not on file     Attends Holiness service: Not on file     Active member of club or organization: Not on file     Attends meetings of clubs or organizations: Not on file     Relationship status: Not on file     Intimate partner violence     Fear of current or ex partner: Not on file     Emotionally abused: Not on file     Physically abused: Not on file     Forced sexual activity: Not on file   Other Topics Concern     Parent/sibling w/ CABG, MI or angioplasty before 65F 55M? No   Social History Narrative     Not on file       EXAM:  /72 (BP Location: Right arm, Patient Position: Sitting, Cuff Size: Adult Regular)   Pulse 72   Ht 1.562 m (5' 1.5\")   Wt 45.2 kg (99 lb 11.2 oz)   LMP  (LMP Unknown)   SpO2 98%   BMI 18.53 kg/m    In general, the patient is a pleasant female in no apparent distress.    HEENT: NC/AT.  PERRLA.  EOMI.  Sclerae white, not injected.    Neck: No adenopathy.  No thyromegaly. Carotids +2/2 bilaterally without bruits.  No jugular venous distension.   Heart: " RRR. Normal S1, S2 splits physiologically. No murmur, rub, click, or gallop.   Lungs: CTA.  No ronchi, wheezes, rales.  Abdomen: Soft, nontender, nondistended.  Extremities: No clubbing, cyanosis  Vascular: No bruits are noted.    Labs:  LIPID RESULTS:  Lab Results   Component Value Date    CHOL 256 (H) 03/19/2021     03/19/2021     (H) 03/19/2021    TRIG 56 03/19/2021    CHOLHDLRATIO 2.3 05/11/2015    NHDL 148 (H) 03/19/2021       LIVER ENZYME RESULTS:  Lab Results   Component Value Date    AST 20 05/27/2021    ALT 21 05/27/2021       CBC RESULTS:  Lab Results   Component Value Date    WBC 3.6 (L) 05/27/2021    RBC 4.25 05/27/2021    HGB 13.5 05/27/2021    HCT 40.7 05/27/2021    MCV 96 05/27/2021    MCH 31.8 05/27/2021    MCHC 33.2 05/27/2021    RDW 13.1 05/27/2021     05/27/2021       BMP RESULTS:  Lab Results   Component Value Date     05/27/2021    POTASSIUM 4.2 05/27/2021    CHLORIDE 104 05/27/2021    CO2 31 05/27/2021    ANIONGAP 5 05/27/2021    GLC 94 05/27/2021    BUN 18 05/27/2021    CR 0.66 05/27/2021    GFRESTIMATED >90 05/27/2021    GFRESTBLACK >90 05/27/2021    NAY 9.2 05/27/2021        A1C RESULTS:  No results found for: A1C                Thank you for allowing me to participate in the care of your patient.      Sincerely,     Hattie Canas MD     [facility]  cc:   Hattie Canas MD  [address] [place] [id number]

## 2021-06-20 ENCOUNTER — MYC MEDICAL ADVICE (OUTPATIENT)
Dept: INTERNAL MEDICINE | Facility: CLINIC | Age: 66
End: 2021-06-20

## 2021-06-21 ENCOUNTER — NURSE TRIAGE (OUTPATIENT)
Dept: INTERNAL MEDICINE | Facility: CLINIC | Age: 66
End: 2021-06-21

## 2021-06-21 NOTE — TELEPHONE ENCOUNTER
"  No available appointment for face to face assessment at this time in [place], [place].  Advised patient to seek urgent care for face to face assessment.  Patient stated she is working at this time in [place], [place] and will seek urgent care.  Reason for Disposition    Looks like a boil, infected sore, deep ulcer, or other infected rash (spreading redness, pus)    Additional Information    Negative: Shock suspected (e.g., cold/pale/clammy skin, too weak to stand, low BP, rapid pulse)    Negative: Similar pain previously and it was from 'heart attack'    Negative: Similar pain previously from 'angina' and not relieved by nitroglycerin    Negative: Sounds like a life-threatening emergency to the triager    Negative: Followed an injury to arm    Negative: Chest pain    Negative: Wound looks infected    Negative: Elbow pain is the main symptom    Negative: Hand or wrist pain is the main symptom    Negative: Difficulty breathing or unusual sweating (e.g., sweating without exertion)    Negative: Chest pain lasting longer than 5 minutes    Negative: Age > 40 and no obvious cause for pain, pain still present even when not moving the arm    Negative: Fever and red area (or area very tender to touch)    Negative: Fever and swollen joint    Negative: Entire arm is swollen    Negative: Patient sounds very sick or weak to the triager    Negative: SEVERE pain (e.g., excruciating, unable to do any normal activities)    Negative: Red area or streak and large (> 2 in. or 5 cm)    Negative: Cast on wrist or arm and now increasing pain    Negative: Weakness (i.e., loss of strength) in hand or fingers    Negative: Arm pains with exertion (e.g., occurs with walking; goes away on resting)    Answer Assessment - Initial Assessment Questions  1. ONSET: \"When did the pain start?\"      No    2. LOCATION: \"Where is the pain located?\"       No pain    3. PAIN: \"How bad is the pain?\" (Scale 1-10; or mild, moderate, severe)    - MILD (1-3): doesn't " "interfere with normal activities    - MODERATE (4-7): interferes with normal activities (e.g., work or school) or awakens from sleep    - SEVERE (8-10): excruciating pain, unable to do any normal activities, unable to hold a cup of water      None    4. WORK OR EXERCISE: \"Has there been any recent work or exercise that involved this part of the body?\"      No    5. CAUSE: \"What do you think is causing the arm pain?\"      Bite from bug    6. OTHER SYMPTOMS: \"Do you have any other symptoms?\" (e.g., neck pain, swelling, rash, fever, numbness, weakness)      Friday afternoon, was outside, upper right arm got red, then blackish and has hard lump inside    7. PREGNANCY: \"Is there any chance you are pregnant?\" \"When was your last menstrual period?\"      NA    Protocols used: ARM PAIN-A-OH  Carol Ann Temple RN    "

## 2021-07-13 ENCOUNTER — OFFICE VISIT (OUTPATIENT)
Dept: INTERNAL MEDICINE | Facility: CLINIC | Age: 66
End: 2021-07-13
Payer: COMMERCIAL

## 2021-07-13 VITALS
RESPIRATION RATE: 16 BRPM | SYSTOLIC BLOOD PRESSURE: 112 MMHG | DIASTOLIC BLOOD PRESSURE: 64 MMHG | TEMPERATURE: 97.7 F | HEART RATE: 72 BPM | WEIGHT: 100 LBS | OXYGEN SATURATION: 99 % | BODY MASS INDEX: 18.59 KG/M2

## 2021-07-13 DIAGNOSIS — E78.5 HYPERLIPIDEMIA LDL GOAL <130: ICD-10-CM

## 2021-07-13 DIAGNOSIS — S40.021A TRAUMATIC ECCHYMOSIS OF RIGHT UPPER ARM, INITIAL ENCOUNTER: ICD-10-CM

## 2021-07-13 DIAGNOSIS — F33.0 MAJOR DEPRESSIVE DISORDER, RECURRENT EPISODE, MILD (H): ICD-10-CM

## 2021-07-13 DIAGNOSIS — R23.0 BLUE TOES: Primary | ICD-10-CM

## 2021-07-13 PROCEDURE — 99214 OFFICE O/P EST MOD 30 MIN: CPT | Performed by: INTERNAL MEDICINE

## 2021-07-13 RX ORDER — ATORVASTATIN CALCIUM 20 MG/1
20 TABLET, FILM COATED ORAL DAILY
Qty: 90 TABLET | Refills: 3 | Status: SHIPPED | OUTPATIENT
Start: 2021-07-13 | End: 2022-07-13

## 2021-07-13 ASSESSMENT — PAIN SCALES - GENERAL: PAINLEVEL: NO PAIN (0)

## 2021-07-13 NOTE — PROGRESS NOTES
Assessment & Plan     Blue toes  Blue toe syndrome followed with cardiology negative work-up so far no embolic source.  Supposed to be getting ultrasounds done arterial and venous [place].  Continue her full dose aspirin, blood pressure treatment and will add cholesterol treatment.    Hyperlipidemia LDL goal <130  Hyperlipidemia with a cholesterol of 256, LDL of 136.  If we consider this vascular disease we will therefore treat her we will start her on atorvastatin 20 mg daily and recheck her labs in 8 weeks.  - atorvastatin (LIPITOR) 20 MG tablet; Take 1 tablet (20 mg) by mouth daily  - **Hepatic panel FUTURE 2mo; Future  - Lipid panel reflex to direct LDL Fasting; Future    Major depressive disorder, recurrent episode, mild (H)  Major depression is still present with her sister's health issues and some arguing and stress there.  She continues on Paxil without any med changes.    Traumatic ecchymosis of right upper arm, initial encounter  Traumatic ecchymosis of the right upper arm is appears just to be a bruise probably had a hematoma there that is resolving.                   No follow-ups on file.    Fuentes Darby MD  [facility]   TOBY is a 66 year old who presents for the following health issues     HPI     Chief Complaint   Patient presents with     Results     discuss lipids per cardiologist     Patient has blue toe syndrome she is seeing cardiology and vascular she needs to have a couple more ultrasounds.  She has had a negative work-up for embolic disease with a negative echo, negative for A. fib, negative CT scan.  She does have some high cholesterol but has never had vascular disease other than this now.  Her depression is stable on Paxil but she does have a lot of stress from her sister and her health issues    Her weight has been stable more recently but still low.    Past Medical History:    Diagnosis Date     Anxiety      Asthma, mild intermittent      Migraines      Tension headaches      Unspecified hypothyroidism      Current Outpatient Medications   Medication     albuterol (VENTOLIN HFA) 108 (90 Base) MCG/ACT inhaler     aspirin (ASA) 325 MG tablet     atorvastatin (LIPITOR) 20 MG tablet     fluticasone (FLONASE) 50 MCG/ACT nasal spray     fluticasone-salmeterol (ADVAIR DISKUS) 250-50 MCG/DOSE inhaler     ibuprofen (ADVIL/MOTRIN) 200 MG capsule     levothyroxine (SYNTHROID/LEVOTHROID) 50 MCG tablet     PARoxetine (PAXIL-CR) 37.5 MG 24 hr tablet     cetirizine (ZYRTEC) 10 MG tablet     No current facility-administered medications for this visit.     Social History     Tobacco Use     Smoking status: Never Smoker     Smokeless tobacco: Never Used   Substance Use Topics     Alcohol use: Yes     Comment: Occasional wine 3X's /week     Drug use: No           Review of Systems         Objective    /64   Pulse 72   Temp 97.7  F (36.5  C) (Temporal)   Resp 16   Wt 45.4 kg (100 lb)   LMP  (LMP Unknown)   SpO2 99%   BMI 18.59 kg/m    Body mass index is 18.59 kg/m .  Physical Exam   No acute distress  Heart is regular  Extremities have no edema positive pulses her toes bilaterally are cool and dusky.  Right arm has a lesion which appears to be a bruise.

## 2021-07-18 DIAGNOSIS — J45.20 MILD INTERMITTENT ASTHMA WITHOUT COMPLICATION: ICD-10-CM

## 2021-07-20 NOTE — TELEPHONE ENCOUNTER
"Requested Prescriptions   Pending Prescriptions Disp Refills    ADVAIR DISKUS 250-50 MCG/DOSE inhaler [Pharmacy Med Name: ADVAIR DISKUS 250-50MCG/DOSE AEPB] 180 each 3     Sig: INHALE ONE PUFF BY MOUTH TWICE A DAY        Inhaled Steroids Protocol Failed - 7/18/2021 10:49 AM        Failed - Asthma control assessment score within normal limits in last 6 months     Please review ACT score.           Passed - Patient is age 12 or older        Passed - Medication is active on med list        Passed - Recent (6 mo) or future (30 days) visit within the authorizing provider's specialty     Patient had office visit in the last 6 months or has a visit in the next 30 days with authorizing provider or within the authorizing provider's specialty.  See \"Patient Info\" tab in inbasket, or \"Choose Columns\" in Meds & Orders section of the refill encounter.           Long-Acting Beta Agonist Inhalers Protocol  Failed - 7/18/2021 10:49 AM        Failed - Asthma control assessment score within normal limits in last 6 months     Please review ACT score.           Passed - Patient is age 12 or older        Passed - Order for Serevent, Striverdi, or Foradil and pt has steroid inhaler        Passed - Medication is active on med list        Passed - Recent (6 mo) or future (30 days) visit within the authorizing provider's specialty     Patient had office visit in the last 6 months or has a visit in the next 30 days with authorizing provider or within the authorizing provider's specialty.  See \"Patient Info\" tab in inbasket, or \"Choose Columns\" in Meds & Orders section of the refill encounter.                Last Written Prescription Date:  6/19/2020  Last Fill Quantity: 3 inhalers,  # refills: 3   Last office visit: 7/13/2021 with prescribing provider:  Wilner   Future Office Visit:      ACT Total Scores 5/10/2019 11/21/2019 3/19/2021   ACT TOTAL SCORE - - -   ASTHMA ER VISITS - - -   ASTHMA HOSPITALIZATIONS - - -   ACT TOTAL SCORE (Goal " Greater than or Equal to 20) 25 19 12   In the past 12 months, how many times did you visit the emergency room for your asthma without being admitted to the hospital? 0 0 0   In the past 12 months, how many times were you hospitalized overnight because of your asthma? 0 0 0           Routing refill request to provider for review/approval because:  ACT less than 20        Maria Del Carmen Gunter RN  [facility]

## 2021-07-30 ENCOUNTER — HOSPITAL ENCOUNTER (OUTPATIENT)
Dept: ULTRASOUND IMAGING | Facility: CLINIC | Age: 66
End: 2021-07-30
Attending: INTERNAL MEDICINE
Payer: COMMERCIAL

## 2021-07-30 DIAGNOSIS — I75.023 BLUE TOE SYNDROME OF BOTH LOWER EXTREMITIES (H): ICD-10-CM

## 2021-07-30 PROCEDURE — 93925 LOWER EXTREMITY STUDY: CPT

## 2021-07-30 PROCEDURE — 93922 UPR/L XTREMITY ART 2 LEVELS: CPT | Mod: 26 | Performed by: INTERNAL MEDICINE

## 2021-07-30 PROCEDURE — 93922 UPR/L XTREMITY ART 2 LEVELS: CPT

## 2021-07-30 PROCEDURE — 93925 LOWER EXTREMITY STUDY: CPT | Mod: 26 | Performed by: INTERNAL MEDICINE

## 2021-08-03 ENCOUNTER — CARE COORDINATION (OUTPATIENT)
Dept: CARDIOLOGY | Facility: CLINIC | Age: 66
End: 2021-08-03

## 2021-08-03 DIAGNOSIS — I75.023 BLUE TOE SYNDROME OF BOTH LOWER EXTREMITIES (H): Primary | ICD-10-CM

## 2021-08-03 NOTE — PROGRESS NOTES
LE PPG US 7/30/21:  Right foot:  First toe: Present, Normal  Second toe: Present, Moderately abnormal  Third toe: Present, Severely abnormal  Fourth toe: Present, Mildly abnormal  Fifth toe: Present, Moderately abnormal     Left foot:  First toe: Present, Moderately abnormal  Second toe: Present, Moderately abnormal  Third toe: Present, Severely abnormal  Fourth toe: Present, Severely abnormal  Fifth toe: Present, Moderate-severely abnormal                                                                      Impression:  1. Abnormal PPG waveforms in Right lower extremity digits 2-5, and  Left lower extremity digits 1-5.         Last OV 6/16/21 w/ Dr. Canas:  ASSESSMENT/PLAN:     In summary this is a very pleasant 66 year old with diffuse blue toe syndrome. Based on her physical exam I agree with primary referral in that this is appearing like a distal embolic event. Differential includes atrial fibrillation, proximal emboli (however CT chest/abdomen/pelvis angiogram negative for large vessel proximal source). Intracardiac source (valvular disease) is possible but rare in absence of atrial fibrillation. Agree with aspirin for now. If she should worsen, get ulcerations, worsening gangrene, then would suggest a short trial of anticoagulation therapy.      Will obtain arterial duplex ultrasound and leg PPGs as additional work up for her symptoms. Plan to follow up with PCP for cholesterol. Recommend possible statin and weight loss evaluation.      Will see back in 6 months.    LAQUITA Hedrick August 3, 2021 9:24 AM

## 2021-08-05 NOTE — PROGRESS NOTES
Called patient with Dr. Canas's recommendations of starting Eliquis 5mg BID, a 14 day ziopatch to assess for a.fib and a follow up visit with Dr. Canas in 2-3 months in [place]. Patient confirmed understanding of this. Rx sent to patient's pharmacy. Orders placed for 14 day ziopatch and for follow up visit. Patient will call if she has any questions or concerns regarding follow up.           LAQUITA Hedrick August 5, 2021 2:24 PM

## 2021-08-05 NOTE — PROGRESS NOTES
Hattie Canas MD Cowling, Elizabeth, RN 1 hour ago (11:41 AM)   CF  Based on these results, it appears it could be embolic source. CT was already done but we should evaluate for atrial fibrillation and I would consider starting blood thinner - apixaban 5 mg twice a day and stopping aspirin. Please have her do a ziopatch for 14 days then make sure I have follow up with her in 2/3 months to review.     Dr. Canas     Message text      Contacted patient to review results and Dr. Canas's recommendations. Patient did not answer. Left message for patient to call back.

## 2021-08-06 ENCOUNTER — MYC MEDICAL ADVICE (OUTPATIENT)
Dept: CARDIOLOGY | Facility: CLINIC | Age: 66
End: 2021-08-06

## 2021-08-06 NOTE — TELEPHONE ENCOUNTER
[employer] message received from patient. Will route to Dr. Canas for review.       Last OV 6/16/21 w/ Dr. Canas:  ASSESSMENT/PLAN:     In summary this is a very pleasant 66 year old with diffuse blue toe syndrome. Based on her physical exam I agree with primary referral in that this is appearing like a distal embolic event. Differential includes atrial fibrillation, proximal emboli (however CT chest/abdomen/pelvis angiogram negative for large vessel proximal source). Intracardiac source (valvular disease) is possible but rare in absence of atrial fibrillation. Agree with aspirin for now. If she should worsen, get ulcerations, worsening gangrene, then would suggest a short trial of anticoagulation therapy.      Will obtain arterial duplex ultrasound and leg PPGs as additional work up for her symptoms. Plan to follow up with PCP for cholesterol. Recommend possible statin and weight loss evaluation.      Will see back in 6 months.      LAQUITA Hedrick August 6, 2021 10:43 AM

## 2021-08-10 ENCOUNTER — HOSPITAL ENCOUNTER (OUTPATIENT)
Dept: CARDIOLOGY | Facility: CLINIC | Age: 66
Discharge: HOME | End: 2021-08-10
Attending: INTERNAL MEDICINE | Admitting: INTERNAL MEDICINE
Payer: COMMERCIAL

## 2021-08-10 DIAGNOSIS — I75.023 BLUE TOE SYNDROME OF BOTH LOWER EXTREMITIES (H): ICD-10-CM

## 2021-08-10 PROCEDURE — 93246 EXT ECG>7D<15D RECORDING: CPT

## 2021-08-10 NOTE — CONFIDENTIAL NOTE
Hard to say what this is for sure. Please have her let us know if improved after a week or two of Eliquis. If still painful after that I'd like one of my APPs at [facility] to see her in person and evaluate the foot.   Dr. Canas       Will send Dr. Canas's response to patient.       LAQUITA Hedrick August 10, 2021 3:29 PM

## 2021-09-01 ENCOUNTER — HOSPITAL ENCOUNTER (EMERGENCY)
Facility: CLINIC | Age: 66
Discharge: HOME | End: 2021-09-01
Attending: FAMILY MEDICINE | Admitting: FAMILY MEDICINE
Payer: COMMERCIAL

## 2021-09-01 ENCOUNTER — APPOINTMENT (OUTPATIENT)
Dept: CT IMAGING | Facility: CLINIC | Age: 66
End: 2021-09-01
Attending: FAMILY MEDICINE
Payer: COMMERCIAL

## 2021-09-01 ENCOUNTER — NURSE TRIAGE (OUTPATIENT)
Dept: NURSING | Facility: CLINIC | Age: 66
End: 2021-09-01

## 2021-09-01 VITALS
SYSTOLIC BLOOD PRESSURE: 129 MMHG | WEIGHT: 101 LBS | HEART RATE: 74 BPM | TEMPERATURE: 98.1 F | RESPIRATION RATE: 14 BRPM | OXYGEN SATURATION: 100 % | DIASTOLIC BLOOD PRESSURE: 89 MMHG | BODY MASS INDEX: 18.77 KG/M2

## 2021-09-01 DIAGNOSIS — R10.31 RLQ ABDOMINAL PAIN: ICD-10-CM

## 2021-09-01 LAB
ALBUMIN UR-MCNC: NEGATIVE MG/DL
ANION GAP SERPL CALCULATED.3IONS-SCNC: 3 MMOL/L (ref 3–14)
APPEARANCE UR: CLEAR
BASOPHILS # BLD AUTO: 0 10E3/UL (ref 0–0.2)
BASOPHILS NFR BLD AUTO: 1 %
BILIRUB UR QL STRIP: NEGATIVE
BUN SERPL-MCNC: 21 MG/DL (ref 7–30)
CALCIUM SERPL-MCNC: 9.3 MG/DL (ref 8.5–10.1)
CHLORIDE BLD-SCNC: 106 MMOL/L (ref 94–109)
CO2 SERPL-SCNC: 30 MMOL/L (ref 20–32)
COLOR UR AUTO: YELLOW
CREAT SERPL-MCNC: 0.56 MG/DL (ref 0.52–1.04)
EOSINOPHIL # BLD AUTO: 0.3 10E3/UL (ref 0–0.7)
EOSINOPHIL NFR BLD AUTO: 8 %
ERYTHROCYTE [DISTWIDTH] IN BLOOD BY AUTOMATED COUNT: 12.8 % (ref 10–15)
GFR SERPL CREATININE-BSD FRML MDRD: >90 ML/MIN/1.73M2
GLUCOSE BLD-MCNC: 119 MG/DL (ref 70–99)
GLUCOSE UR STRIP-MCNC: NEGATIVE MG/DL
HCT VFR BLD AUTO: 39.8 % (ref 35–47)
HGB BLD-MCNC: 13.4 G/DL (ref 11.7–15.7)
HGB UR QL STRIP: ABNORMAL
IMM GRANULOCYTES # BLD: 0 10E3/UL
IMM GRANULOCYTES NFR BLD: 1 %
KETONES UR STRIP-MCNC: NEGATIVE MG/DL
LEUKOCYTE ESTERASE UR QL STRIP: NEGATIVE
LYMPHOCYTES # BLD AUTO: 1.7 10E3/UL (ref 0.8–5.3)
LYMPHOCYTES NFR BLD AUTO: 42 %
MCH RBC QN AUTO: 31.7 PG (ref 26.5–33)
MCHC RBC AUTO-ENTMCNC: 33.7 G/DL (ref 31.5–36.5)
MCV RBC AUTO: 94 FL (ref 78–100)
MONOCYTES # BLD AUTO: 0.8 10E3/UL (ref 0–1.3)
MONOCYTES NFR BLD AUTO: 20 %
NEUTROPHILS # BLD AUTO: 1.1 10E3/UL (ref 1.6–8.3)
NEUTROPHILS NFR BLD AUTO: 28 %
NITRATE UR QL: NEGATIVE
NRBC # BLD AUTO: 0 10E3/UL
NRBC BLD AUTO-RTO: 0 /100
PH UR STRIP: 7 [PH] (ref 5–7)
PLATELET # BLD AUTO: 195 10E3/UL (ref 150–450)
POTASSIUM BLD-SCNC: 3.9 MMOL/L (ref 3.4–5.3)
RBC # BLD AUTO: 4.23 10E6/UL (ref 3.8–5.2)
RBC URINE: 1 /HPF
SODIUM SERPL-SCNC: 139 MMOL/L (ref 133–144)
SP GR UR STRIP: 1.01 (ref 1–1.03)
SQUAMOUS EPITHELIAL: <1 /HPF
UROBILINOGEN UR STRIP-MCNC: NORMAL MG/DL
WBC # BLD AUTO: 4.1 10E3/UL (ref 4–11)
WBC URINE: 1 /HPF

## 2021-09-01 PROCEDURE — 96376 TX/PRO/DX INJ SAME DRUG ADON: CPT | Performed by: FAMILY MEDICINE

## 2021-09-01 PROCEDURE — 36415 COLL VENOUS BLD VENIPUNCTURE: CPT | Performed by: FAMILY MEDICINE

## 2021-09-01 PROCEDURE — 96374 THER/PROPH/DIAG INJ IV PUSH: CPT | Performed by: FAMILY MEDICINE

## 2021-09-01 PROCEDURE — 80048 BASIC METABOLIC PNL TOTAL CA: CPT | Performed by: FAMILY MEDICINE

## 2021-09-01 PROCEDURE — 99285 EMERGENCY DEPT VISIT HI MDM: CPT | Mod: 25 | Performed by: FAMILY MEDICINE

## 2021-09-01 PROCEDURE — 74176 CT ABD & PELVIS W/O CONTRAST: CPT

## 2021-09-01 PROCEDURE — 85025 COMPLETE CBC W/AUTO DIFF WBC: CPT | Performed by: FAMILY MEDICINE

## 2021-09-01 PROCEDURE — 81003 URINALYSIS AUTO W/O SCOPE: CPT | Performed by: FAMILY MEDICINE

## 2021-09-01 PROCEDURE — 250N000011 HC RX IP 250 OP 636: Performed by: FAMILY MEDICINE

## 2021-09-01 PROCEDURE — 99285 EMERGENCY DEPT VISIT HI MDM: CPT | Performed by: FAMILY MEDICINE

## 2021-09-01 RX ORDER — HYDROMORPHONE HYDROCHLORIDE 1 MG/ML
0.5 INJECTION, SOLUTION INTRAMUSCULAR; INTRAVENOUS; SUBCUTANEOUS
Status: DISCONTINUED | OUTPATIENT
Start: 2021-09-01 | End: 2021-09-01 | Stop reason: HOSPADM

## 2021-09-01 RX ADMIN — HYDROMORPHONE HYDROCHLORIDE 0.5 MG: 1 INJECTION, SOLUTION INTRAMUSCULAR; INTRAVENOUS; SUBCUTANEOUS at 09:30

## 2021-09-01 RX ADMIN — HYDROMORPHONE HYDROCHLORIDE 0.5 MG: 1 INJECTION, SOLUTION INTRAMUSCULAR; INTRAVENOUS; SUBCUTANEOUS at 11:56

## 2021-09-01 NOTE — ED TRIAGE NOTES
Here with right sided flank pain that came on suddenly last night. Ibuprofen has not helped.  She can not find a position of comfort. Feels the need to urinate frequently

## 2021-09-01 NOTE — TELEPHONE ENCOUNTER
RN triage   Call from pt   Pt states having sharp/severe/burning abd pain -- lower R side abd -- since last night  -- rates pain 10/10- continuous / constant  No nausea or vomiting or diarrhea   No fever   No chest pain   Per protocol = should go to ED -- pt will have sister take her to ED     Padmini Yao RN  [facility]  Triage Nurse Advisor    COVID 19 Nurse Triage Plan/Patient Instructions    Please be aware that novel coronavirus (COVID-19) may be circulating in the community. If you develop symptoms such as fever, cough, or SOB or if you have concerns about the presence of another infection including coronavirus (COVID-19), please contact your health care provider or visit [email or web address].     Disposition/Instructions    ED Visit recommended. Follow protocol based instructions.     Bring Your Own Device:  Please also bring your smart device(s) (smart phones, tablets, laptops) and their charging cables for your personal use and to communicate with your care team during your visit.    Thank you for taking steps to prevent the spread of this virus.  o Limit your contact with others.  o Wear a simple mask to cover your cough.  o Wash your hands well and often.    Resources    [facility]: About COVID-19: [email or web address]    CDC: What to Do If You're Sick: www.cdc.gov/coronavirus/2019-ncov/about/steps-when-sick.html    CDC: Ending Home Isolation: www.cdc.gov/coronavirus/2019-ncov/hcp/disposition-in-home-patients.html     CDC: Caring for Someone: www.cdc.gov/coronavirus/2019-ncov/if-you-are-sick/care-for-someone.html     [facility]: Interim Guidance for Hospital Discharge to Home: www.health.state.[place]/diseases/coronavirus/hcp/hospdischarge.pdf    [facility] clinical trials (COVID-19 research studies): [email or web address]     Below are the COVID-19 hotlines at the [facility] ([facility]). Interpreters are available.   o For health questions: Call  450.400.3673 or 1-836.691.1990 (7 a.m. to 7 p.m.)  o For questions about schools and childcare: Call 087-521-8558 or 1-577.127.8542 (7 a.m. to 7 p.m.)                       Reason for Disposition    SEVERE abdominal pain (e.g., excruciating)    Additional Information    Negative: Passed out (i.e., fainted, collapsed and was not responding)    Negative: Shock suspected (e.g., cold/pale/clammy skin, too weak to stand, low BP, rapid pulse)    Negative: Sounds like a life-threatening emergency to the triager    Negative: Chest pain    Protocols used: ABDOMINAL PAIN - FEMALE-A-OH

## 2021-09-01 NOTE — ED PROVIDER NOTES
History     Chief Complaint   Patient presents with     Flank Pain     HPI  Brissa Mayer is a 66 year old female who presents with right sided abdominal pain, hip pain and right flank pain that all started last night.  She describes the pain as a pressure-like pain.  It has been constant.  Any type of movement, bending makes the pain significantly worse, nothing is helped.  She is also had some urinary urgency but denies any hematuria or discomfort.  Denies feeling lightheaded or dizzy.  Patient denies a significant cough.  Patient has not had pain like this before.    Allergies:  Allergies   Allergen Reactions     Compazine      Anxiety     Flagyl [Metronidazole Hcl] Nausea and Vomiting       Problem List:    Patient Active Problem List    Diagnosis Date Noted     Adjustment disorder with mixed anxiety and depressed mood 04/03/2018     Priority: Medium     Gastroesophageal reflux disease without esophagitis 10/10/2016     Priority: Medium     Hypothyroidism 08/01/2016     Priority: Medium     Major depressive disorder, recurrent episode, mild (HCC) 01/22/2016     Priority: Medium     Generalized anxiety disorder 10/07/2013     Priority: Medium     Hyperlipidemia LDL goal <130 10/23/2012     Priority: Medium     Advanced directives, counseling/discussion 07/26/2011     Priority: Medium     Parent voices understanding and acceptance of this advice and will call back if any further questions or concerns.         CARDIOVASCULAR SCREENING; LDL GOAL LESS THAN 160 10/31/2010     Priority: Medium     Asthma, mild intermittent      Priority: Medium        Past Medical History:    Past Medical History:   Diagnosis Date     Anxiety      Asthma, mild intermittent      Migraines      Tension headaches      Unspecified hypothyroidism        Past Surgical History:    Past Surgical History:   Procedure Laterality Date     BUNIONECTOMY       ESOPHAGOSCOPY, GASTROSCOPY, DUODENOSCOPY (EGD), COMBINED N/A 4/2/2019    Procedure:  ESOPHAGOSCOPY, GASTROSCOPY, DUODENOSCOPY (EGD);  Surgeon: Ochoa Cherry DO;  Location: [facility]     OTHER SURGICAL HISTORY  1980    Bunionectomy       Family History:    Family History   Problem Relation Age of Onset     Asthma Mother      Diabetes Mother      Cancer - colorectal Maternal Grandmother      Heart Disease Father         MI at age 55     Prostate Cancer Father      Asthma Father      Asthma Sister      Obesity Sister        Social History:  Marital Status:  Single [1]  Social History     Tobacco Use     Smoking status: Never Smoker     Smokeless tobacco: Never Used   Substance Use Topics     Alcohol use: Yes     Comment: Occasional wine 3X's /week     Drug use: No        Medications:    albuterol (VENTOLIN HFA) 108 (90 Base) MCG/ACT inhaler  apixaban ANTICOAGULANT (ELIQUIS) 5 MG tablet  atorvastatin (LIPITOR) 20 MG tablet  cetirizine (ZYRTEC) 10 MG tablet  fluticasone (FLONASE) 50 MCG/ACT nasal spray  fluticasone-salmeterol (ADVAIR DISKUS) 250-50 MCG/DOSE inhaler  ibuprofen (ADVIL/MOTRIN) 200 MG capsule  levothyroxine (SYNTHROID/LEVOTHROID) 50 MCG tablet  PARoxetine (PAXIL-CR) 37.5 MG 24 hr tablet          Review of Systems   All other systems reviewed and are negative.      Physical Exam   BP: (!) 126/96  Pulse: 73  Temp: 98.1  F (36.7  C)  Resp: 14  Weight: 45.8 kg (101 lb)  SpO2: 100 %      Physical Exam  Vitals and nursing note reviewed.   Constitutional:       General: She is not in acute distress.     Appearance: She is well-developed. She is not diaphoretic.   HENT:      Head: Normocephalic and atraumatic.      Nose: Nose normal.      Mouth/Throat:      Pharynx: No oropharyngeal exudate.   Eyes:      Conjunctiva/sclera: Conjunctivae normal.   Cardiovascular:      Rate and Rhythm: Normal rate and regular rhythm.      Heart sounds: Normal heart sounds. No murmur heard.   No friction rub.   Pulmonary:      Effort: Pulmonary effort is normal. No respiratory distress.      Breath sounds:  Normal breath sounds. No stridor. No wheezing or rales.   Abdominal:      General: Bowel sounds are normal. There is no distension.      Palpations: Abdomen is soft. There is no mass.      Tenderness: There is abdominal tenderness (Right lower quadrant abdominal tenderness). There is no guarding.   Musculoskeletal:         General: No tenderness. Normal range of motion.      Cervical back: Normal range of motion and neck supple.   Skin:     General: Skin is warm and dry.      Capillary Refill: Capillary refill takes less than 2 seconds.      Findings: No erythema.   Neurological:      Mental Status: She is alert and oriented to person, place, and time.   Psychiatric:         Judgment: Judgment normal.         ED Course        Procedures        Results for orders placed or performed during the hospital encounter of 09/01/21 (from the past 24 hour(s))   Basic metabolic panel   Result Value Ref Range    Sodium 139 133 - 144 mmol/L    Potassium 3.9 3.4 - 5.3 mmol/L    Chloride 106 94 - 109 mmol/L    Carbon Dioxide (CO2) 30 20 - 32 mmol/L    Anion Gap 3 3 - 14 mmol/L    Urea Nitrogen 21 7 - 30 mg/dL    Creatinine 0.56 0.52 - 1.04 mg/dL    Calcium 9.3 8.5 - 10.1 mg/dL    Glucose 119 (H) 70 - 99 mg/dL    GFR Estimate >90 >60 mL/min/1.73m2   CBC with platelets differential    Narrative    The following orders were created for panel order CBC with platelets differential.  Procedure                               Abnormality         Status                     ---------                               -----------         ------                     CBC with platelets and d...[[id number]]  Abnormal            Final result                 Please view results for these tests on the individual orders.   CBC with platelets and differential   Result Value Ref Range    WBC Count 4.1 4.0 - 11.0 10e3/uL    RBC Count 4.23 3.80 - 5.20 10e6/uL    Hemoglobin 13.4 11.7 - 15.7 g/dL    Hematocrit 39.8 35.0 - 47.0 %    MCV 94 78 - 100 fL    MCH  31.7 26.5 - 33.0 pg    MCHC 33.7 31.5 - 36.5 g/dL    RDW 12.8 10.0 - 15.0 %    Platelet Count 195 150 - 450 10e3/uL    % Neutrophils 28 %    % Lymphocytes 42 %    % Monocytes 20 %    % Eosinophils 8 %    % Basophils 1 %    % Immature Granulocytes 1 %    NRBCs per 100 WBC 0 <1 /100    Absolute Neutrophils 1.1 (L) 1.6 - 8.3 10e3/uL    Absolute Lymphocytes 1.7 0.8 - 5.3 10e3/uL    Absolute Monocytes 0.8 0.0 - 1.3 10e3/uL    Absolute Eosinophils 0.3 0.0 - 0.7 10e3/uL    Absolute Basophils 0.0 0.0 - 0.2 10e3/uL    Absolute Immature Granulocytes 0.0 <=0.0 10e3/uL    Absolute NRBCs 0.0 10e3/uL   CT Abdomen Pelvis w/o Contrast    Narrative    CT ABDOMEN/PELVIS WITHOUT CONTRAST September 1, 2021 9:55 AM    CLINICAL HISTORY: Right lower quadrant abdominal pain. No history of  surgery or cancer.    TECHNIQUE: CT scan of the abdomen and pelvis was performed without IV  contrast. Multiplanar reformats were obtained. Dose reduction  techniques were used.  CONTRAST: None.    COMPARISON: CT abdomen/pelvis dated 12/16/2015. CT chest dated  3/22/2021. More recent CT abdomen/pelvis from 4/26/2008 is not  available.    FINDINGS: Relative lack of intraperitoneal adipose tissue limits this  evaluation. Lack of IV contrast limits evaluation of the solid organs  of the abdomen and pelvis.    LOWER CHEST: Single bulla is seen in the right lower lung lobe.  Visualized portions of the lung bases are otherwise unremarkable.  There are aortic calcifications. Visualized mediastinal contents are  otherwise unremarkable.    HEPATOBILIARY: Normal.    PANCREAS: Normal.    SPLEEN: Normal.    ADRENAL GLANDS: Normal.    KIDNEYS/BLADDER: Fatty lesion in the inferior pole of the left kidney  measures up to 1.2 cm in diameter and is most consistent with a benign  renal angiomyolipoma. The kidneys are otherwise normal in appearance  for noncontrast CT. No hydronephrosis, nephrolithiasis, hydroureter or  ureteral calculus is identified. Ureters are very  difficult to follow  due to relative lack of intraperitoneal adipose tissues. Urinary  bladder is grossly unremarkable.    BOWEL: The colon is grossly of normal appearance. Moderate amount of  stool is seen in the colon. Appendix is not well seen. No pericecal  inflammatory change to suggest acute appendicitis. Evaluation of the  bowel is limited due to lack of intraperitoneal adipose tissue and  lack of IV contrast. Small bowel is grossly of normal caliber. Stomach  contains a small amount of fluid and air. Gastric wall appears mildly  thickened which is likely due to incomplete distention.    LYMPH NODES: No lymphadenopathy is identified.    VASCULATURE: There is nonaneurysmal atherosclerosis.    PELVIC ORGANS: Structure which could represent an atrophic uterus is  seen just posterior to the urinary bladder. Ovaries are not well seen.  No obvious adnexal mass is identified.    OTHER: There appears to be trace free fluid in the pelvis. No free air  is identified in the peritoneal cavity.    MUSCULOSKELETAL: Degenerative changes are noted in the spine. No  aggressive osseous lesions or acute osseous fractures.      Impression    IMPRESSION:   1.  This study is significantly limited due to the relative lack of  intraperitoneal adipose tissue and also due to the lack of IV  contrast.  2.  No diverticulitis, urinary system calculus, urinary system  obstruction, or bowel obstruction is seen. Appendix is not definitely  identified and no definite evidence for appendicitis is seen.  Appendicitis cannot be excluded due to the limitations of this study.    I discussed the significant limitations of this study as well as the  lack of obvious etiology for patient's symptoms with Dr. Lentz on  9/1/2021 at  , when he became available.   UA with Microscopic reflex to Culture    Specimen: Urine, Clean Catch   Result Value Ref Range    Color Urine Yellow Colorless, Straw, Light Yellow, Yellow    Appearance Urine Clear Clear     Glucose Urine Negative Negative mg/dL    Bilirubin Urine Negative Negative    Ketones Urine Negative Negative mg/dL    Specific Gravity Urine 1.012 1.003 - 1.035    Blood Urine Small (A) Negative    pH Urine 7.0 5.0 - 7.0    Protein Albumin Urine Negative Negative mg/dL    Urobilinogen Urine Normal Normal, 2.0 mg/dL    Nitrite Urine Negative Negative    Leukocyte Esterase Urine Negative Negative    RBC Urine 1 <=2 /HPF    WBC Urine 1 <=5 /HPF    Squamous Epithelials Urine <1 <=1 /HPF    Narrative    Urine Culture not indicated       Medications   HYDROmorphone (PF) (DILAUDID) injection 0.5 mg (0.5 mg Intravenous Given 9/1/21 1156)     Labs came back and were unremarkable.  I did not get the urine back initially and the story sounded more suspicious for a kidney stone so I did a CT scan without contrast.  They did not see a stone but they were not able to visualize the appendix.  The radiologist said that he could not rule out appendicitis.  We discussed possibly adding contrast oral and IV, but he says he does not think that that would change the fact to being able to see the appendix.  Patient's urine came back and it was normal.  I did consult general surgery and spoke to Dr. Cherry who reviewed the films.  He thinks that the patient looked pretty constipated and with having a normal white count and not seeing any inflammatory markers, would recommend just treating this like possible constipation.  He has an appointment available tomorrow and will see her tomorrow if the pain still persist after trying the bowel cleanout.    Assessments & Plan (with Medical Decision Making)  rlq abd pain     I have reviewed the nursing notes.    I have reviewed the findings, diagnosis, plan and need for follow up with the patient.              9/1/2021   [facility] EMERGENCY DEPT     Raphael Lentz MD  09/01/21 [id number]

## 2021-09-01 NOTE — DISCHARGE INSTRUCTIONS
1.  The surgeon thinks that your pain could be related to possible constipation.  I want you to  some over-the-counter MiraLAX and mix in a 20 ounce bottle of Gatorade and shake well.  Drink 3 to 4 ounces every 15 minutes until gone.  This should give you some good results.  If you are still having pain after having results, please see the surgeon as scheduled for tomorrow.

## 2021-09-02 ENCOUNTER — OFFICE VISIT (OUTPATIENT)
Dept: SURGERY | Facility: CLINIC | Age: 66
End: 2021-09-02
Payer: COMMERCIAL

## 2021-09-02 ENCOUNTER — HOSPITAL ENCOUNTER (OUTPATIENT)
Dept: CT IMAGING | Facility: CLINIC | Age: 66
Discharge: HOME | End: 2021-09-02
Attending: SURGERY | Admitting: SURGERY
Payer: COMMERCIAL

## 2021-09-02 VITALS
TEMPERATURE: 97.6 F | SYSTOLIC BLOOD PRESSURE: 128 MMHG | BODY MASS INDEX: 18.53 KG/M2 | HEIGHT: 62 IN | WEIGHT: 100.7 LBS | DIASTOLIC BLOOD PRESSURE: 86 MMHG

## 2021-09-02 DIAGNOSIS — R10.9 ABDOMINAL PAIN, UNSPECIFIED ABDOMINAL LOCATION: ICD-10-CM

## 2021-09-02 DIAGNOSIS — R10.9 ABDOMINAL PAIN, UNSPECIFIED ABDOMINAL LOCATION: Primary | ICD-10-CM

## 2021-09-02 LAB
ALBUMIN SERPL-MCNC: 4.2 G/DL (ref 3.4–5)
ALP SERPL-CCNC: 62 U/L (ref 40–150)
ALT SERPL W P-5'-P-CCNC: 27 U/L (ref 0–50)
ANION GAP SERPL CALCULATED.3IONS-SCNC: 1 MMOL/L (ref 3–14)
AST SERPL W P-5'-P-CCNC: 22 U/L (ref 0–45)
BILIRUB SERPL-MCNC: 0.4 MG/DL (ref 0.2–1.3)
BUN SERPL-MCNC: 19 MG/DL (ref 7–30)
CALCIUM SERPL-MCNC: 9.5 MG/DL (ref 8.5–10.1)
CHLORIDE BLD-SCNC: 106 MMOL/L (ref 94–109)
CO2 SERPL-SCNC: 32 MMOL/L (ref 20–32)
CREAT SERPL-MCNC: 0.68 MG/DL (ref 0.52–1.04)
ERYTHROCYTE [DISTWIDTH] IN BLOOD BY AUTOMATED COUNT: 12.8 % (ref 10–15)
GFR SERPL CREATININE-BSD FRML MDRD: 90 ML/MIN/1.73M2
GLUCOSE BLD-MCNC: 138 MG/DL (ref 70–99)
HCT VFR BLD AUTO: 40.1 % (ref 35–47)
HGB BLD-MCNC: 13.4 G/DL (ref 11.7–15.7)
LIPASE SERPL-CCNC: 103 U/L (ref 73–393)
MCH RBC QN AUTO: 32.1 PG (ref 26.5–33)
MCHC RBC AUTO-ENTMCNC: 33.4 G/DL (ref 31.5–36.5)
MCV RBC AUTO: 96 FL (ref 78–100)
PLATELET # BLD AUTO: 194 10E3/UL (ref 150–450)
POTASSIUM BLD-SCNC: 4 MMOL/L (ref 3.4–5.3)
PROT SERPL-MCNC: 7.6 G/DL (ref 6.8–8.8)
RBC # BLD AUTO: 4.17 10E6/UL (ref 3.8–5.2)
SODIUM SERPL-SCNC: 139 MMOL/L (ref 133–144)
WBC # BLD AUTO: 4.9 10E3/UL (ref 4–11)

## 2021-09-02 PROCEDURE — 36415 COLL VENOUS BLD VENIPUNCTURE: CPT | Performed by: SURGERY

## 2021-09-02 PROCEDURE — 250N000009 HC RX 250: Performed by: RADIOLOGY

## 2021-09-02 PROCEDURE — 80053 COMPREHEN METABOLIC PANEL: CPT | Performed by: SURGERY

## 2021-09-02 PROCEDURE — 85027 COMPLETE CBC AUTOMATED: CPT | Performed by: SURGERY

## 2021-09-02 PROCEDURE — 83690 ASSAY OF LIPASE: CPT | Performed by: SURGERY

## 2021-09-02 PROCEDURE — 99214 OFFICE O/P EST MOD 30 MIN: CPT | Performed by: SURGERY

## 2021-09-02 PROCEDURE — 250N000011 HC RX IP 250 OP 636: Performed by: RADIOLOGY

## 2021-09-02 PROCEDURE — 74177 CT ABD & PELVIS W/CONTRAST: CPT

## 2021-09-02 RX ORDER — IOPAMIDOL 755 MG/ML
500 INJECTION, SOLUTION INTRAVASCULAR ONCE
Status: COMPLETED | OUTPATIENT
Start: 2021-09-02 | End: 2021-09-02

## 2021-09-02 RX ADMIN — SODIUM CHLORIDE 60 ML: 9 INJECTION, SOLUTION INTRAVENOUS at 13:29

## 2021-09-02 RX ADMIN — IOPAMIDOL 50 ML: 755 INJECTION, SOLUTION INTRAVENOUS at 13:29

## 2021-09-02 ASSESSMENT — MIFFLIN-ST. JEOR: SCORE: 932.08

## 2021-09-02 ASSESSMENT — PAIN SCALES - GENERAL: PAINLEVEL: WORST PAIN (10)

## 2021-09-02 NOTE — PROGRESS NOTES
Patient seen in consultation for lower abdominal pain and flank pain by [facility]    HPI:  Patient is a 66 year old female with 2 days of worsening lower abdominal pain. She states that she felt normal all day Tuesday then that night she had abrupt onset lower abdomen and right flank pain. She was seen in the ED yesterday and her workup was largely unremarkable. CT did not show evidence of acute process but this was done without contrast. It did show moderate to large stool burden in the colon so she was told to use miralax. She states she did have quite a bit of stool output last night. Unfortunately, this did not improve her symptoms, in fact she feels worse today. No subjective or objective fevers. She was still able to drink some fluid today and is urinating. She has never had abdominal surgery in the past.    Review Of Systems    Skin: negative  Ears/Nose/Throat: negative  Respiratory: No shortness of breath, dyspnea on exertion, cough, or hemoptysis  Cardiovascular: negative  Gastrointestinal: as above  Genitourinary: negative  Musculoskeletal: negative  Neurologic: negative  Hematologic/Lymphatic/Immunologic: negative  Endocrine: negative      Past Medical History:   Diagnosis Date     Anxiety      Asthma, mild intermittent      Migraines      Tension headaches      Unspecified hypothyroidism        Past Surgical History:   Procedure Laterality Date     BUNIONECTOMY       ESOPHAGOSCOPY, GASTROSCOPY, DUODENOSCOPY (EGD), COMBINED N/A 4/2/2019    Procedure: ESOPHAGOSCOPY, GASTROSCOPY, DUODENOSCOPY (EGD);  Surgeon: Ochoa Cherry DO;  Location:  GI     OTHER SURGICAL HISTORY  1980    Bunionectomy       Family History   Problem Relation Age of Onset     Asthma Mother      Diabetes Mother      Cancer - colorectal Maternal Grandmother      Heart Disease Father         MI at age 55     Prostate Cancer Father      Asthma Father      Asthma Sister      Obesity Sister        Social History      Socioeconomic History     Marital status: Single     Spouse name: Not on file     Number of children: Not on file     Years of education: Not on file     Highest education level: Not on file   Occupational History     Not on file   Tobacco Use     Smoking status: Never Smoker     Smokeless tobacco: Never Used   Substance and Sexual Activity     Alcohol use: Yes     Comment: Occasional wine 3X's /week     Drug use: No     Sexual activity: Not Currently   Other Topics Concern     Parent/sibling w/ CABG, MI or angioplasty before 65F 55M? No   Social History Narrative     Not on file     Social Determinants of Health     Financial Resource Strain:      Difficulty of Paying Living Expenses:    Food Insecurity:      Worried About Running Out of Food in the Last Year:      Ran Out of Food in the Last Year:    Transportation Needs:      Lack of Transportation (Medical):      Lack of Transportation (Non-Medical):    Physical Activity:      Days of Exercise per Week:      Minutes of Exercise per Session:    Stress:      Feeling of Stress :    Social Connections:      Frequency of Communication with Friends and Family:      Frequency of Social Gatherings with Friends and Family:      Attends Sabianism Services:      Active Member of Clubs or Organizations:      Attends Club or Organization Meetings:      Marital Status:    Intimate Partner Violence:      Fear of Current or Ex-Partner:      Emotionally Abused:      Physically Abused:      Sexually Abused:        Current Outpatient Medications   Medication Sig Dispense Refill     albuterol (VENTOLIN HFA) 108 (90 Base) MCG/ACT inhaler INHALE TWO PUFFS BY MOUTH EVERY 6 HOURS AS NEEDED FOR SHORTNESS OF BREATH/DYSPNEA 18 g 3     apixaban ANTICOAGULANT (ELIQUIS) 5 MG tablet Take 1 tablet (5 mg) by mouth 2 times daily 60 tablet 11     atorvastatin (LIPITOR) 20 MG tablet Take 1 tablet (20 mg) by mouth daily 90 tablet 3     fluticasone (FLONASE) 50 MCG/ACT nasal spray Spray 2 sprays  "into both nostrils daily 1 Package 1     fluticasone-salmeterol (ADVAIR DISKUS) 250-50 MCG/DOSE inhaler INHALE ONE PUFF BY MOUTH TWICE A  each 3     ibuprofen (ADVIL/MOTRIN) 200 MG capsule Take 400 mg by mouth as needed for fever       levothyroxine (SYNTHROID/LEVOTHROID) 50 MCG tablet TAKE ONE TABLET BY MOUTH ONCE DAILY 90 tablet 3     PARoxetine (PAXIL-CR) 37.5 MG 24 hr tablet TAKE ONE TABLET BY MOUTH EVERY MORNING 90 tablet 0     cetirizine (ZYRTEC) 10 MG tablet Take 1 tablet (10 mg) by mouth every evening (Patient not taking: Reported on 7/13/2021) 30 tablet 1       Medications and history reviewed    Physical exam:  Vitals: /86 (BP Location: Right arm, Patient Position: Sitting, Cuff Size: Adult Regular)   Temp 97.6  F (36.4  C) (Temporal)   Ht 1.562 m (5' 1.5\")   Wt 45.7 kg (100 lb 11.2 oz)   LMP  (LMP Unknown)   BMI 18.72 kg/m    BMI= Body mass index is 18.72 kg/m .    Constitutional: Healthy, alert, non-distressed   Head: Normo-cephalic, atraumatic, no lesions, masses or tenderness   Cardiovascular: RRR, no new murmurs, +S1, +S2 heart sounds, no clicks, rubs or gallops   Respiratory: CTAB, no rales, rhonchi or wheezing, equal chest rise, good respiratory effort   Gastrointestinal: Soft, +ttp diffusely; worse in the lower quadrants but soft and non-acute, fullness with dullness to percussion in the RUQ, nondistended no rebound rigidity or guarding, no masses or hernias palpated   : Deferred  Musculoskeletal: Moves all extremities, normal  strength, no deformities noted   Skin: No suspicious lesions or rashes   Psychiatric: Mentation appears normal, affect appropriate   Hematologic/Lymphatic/Immunologic: Normal cervical and supraclavicular lymph nodes   Patient able to get up on table without difficulty.    Labs show:  No results found for this or any previous visit (from the past 24 hour(s)).    Imaging shows:  Recent Results (from the past 744 hour(s))   CT Abdomen Pelvis w/o Contrast "    Narrative    CT ABDOMEN/PELVIS WITHOUT CONTRAST September 1, 2021 9:55 AM    CLINICAL HISTORY: Right lower quadrant abdominal pain. No history of  surgery or cancer.    TECHNIQUE: CT scan of the abdomen and pelvis was performed without IV  contrast. Multiplanar reformats were obtained. Dose reduction  techniques were used.  CONTRAST: None.    COMPARISON: CT abdomen/pelvis dated 12/16/2015. CT chest dated  3/22/2021. More recent CT abdomen/pelvis from 4/26/2008 is not  available.    FINDINGS: Relative lack of intraperitoneal adipose tissue limits this  evaluation. Lack of IV contrast limits evaluation of the solid organs  of the abdomen and pelvis.    LOWER CHEST: Single bulla is seen in the right lower lung lobe.  Visualized portions of the lung bases are otherwise unremarkable.  There are aortic calcifications. Visualized mediastinal contents are  otherwise unremarkable.    HEPATOBILIARY: Normal.    PANCREAS: Normal.    SPLEEN: Normal.    ADRENAL GLANDS: Normal.    KIDNEYS/BLADDER: Fatty lesion in the inferior pole of the left kidney  measures up to 1.2 cm in diameter and is most consistent with a benign  renal angiomyolipoma. The kidneys are otherwise normal in appearance  for noncontrast CT. No hydronephrosis, nephrolithiasis, hydroureter or  ureteral calculus is identified. Ureters are very difficult to follow  due to relative lack of intraperitoneal adipose tissues. Urinary  bladder is grossly unremarkable.    BOWEL: The colon is grossly of normal appearance. Moderate amount of  stool is seen in the colon. Appendix is not well seen. No pericecal  inflammatory change to suggest acute appendicitis. Evaluation of the  bowel is limited due to lack of intraperitoneal adipose tissue and  lack of IV contrast. Small bowel is grossly of normal caliber. Stomach  contains a small amount of fluid and air. Gastric wall appears mildly  thickened which is likely due to incomplete distention.    LYMPH NODES: No  lymphadenopathy is identified.    VASCULATURE: There is nonaneurysmal atherosclerosis.    PELVIC ORGANS: Structure which could represent an atrophic uterus is  seen just posterior to the urinary bladder. Ovaries are not well seen.  No obvious adnexal mass is identified.    OTHER: There appears to be trace free fluid in the pelvis. No free air  is identified in the peritoneal cavity.    MUSCULOSKELETAL: Degenerative changes are noted in the spine. No  aggressive osseous lesions or acute osseous fractures.      Impression    IMPRESSION:   1.  This study is significantly limited due to the relative lack of  intraperitoneal adipose tissue and also due to the lack of IV  contrast.  2.  No diverticulitis, urinary system calculus, urinary system  obstruction, or bowel obstruction is seen. Appendix is not definitely  identified and no definite evidence for appendicitis is seen.  Appendicitis cannot be excluded due to the limitations of this study.    I discussed the significant limitations of this study as well as the  lack of obvious etiology for patient's symptoms with Dr. Lentz on  9/1/2021 at approximately 11:30 AM, when he became available.    DINORA MUSTAFA MD         SYSTEM ID:  [id number]         Assessment:     ICD-10-CM    1. Abdominal pain, unspecified abdominal location  R10.9 Lipase     CBC with platelets     Comprehensive metabolic panel (BMP + Alb, Alk Phos, ALT, AST, Total. Bili, TP)     CT Abdomen Pelvis w Contrast     Plan: I recommend rechecking labs today adding lipase and CMP. I would also like to repeat CT scan with contrast this time to further evaluate solid organs of the abdomen and pelvis. She will stay in the clinic until these tests have been completed.    **Update: CT does not show any acute process other than a thickened gastric antrum and pylorus. No signs of perforated viscous. No clear signs of appendicitis. Labs were again unremarkable**    I recommend the patient take omeprazole 40mg daily  for possible gastritis and to take another dose of miralax as there is still some stool burden in the right colon. She should follow up with her PCP should the symptoms not improve.     She was instructed to return with worsening symptoms or onset of fevers.     Ochoa Cherry, DO

## 2021-09-04 ENCOUNTER — HEALTH MAINTENANCE LETTER (OUTPATIENT)
Age: 66
End: 2021-09-04

## 2021-09-09 NOTE — PROGRESS NOTES
RN called patient and left detailed VM with Dr. Canas's recommendations below. RN advised patient in VM to call scheduling to arrange the PPG in one month and F/U with Dr. Canas. RN placed orders for PPG. RN advised patient to call us back with any questions.     Dr. Canas's recommendations  Thank you Bisi. So there is no atrial fibrillation which I was looking for. Although this is not 100% conclusive as sometimes AFIB comes and goes infrequently and can cause blood clots to the form in the heart, and we just don't catch it the exact time the ziopatch is being worn. Why don't we do another month of anticoagulation, see if her toe blood flow improves with this, and repeat the lower extremity PPGs of the toes only, before follow up with me. Based on these results we can discuss long term management and stopping the blood thinner. If the blood flow resolves I would think reasonable to stop the blood thinner.     Dr. Canas

## 2021-09-16 DIAGNOSIS — F43.23 ADJUSTMENT DISORDER WITH MIXED ANXIETY AND DEPRESSED MOOD: ICD-10-CM

## 2021-09-17 NOTE — TELEPHONE ENCOUNTER
Sent my chart with PHQ-9 attached.        Maria Del Carmen Gunter RN  [facility]

## 2021-09-17 NOTE — TELEPHONE ENCOUNTER
"Requested Prescriptions   Pending Prescriptions Disp Refills    PARoxetine (PAXIL-CR) 37.5 MG 24 hr tablet [Pharmacy Med Name: PAROXETINE HCL ER 37.5MG TB24] 90 tablet 0     Sig: TAKE ONE TABLET BY MOUTH EVERY MORNING        SSRIs Protocol Failed - 9/17/2021  3:26 PM        Failed - PHQ-9 score less than 5 in past 6 months     Please review last PHQ-9 score.           Passed - Medication is active on med list        Passed - Patient is age 18 or older        Passed - No active pregnancy on record        Passed - No positive pregnancy test in last 12 months        Passed - Recent (6 mo) or future (30 days) visit within the authorizing provider's specialty     Patient had office visit in the last 6 months or has a visit in the next 30 days with authorizing provider or within the authorizing provider's specialty.  See \"Patient Info\" tab in inbasket, or \"Choose Columns\" in Meds & Orders section of the refill encounter.                Routing refill request to provider for review/approval because:  Labs out of range:  PHQ-9 score:    PHQ 3/19/2021   PHQ-9 Total Score 3   Q9: Thoughts of better off dead/self-harm past 2 weeks Not at all   Nurse sent my chart message with PHQ - 9 attached       Maria Del Carmen Gunter RN  [facility]                 "

## 2021-09-20 RX ORDER — PAROXETINE HYDROCHLORIDE HEMIHYDRATE 37.5 MG/1
TABLET, FILM COATED, EXTENDED RELEASE ORAL
Qty: 90 TABLET | Refills: 0 | Status: SHIPPED | OUTPATIENT
Start: 2021-09-20 | End: 2021-12-28

## 2021-09-30 ENCOUNTER — HOSPITAL ENCOUNTER (OUTPATIENT)
Dept: ULTRASOUND IMAGING | Facility: CLINIC | Age: 66
Discharge: HOME | End: 2021-09-30
Attending: INTERNAL MEDICINE | Admitting: INTERNAL MEDICINE
Payer: COMMERCIAL

## 2021-09-30 DIAGNOSIS — I75.023 BLUE TOE SYNDROME OF BOTH LOWER EXTREMITIES (H): ICD-10-CM

## 2021-09-30 PROCEDURE — 93922 UPR/L XTREMITY ART 2 LEVELS: CPT

## 2021-09-30 PROCEDURE — 93922 UPR/L XTREMITY ART 2 LEVELS: CPT | Mod: 26 | Performed by: INTERNAL MEDICINE

## 2021-10-01 ENCOUNTER — TELEPHONE (OUTPATIENT)
Dept: CARDIOLOGY | Facility: CLINIC | Age: 66
End: 2021-10-01

## 2021-10-01 DIAGNOSIS — I75.023 BLUE TOE SYNDROME OF BOTH LOWER EXTREMITIES (H): Primary | ICD-10-CM

## 2021-10-01 NOTE — TELEPHONE ENCOUNTER
Lower extremity PPG results noted 9/30/21:    Findings:     Right foot:  First toe: Present, Normal  Second toe: Present, Severely abnormal  Third toe: Present, Severely abnormal  Fourth toe: Present, Moderately abnormal  Fifth toe: Present, Moderately abnormal     Left foot:  First toe: Present,Severely abnormal  Second toe: Present, Moderately abnormal  Third toe: Present, Moderately abnormal  Fourth toe: Present, Severely abnormal  Fifth toe: Present, Normal                                                                       Impression:  1. Abnormal PPG waveforms again noted. On side-by side comparison, the  biggest changes seen are in the left first digit, which is now  severely abnormal, compared to moderately on prior study, and the left  5th digit, which is now normal and was moderately abnormal.      HOA WESTON MD     Will route to Dr. Canas for review.

## 2021-10-06 RX ORDER — SILDENAFIL CITRATE 20 MG/1
10 TABLET ORAL 3 TIMES DAILY
Qty: 45 TABLET | Refills: 11 | Status: SHIPPED | OUTPATIENT
Start: 2021-10-06 | End: 2022-01-05

## 2021-10-06 NOTE — TELEPHONE ENCOUNTER
PA Initiation    Medication: sildenafil (REVATIO) 20 MG tablet   Insurance Company: [employer] - Phone [phone] Fax [phone]  Pharmacy Filling the Rx: [facility] [address]   Filling Pharmacy Phone: [phone]  Filling Pharmacy Fax: [phone]  Start Date: 10/6/2021

## 2021-10-06 NOTE — TELEPHONE ENCOUNTER
Hello!   I have reviewed Lara's PPGs. They are still severely abnormal despite aspirin, eliquis. I recommend the following: Get prior authorization for sildenafil 10 mg tid (small vessel vasodilator). 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 I would continue the blood thinner therapy for now as even if there is inflammation of the vessels there can be secondary blood clot formation.     Then during follow up visit we can see how her symptoms are.     Best,  Dr. Canas        Called patient to go over recommendations from Dr. Canas. Patient confirmed understanding of this. Rx sent to pharmacy. Referral placed for Rheumatology. Message sent to PA team to do prior authorization for Sildenafil 10mg TID.       LAQUITA Hedrick October 6, 2021 2:15 PM

## 2021-10-07 ENCOUNTER — TELEPHONE (OUTPATIENT)
Dept: CARDIOLOGY | Facility: CLINIC | Age: 66
End: 2021-10-07

## 2021-10-07 NOTE — TELEPHONE ENCOUNTER
Prior Authorization Retail Medication Request    Medication/Dose: Sildenafil 20mg  ICD code (if different than what is on RX):    Previously Tried and Failed:    Rationale:      Insurance Name:  [employer]  Insurance ID:  [id number]      Pharmacy Information (if different than what is on RX)  Name:  [facility]   Phone:  [phone]      SUBMITTED QUANTITY/DAYS SUPPLY RATIO (45  .000 / 30 = 1.500) EXCEEDS RATIO (1.000   / 1 = 1.000).    Thank you,  Janel Sanchez CPhT  [facility] [place]

## 2021-10-08 NOTE — TELEPHONE ENCOUNTER
Prior Authorization Approval    Authorization Effective Date: 9/8/2021  Authorization Expiration Date: 10/7/2026  Medication: sildenafil (REVATIO) 20 MG tablet--APPROVED  Approved Dose/Quantity:   Reference #:     Insurance Company: [employer] - Phone [phone] Fax [phone]  Expected CoPay:       CoPay Card Available:      Foundation Assistance Needed:    Which Pharmacy is filling the prescription (Not needed for infusion/clinic administered): [facility] [address]   Pharmacy Notified: Yes  Patient Notified: Yes **Instructed pharmacy to notify patient when script is ready to /ship.**

## 2021-10-18 ENCOUNTER — TELEPHONE (OUTPATIENT)
Dept: OTHER | Facility: CLINIC | Age: 66
End: 2021-10-18

## 2021-10-18 ENCOUNTER — MYC MEDICAL ADVICE (OUTPATIENT)
Dept: CARDIOLOGY | Facility: CLINIC | Age: 66
End: 2021-10-18

## 2021-10-18 DIAGNOSIS — I75.023 BLUE TOE SYNDROME OF BOTH LOWER EXTREMITIES (H): Primary | ICD-10-CM

## 2021-10-18 NOTE — TELEPHONE ENCOUNTER
RN placed order for vascular medicine referral and will update patient via [employer].         Dr. Canas's recommendations    Hi Lara, thanks for letting me know. I am guessing they are fully booked and being more selective these days. Let's try a different route and have you see my colleague in vascular internal medicine who can work up small vessel disease.     Team can you see if Lee Garcia or Dario would do a consult for Lara? These two are at [facility] however so if she's ok with coming down for opinion that would be great.     Thank you!   Dr. Canas

## 2021-10-18 NOTE — TELEPHONE ENCOUNTER
MD Hattie for blue toe syndrome    Pt needs to be scheduled for new pt in person consult with vascular medicine.  Will route to scheduling to coordinate an appointment at next available.    CASTRO YungN, RN  [facility] [place]

## 2021-10-18 NOTE — TELEPHONE ENCOUNTER
avery message received. RN will send to Dr. Canas for further recommendations.     I called the rheumatologist number you provided & the office told me that they sent the referral back to you as they don t see patients with my diagnosis.  Please let me know what I need to do.  Thanks!    10/6/21 telephone encounter  Hello!   I have reviewed Lara's PPGs. They are still severely abnormal despite aspirin, eliquis. I recommend the following: Get prior authorization for sildenafil 10 mg tid (small vessel vasodilator). 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 I would continue the blood thinner therapy for now as even if there is inflammation of the vessels there can be secondary blood clot formation.     Then during follow up visit we can see how her symptoms are.     Best,  Dr. Canas      6/16/21 visit Dr. 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 Based on her physical exam I agree with primary referral in that this is appearing like a distal embolic event. Differential includes atrial fibrillation, proximal emboli (however CT chest/abdomen/pelvis angiogram negative for large vessel proximal source). Intracardiac source (valvular disease) is possible but rare in absence of atrial fibrillation. Agree with aspirin for now. If she should worsen, get ulcerations, worsening gangrene, then would suggest a short trial of anticoagulation therapy.      Will obtain arterial duplex ultrasound and leg PPGs as additional work up for her symptoms. Plan to follow up with PCP for cholesterol. Recommend possible statin and weight loss evaluation.      Will see back in 6 months.     Hattie Canas MD MSc  [facility]

## 2021-11-01 ENCOUNTER — MYC MEDICAL ADVICE (OUTPATIENT)
Dept: INTERNAL MEDICINE | Facility: CLINIC | Age: 66
End: 2021-11-01

## 2021-11-01 DIAGNOSIS — R05.9 COUGH: Primary | ICD-10-CM

## 2021-11-02 ENCOUNTER — LAB (OUTPATIENT)
Dept: FAMILY MEDICINE | Facility: CLINIC | Age: 66
End: 2021-11-02
Attending: INTERNAL MEDICINE
Payer: COMMERCIAL

## 2021-11-02 DIAGNOSIS — R05.9 COUGH: ICD-10-CM

## 2021-11-02 PROCEDURE — U0005 INFEC AGEN DETEC AMPLI PROBE: HCPCS

## 2021-11-02 PROCEDURE — U0003 INFECTIOUS AGENT DETECTION BY NUCLEIC ACID (DNA OR RNA); SEVERE ACUTE RESPIRATORY SYNDROME CORONAVIRUS 2 (SARS-COV-2) (CORONAVIRUS DISEASE [COVID-19]), AMPLIFIED PROBE TECHNIQUE, MAKING USE OF HIGH THROUGHPUT TECHNOLOGIES AS DESCRIBED BY CMS-2020-01-R: HCPCS

## 2021-11-03 ENCOUNTER — LAB (OUTPATIENT)
Dept: LAB | Facility: CLINIC | Age: 66
End: 2021-11-03
Attending: INTERNAL MEDICINE
Payer: COMMERCIAL

## 2021-11-03 ENCOUNTER — OFFICE VISIT (OUTPATIENT)
Dept: OTHER | Facility: CLINIC | Age: 66
End: 2021-11-03
Attending: INTERNAL MEDICINE
Payer: COMMERCIAL

## 2021-11-03 VITALS
DIASTOLIC BLOOD PRESSURE: 85 MMHG | BODY MASS INDEX: 19.63 KG/M2 | HEIGHT: 61 IN | RESPIRATION RATE: 16 BRPM | HEART RATE: 75 BPM | SYSTOLIC BLOOD PRESSURE: 150 MMHG | OXYGEN SATURATION: 98 % | WEIGHT: 104 LBS

## 2021-11-03 DIAGNOSIS — I75.023 BLUE TOE SYNDROME OF BOTH LOWER EXTREMITIES (H): ICD-10-CM

## 2021-11-03 DIAGNOSIS — I75.023 BLUE TOE SYNDROME OF BOTH LOWER EXTREMITIES (H): Primary | ICD-10-CM

## 2021-11-03 LAB
CRP SERPL-MCNC: <2.9 MG/L (ref 0–8)
ERYTHROCYTE [SEDIMENTATION RATE] IN BLOOD BY WESTERGREN METHOD: 8 MM/HR (ref 0–30)
SARS-COV-2 RNA RESP QL NAA+PROBE: NEGATIVE

## 2021-11-03 PROCEDURE — 86225 DNA ANTIBODY NATIVE: CPT

## 2021-11-03 PROCEDURE — 86235 NUCLEAR ANTIGEN ANTIBODY: CPT

## 2021-11-03 PROCEDURE — 86200 CCP ANTIBODY: CPT

## 2021-11-03 PROCEDURE — 86431 RHEUMATOID FACTOR QUANT: CPT

## 2021-11-03 PROCEDURE — 86140 C-REACTIVE PROTEIN: CPT

## 2021-11-03 PROCEDURE — 83516 IMMUNOASSAY NONANTIBODY: CPT

## 2021-11-03 PROCEDURE — 36415 COLL VENOUS BLD VENIPUNCTURE: CPT

## 2021-11-03 PROCEDURE — 85652 RBC SED RATE AUTOMATED: CPT

## 2021-11-03 PROCEDURE — G0463 HOSPITAL OUTPT CLINIC VISIT: HCPCS

## 2021-11-03 PROCEDURE — 86038 ANTINUCLEAR ANTIBODIES: CPT

## 2021-11-03 PROCEDURE — 86255 FLUORESCENT ANTIBODY SCREEN: CPT

## 2021-11-03 PROCEDURE — 99204 OFFICE O/P NEW MOD 45 MIN: CPT | Performed by: INTERNAL MEDICINE

## 2021-11-03 ASSESSMENT — MIFFLIN-ST. JEOR: SCORE: 939.12

## 2021-11-03 NOTE — PROGRESS NOTES
INITIAL VASCULAR MEDICINE EVALUATION  REFERRING MD: TIERNEY  REASON FOR REFERRAL: BLUE TOE SYNDROME W/O OBVIOUS CARDIOEMBOLIC OR ATHEROEMBOLIC FOCUS      HPI: Brissa Mayer  is a 66-year-old lifetime nonsmoking white female who in 2021 developedblue toes on her right foot then followed by the left.  They were slow to heal but have in fact healed since then. She said some of the tips turned black but then healed.  They were also quite painful at the time.  She denies any irregular heartbeat, frostbite or cold exposure.  She did have a cardiac echo when she was in her 40s and was told she had a murmur. She denies any rest pain, claudication, stroke or TIAs.  She now presents to me for her toe pain and possible ischemia.She has previously been seen by Sree Henry and Tierney with imaging to date revealing no atheroembolic focus on CTA C/A/P, no cardioembolic focus on TTE, and no significant ectopy on a ZioPatch.There was a single four beat run of VT, and runs PSVT, with the longest run lasting 8 beats. She has been empirically treated with Eliquis AC, and Revatio to act as a vasodilator. She states those agents have helped her sxs. She has had worsening flow noted in her toes on duplex, and she ws therefore sent to me to address a possible autoimmune mediated vasospastic component. She thinks she may have had COVID last year. She hasn't felt well since then. Breathing is with ongoing difficulty and she is using inhaler more often. Weight loss of 35 lbs unintentional, poor appetite, despite being normal weight at baseline. Having a lot of night sweats. No ongoing fevers/chills. Her mother  of Wegener's.           Review Of Systems  Skin: negative  Eyes: negative  Ears/Nose/Throat: negative  Respiratory: No shortness of breath, dyspnea on exertion, cough, or hemoptysis  Cardiovascular: negative  Gastrointestinal: negative  Genitourinary: negative  Musculoskeletal: as above, no arhtralgias  Neurologic:  negative  Psychiatric: negative  Hematologic/Lymphatic/Immunologic: negative  Endocrine: negative    PAST MEDICAL HISTORY:                  Past Medical History:   Diagnosis Date     Anxiety      Asthma, mild intermittent      Migraines      Tension headaches      Unspecified hypothyroidism        PAST SURGICAL HISTORY:                  Past Surgical History:   Procedure Laterality Date     BUNIONECTOMY       ESOPHAGOSCOPY, GASTROSCOPY, DUODENOSCOPY (EGD), COMBINED N/A 4/2/2019    Procedure: ESOPHAGOSCOPY, GASTROSCOPY, DUODENOSCOPY (EGD);  Surgeon: Ochoa Cherry DO;  Location:  GI     OTHER SURGICAL HISTORY  1980    Bunionectomy       CURRENT MEDICATIONS:                  Current Outpatient Medications   Medication Sig Dispense Refill     albuterol (VENTOLIN HFA) 108 (90 Base) MCG/ACT inhaler INHALE TWO PUFFS BY MOUTH EVERY 6 HOURS AS NEEDED FOR SHORTNESS OF BREATH/DYSPNEA 18 g 3     apixaban ANTICOAGULANT (ELIQUIS) 5 MG tablet Take 1 tablet (5 mg) by mouth 2 times daily 60 tablet 11     atorvastatin (LIPITOR) 20 MG tablet Take 1 tablet (20 mg) by mouth daily 90 tablet 3     levothyroxine (SYNTHROID/LEVOTHROID) 50 MCG tablet TAKE ONE TABLET BY MOUTH ONCE DAILY 90 tablet 3     PARoxetine (PAXIL-CR) 37.5 MG 24 hr tablet TAKE ONE TABLET BY MOUTH EVERY MORNING 90 tablet 0     sildenafil (REVATIO) 20 MG tablet Take 0.5 tablets (10 mg) by mouth 3 times daily 45 tablet 11     cetirizine (ZYRTEC) 10 MG tablet Take 10 mg by mouth every evening  (Patient not taking: Reported on 11/3/2021) 30 tablet 1     fluticasone (FLONASE) 50 MCG/ACT nasal spray Spray 2 sprays into both nostrils daily (Patient not taking: Reported on 11/3/2021) 1 Package 1     fluticasone-salmeterol (ADVAIR DISKUS) 250-50 MCG/DOSE inhaler INHALE ONE PUFF BY MOUTH TWICE A DAY (Patient not taking: Reported on 11/3/2021) 180 each 3     ibuprofen (ADVIL/MOTRIN) 200 MG capsule Take 400 mg by mouth as needed for fever (Patient not taking: Reported  on 11/3/2021)         ALLERGIES:                  Allergies   Allergen Reactions     Compazine      Anxiety     Flagyl [Metronidazole Hcl] Nausea and Vomiting       SOCIAL HISTORY:                  Social History     Socioeconomic History     Marital status: Single     Spouse name: Not on file     Number of children: Not on file     Years of education: Not on file     Highest education level: Not on file   Occupational History     Not on file   Tobacco Use     Smoking status: Never Smoker     Smokeless tobacco: Never Used   Substance and Sexual Activity     Alcohol use: Yes     Comment: Occasional wine 3X's /week     Drug use: No     Sexual activity: Not Currently   Other Topics Concern     Parent/sibling w/ CABG, MI or angioplasty before 65F 55M? No   Social History Narrative     Not on file     Social Determinants of Health     Financial Resource Strain:      Difficulty of Paying Living Expenses:    Food Insecurity:      Worried About Running Out of Food in the Last Year:      Ran Out of Food in the Last Year:    Transportation Needs:      Lack of Transportation (Medical):      Lack of Transportation (Non-Medical):    Physical Activity:      Days of Exercise per Week:      Minutes of Exercise per Session:    Stress:      Feeling of Stress :    Social Connections:      Frequency of Communication with Friends and Family:      Frequency of Social Gatherings with Friends and Family:      Attends [employer] Services:      Active Member of Clubs or Organizations:      Attends Club or Organization Meetings:      Marital Status:    Intimate Partner Violence:      Fear of Current or Ex-Partner:      Emotionally Abused:      Physically Abused:      Sexually Abused:        FAMILY HISTORY:                   Family History   Problem Relation Age of Onset     Asthma Mother      Diabetes Mother      Cancer - colorectal Maternal Grandmother      Heart Disease Father         MI at age 55     Prostate Cancer Father      Asthma  Father      Asthma Sister      Obesity Sister          Physical exam Reveals:    O/P: WNL  HEENT: WNL  NECK: No JVD, thyromegaly, or lymphadenopathy  HEART: RRR, no murmurs, gallops, or rubs  LUNGS: CTA bilaterally without rales, wheezes, or rhonchi  GI: NABS, nondistended, nontender, soft  EXT:without cyanosis, clubbing, or edema  NEURO: nonfocal  : no flank tenderness      Rt femoral:      3 plus  Rt popliteal:     2 plus  Rt DP:              triphasic  Rt PT:              triphasic           Lt femoral:       3 plus  Lt popliteal:      3 plus  Lt DP:              triphasic  Lt PT:               triphasic      PPGs of bilateral lower extremity digits dated 9/30/21     Clinical history: 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 Canas     Technique: PPG waveforms were obtained with a sensor and infrared  light.     Findings:     Right foot:  First toe: Present, Normal  Second toe: Present, Severely abnormal  Third toe: Present, Severely abnormal  Fourth toe: Present, Moderately abnormal  Fifth toe: Present, Moderately abnormal     Left foot:  First toe: Present,Severely abnormal  Second toe: Present, Moderately abnormal  Third toe: Present, Moderately abnormal  Fourth toe: Present, Severely abnormal  Fifth toe: Present, Normal                                                                       Impression:  1. Abnormal PPG waveforms again noted. On side-by side comparison, the  biggest changes seen are in the left first digit, which is now  severely abnormal, compared to moderately on prior study, and the left  5th digit, which is now normal and was moderately abnormal.      HOA WESTON MD         CT ABDOMEN PELVIS WITH CONTRAST 9/2/2021 1:41 PM     CLINICAL HISTORY: Abdominal pain, acute, nonlocalized. Had noncontrast  CT yesterday, pain worse today. Abdominal pain, unspecified abdominal  location.     TECHNIQUE: CT scan of the abdomen and  pelvis was performed following  injection of IV contrast. Multiplanar reformats were obtained. Dose  reduction techniques were used.  CONTRAST: 50mL, Isovue 370     COMPARISON: CT abdomen and pelvis without IV contrast dated 9/1/2021  and 4/26/2018, CT abdomen and pelvis with IV contrast dated  12/16/2015.     FINDINGS:   LOWER CHEST: Visualized portions of the lung bases and mediastinal  contents are grossly unremarkable. There are thoracic aortic  calcifications.     HEPATOBILIARY: Questionable subtle bubble of gas is seen in the common  bile duct in the head of the pancreas indicating possible partially  sphincterotomy. Recommend clinical correlation. No definite mass in  the head of the pancreas or choledocholithiasis is seen. There may be  minimal gallbladder wall thickening. No definite cholelithiasis is  identified. No pericholecystic fluid is seen. Liver enhances normally  without focal lesion, intrahepatic biliary ductal dilatation or  choledocholithiasis.     PANCREAS: Normal.     SPLEEN: Normal.     ADRENAL GLANDS: Normal.     KIDNEYS/BLADDER: Fat-containing lesion inferior pole left kidney is  most consistent with adrenal angiomyolipoma, a benign lesion. This is  stable as compared to the prior study. The kidneys otherwise enhance  normally. No hydronephrosis, nephrolithiasis, hydroureter, or ureteral  calculus is identified. Ureters are difficult to follow due to the  relative lack of intraperitoneal adipose tissue. Urinary bladder is  grossly unremarkable.     BOWEL: The colon is difficult to follow given the lack of  intraperitoneal adipose tissue. No definite pericolonic inflammatory  changes to suggest acute diverticulitis. Appendix is not well seen. No  pericecal inflammatory change to suggest acute appendicitis. There is  stranding slightly increased density in the peritoneal adipose tissues  around the anterior aspect of the mid to lower abdomen, more so on the  right. This is of uncertain  clinical significance and etiology but  could represent mesenteritis. Small bowel appears grossly of normal  caliber. There is abnormal thickening of the wall of the gastric  antrum/pyloric/proximal duodenal region which could represent an  inflammatory process such as gastritis or ulcer disease. No obvious  perforated ulcer is identified. There is no free air or free fluid  around this region. The stomach otherwise contains a large amount of  ingested material and moderate amount of air.     PELVIC ORGANS: Enhancing structure just posterior to the urinary  bladder in the deep pelvis likely represents an atrophic uterus and is  not appreciably changed since the prior study dated 12/16/2015. The  ovaries are not well seen. No adnexal masses are identified.     ADDITIONAL FINDINGS: Incidental note of circumaortic left renal vein,  a normal variant. There is nonaneurysmal atherosclerosis. No definite  adenopathy, free air is identified. There is trace free fluid in the  pelvis.     MUSCULOSKELETAL: No aggressive osseous lesions or acute osseous  fractures are identified. There are degenerative changes in the spine,  mostly in the facet joints of the lower lumbar spine. There is also  disc height loss at L4-L5 and L5-S1. Mild anterolisthesis of L5 on S1  is degenerative in etiology.                                                                      IMPRESSION:   1.  Abnormal thickening of the wall the gastric  antrum/pylorus/proximal duodenum could represent an inflammatory or  infectious process (gastritis and possible ulcer disease) versus less  likely neoplastic infiltration. Recommend clinical correlation.  2.  Slightly increased density in the peritoneal adipose tissues  throughout the mid to lower abdomen and pelvis and slightly more so on  the right could represent mesenteritis. There is a relative lack of  intraperitoneal adipose tissue limiting evaluation of the hollow and  solid organs of the abdomen and  pelvis. The amount of intraperitoneal  adipose has decreased since the prior studies from 2018 and 2015.  3.  Appendix is not definitely seen. No obvious evidence for acute  appendicitis is identified. Appendicitis cannot be excluded on the  basis of this study.  4.  Stable angiomyolipoma inferior left kidney is again noted.  5.  Small bubble of gas in the distal common bile duct could be from  prior sphincterotomy. Recommend clinical correlation.  6.  Relative lack of intraperitoneal adipose tissue limits evaluation  of the bowel and other structures of the abdomen and pelvis.     I discussed the thickening of the wall of the gastric  antrum/pylorus/proximal duodenum, slightly increased density in the  peroneal adipose tissues, and lack of other obvious etiology of  patient's symptoms with Dr. Cherry on 9/2/2021 at 1:52 PM. We also  discussed the limitations of this study due to the relative lack of  intraperitoneal adipose tissue.     DINORA MUSTAFA MD          CT ABDOMEN/PELVIS WITHOUT CONTRAST September 1, 2021 9:55 AM     CLINICAL HISTORY: Right lower quadrant abdominal pain. No history of  surgery or cancer.     TECHNIQUE: CT scan of the abdomen and pelvis was performed without IV  contrast. Multiplanar reformats were obtained. Dose reduction  techniques were used.  CONTRAST: None.     COMPARISON: CT abdomen/pelvis dated 12/16/2015. CT chest dated  3/22/2021. More recent CT abdomen/pelvis from 4/26/2008 is not  available.     FINDINGS: Relative lack of intraperitoneal adipose tissue limits this  evaluation. Lack of IV contrast limits evaluation of the solid organs  of the abdomen and pelvis.     LOWER CHEST: Single bulla is seen in the right lower lung lobe.  Visualized portions of the lung bases are otherwise unremarkable.  There are aortic calcifications. Visualized mediastinal contents are  otherwise unremarkable.     HEPATOBILIARY: Normal.     PANCREAS: Normal.     SPLEEN: Normal.     ADRENAL GLANDS:  Normal.     KIDNEYS/BLADDER: Fatty lesion in the inferior pole of the left kidney  measures up to 1.2 cm in diameter and is most consistent with a benign  renal angiomyolipoma. The kidneys are otherwise normal in appearance  for noncontrast CT. No hydronephrosis, nephrolithiasis, hydroureter or  ureteral calculus is identified. Ureters are very difficult to follow  due to relative lack of intraperitoneal adipose tissues. Urinary  bladder is grossly unremarkable.     BOWEL: The colon is grossly of normal appearance. Moderate amount of  stool is seen in the colon. Appendix is not well seen. No pericecal  inflammatory change to suggest acute appendicitis. Evaluation of the  bowel is limited due to lack of intraperitoneal adipose tissue and  lack of IV contrast. Small bowel is grossly of normal caliber. Stomach  contains a small amount of fluid and air. Gastric wall appears mildly  thickened which is likely due to incomplete distention.     LYMPH NODES: No lymphadenopathy is identified.     VASCULATURE: There is nonaneurysmal atherosclerosis.     PELVIC ORGANS: Structure which could represent an atrophic uterus is  seen just posterior to the urinary bladder. Ovaries are not well seen.  No obvious adnexal mass is identified.     OTHER: There appears to be trace free fluid in the pelvis. No free air  is identified in the peritoneal cavity.     MUSCULOSKELETAL: Degenerative changes are noted in the spine. No  aggressive osseous lesions or acute osseous fractures.                                                                      IMPRESSION:   1.  This study is significantly limited due to the relative lack of  intraperitoneal adipose tissue and also due to the lack of IV  contrast.  2.  No diverticulitis, urinary system calculus, urinary system  obstruction, or bowel obstruction is seen. Appendix is not definitely  identified and no definite evidence for appendicitis is seen.  Appendicitis cannot be excluded due to the  limitations of this study.     I discussed the significant limitations of this study as well as the  lack of obvious etiology for patient's symptoms with Dr. Lentz on  9/1/2021 at approximately 11:30 AM, when he became available.     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     Ordering Physician: Dr. Canas     Technique: PPG waveforms were obtained with a sensor and infrared  light.     Findings:     Right foot:  First toe: Present, Normal  Second toe: Present, Moderately abnormal  Third toe: Present, Severely abnormal  Fourth toe: Present, Mildly abnormal  Fifth toe: Present, Moderately abnormal     Left foot:  First toe: Present, Moderately abnormal  Second toe: Present, Moderately abnormal  Third toe: Present, Severely abnormal  Fourth toe: Present, Severely abnormal  Fifth toe: Present, Moderate-severely abnormal                                                                      Impression:  1. Abnormal PPG waveforms in Right lower extremity digits 2-5, and  Left lower extremity digits 1-5.       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 Right lower extremity: Normal arterial velocities suggesting no  hemodynamically significant stenosis.   2. Left lower extremity: Normal arterial velocities suggesting no  hemodynamically significant stenosis.      Findings:      Right lower extremity:      Common femoral artery: 99/8 cm/sec.  Deep femoral artery: 91/11 cm/sec.  Proximal SFA: 77/1 cm/sec.  Mid SFA: 76/1 cm/sec.  Distal SFA: 61/1 cm/sec.  Popliteal artery: 32/1 cm/sec.  Anterior tibial artery: 40/1 cm/sec.  Peroneal artery: 44/3 cm/sec.  PTA  ankle: 65/1 cm/sec.  Waveforms are triphasic in the CFA to the proximal SFA, and are  biphasic from the mid SFA to the distal peroneal artery.      Left lower extremity:     Common femoral artery: 105/4 cm/sec.  Deep femoral artery: 89/7 cm/sec.  Proximal SFA: 79/2 cm/sec.  Mid SFA: 83/1 cm/sec.  Distal SFA: 74/1 cm/sec.  Popliteal artery: 40/4 cm/sec.  Anterior tibial: 53/1 cm/sec.  Peroneal artery: 28/1 cm/sec.  PTA ankle: 81/1 cm/sec.  Waveforms are triphasic in the CFA to the mid SFA, and are biphasic  from the distal SFA to the distal peroneal artery.      MICHAELA REEDER MD        [id number]  [id number]  [id number]  [id number]^TIERNEY^STEFANO     [facility]  Echocardiography Laboratory  [address] [place] [address]     Name: NISHA NIEEVS  MRN: [id number]  : 1955  Study Date: 05/10/2021 10:07 AM  Age: 66 yrs  Gender: Female  Patient Location: [facility]  Reason For Study: Embolic disease of toe (H)  History: Hyperlipidemia,Asthma  Ordering Physician: STEFANO MONET  Referring Physician: Fuentes Darby  Performed By: Sharifa Hernandez     BSA: 1.4 m2  Height: 61 in  Weight: 100 lb  HR: 60  BP: 146/80 mmHg  ______________________________________________________________________________  Procedure  Complete Echo Adult.  ______________________________________________________________________________  Interpretation Summary     1. The left ventricle is normal in structure, function and size. The visual  ejection fraction is estimated at 65%.  2. The right ventricle is normal in structure, function and size.  3. No valve disease.     No previous echo for comparison.  ______________________________________________________________________________  Left Ventricle  The left ventricle is normal in structure, function and size. There is normal  left ventricular wall thickness. The visual ejection fraction is estimated at  65%. Left ventricular diastolic function is normal. Normal left  ventricular  wall motion.     Right Ventricle  The right ventricle is normal in structure, function and size.     Atria  Normal left atrial size. Right atrial size is normal. There is no atrial shunt  seen.     Mitral Valve  There is mild (1+) mitral regurgitation.     Tricuspid Valve  There is mild (1+) tricuspid regurgitation. The right ventricular systolic  pressure is approximated at 20.4 mmHg plus the right atrial pressure.     Aortic Valve  There is trace aortic regurgitation.     Pulmonic Valve  The pulmonic valve is normal in structure and function.     Vessels  Normal ascending, transverse (arch), and descending aorta. The inferior vena  cava was normal in size with preserved respiratory variability.     Pericardium  There is no pericardial effusion.     Rhythm  Sinus rhythm was noted.  ______________________________________________________________________________  MMode/2D Measurements & Calculations  IVSd: 0.70 cm     LVIDd: 3.8 cm  LVIDs: 2.8 cm  LVPWd: 0.80 cm  FS: 26.3 %  LV mass(C)d: 78.8 grams  LV mass(C)dI: 56.0 grams/m2  Ao root diam: 3.1 cm  LA dimension: 3.0 cm  asc Aorta Diam: 2.9 cm  LA/Ao: 0.97  RWT: 0.42     Doppler Measurements & Calculations  MV E max nathan: 72.0 cm/sec  MV A max nathan: 57.8 cm/sec  MV E/A: 1.2  MV dec slope: 273.0 cm/sec2  MV dec time: 0.26 sec  TR max nathan: 226.0 cm/sec  TR max P.4 mmHg  E/E' av.7  Lateral E/e': 8.9  Medial E/e': 8.5     ______________________________________________________________________________  Report approved by: Heena Isaac 05/10/2021 02:00 PM              CTA CHEST, ABDOMEN, PELVIS RUNOFF WITH CONTRAST  3/22/2021 10:26 AM      HISTORY:  Blue toe syndrome. Concern for distal emboli. Evaluate for  embolic source.     COMPARISON: CT of the chest dated 2011     TECHNIQUE: CT angiogram of the chest, abdomen and pelvis with  bilateral lower extremity runoff was performed following the  administration of 100 cc intravenous contrast.  Images are reviewed in  multiple planes and 3-D reconstructions were also performed.    Radiation dose for this scan was reduced using automated exposure  control, adjustment of the mA and/or kV according to patient size, or  iterative reconstruction technique.     FINDINGS:      Chest: The thoracic aorta at the level of the sinuses of Valsalva has  a maximum diameter of approximately 3.1 cm. The ascending thoracic  aorta has a maximum diameter of approximately 3.6 cm. The aorta then  tapers at the level of the thoracic aortic arch. The descending  thoracic aorta is of normal caliber without aneurysmal dilatation or  significant stenosis. There is no significant soft plaque in the  thoracic aorta. The great vessels arising from the thoracic aortic  arch are patent without significant stenoses.     Abdomen/pelvis: There is minimal scattered calcified plaque in the  abdominal aorta. No evidence for an aneurysmal dilatation or  significant stenosis. The celiac trunk, superior mesenteric artery,  renal arteries, and inferior mesenteric artery are patent without  significant stenoses.     The iliac arteries are patent without significant stenoses.     Right lower extremity angiogram:  Common femoral artery: Ectatic with a maximum diameter of 10 mm. No  significant stenosis.  Profunda femoris artery: No significant stenosis.  Superficial femoral artery: No significant stenosis.  Popliteal artery: No significant stenosis.  Tibial arteries: Three-vessel runoff. No significant stenosis.     Left lower extremity angiogram:  Common femoral artery: No significant stenosis.  Profunda femoris artery: No significant stenosis.  Superficial femoral artery: No significant stenosis.  Popliteal artery: No significant stenosis.  Tibial arteries: Three-vessel runoff. No significant stenosis.     Soft tissues:     Chest: Right apical scarring. 7 mm nodule in the right upper lobe.     Abdomen/pelvis: There is a 2.5 x 1.8 cm angiomyolipoma  at the lower  pole of the left kidney. Remaining solid organs in the abdomen appear  grossly unremarkable.     The bowel appears grossly unremarkable.                                                                      IMPRESSION:  1. Mild aneurysmal dilatation of the ascending thoracic aorta which  has a maximum diameter of approximately 3.6 cm.  2. No significant soft plaque in the arteries of the chest, abdomen,  pelvis and lower extremities that could serve as a source for distal  emboli.  3. 2.7 cm angiomyolipoma at the lower pole of the left kidney.  Consider urological follow-up and follow-up CT scan in one year to  monitor for any increase in size.     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 If non found, continue symptomatic treatment as already undertaken,  Plan: Erythrocyte sedimentation rate auto, CRP         inflammation, Anti Nuclear Cassie IgG by IFA with         Reflex, Rheumatoid factor, ANCA IgG by IFA with        Reflex to Titer, Cyclic Citrullinated Peptide         Antibody IgG, Histone antibody IgG, SSA Ro EZEQUIEL         Antibody IgG, SSB La EZEQUIEL Antibody IgG,         Scleroderma Antibody Scl70 EZEQUIEL IgG, DNA double         stranded antibodies, Centromere Antibody IgG,         Mayer EZEQUIEL Antibody IgG

## 2021-11-03 NOTE — PROGRESS NOTES
"[facility]        Patient is here for a consult to discuss about blue toe syndrome     BP (!) 150/85 (BP Location: Right arm, Patient Position: Chair, Cuff Size: Adult Regular)   Pulse 75   Resp 16   Ht 5' 1\" (1.549 m)   Wt 104 lb (47.2 kg)   LMP  (LMP Unknown)   SpO2 98%   BMI 19.65 kg/m      The provider has been notified that the patient has no concerns.     Questions patient would like addressed today are: N/A.    Refills are needed: No    Has homecare services and agency name:  [facility] Salcedo [facility]      "

## 2021-11-04 ENCOUNTER — TELEPHONE (OUTPATIENT)
Dept: OTHER | Facility: CLINIC | Age: 66
End: 2021-11-04

## 2021-11-04 DIAGNOSIS — I75.023 BLUE TOE SYNDROME OF BOTH LOWER EXTREMITIES (H): Primary | ICD-10-CM

## 2021-11-04 LAB
ANA PAT SER IF-IMP: ABNORMAL
ANA SER QL IF: POSITIVE
ANA TITR SER IF: ABNORMAL {TITER}
ANCA AB PATTERN SER IF-IMP: NORMAL
C-ANCA TITR SER IF: NORMAL {TITER}
CCP AB SER IA-ACNC: 2.4 U/ML
DSDNA AB SER-ACNC: 1.6 IU/ML
ENA SM IGG SER IA-ACNC: 2.5 U/ML
ENA SM IGG SER IA-ACNC: NEGATIVE
ENA SS-A AB SER IA-ACNC: <0.5 U/ML
ENA SS-A AB SER IA-ACNC: NEGATIVE
ENA SS-B IGG SER IA-ACNC: <0.6 U/ML
ENA SS-B IGG SER IA-ACNC: NEGATIVE
RHEUMATOID FACT SER NEPH-ACNC: 8 IU/ML

## 2021-11-04 NOTE — TELEPHONE ENCOUNTER
Follow up to 11/3/21    Please arrange for:      In clinic visit early December to discuss results of labs drawn 11/3/21.    Parris Paniagua RN BSN  [facility]  [phone]

## 2021-11-05 LAB
CENTROMERE B AB SER-ACNC: <0.7 U/ML
CENTROMERE B AB SER-ACNC: NEGATIVE
ENA SCL70 IGG SER IA-ACNC: 0.8 U/ML
ENA SCL70 IGG SER IA-ACNC: NEGATIVE
HISTONE IGG SER IA-ACNC: 0.3 UNITS

## 2021-11-18 ENCOUNTER — IMMUNIZATION (OUTPATIENT)
Dept: FAMILY MEDICINE | Facility: CLINIC | Age: 66
End: 2021-11-18
Payer: COMMERCIAL

## 2021-11-18 DIAGNOSIS — Z23 NEED FOR PROPHYLACTIC VACCINATION AND INOCULATION AGAINST INFLUENZA: Primary | ICD-10-CM

## 2021-11-18 DIAGNOSIS — Z23 HIGH PRIORITY FOR 2019-NCOV VACCINE: ICD-10-CM

## 2021-11-18 PROCEDURE — 99207 PR NO CHARGE NURSE ONLY: CPT

## 2021-11-18 PROCEDURE — 0064A COVID-19,PF,MODERNA (18+ YRS BOOSTER .25ML): CPT

## 2021-11-18 PROCEDURE — 90662 IIV NO PRSV INCREASED AG IM: CPT

## 2021-11-18 PROCEDURE — 91306 COVID-19,PF,MODERNA (18+ YRS BOOSTER .25ML): CPT

## 2021-11-18 PROCEDURE — 90471 IMMUNIZATION ADMIN: CPT

## 2021-11-18 NOTE — PROGRESS NOTES
Prior to immunization administration, verified patients identity using patient s name and date of birth. Please see Immunization Activity for additional information.     Screening Questionnaire for Adult Immunization    Are you sick today?   No   Do you have allergies to medications, food, a vaccine component or latex?   No   Have you ever had a serious reaction after receiving a vaccination?   No   Do you have a long-term health problem with heart, lung, kidney, or metabolic disease (e.g., diabetes), asthma, a blood disorder, no spleen, complement component deficiency, a cochlear implant, or a spinal fluid leak?  Are you on long-term aspirin therapy?   No   Do you have cancer, leukemia, HIV/AIDS, or any other immune system problem?   No   Do you have a parent, brother, or sister with an immune system problem?   No   In the past 3 months, have you taken medications that affect  your immune system, such as prednisone, other steroids, or anticancer drugs; drugs for the treatment of rheumatoid arthritis, Crohn s disease, or psoriasis; or have you had radiation treatments?   No   Have you had a seizure, or a brain or other nervous system problem?   No   During the past year, have you received a transfusion of blood or blood    products, or been given immune (gamma) globulin or antiviral drug?   No   For women: Are you pregnant or is there a chance you could become       pregnant during the next month?   No   Have you received any vaccinations in the past 4 weeks?   No     Immunization questionnaire answers were all negative.        Per orders of Dr. Darby, injection of Flu and Moderna given by Lorena Porter. Patient instructed to remain in clinic for 15 minutes afterwards, and to report any adverse reaction to me immediately.       Screening performed by Lorena Porter on 11/18/2021 at 1:27 PM.

## 2021-12-06 ENCOUNTER — VIRTUAL VISIT (OUTPATIENT)
Dept: OTHER | Facility: CLINIC | Age: 66
End: 2021-12-06
Attending: INTERNAL MEDICINE
Payer: COMMERCIAL

## 2021-12-06 VITALS — BODY MASS INDEX: 19.26 KG/M2 | HEIGHT: 61 IN | OXYGEN SATURATION: 92 % | WEIGHT: 102 LBS

## 2021-12-06 DIAGNOSIS — R76.0 ANTINUCLEAR ANTIBODY (ANA) TITER GREATER THAN 1:80: Primary | ICD-10-CM

## 2021-12-06 PROCEDURE — 99213 OFFICE O/P EST LOW 20 MIN: CPT | Mod: GT | Performed by: INTERNAL MEDICINE

## 2021-12-06 ASSESSMENT — MIFFLIN-ST. JEOR: SCORE: 930.05

## 2021-12-06 NOTE — PROGRESS NOTES
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        HPI: Brissa Mayer  is a 66-year-old lifetime nonsmoking white female who in 2021 developed blue toes on her right foot then followed by the left.  They were slow to heal but have in fact healed since then. She said some of the tips turned black but then healed.  They were also quite painful at the time.  She denies any irregular heartbeat, frostbite or cold exposure.  She did have a cardiac echo when she was in her 40s and was told she had a murmur. She denies any rest pain, claudication, stroke or TIAs.  She now presents to me for her toe pain and possible ischemia. She has previously been seen by Sree Henry and [facility] with imaging to date revealing no atheroembolic focus on CTA C/A/P, no cardioembolic focus on TTE, and no significant ectopy on a ZioPatch. There was a single four beat run of VT, and runs PSVT, with the longest run lasting 8 beats. She has been empirically treated with Eliquis AC, and Revatio to act as a vasodilator. She states those agents have helped her sxs. She has had worsening flow noted in her toes on duplex, and she was therefore sent to me to address a possible autoimmune mediated vasospastic component. She thinks she may have had COVID last year. She hasn't felt well since then. Breathing is with ongoing difficulty and she is using inhaler more often. Weight loss of 35 lbs unintentional, poor appetite, despite being normal weight at baseline. Having a lot of night sweats. No ongoing fevers/chills. Her mother  of Wegener's.    When last seen, I stated we should attempt to rule out an autoimmune mediated component. We checked labs revealing that her JAUN is positive at 1:160 in a dense fine speckled pattern. However, negative or normal studies included ESR and CRP, Ro, La, Scleroderma, RF, CCP, histone,  centromere, DS DNA Abs.    Since last having been seen, she stopped Eliquis and Revatio as she did not like how they made her feel. She has had complete resolution of sxs. She has no arthralgias or myalgias. She did stub her toe and noted that her nail on that toe became  Black and blue but that the toe itself is otherwise normal.         Review Of Systems  Skin: negative  Eyes: negative  Ears/Nose/Throat: negative  Respiratory: No shortness of breath, dyspnea on exertion, cough, or hemoptysis  Cardiovascular: negative  Gastrointestinal: negative  Genitourinary: negative  Musculoskeletal: as above  Neurologic: negative  Psychiatric: negative  Hematologic/Lymphatic/Immunologic: negative  Endocrine: negative    PAST MEDICAL HISTORY:                  Past Medical History:   Diagnosis Date     Anxiety      Asthma, mild intermittent      Migraines      Tension headaches      Unspecified hypothyroidism        PAST SURGICAL HISTORY:                  Past Surgical History:   Procedure Laterality Date     BUNIONECTOMY       ESOPHAGOSCOPY, GASTROSCOPY, DUODENOSCOPY (EGD), COMBINED N/A 4/2/2019    Procedure: ESOPHAGOSCOPY, GASTROSCOPY, DUODENOSCOPY (EGD);  Surgeon: Ochoa Cherry DO;  Location:  GI     OTHER SURGICAL HISTORY  1980    Bunionectomy       CURRENT MEDICATIONS:                  Current Outpatient Medications   Medication Sig Dispense Refill     albuterol (VENTOLIN HFA) 108 (90 Base) MCG/ACT inhaler INHALE TWO PUFFS BY MOUTH EVERY 6 HOURS AS NEEDED FOR SHORTNESS OF BREATH/DYSPNEA 18 g 3     atorvastatin (LIPITOR) 20 MG tablet Take 1 tablet (20 mg) by mouth daily 90 tablet 3     fluticasone (FLONASE) 50 MCG/ACT nasal spray Spray 2 sprays into both nostrils daily 1 Package 1     fluticasone-salmeterol (ADVAIR DISKUS) 250-50 MCG/DOSE inhaler INHALE ONE PUFF BY MOUTH TWICE A  each 3     ibuprofen (ADVIL/MOTRIN) 200 MG capsule Take 400 mg by mouth as needed for fever        levothyroxine  (SYNTHROID/LEVOTHROID) 50 MCG tablet TAKE ONE TABLET BY MOUTH ONCE DAILY 90 tablet 3     PARoxetine (PAXIL-CR) 37.5 MG 24 hr tablet TAKE ONE TABLET BY MOUTH EVERY MORNING 90 tablet 0     apixaban ANTICOAGULANT (ELIQUIS) 5 MG tablet Take 1 tablet (5 mg) by mouth 2 times daily (Patient not taking: Reported on 12/6/2021) 60 tablet 11     cetirizine (ZYRTEC) 10 MG tablet Take 10 mg by mouth every evening  (Patient not taking: Reported on 11/3/2021) 30 tablet 1     sildenafil (REVATIO) 20 MG tablet Take 0.5 tablets (10 mg) by mouth 3 times daily (Patient not taking: Reported on 12/6/2021) 45 tablet 11       ALLERGIES:                  Allergies   Allergen Reactions     Compazine      Anxiety     Flagyl [Metronidazole Hcl] Nausea and Vomiting       SOCIAL HISTORY:                  Social History     Socioeconomic History     Marital status: Single     Spouse name: Not on file     Number of children: Not on file     Years of education: Not on file     Highest education level: Not on file   Occupational History     Not on file   Tobacco Use     Smoking status: Never Smoker     Smokeless tobacco: Never Used   Substance and Sexual Activity     Alcohol use: Yes     Comment: Occasional wine 3X's /week     Drug use: No     Sexual activity: Not Currently   Other Topics Concern     Parent/sibling w/ CABG, MI or angioplasty before 65F 55M? No   Social History Narrative     Not on file     Social Determinants of Health     Financial Resource Strain: Not on file   Food Insecurity: Not on file   Transportation Needs: Not on file   Physical Activity: Not on file   Stress: Not on file   Social Connections: Not on file   Intimate Partner Violence: Not on file   Housing Stability: Not on file       FAMILY HISTORY:                   Family History   Problem Relation Age of Onset     Asthma Mother      Diabetes Mother      Cancer - colorectal Maternal Grandmother      Heart Disease Father         MI at age 55     Prostate Cancer Father       Asthma Father      Asthma Sister      Obesity Sister              Physical exam was not performed as it was a video visit.           PPGs of bilateral lower extremity digits dated 9/30/21     Clinical history: 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 Canas     Technique: PPG waveforms were obtained with a sensor and infrared  light.     Findings:     Right foot:  First toe: Present, Normal  Second toe: Present, Severely abnormal  Third toe: Present, Severely abnormal  Fourth toe: Present, Moderately abnormal  Fifth toe: Present, Moderately abnormal     Left foot:  First toe: Present,Severely abnormal  Second toe: Present, Moderately abnormal  Third toe: Present, Moderately abnormal  Fourth toe: Present, Severely abnormal  Fifth toe: Present, Normal                                                                       Impression:  1. Abnormal PPG waveforms again noted. On side-by side comparison, the  biggest changes seen are in the left first digit, which is now  severely abnormal, compared to moderately on prior study, and the left  5th digit, which is now normal and was moderately abnormal.      HOA WESTON MD            CT ABDOMEN PELVIS WITH CONTRAST 9/2/2021 1:41 PM     CLINICAL HISTORY: Abdominal pain, acute, nonlocalized. Had noncontrast  CT yesterday, pain worse today. Abdominal pain, unspecified abdominal  location.     TECHNIQUE: CT scan of the abdomen and pelvis was performed following  injection of IV contrast. Multiplanar reformats were obtained. Dose  reduction techniques were used.  CONTRAST: 50mL, Isovue 370     COMPARISON: CT abdomen and pelvis without IV contrast dated 9/1/2021  and 4/26/2018, CT abdomen and pelvis with IV contrast dated  12/16/2015.     FINDINGS:   LOWER CHEST: Visualized portions of the lung bases and mediastinal  contents are grossly unremarkable. There are thoracic  aortic  calcifications.     HEPATOBILIARY: Questionable subtle bubble of gas is seen in the common  bile duct in the head of the pancreas indicating possible partially  sphincterotomy. Recommend clinical correlation. No definite mass in  the head of the pancreas or choledocholithiasis is seen. There may be  minimal gallbladder wall thickening. No definite cholelithiasis is  identified. No pericholecystic fluid is seen. Liver enhances normally  without focal lesion, intrahepatic biliary ductal dilatation or  choledocholithiasis.     PANCREAS: Normal.     SPLEEN: Normal.     ADRENAL GLANDS: Normal.     KIDNEYS/BLADDER: Fat-containing lesion inferior pole left kidney is  most consistent with adrenal angiomyolipoma, a benign lesion. This is  stable as compared to the prior study. The kidneys otherwise enhance  normally. No hydronephrosis, nephrolithiasis, hydroureter, or ureteral  calculus is identified. Ureters are difficult to follow due to the  relative lack of intraperitoneal adipose tissue. Urinary bladder is  grossly unremarkable.     BOWEL: The colon is difficult to follow given the lack of  intraperitoneal adipose tissue. No definite pericolonic inflammatory  changes to suggest acute diverticulitis. Appendix is not well seen. No  pericecal inflammatory change to suggest acute appendicitis. There is  stranding slightly increased density in the peritoneal adipose tissues  around the anterior aspect of the mid to lower abdomen, more so on the  right. This is of uncertain clinical significance and etiology but  could represent mesenteritis. Small bowel appears grossly of normal  caliber. There is abnormal thickening of the wall of the gastric  antrum/pyloric/proximal duodenal region which could represent an  inflammatory process such as gastritis or ulcer disease. No obvious  perforated ulcer is identified. There is no free air or free fluid  around this region. The stomach otherwise contains a large amount  of  ingested material and moderate amount of air.     PELVIC ORGANS: Enhancing structure just posterior to the urinary  bladder in the deep pelvis likely represents an atrophic uterus and is  not appreciably changed since the prior study dated 12/16/2015. The  ovaries are not well seen. No adnexal masses are identified.     ADDITIONAL FINDINGS: Incidental note of circumaortic left renal vein,  a normal variant. There is nonaneurysmal atherosclerosis. No definite  adenopathy, free air is identified. There is trace free fluid in the  pelvis.     MUSCULOSKELETAL: No aggressive osseous lesions or acute osseous  fractures are identified. There are degenerative changes in the spine,  mostly in the facet joints of the lower lumbar spine. There is also  disc height loss at L4-L5 and L5-S1. Mild anterolisthesis of L5 on S1  is degenerative in etiology.                                                                      IMPRESSION:   1.  Abnormal thickening of the wall the gastric  antrum/pylorus/proximal duodenum could represent an inflammatory or  infectious process (gastritis and possible ulcer disease) versus less  likely neoplastic infiltration. Recommend clinical correlation.  2.  Slightly increased density in the peritoneal adipose tissues  throughout the mid to lower abdomen and pelvis and slightly more so on  the right could represent mesenteritis. There is a relative lack of  intraperitoneal adipose tissue limiting evaluation of the hollow and  solid organs of the abdomen and pelvis. The amount of intraperitoneal  adipose has decreased since the prior studies from 2018 and 2015.  3.  Appendix is not definitely seen. No obvious evidence for acute  appendicitis is identified. Appendicitis cannot be excluded on the  basis of this study.  4.  Stable angiomyolipoma inferior left kidney is again noted.  5.  Small bubble of gas in the distal common bile duct could be from  prior sphincterotomy. Recommend clinical  correlation.  6.  Relative lack of intraperitoneal adipose tissue limits evaluation  of the bowel and other structures of the abdomen and pelvis.     I discussed the thickening of the wall of the gastric  antrum/pylorus/proximal duodenum, slightly increased density in the  peroneal adipose tissues, and lack of other obvious etiology of  patient's symptoms with Dr. Cherry on 9/2/2021 at 1:52 PM. We also  discussed the limitations of this study due to the relative lack of  intraperitoneal adipose tissue.     DINORA MUSTAFA MD            CT ABDOMEN/PELVIS WITHOUT CONTRAST September 1, 2021 9:55 AM     CLINICAL HISTORY: Right lower quadrant abdominal pain. No history of  surgery or cancer.     TECHNIQUE: CT scan of the abdomen and pelvis was performed without IV  contrast. Multiplanar reformats were obtained. Dose reduction  techniques were used.  CONTRAST: None.     COMPARISON: CT abdomen/pelvis dated 12/16/2015. CT chest dated  3/22/2021. More recent CT abdomen/pelvis from 4/26/2008 is not  available.     FINDINGS: Relative lack of intraperitoneal adipose tissue limits this  evaluation. Lack of IV contrast limits evaluation of the solid organs  of the abdomen and pelvis.     LOWER CHEST: Single bulla is seen in the right lower lung lobe.  Visualized portions of the lung bases are otherwise unremarkable.  There are aortic calcifications. Visualized mediastinal contents are  otherwise unremarkable.     HEPATOBILIARY: Normal.     PANCREAS: Normal.     SPLEEN: Normal.     ADRENAL GLANDS: Normal.     KIDNEYS/BLADDER: Fatty lesion in the inferior pole of the left kidney  measures up to 1.2 cm in diameter and is most consistent with a benign  renal angiomyolipoma. The kidneys are otherwise normal in appearance  for noncontrast CT. No hydronephrosis, nephrolithiasis, hydroureter or  ureteral calculus is identified. Ureters are very difficult to follow  due to relative lack of intraperitoneal adipose tissues.  Urinary  bladder is grossly unremarkable.     BOWEL: The colon is grossly of normal appearance. Moderate amount of  stool is seen in the colon. Appendix is not well seen. No pericecal  inflammatory change to suggest acute appendicitis. Evaluation of the  bowel is limited due to lack of intraperitoneal adipose tissue and  lack of IV contrast. Small bowel is grossly of normal caliber. Stomach  contains a small amount of fluid and air. Gastric wall appears mildly  thickened which is likely due to incomplete distention.     LYMPH NODES: No lymphadenopathy is identified.     VASCULATURE: There is nonaneurysmal atherosclerosis.     PELVIC ORGANS: Structure which could represent an atrophic uterus is  seen just posterior to the urinary bladder. Ovaries are not well seen.  No obvious adnexal mass is identified.     OTHER: There appears to be trace free fluid in the pelvis. No free air  is identified in the peritoneal cavity.     MUSCULOSKELETAL: Degenerative changes are noted in the spine. No  aggressive osseous lesions or acute osseous fractures.                                                                      IMPRESSION:   1.  This study is significantly limited due to the relative lack of  intraperitoneal adipose tissue and also due to the lack of IV  contrast.  2.  No diverticulitis, urinary system calculus, urinary system  obstruction, or bowel obstruction is seen. Appendix is not definitely  identified and no definite evidence for appendicitis is seen.  Appendicitis cannot be excluded due to the limitations of this study.     I discussed the significant limitations of this study as well as the  lack of obvious etiology for patient's symptoms with Dr. Lentz on  9/1/2021 at approximately 11:30 AM, when he became available.     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     Ordering Physician:   [place]     Technique: PPG waveforms were obtained with a sensor and infrared  light.     Findings:     Right foot:  First toe: Present, Normal  Second toe: Present, Moderately abnormal  Third toe: Present, Severely abnormal  Fourth toe: Present, Mildly abnormal  Fifth toe: Present, Moderately abnormal     Left foot:  First toe: Present, Moderately abnormal  Second toe: Present, Moderately abnormal  Third toe: Present, Severely abnormal  Fourth toe: Present, Severely abnormal  Fifth toe: Present, Moderate-severely abnormal                                                                      Impression:  1. Abnormal PPG waveforms in Right lower extremity digits 2-5, and  Left lower extremity digits 1-5.       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 Right lower extremity: Normal arterial velocities suggesting no  hemodynamically significant stenosis.   2. Left lower extremity: Normal arterial velocities suggesting no  hemodynamically significant stenosis.      Findings:      Right lower extremity:      Common femoral artery: 99/8 cm/sec.  Deep femoral artery: 91/11 cm/sec.  Proximal SFA: 77/1 cm/sec.  Mid SFA: 76/1 cm/sec.  Distal SFA: 61/1 cm/sec.  Popliteal artery: 32/1 cm/sec.  Anterior tibial artery: 40/1 cm/sec.  Peroneal artery: 44/3 cm/sec.  PTA ankle: 65/1 cm/sec.  Waveforms are triphasic in the CFA to the proximal SFA, and are  biphasic from the mid SFA to the distal peroneal artery.      Left lower extremity:     Common femoral artery: 105/4 cm/sec.  Deep femoral artery: 89/7 cm/sec.  Proximal SFA: 79/2 cm/sec.  Mid SFA: 83/1 cm/sec.  Distal SFA: 74/1 cm/sec.  Popliteal artery: 40/4 cm/sec.  Anterior tibial: 53/1 cm/sec.  Peroneal artery: 28/1 cm/sec.  PTA ankle:  81/1 cm/sec.  Waveforms are triphasic in the CFA to the mid SFA, and are biphasic  from the distal SFA to the distal peroneal artery.      MICHAELA REEDER MD         [id number]  [id number]  [id number]  [id number]^TIERNEY^STEFANO     [facility]  Echocardiography Laboratory  [address] [place] [address]     Name: NISHA NIEVES  MRN: [id number]  : 1955  Study Date: 05/10/2021 10:07 AM  Age: 66 yrs  Gender: Female  Patient Location: [facility]  Reason For Study: Embolic disease of toe (H)  History: Hyperlipidemia,Asthma  Ordering Physician: STEFANO MONET  Referring Physician: Fuentes Darby  Performed By: Sharifa Hernandez     BSA: 1.4 m2  Height: 61 in  Weight: 100 lb  HR: 60  BP: 146/80 mmHg  ______________________________________________________________________________  Procedure  Complete Echo Adult.  ______________________________________________________________________________  Interpretation Summary     1. The left ventricle is normal in structure, function and size. The visual  ejection fraction is estimated at 65%.  2. The right ventricle is normal in structure, function and size.  3. No valve disease.     No previous echo for comparison.  ______________________________________________________________________________  Left Ventricle  The left ventricle is normal in structure, function and size. There is normal  left ventricular wall thickness. The visual ejection fraction is estimated at  65%. Left ventricular diastolic function is normal. Normal left ventricular  wall motion.     Right Ventricle  The right ventricle is normal in structure, function and size.     Atria  Normal left atrial size. Right atrial size is normal. There is no atrial shunt  seen.     Mitral Valve  There is mild (1+) mitral regurgitation.     Tricuspid Valve  There is mild (1+) tricuspid regurgitation. The right ventricular systolic  pressure is approximated at 20.4 mmHg plus the right atrial  pressure.     Aortic Valve  There is trace aortic regurgitation.     Pulmonic Valve  The pulmonic valve is normal in structure and function.     Vessels  Normal ascending, transverse (arch), and descending aorta. The inferior vena  cava was normal in size with preserved respiratory variability.     Pericardium  There is no pericardial effusion.     Rhythm  Sinus rhythm was noted.  ______________________________________________________________________________  MMode/2D Measurements & Calculations  IVSd: 0.70 cm     LVIDd: 3.8 cm  LVIDs: 2.8 cm  LVPWd: 0.80 cm  FS: 26.3 %  LV mass(C)d: 78.8 grams  LV mass(C)dI: 56.0 grams/m2  Ao root diam: 3.1 cm  LA dimension: 3.0 cm  asc Aorta Diam: 2.9 cm  LA/Ao: 0.97  RWT: 0.42     Doppler Measurements & Calculations  MV E max nathan: 72.0 cm/sec  MV A max nathan: 57.8 cm/sec  MV E/A: 1.2  MV dec slope: 273.0 cm/sec2  MV dec time: 0.26 sec  TR max nathan: 226.0 cm/sec  TR max P.4 mmHg  E/E' av.7  Lateral E/e': 8.9  Medial E/e': 8.5     ______________________________________________________________________________  Report approved by: Heena Isaac 05/10/2021 02:00 PM                 CTA CHEST, ABDOMEN, PELVIS RUNOFF WITH CONTRAST  3/22/2021 10:26 AM      HISTORY:  Blue toe syndrome. Concern for distal emboli. Evaluate for  embolic source.     COMPARISON: CT of the chest dated 2011     TECHNIQUE: CT angiogram of the chest, abdomen and pelvis with  bilateral lower extremity runoff was performed following the  administration of 100 cc intravenous contrast. Images are reviewed in  multiple planes and 3-D reconstructions were also performed.    Radiation dose for this scan was reduced using automated exposure  control, adjustment of the mA and/or kV according to patient size, or  iterative reconstruction technique.     FINDINGS:      Chest: The thoracic aorta at the level of the sinuses of Valsalva has  a maximum diameter of approximately 3.1 cm. The ascending  thoracic  aorta has a maximum diameter of approximately 3.6 cm. The aorta then  tapers at the level of the thoracic aortic arch. The descending  thoracic aorta is of normal caliber without aneurysmal dilatation or  significant stenosis. There is no significant soft plaque in the  thoracic aorta. The great vessels arising from the thoracic aortic  arch are patent without significant stenoses.     Abdomen/pelvis: There is minimal scattered calcified plaque in the  abdominal aorta. No evidence for an aneurysmal dilatation or  significant stenosis. The celiac trunk, superior mesenteric artery,  renal arteries, and inferior mesenteric artery are patent without  significant stenoses.     The iliac arteries are patent without significant stenoses.     Right lower extremity angiogram:  Common femoral artery: Ectatic with a maximum diameter of 10 mm. No  significant stenosis.  Profunda femoris artery: No significant stenosis.  Superficial femoral artery: No significant stenosis.  Popliteal artery: No significant stenosis.  Tibial arteries: Three-vessel runoff. No significant stenosis.     Left lower extremity angiogram:  Common femoral artery: No significant stenosis.  Profunda femoris artery: No significant stenosis.  Superficial femoral artery: No significant stenosis.  Popliteal artery: No significant stenosis.  Tibial arteries: Three-vessel runoff. No significant stenosis.     Soft tissues:     Chest: Right apical scarring. 7 mm nodule in the right upper lobe.     Abdomen/pelvis: There is a 2.5 x 1.8 cm angiomyolipoma at the lower  pole of the left kidney. Remaining solid organs in the abdomen appear  grossly unremarkable.     The bowel appears grossly unremarkable.                                                                      IMPRESSION:  1. Mild aneurysmal dilatation of the ascending thoracic aorta which  has a maximum diameter of approximately 3.6 cm.  2. No significant soft plaque in the arteries of the chest,  abdomen,  pelvis and lower extremities that could serve as a source for distal  emboli.  3. 2.7 cm angiomyolipoma at the lower pole of the left kidney.  Consider urological follow-up and follow-up CT scan in one year to  monitor for any increase in size.     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 If non found, continue symptomatic treatment as already undertaken,  Plan: Erythrocyte sedimentation rate auto, CRP         inflammation, Anti Nuclear Cassie IgG by IFA with         Reflex, Rheumatoid factor, ANCA IgG by IFA with        Reflex to Titer, Cyclic Citrullinated Peptide         Antibody IgG, Histone antibody IgG, SSA Ro EZEQUIEL         Antibody IgG, SSB La EZEQUIEL Antibody IgG,         Scleroderma Antibody Scl70 EZEQUIEL IgG, DNA double         stranded antibodies, Centromere Antibody IgG,         Mayer EZEQUIEL Antibody IgG                     Instructions     After Visit Summary (Automatic SnapShot taken 11/3/2021)      Component      Latest Ref Rng & Units 11/3/2021   JAUN interpretation      Negative Positive (A)   JAUN pattern 1       Dense fine speckled   JAUN titer 1       1:160   Neutrophil Cytoplasmic Antibody      <1:10 <1:10   Neutrophil Cytoplasmic Antibody Pattern       The ANCA IFA is <1:10.  No further testing will be performed.   SSA Cassie IgG Instrument Value      <7.0 U/mL <0.5   SSA (Ro) Antibody IgG      Negative Negative   SSB Cassie IgG Instrument Value      <7.0 U/mL <0.6   SSB (La) Antibody IgG      Negative Negative   Scl-70 Cassie IgG Instrument Value      <7.0 U/mL 0.8   Scleroderma Antibody Scl-70 EZEQUIEL IgG      Negative Negative   Centromere Cassie IgG Instrument Value      <7.0 U/mL <0.7   Centromere Antibody IgG      Negative Negative   Mayer EZEQUIEL Cassie IgG Instrument Value      <7.0 U/mL 2.5   Smith EZEQUIEL Antibody IgG      Negative Negative    Sed Rate      0 - 30 mm/hr 8   CRP Inflammation      0.0 - 8.0 mg/L <2.9   Rheumatoid Factor      <20 IU/mL 8   Cyclic Citrullinated Peptide Antibody, IgG      <7.0 U/mL 2.4   Histone Antibody,IgG      0.0 - 0.9 Units 0.3   DNA-ds      <10.0 IU/mL 1.6         A/P:    (R76.0) Antinuclear antibody (JAUN) titer greater than 1:80 w/o sxs suggestive of autoimmune disease  (primary encounter diagnosis)  Comment: 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 She has no autoimmune sxs. Her JAUN is elevated. All other above Abs are normal. This is likely falsely elevated.  Plan: Check complement levels. Virtual visit f/u two weeks later.      20 minutes total medical care on today's date

## 2021-12-06 NOTE — PROGRESS NOTES
TOBY is a 66 year old who is being evaluated via a billable video visit.      How would you like to obtain your AVS? MyChart  If the video visit is dropped, the invitation should be resent by: Text to cell phone: [phone]  Will anyone else be joining your video visit? - sister will be present         Vitals:  No vitals were obtained today due to virtual visit.

## 2021-12-07 ENCOUNTER — TELEPHONE (OUTPATIENT)
Dept: OTHER | Facility: CLINIC | Age: 66
End: 2021-12-07
Payer: COMMERCIAL

## 2021-12-07 NOTE — TELEPHONE ENCOUNTER
Follow up to 12/6/21    Please arrange for:      Non-fasting labs (preferrably in [place])    Virtual visit two weeks later.    Parris Paniagua RN BSN  [facility]  [phone]

## 2021-12-08 NOTE — TELEPHONE ENCOUNTER
12.8.21 Called and spoke with patient. She is going to call and schedule lab @ [place] and call us back to schedule follow up with Dr. Garcia 2 weeks later.

## 2021-12-09 ENCOUNTER — LAB (OUTPATIENT)
Dept: LAB | Facility: CLINIC | Age: 66
End: 2021-12-09
Payer: COMMERCIAL

## 2021-12-09 DIAGNOSIS — R76.0 ANTINUCLEAR ANTIBODY (ANA) TITER GREATER THAN 1:80: ICD-10-CM

## 2021-12-09 LAB
C3 SERPL-MCNC: 92 MG/DL (ref 81–157)
C4 SERPL-MCNC: 18 MG/DL (ref 13–39)

## 2021-12-09 PROCEDURE — 86160 COMPLEMENT ANTIGEN: CPT | Mod: 59

## 2021-12-09 PROCEDURE — 99000 SPECIMEN HANDLING OFFICE-LAB: CPT

## 2021-12-09 PROCEDURE — 36415 COLL VENOUS BLD VENIPUNCTURE: CPT

## 2021-12-09 PROCEDURE — 86162 COMPLEMENT TOTAL (CH50): CPT | Mod: 90

## 2021-12-09 PROCEDURE — 86160 COMPLEMENT ANTIGEN: CPT

## 2021-12-11 LAB — CH50 SERPL-ACNC: 53.1 U/ML

## 2021-12-23 ENCOUNTER — VIRTUAL VISIT (OUTPATIENT)
Dept: OTHER | Facility: CLINIC | Age: 66
End: 2021-12-23
Attending: INTERNAL MEDICINE
Payer: COMMERCIAL

## 2021-12-23 DIAGNOSIS — R76.0 ANTINUCLEAR ANTIBODY (ANA) TITER GREATER THAN 1:80: ICD-10-CM

## 2021-12-23 PROCEDURE — 99213 OFFICE O/P EST LOW 20 MIN: CPT | Mod: GT | Performed by: INTERNAL MEDICINE

## 2021-12-23 NOTE — PROGRESS NOTES
Brissa Mayer is a 66 year old female who is presenting at the current time to discuss her diagnosi(es) of       Antinuclear antibody (JAUN) titer greater than 1:80             HPI: Brissa Mayer  is a 66-year-old lifetime nonsmoking white female who in 2021 developed blue toes on her right foot then followed by the left.  They were slow to heal but have in fact healed since then. She said some of the tips turned black but then healed.  They were also quite painful at the time.  She denies any irregular heartbeat, frostbite or cold exposure.  She did have a cardiac echo when she was in her 40s and was told she had a murmur. She denies any rest pain, claudication, stroke or TIAs.  She now presents to me for her toe pain and possible ischemia. She has previously been seen by Sree Henry and [facility] with imaging to date revealing no atheroembolic focus on CTA C/A/P, no cardioembolic focus on TTE, and no significant ectopy on a ZioPatch. There was a single four beat run of VT, and runs PSVT, with the longest run lasting 8 beats. She has been empirically treated with Eliquis AC, and Revatio to act as a vasodilator. She states those agents have helped her sxs. She has had worsening flow noted in her toes on duplex, and she was therefore sent to me to address a possible autoimmune mediated vasospastic component. She thinks she may have had COVID last year. She hasn't felt well since then. Breathing is with ongoing difficulty and she is using inhaler more often. Weight loss of 35 lbs unintentional, poor appetite, despite being normal weight at baseline. Having a lot of night sweats. No ongoing fevers/chills. Her mother  of Wegener's.     When last seen, I stated we should attempt to rule out an autoimmune mediated component. We checked labs revealing that her JAUN is positive at 1:160 in a dense fine speckled pattern. However, negative or normal studies included ESR and CRP, Ro, La, Scleroderma, RF, CCP, histone,  centromere, DS DNA Abs.     Since last having been seen, she stopped Eliquis and Revatio as she did not like how they made her feel. She has had complete resolution of sxs. She has no arthralgias or myalgias. She did stub her toe and noted that her nail on that toe became black and blue but that the toe itself is otherwise normal.              Review Of Systems  Skin: negative  Eyes: negative  Ears/Nose/Throat: negative  Respiratory: No shortness of breath, dyspnea on exertion, cough, or hemoptysis  Cardiovascular: negative  Gastrointestinal: negative  Genitourinary: negative  Musculoskeletal: negative  Neurologic: negative  Psychiatric: negative  Hematologic/Lymphatic/Immunologic: negative  Endocrine: negative  .      PAST MEDICAL HISTORY:                  Past Medical History:   Diagnosis Date     Anxiety      Asthma, mild intermittent      Migraines      Tension headaches      Unspecified hypothyroidism        PAST SURGICAL HISTORY:                  Past Surgical History:   Procedure Laterality Date     BUNIONECTOMY       ESOPHAGOSCOPY, GASTROSCOPY, DUODENOSCOPY (EGD), COMBINED N/A 4/2/2019    Procedure: ESOPHAGOSCOPY, GASTROSCOPY, DUODENOSCOPY (EGD);  Surgeon: Ochoa Cherry DO;  Location:  GI     OTHER SURGICAL HISTORY  1980    Bunionectomy       CURRENT MEDICATIONS:                  Current Outpatient Medications   Medication Sig Dispense Refill     albuterol (VENTOLIN HFA) 108 (90 Base) MCG/ACT inhaler INHALE TWO PUFFS BY MOUTH EVERY 6 HOURS AS NEEDED FOR SHORTNESS OF BREATH/DYSPNEA 18 g 3     apixaban ANTICOAGULANT (ELIQUIS) 5 MG tablet Take 1 tablet (5 mg) by mouth 2 times daily 60 tablet 11     atorvastatin (LIPITOR) 20 MG tablet Take 1 tablet (20 mg) by mouth daily 90 tablet 3     cetirizine (ZYRTEC) 10 MG tablet Take 10 mg by mouth every evening  30 tablet 1     fluticasone (FLONASE) 50 MCG/ACT nasal spray Spray 2 sprays into both nostrils daily 1 Package 1     fluticasone-salmeterol (ADVAIR  DISKUS) 250-50 MCG/DOSE inhaler INHALE ONE PUFF BY MOUTH TWICE A  each 3     ibuprofen (ADVIL/MOTRIN) 200 MG capsule Take 400 mg by mouth as needed for fever        levothyroxine (SYNTHROID/LEVOTHROID) 50 MCG tablet TAKE ONE TABLET BY MOUTH ONCE DAILY 90 tablet 3     PARoxetine (PAXIL-CR) 37.5 MG 24 hr tablet TAKE ONE TABLET BY MOUTH EVERY MORNING 90 tablet 0     sildenafil (REVATIO) 20 MG tablet Take 0.5 tablets (10 mg) by mouth 3 times daily 45 tablet 11       ALLERGIES:                  Allergies   Allergen Reactions     Compazine      Anxiety     Flagyl [Metronidazole Hcl] Nausea and Vomiting       SOCIAL HISTORY:                  Social History     Socioeconomic History     Marital status: Single     Spouse name: Not on file     Number of children: Not on file     Years of education: Not on file     Highest education level: Not on file   Occupational History     Not on file   Tobacco Use     Smoking status: Never Smoker     Smokeless tobacco: Never Used   Substance and Sexual Activity     Alcohol use: Yes     Comment: Occasional wine 3X's /week     Drug use: No     Sexual activity: Not Currently   Other Topics Concern     Parent/sibling w/ CABG, MI or angioplasty before 65F 55M? No   Social History Narrative     Not on file     Social Determinants of Health     Financial Resource Strain: Not on file   Food Insecurity: Not on file   Transportation Needs: Not on file   Physical Activity: Not on file   Stress: Not on file   Social Connections: Not on file   Intimate Partner Violence: Not on file   Housing Stability: Not on file       FAMILY HISTORY:                   Family History   Problem Relation Age of Onset     Asthma Mother      Diabetes Mother      Cancer - colorectal Maternal Grandmother      Heart Disease Father         MI at age 55     Prostate Cancer Father      Asthma Father      Asthma Sister      Obesity Sister              Physical exam was not performed as it was a video  visit               PPGs of bilateral lower extremity digits dated 9/30/21     Clinical history: 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 Canas     Technique: PPG waveforms were obtained with a sensor and infrared  light.     Findings:     Right foot:  First toe: Present, Normal  Second toe: Present, Severely abnormal  Third toe: Present, Severely abnormal  Fourth toe: Present, Moderately abnormal  Fifth toe: Present, Moderately abnormal     Left foot:  First toe: Present,Severely abnormal  Second toe: Present, Moderately abnormal  Third toe: Present, Moderately abnormal  Fourth toe: Present, Severely abnormal  Fifth toe: Present, Normal                                                                       Impression:  1. Abnormal PPG waveforms again noted. On side-by side comparison, the  biggest changes seen are in the left first digit, which is now  severely abnormal, compared to moderately on prior study, and the left  5th digit, which is now normal and was moderately abnormal.      HOA WESTON MD            CT ABDOMEN PELVIS WITH CONTRAST 9/2/2021 1:41 PM     CLINICAL HISTORY: Abdominal pain, acute, nonlocalized. Had noncontrast  CT yesterday, pain worse today. Abdominal pain, unspecified abdominal  location.     TECHNIQUE: CT scan of the abdomen and pelvis was performed following  injection of IV contrast. Multiplanar reformats were obtained. Dose  reduction techniques were used.  CONTRAST: 50mL, Isovue 370     COMPARISON: CT abdomen and pelvis without IV contrast dated 9/1/2021  and 4/26/2018, CT abdomen and pelvis with IV contrast dated  12/16/2015.     FINDINGS:   LOWER CHEST: Visualized portions of the lung bases and mediastinal  contents are grossly unremarkable. There are thoracic aortic  calcifications.     HEPATOBILIARY: Questionable subtle bubble of gas is seen in the common  bile duct in the head of the pancreas indicating  possible partially  sphincterotomy. Recommend clinical correlation. No definite mass in  the head of the pancreas or choledocholithiasis is seen. There may be  minimal gallbladder wall thickening. No definite cholelithiasis is  identified. No pericholecystic fluid is seen. Liver enhances normally  without focal lesion, intrahepatic biliary ductal dilatation or  choledocholithiasis.     PANCREAS: Normal.     SPLEEN: Normal.     ADRENAL GLANDS: Normal.     KIDNEYS/BLADDER: Fat-containing lesion inferior pole left kidney is  most consistent with adrenal angiomyolipoma, a benign lesion. This is  stable as compared to the prior study. The kidneys otherwise enhance  normally. No hydronephrosis, nephrolithiasis, hydroureter, or ureteral  calculus is identified. Ureters are difficult to follow due to the  relative lack of intraperitoneal adipose tissue. Urinary bladder is  grossly unremarkable.     BOWEL: The colon is difficult to follow given the lack of  intraperitoneal adipose tissue. No definite pericolonic inflammatory  changes to suggest acute diverticulitis. Appendix is not well seen. No  pericecal inflammatory change to suggest acute appendicitis. There is  stranding slightly increased density in the peritoneal adipose tissues  around the anterior aspect of the mid to lower abdomen, more so on the  right. This is of uncertain clinical significance and etiology but  could represent mesenteritis. Small bowel appears grossly of normal  caliber. There is abnormal thickening of the wall of the gastric  antrum/pyloric/proximal duodenal region which could represent an  inflammatory process such as gastritis or ulcer disease. No obvious  perforated ulcer is identified. There is no free air or free fluid  around this region. The stomach otherwise contains a large amount of  ingested material and moderate amount of air.     PELVIC ORGANS: Enhancing structure just posterior to the urinary  bladder in the deep pelvis likely  represents an atrophic uterus and is  not appreciably changed since the prior study dated 12/16/2015. The  ovaries are not well seen. No adnexal masses are identified.     ADDITIONAL FINDINGS: Incidental note of circumaortic left renal vein,  a normal variant. There is nonaneurysmal atherosclerosis. No definite  adenopathy, free air is identified. There is trace free fluid in the  pelvis.     MUSCULOSKELETAL: No aggressive osseous lesions or acute osseous  fractures are identified. There are degenerative changes in the spine,  mostly in the facet joints of the lower lumbar spine. There is also  disc height loss at L4-L5 and L5-S1. Mild anterolisthesis of L5 on S1  is degenerative in etiology.                                                                      IMPRESSION:   1.  Abnormal thickening of the wall the gastric  antrum/pylorus/proximal duodenum could represent an inflammatory or  infectious process (gastritis and possible ulcer disease) versus less  likely neoplastic infiltration. Recommend clinical correlation.  2.  Slightly increased density in the peritoneal adipose tissues  throughout the mid to lower abdomen and pelvis and slightly more so on  the right could represent mesenteritis. There is a relative lack of  intraperitoneal adipose tissue limiting evaluation of the hollow and  solid organs of the abdomen and pelvis. The amount of intraperitoneal  adipose has decreased since the prior studies from 2018 and 2015.  3.  Appendix is not definitely seen. No obvious evidence for acute  appendicitis is identified. Appendicitis cannot be excluded on the  basis of this study.  4.  Stable angiomyolipoma inferior left kidney is again noted.  5.  Small bubble of gas in the distal common bile duct could be from  prior sphincterotomy. Recommend clinical correlation.  6.  Relative lack of intraperitoneal adipose tissue limits evaluation  of the bowel and other structures of the abdomen and pelvis.     I discussed  the thickening of the wall of the gastric  antrum/pylorus/proximal duodenum, slightly increased density in the  peroneal adipose tissues, and lack of other obvious etiology of  patient's symptoms with Dr. Cherry on 9/2/2021 at 1:52 PM. We also  discussed the limitations of this study due to the relative lack of  intraperitoneal adipose tissue.     DINORA MUSTAFA MD            CT ABDOMEN/PELVIS WITHOUT CONTRAST September 1, 2021 9:55 AM     CLINICAL HISTORY: Right lower quadrant abdominal pain. No history of  surgery or cancer.     TECHNIQUE: CT scan of the abdomen and pelvis was performed without IV  contrast. Multiplanar reformats were obtained. Dose reduction  techniques were used.  CONTRAST: None.     COMPARISON: CT abdomen/pelvis dated 12/16/2015. CT chest dated  3/22/2021. More recent CT abdomen/pelvis from 4/26/2008 is not  available.     FINDINGS: Relative lack of intraperitoneal adipose tissue limits this  evaluation. Lack of IV contrast limits evaluation of the solid organs  of the abdomen and pelvis.     LOWER CHEST: Single bulla is seen in the right lower lung lobe.  Visualized portions of the lung bases are otherwise unremarkable.  There are aortic calcifications. Visualized mediastinal contents are  otherwise unremarkable.     HEPATOBILIARY: Normal.     PANCREAS: Normal.     SPLEEN: Normal.     ADRENAL GLANDS: Normal.     KIDNEYS/BLADDER: Fatty lesion in the inferior pole of the left kidney  measures up to 1.2 cm in diameter and is most consistent with a benign  renal angiomyolipoma. The kidneys are otherwise normal in appearance  for noncontrast CT. No hydronephrosis, nephrolithiasis, hydroureter or  ureteral calculus is identified. Ureters are very difficult to follow  due to relative lack of intraperitoneal adipose tissues. Urinary  bladder is grossly unremarkable.     BOWEL: The colon is grossly of normal appearance. Moderate amount of  stool is seen in the colon. Appendix is not well seen. No  pericecal  inflammatory change to suggest acute appendicitis. Evaluation of the  bowel is limited due to lack of intraperitoneal adipose tissue and  lack of IV contrast. Small bowel is grossly of normal caliber. Stomach  contains a small amount of fluid and air. Gastric wall appears mildly  thickened which is likely due to incomplete distention.     LYMPH NODES: No lymphadenopathy is identified.     VASCULATURE: There is nonaneurysmal atherosclerosis.     PELVIC ORGANS: Structure which could represent an atrophic uterus is  seen just posterior to the urinary bladder. Ovaries are not well seen.  No obvious adnexal mass is identified.     OTHER: There appears to be trace free fluid in the pelvis. No free air  is identified in the peritoneal cavity.     MUSCULOSKELETAL: Degenerative changes are noted in the spine. No  aggressive osseous lesions or acute osseous fractures.                                                                      IMPRESSION:   1.  This study is significantly limited due to the relative lack of  intraperitoneal adipose tissue and also due to the lack of IV  contrast.  2.  No diverticulitis, urinary system calculus, urinary system  obstruction, or bowel obstruction is seen. Appendix is not definitely  identified and no definite evidence for appendicitis is seen.  Appendicitis cannot be excluded due to the limitations of this study.     I discussed the significant limitations of this study as well as the  lack of obvious etiology for patient's symptoms with Dr. Lentz on  9/1/2021 at approximately 11:30 AM, when he became available.     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     Ordering Physician: Dr. Canas     Technique: PPG waveforms were obtained with a sensor and infrared  light.     Findings:     Right foot:  First toe: Present, Normal  Second toe: Present,  Moderately abnormal  Third toe: Present, Severely abnormal  Fourth toe: Present, Mildly abnormal  Fifth toe: Present, Moderately abnormal     Left foot:  First toe: Present, Moderately abnormal  Second toe: Present, Moderately abnormal  Third toe: Present, Severely abnormal  Fourth toe: Present, Severely abnormal  Fifth toe: Present, Moderate-severely abnormal                                                                      Impression:  1. Abnormal PPG waveforms in Right lower extremity digits 2-5, and  Left lower extremity digits 1-5.       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 Right lower extremity: Normal arterial velocities suggesting no  hemodynamically significant stenosis.   2. Left lower extremity: Normal arterial velocities suggesting no  hemodynamically significant stenosis.      Findings:      Right lower extremity:      Common femoral artery: 99/8 cm/sec.  Deep femoral artery: 91/11 cm/sec.  Proximal SFA: 77/1 cm/sec.  Mid SFA: 76/1 cm/sec.  Distal SFA: 61/1 cm/sec.  Popliteal artery: 32/1 cm/sec.  Anterior tibial artery: 40/1 cm/sec.  Peroneal artery: 44/3 cm/sec.  PTA ankle: 65/1 cm/sec.  Waveforms are triphasic in the CFA to the proximal SFA, and are  biphasic from the mid SFA to the distal peroneal artery.      Left lower extremity:     Common femoral artery: 105/4 cm/sec.  Deep femoral artery: 89/7 cm/sec.  Proximal SFA: 79/2 cm/sec.  Mid SFA: 83/1 cm/sec.  Distal SFA: 74/1 cm/sec.  Popliteal artery: 40/4 cm/sec.  Anterior tibial: 53/1 cm/sec.  Peroneal artery: 28/1 cm/sec.  PTA ankle: 81/1 cm/sec.  Waveforms are triphasic in the CFA to the mid SFA, and are biphasic  from the distal SFA to the distal peroneal artery.      MICHAELA REEDER MD          [id number]  [id number]  [id number]  [id number]^TIERNEY^STEFANO     [facility]  Echocardiography [facility]  [address] [place] [address]     Name: NISHA NIEVES  MRN: [id number]  : 1955  Study Date: 05/10/2021 10:07 AM  Age: 66 yrs  Gender: Female  Patient Location: [facility]  Reason For Study: Embolic disease of toe (H)  History: Hyperlipidemia,Asthma  Ordering Physician: STEFANO MONET  Referring Physician: Fuentes Darby  Performed By: Sharifa Hernandez     BSA: 1.4 m2  Height: 61 in  Weight: 100 lb  HR: 60  BP: 146/80 mmHg  ______________________________________________________________________________  Procedure  Complete Echo Adult.  ______________________________________________________________________________  Interpretation Summary     1. The left ventricle is normal in structure, function and size. The visual  ejection fraction is estimated at 65%.  2. The right ventricle is normal in structure, function and size.  3. No valve disease.     No previous echo for comparison.  ______________________________________________________________________________  Left Ventricle  The left ventricle is normal in structure, function and size. There is normal  left ventricular wall thickness. The visual ejection fraction is estimated at  65%. Left ventricular diastolic function is normal. Normal left ventricular  wall motion.     Right Ventricle  The right ventricle is normal in structure, function and size.     Atria  Normal left atrial size. Right atrial size is normal. There is no atrial shunt  seen.     Mitral Valve  There is mild (1+) mitral regurgitation.     Tricuspid Valve  There is mild (1+) tricuspid regurgitation. The right ventricular systolic  pressure is approximated at 20.4 mmHg plus the right atrial pressure.     Aortic Valve  There is trace aortic regurgitation.     Pulmonic Valve  The pulmonic valve is normal in structure and function.     Vessels  Normal  ascending, transverse (arch), and descending aorta. The inferior vena  cava was normal in size with preserved respiratory variability.     Pericardium  There is no pericardial effusion.     Rhythm  Sinus rhythm was noted.  ______________________________________________________________________________  MMode/2D Measurements & Calculations  IVSd: 0.70 cm     LVIDd: 3.8 cm  LVIDs: 2.8 cm  LVPWd: 0.80 cm  FS: 26.3 %  LV mass(C)d: 78.8 grams  LV mass(C)dI: 56.0 grams/m2  Ao root diam: 3.1 cm  LA dimension: 3.0 cm  asc Aorta Diam: 2.9 cm  LA/Ao: 0.97  RWT: 0.42     Doppler Measurements & Calculations  MV E max nathan: 72.0 cm/sec  MV A max nathan: 57.8 cm/sec  MV E/A: 1.2  MV dec slope: 273.0 cm/sec2  MV dec time: 0.26 sec  TR max nathan: 226.0 cm/sec  TR max P.4 mmHg  E/E' av.7  Lateral E/e': 8.9  Medial E/e': 8.5     ______________________________________________________________________________  Report approved by: Heena Isaac 05/10/2021 02:00 PM                 CTA CHEST, ABDOMEN, PELVIS RUNOFF WITH CONTRAST  3/22/2021 10:26 AM      HISTORY:  Blue toe syndrome. Concern for distal emboli. Evaluate for  embolic source.     COMPARISON: CT of the chest dated 2011     TECHNIQUE: CT angiogram of the chest, abdomen and pelvis with  bilateral lower extremity runoff was performed following the  administration of 100 cc intravenous contrast. Images are reviewed in  multiple planes and 3-D reconstructions were also performed.    Radiation dose for this scan was reduced using automated exposure  control, adjustment of the mA and/or kV according to patient size, or  iterative reconstruction technique.     FINDINGS:      Chest: The thoracic aorta at the level of the sinuses of Valsalva has  a maximum diameter of approximately 3.1 cm. The ascending thoracic  aorta has a maximum diameter of approximately 3.6 cm. The aorta then  tapers at the level of the thoracic aortic arch. The descending  thoracic aorta is of  normal caliber without aneurysmal dilatation or  significant stenosis. There is no significant soft plaque in the  thoracic aorta. The great vessels arising from the thoracic aortic  arch are patent without significant stenoses.     Abdomen/pelvis: There is minimal scattered calcified plaque in the  abdominal aorta. No evidence for an aneurysmal dilatation or  significant stenosis. The celiac trunk, superior mesenteric artery,  renal arteries, and inferior mesenteric artery are patent without  significant stenoses.     The iliac arteries are patent without significant stenoses.     Right lower extremity angiogram:  Common femoral artery: Ectatic with a maximum diameter of 10 mm. No  significant stenosis.  Profunda femoris artery: No significant stenosis.  Superficial femoral artery: No significant stenosis.  Popliteal artery: No significant stenosis.  Tibial arteries: Three-vessel runoff. No significant stenosis.     Left lower extremity angiogram:  Common femoral artery: No significant stenosis.  Profunda femoris artery: No significant stenosis.  Superficial femoral artery: No significant stenosis.  Popliteal artery: No significant stenosis.  Tibial arteries: Three-vessel runoff. No significant stenosis.     Soft tissues:     Chest: Right apical scarring. 7 mm nodule in the right upper lobe.     Abdomen/pelvis: There is a 2.5 x 1.8 cm angiomyolipoma at the lower  pole of the left kidney. Remaining solid organs in the abdomen appear  grossly unremarkable.     The bowel appears grossly unremarkable.                                                                      IMPRESSION:  1. Mild aneurysmal dilatation of the ascending thoracic aorta which  has a maximum diameter of approximately 3.6 cm.  2. No significant soft plaque in the arteries of the chest, abdomen,  pelvis and lower extremities that could serve as a source for distal  emboli.  3. 2.7 cm angiomyolipoma at the lower pole of the left kidney.  Consider  urological follow-up and follow-up CT scan in one year to  monitor for any increase in size.     JEANNE HEARN MD              Component      Latest Ref Rng & Units 11/3/2021   JAUN interpretation      Negative Positive (A)   JAUN pattern 1       Dense fine speckled   JAUN titer 1       1:160   Neutrophil Cytoplasmic Antibody      <1:10 <1:10   Neutrophil Cytoplasmic Antibody Pattern       The ANCA IFA is <1:10.  No further testing will be performed.   SSA Cassie IgG Instrument Value      <7.0 U/mL <0.5   SSA (Ro) Antibody IgG      Negative Negative   SSB Cassie IgG Instrument Value      <7.0 U/mL <0.6   SSB (La) Antibody IgG      Negative Negative   Scl-70 Cassie IgG Instrument Value      <7.0 U/mL 0.8   Scleroderma Antibody Scl-70 EZEQUIEL IgG      Negative Negative   Centromere Cassie IgG Instrument Value      <7.0 U/mL <0.7   Centromere Antibody IgG      Negative Negative   Mayer EZEQUIEL Cassie IgG Instrument Value      <7.0 U/mL 2.5   Smith EZEQUIEL Antibody IgG      Negative Negative   Sed Rate      0 - 30 mm/hr 8   CRP Inflammation      0.0 - 8.0 mg/L <2.9   Rheumatoid Factor      <20 IU/mL 8   Cyclic Citrullinated Peptide Antibody, IgG      <7.0 U/mL 2.4   Histone Antibody,IgG      0.0 - 0.9 Units 0.3   DNA-ds      <10.0 IU/mL 1.6   Complement, Total, S      38.7 - 89.9 U/mL    Complement C3      81 - 157 mg/dL    Complement C4      13 - 39 mg/dL      Component      Latest Ref Rng & Units 12/9/2021   JAUN interpretation      Negative    JAUN pattern 1          JAUN titer 1          Neutrophil Cytoplasmic Antibody      <1:10    Neutrophil Cytoplasmic Antibody Pattern          SSA Cassie IgG Instrument Value      <7.0 U/mL    SSA (Ro) Antibody IgG      Negative    SSB Cassie IgG Instrument Value      <7.0 U/mL    SSB (La) Antibody IgG      Negative    Scl-70 Cassie IgG Instrument Value      <7.0 U/mL    Scleroderma Antibody Scl-70 EZEQUIEL IgG      Negative    Centromere Cassie IgG Instrument Value      <7.0 U/mL    Centromere Antibody IgG      Negative     Mayer EZEQUIEL Cassie IgG Instrument Value      <7.0 U/mL    Smith EZEQUIEL Antibody IgG      Negative    Sed Rate      0 - 30 mm/hr    CRP Inflammation      0.0 - 8.0 mg/L    Rheumatoid Factor      <20 IU/mL    Cyclic Citrullinated Peptide Antibody, IgG      <7.0 U/mL    Histone Antibody,IgG      0.0 - 0.9 Units    DNA-ds      <10.0 IU/mL    Complement, Total, S      38.7 - 89.9 U/mL 53.1   Complement C3      81 - 157 mg/dL 92   Complement C4      13 - 39 mg/dL 18        A/P:     (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 She has an isolated elevated JAUN in a 1:160 in a dense speckled pattern. We ruled out other autoimmune mediated component however in that all other Abs are normal as above.   Plan: As none found, resume symptomatic treatment as already undertaken if sxs recur, RTC prn.

## 2021-12-23 NOTE — PROGRESS NOTES
TOBY is a 66 year old who is being evaluated via a billable video visit.      How would you like to obtain your AVS? MyChart  If the video visit is dropped, the invitation should be resent by: Text to cell phone: Non-fasting labs ([facility])  Will anyone else be joining your video visit? No      Video Start Time:

## 2021-12-27 DIAGNOSIS — F43.23 ADJUSTMENT DISORDER WITH MIXED ANXIETY AND DEPRESSED MOOD: ICD-10-CM

## 2021-12-28 RX ORDER — PAROXETINE HYDROCHLORIDE HEMIHYDRATE 37.5 MG/1
TABLET, FILM COATED, EXTENDED RELEASE ORAL
Qty: 90 TABLET | Refills: 0 | Status: SHIPPED | OUTPATIENT
Start: 2021-12-28 | End: 2022-04-14

## 2021-12-28 NOTE — TELEPHONE ENCOUNTER
"Requested Prescriptions   Signed Prescriptions Disp Refills    PARoxetine (PAXIL-CR) 37.5 MG 24 hr tablet 90 tablet 0     Sig: TAKE ONE TABLET BY MOUTH EVERY MORNING       SSRIs Protocol Passed - 12/27/2021  8:56 AM        Passed - PHQ-9 score less than 5 in past 6 months     Please review last PHQ-9 score.           Passed - Medication is active on med list        Passed - Patient is age 18 or older        Passed - No active pregnancy on record        Passed - No positive pregnancy test in last 12 months        Passed - Recent (6 mo) or future (30 days) visit within the authorizing provider's specialty     Patient had office visit in the last 6 months or has a visit in the next 30 days with authorizing provider or within the authorizing provider's specialty.  See \"Patient Info\" tab in inbasket, or \"Choose Columns\" in Meds & Orders section of the refill encounter.               Thanks,  LAQUITA Llanos  [facility]     "

## 2022-01-02 ENCOUNTER — NURSE TRIAGE (OUTPATIENT)
Dept: NURSING | Facility: CLINIC | Age: 67
End: 2022-01-02
Payer: COMMERCIAL

## 2022-01-02 ENCOUNTER — MYC MEDICAL ADVICE (OUTPATIENT)
Dept: INTERNAL MEDICINE | Facility: CLINIC | Age: 67
End: 2022-01-02

## 2022-01-02 ENCOUNTER — HOSPITAL ENCOUNTER (EMERGENCY)
Facility: CLINIC | Age: 67
Discharge: HOME | End: 2022-01-02
Attending: FAMILY MEDICINE | Admitting: FAMILY MEDICINE
Payer: COMMERCIAL

## 2022-01-02 VITALS
WEIGHT: 107.7 LBS | TEMPERATURE: 98.1 F | HEART RATE: 64 BPM | BODY MASS INDEX: 20.35 KG/M2 | OXYGEN SATURATION: 99 % | SYSTOLIC BLOOD PRESSURE: 149 MMHG | RESPIRATION RATE: 18 BRPM | DIASTOLIC BLOOD PRESSURE: 90 MMHG

## 2022-01-02 DIAGNOSIS — R61 NIGHT SWEATS: ICD-10-CM

## 2022-01-02 DIAGNOSIS — R42 DIZZINESS: ICD-10-CM

## 2022-01-02 LAB
ALBUMIN SERPL-MCNC: 3.9 G/DL (ref 3.4–5)
ALBUMIN UR-MCNC: NEGATIVE MG/DL
ALP SERPL-CCNC: 68 U/L (ref 40–150)
ALT SERPL W P-5'-P-CCNC: 29 U/L (ref 0–50)
ANION GAP SERPL CALCULATED.3IONS-SCNC: 5 MMOL/L (ref 3–14)
APPEARANCE UR: CLEAR
AST SERPL W P-5'-P-CCNC: 25 U/L (ref 0–45)
BASOPHILS # BLD AUTO: 0 10E3/UL (ref 0–0.2)
BASOPHILS NFR BLD AUTO: 0 %
BILIRUB SERPL-MCNC: 0.4 MG/DL (ref 0.2–1.3)
BILIRUB UR QL STRIP: NEGATIVE
BUN SERPL-MCNC: 16 MG/DL (ref 7–30)
CALCIUM SERPL-MCNC: 9.7 MG/DL (ref 8.5–10.1)
CHLORIDE BLD-SCNC: 105 MMOL/L (ref 94–109)
CO2 SERPL-SCNC: 30 MMOL/L (ref 20–32)
COLOR UR AUTO: COLORLESS
CREAT SERPL-MCNC: 0.48 MG/DL (ref 0.52–1.04)
EOSINOPHIL # BLD AUTO: 0.2 10E3/UL (ref 0–0.7)
EOSINOPHIL NFR BLD AUTO: 4 %
ERYTHROCYTE [DISTWIDTH] IN BLOOD BY AUTOMATED COUNT: 13.2 % (ref 10–15)
GFR SERPL CREATININE-BSD FRML MDRD: >90 ML/MIN/1.73M2
GLUCOSE BLD-MCNC: 100 MG/DL (ref 70–99)
GLUCOSE UR STRIP-MCNC: NEGATIVE MG/DL
HCT VFR BLD AUTO: 39.5 % (ref 35–47)
HGB BLD-MCNC: 13 G/DL (ref 11.7–15.7)
HGB UR QL STRIP: ABNORMAL
IMM GRANULOCYTES # BLD: 0.1 10E3/UL
IMM GRANULOCYTES NFR BLD: 2 %
KETONES UR STRIP-MCNC: NEGATIVE MG/DL
LEUKOCYTE ESTERASE UR QL STRIP: NEGATIVE
LYMPHOCYTES # BLD AUTO: 2.2 10E3/UL (ref 0.8–5.3)
LYMPHOCYTES NFR BLD AUTO: 42 %
MCH RBC QN AUTO: 31.3 PG (ref 26.5–33)
MCHC RBC AUTO-ENTMCNC: 32.9 G/DL (ref 31.5–36.5)
MCV RBC AUTO: 95 FL (ref 78–100)
MONOCYTES # BLD AUTO: 1 10E3/UL (ref 0–1.3)
MONOCYTES NFR BLD AUTO: 19 %
NEUTROPHILS # BLD AUTO: 1.8 10E3/UL (ref 1.6–8.3)
NEUTROPHILS NFR BLD AUTO: 33 %
NITRATE UR QL: NEGATIVE
NRBC # BLD AUTO: 0 10E3/UL
NRBC BLD AUTO-RTO: 0 /100
PH UR STRIP: 7 [PH] (ref 5–7)
PLATELET # BLD AUTO: 192 10E3/UL (ref 150–450)
POTASSIUM BLD-SCNC: 3.9 MMOL/L (ref 3.4–5.3)
PROT SERPL-MCNC: 7.6 G/DL (ref 6.8–8.8)
RBC # BLD AUTO: 4.15 10E6/UL (ref 3.8–5.2)
RBC URINE: <1 /HPF
SODIUM SERPL-SCNC: 140 MMOL/L (ref 133–144)
SP GR UR STRIP: 1 (ref 1–1.03)
SQUAMOUS EPITHELIAL: 1 /HPF
TROPONIN I SERPL HS-MCNC: 6 NG/L
TSH SERPL DL<=0.005 MIU/L-ACNC: 1.8 MU/L (ref 0.4–4)
UROBILINOGEN UR STRIP-MCNC: NORMAL MG/DL
WBC # BLD AUTO: 5.4 10E3/UL (ref 4–11)
WBC URINE: 1 /HPF

## 2022-01-02 PROCEDURE — 84443 ASSAY THYROID STIM HORMONE: CPT | Performed by: FAMILY MEDICINE

## 2022-01-02 PROCEDURE — 258N000003 HC RX IP 258 OP 636: Performed by: FAMILY MEDICINE

## 2022-01-02 PROCEDURE — 84484 ASSAY OF TROPONIN QUANT: CPT | Performed by: FAMILY MEDICINE

## 2022-01-02 PROCEDURE — 99283 EMERGENCY DEPT VISIT LOW MDM: CPT | Mod: 25

## 2022-01-02 PROCEDURE — 80053 COMPREHEN METABOLIC PANEL: CPT | Performed by: FAMILY MEDICINE

## 2022-01-02 PROCEDURE — 81001 URINALYSIS AUTO W/SCOPE: CPT | Performed by: FAMILY MEDICINE

## 2022-01-02 PROCEDURE — 36415 COLL VENOUS BLD VENIPUNCTURE: CPT | Performed by: FAMILY MEDICINE

## 2022-01-02 PROCEDURE — 85025 COMPLETE CBC W/AUTO DIFF WBC: CPT | Performed by: FAMILY MEDICINE

## 2022-01-02 PROCEDURE — 99282 EMERGENCY DEPT VISIT SF MDM: CPT | Performed by: FAMILY MEDICINE

## 2022-01-02 PROCEDURE — 96360 HYDRATION IV INFUSION INIT: CPT

## 2022-01-02 RX ORDER — SODIUM CHLORIDE 9 MG/ML
INJECTION, SOLUTION INTRAVENOUS CONTINUOUS
Status: DISCONTINUED | OUTPATIENT
Start: 2022-01-02 | End: 2022-01-02 | Stop reason: HOSPADM

## 2022-01-02 RX ORDER — MECLIZINE HYDROCHLORIDE 25 MG/1
25 TABLET ORAL EVERY 6 HOURS PRN
Status: DISCONTINUED | OUTPATIENT
Start: 2022-01-02 | End: 2022-01-02 | Stop reason: HOSPADM

## 2022-01-02 RX ORDER — MECLIZINE HYDROCHLORIDE 25 MG/1
25 TABLET ORAL EVERY 6 HOURS PRN
Qty: 2 TABLET | Refills: 0 | Status: SHIPPED | OUTPATIENT
Start: 2022-01-02 | End: 2022-01-05

## 2022-01-02 RX ORDER — MECLIZINE HYDROCHLORIDE 25 MG/1
25 TABLET ORAL EVERY 6 HOURS PRN
Qty: 15 TABLET | Refills: 1 | Status: SHIPPED | OUTPATIENT
Start: 2022-01-02 | End: 2023-03-31

## 2022-01-02 RX ADMIN — SODIUM CHLORIDE 1000 ML: 9 INJECTION, SOLUTION INTRAVENOUS at 16:23

## 2022-01-02 NOTE — ED PROVIDER NOTES
History     Chief Complaint   Patient presents with     Dizziness     Nausea     HPI  Brissa Mayer is a 66 year old female who presents with concerns of night sweats that have been going on over the last 2 weeks.  She is also been unable to sleep because of these night sweats and is unable to even nap during the day.  She got concerned because she got very dizzy today when she was at the store.  The dizziness she describes as a sense of movement but gets better if she lays down and rest.  If she closes her eyes she still feels like things are moving.  She denies any recent nausea or any vomiting.  Patient denies any recent dysuria or hematuria.  Patient did states she had a little diarrhea today but that was new for her.    Allergies:  Allergies   Allergen Reactions     Compazine      Anxiety     Flagyl [Metronidazole Hcl] Nausea and Vomiting       Problem List:    Patient Active Problem List    Diagnosis Date Noted     Adjustment disorder with mixed anxiety and depressed mood 04/03/2018     Priority: Medium     Gastroesophageal reflux disease without esophagitis 10/10/2016     Priority: Medium     Hypothyroidism 08/01/2016     Priority: Medium     Major depressive disorder, recurrent episode, mild (HCC) 01/22/2016     Priority: Medium     Generalized anxiety disorder 10/07/2013     Priority: Medium     Hyperlipidemia LDL goal <130 10/23/2012     Priority: Medium     Advanced directives, counseling/discussion 07/26/2011     Priority: Medium     Parent voices understanding and acceptance of this advice and will call back if any further questions or concerns.         CARDIOVASCULAR SCREENING; LDL GOAL LESS THAN 160 10/31/2010     Priority: Medium     Asthma, mild intermittent      Priority: Medium        Past Medical History:    Past Medical History:   Diagnosis Date     Anxiety      Asthma, mild intermittent      Migraines      Tension headaches      Unspecified hypothyroidism        Past Surgical History:     Past Surgical History:   Procedure Laterality Date     BUNIONECTOMY       ESOPHAGOSCOPY, GASTROSCOPY, DUODENOSCOPY (EGD), COMBINED N/A 4/2/2019    Procedure: ESOPHAGOSCOPY, GASTROSCOPY, DUODENOSCOPY (EGD);  Surgeon: Ochoa Cherry DO;  Location: [facility]     OTHER SURGICAL HISTORY  1980    Bunionectomy       Family History:    Family History   Problem Relation Age of Onset     Asthma Mother      Diabetes Mother      Cancer - colorectal Maternal Grandmother      Heart Disease Father         MI at age 55     Prostate Cancer Father      Asthma Father      Asthma Sister      Obesity Sister        Social History:  Marital Status:  Single [1]  Social History     Tobacco Use     Smoking status: Never Smoker     Smokeless tobacco: Never Used   Substance Use Topics     Alcohol use: Yes     Comment: socially     Drug use: No        Medications:    albuterol (VENTOLIN HFA) 108 (90 Base) MCG/ACT inhaler  apixaban ANTICOAGULANT (ELIQUIS) 5 MG tablet  atorvastatin (LIPITOR) 20 MG tablet  cetirizine (ZYRTEC) 10 MG tablet  fluticasone (FLONASE) 50 MCG/ACT nasal spray  fluticasone-salmeterol (ADVAIR DISKUS) 250-50 MCG/DOSE inhaler  ibuprofen (ADVIL/MOTRIN) 200 MG capsule  levothyroxine (SYNTHROID/LEVOTHROID) 50 MCG tablet  PARoxetine (PAXIL-CR) 37.5 MG 24 hr tablet  sildenafil (REVATIO) 20 MG tablet          Review of Systems   All other systems reviewed and are negative.      Physical Exam   BP: (!) 147/89  Pulse: 73  Temp: 98.1  F (36.7  C)  Resp: 18  Weight: 48.9 kg (107 lb 11.2 oz)  SpO2: 99 %      Physical Exam  Vitals and nursing note reviewed.   Constitutional:       General: She is not in acute distress.     Appearance: She is well-developed. She is not diaphoretic.   HENT:      Head: Normocephalic and atraumatic.      Nose: Nose normal.      Mouth/Throat:      Pharynx: No oropharyngeal exudate.   Eyes:      Conjunctiva/sclera: Conjunctivae normal.   Cardiovascular:      Rate and Rhythm: Normal rate and regular  rhythm.      Heart sounds: Normal heart sounds. No murmur heard.  No friction rub.   Pulmonary:      Effort: Pulmonary effort is normal. No respiratory distress.      Breath sounds: Normal breath sounds. No stridor. No wheezing or rales.   Abdominal:      General: Bowel sounds are normal. There is no distension.      Palpations: Abdomen is soft. There is no mass.      Tenderness: There is no abdominal tenderness. There is no guarding.   Musculoskeletal:         General: No tenderness. Normal range of motion.      Cervical back: Normal range of motion and neck supple.   Skin:     General: Skin is warm and dry.      Capillary Refill: Capillary refill takes less than 2 seconds.      Findings: No erythema.   Neurological:      Mental Status: She is alert and oriented to person, place, and time.   Psychiatric:         Judgment: Judgment normal.         ED Course                 Procedures      Results for orders placed or performed during the hospital encounter of 01/02/22 (from the past 24 hour(s))   CBC with platelets differential    Narrative    The following orders were created for panel order CBC with platelets differential.  Procedure                               Abnormality         Status                     ---------                               -----------         ------                     CBC with platelets and d...[[id number]]                      Final result                 Please view results for these tests on the individual orders.   Comprehensive metabolic panel   Result Value Ref Range    Sodium 140 133 - 144 mmol/L    Potassium 3.9 3.4 - 5.3 mmol/L    Chloride 105 94 - 109 mmol/L    Carbon Dioxide (CO2) 30 20 - 32 mmol/L    Anion Gap 5 3 - 14 mmol/L    Urea Nitrogen 16 7 - 30 mg/dL    Creatinine 0.48 (L) 0.52 - 1.04 mg/dL    Calcium 9.7 8.5 - 10.1 mg/dL    Glucose 100 (H) 70 - 99 mg/dL    Alkaline Phosphatase 68 40 - 150 U/L    AST 25 0 - 45 U/L    ALT 29 0 - 50 U/L    Protein Total 7.6 6.8 - 8.8  g/dL    Albumin 3.9 3.4 - 5.0 g/dL    Bilirubin Total 0.4 0.2 - 1.3 mg/dL    GFR Estimate >90 >60 mL/min/1.73m2   Troponin I   Result Value Ref Range    Troponin I High Sensitivity 6 <54 ng/L   UA with Microscopic reflex to Culture    Specimen: Urine, Clean Catch   Result Value Ref Range    Color Urine Colorless Colorless, Straw, Light Yellow, Yellow    Appearance Urine Clear Clear    Glucose Urine Negative Negative mg/dL    Bilirubin Urine Negative Negative    Ketones Urine Negative Negative mg/dL    Specific Gravity Urine 1.003 1.003 - 1.035    Blood Urine Small (A) Negative    pH Urine 7.0 5.0 - 7.0    Protein Albumin Urine Negative Negative mg/dL    Urobilinogen Urine Normal Normal, 2.0 mg/dL    Nitrite Urine Negative Negative    Leukocyte Esterase Urine Negative Negative    RBC Urine <1 <=2 /HPF    WBC Urine 1 <=5 /HPF    Squamous Epithelials Urine 1 <=1 /HPF    Narrative    Urine Culture not indicated   TSH with free T4 reflex   Result Value Ref Range    TSH 1.80 0.40 - 4.00 mU/L   CBC with platelets and differential   Result Value Ref Range    WBC Count 5.4 4.0 - 11.0 10e3/uL    RBC Count 4.15 3.80 - 5.20 10e6/uL    Hemoglobin 13.0 11.7 - 15.7 g/dL    Hematocrit 39.5 35.0 - 47.0 %    MCV 95 78 - 100 fL    MCH 31.3 26.5 - 33.0 pg    MCHC 32.9 31.5 - 36.5 g/dL    RDW 13.2 10.0 - 15.0 %    Platelet Count 192 150 - 450 10e3/uL    % Neutrophils 33 %    % Lymphocytes 42 %    % Monocytes 19 %    % Eosinophils 4 %    % Basophils 0 %    % Immature Granulocytes 2 %    NRBCs per 100 WBC 0 <1 /100    Absolute Neutrophils 1.8 1.6 - 8.3 10e3/uL    Absolute Lymphocytes 2.2 0.8 - 5.3 10e3/uL    Absolute Monocytes 1.0 0.0 - 1.3 10e3/uL    Absolute Eosinophils 0.2 0.0 - 0.7 10e3/uL    Absolute Basophils 0.0 0.0 - 0.2 10e3/uL    Absolute Immature Granulocytes 0.1 <=0.4 10e3/uL    Absolute NRBCs 0.0 10e3/uL       Medications   0.9% sodium chloride BOLUS (1,000 mLs Intravenous New Bag 1/2/22 6274)     Followed by   sodium  chloride 0.9% infusion (has no administration in time range)       Labs are reviewed and were unremarkable.  Patient's CBC and chemistry panel were normal.  Patient is on thyroid medicine, so I did check her TSH to see if this could be a cause but this was also negative.  At this point I do not have a good explanation for a lot of her symptoms.  Her dizziness appears to be more positional.  She is a little bit better after the IV fluids were given here.  We will have the patient follow-up with her doctor later on this week for further testing for these night sweats.  I am not sure if we need to do a cancer work-up for her.  All questions were answered and patient will be discharged at this time    Assessments & Plan (with Medical Decision Making)  Dizziness, night sweats     I have reviewed the nursing notes.    I have reviewed the findings, diagnosis, plan and need for follow up with the patient.              1/2/2022   [facility] EMERGENCY DEPT     Raphael Lentz MD  01/02/22 0851

## 2022-01-02 NOTE — ED NOTES
Pt is reporting headaches, nausea, abdominal pain, dizziness and weeks of night sweats that are not allowing her to sleep

## 2022-01-02 NOTE — ED TRIAGE NOTES
"Pt reports she has been having severe dizziness today and having nausea, she was at the store and reports she \"almost passed out\", just not feeling well  "

## 2022-01-02 NOTE — TELEPHONE ENCOUNTER
"Triage:    Pt called reporting dizziness, described as spinning, that started this am. She is lying on the couch at this time unable to do normal activities.  Pt reports having night sweats last two weeks and not sleeping.  Urine dark over last week but trying to increase fluids.  Pt feels \"feverish\" but afebrile.  Pt reports feeling terrible with headache, nausea and abdominal pain rating it a 5/10.  Pt reports that her toes/arms are more numb at night over last couple of weeks.    According to the protocol, patient should go to ED now (or PCP triage).  Care advice given.  Second level triage completed and Dr. Rodriguez paged and stated for pt to go to ED.  Patient verbalizes understanding and agrees with plan of care.     Sun Stuart RN  01/02/22 1:58 PM  [facility] Nurse Advisor    COVID 19 Nurse Triage Plan/Patient Instructions    Please be aware that novel coronavirus (COVID-19) may be circulating in the community. If you develop symptoms such as fever, cough, or SOB or if you have concerns about the presence of another infection including coronavirus (COVID-19), please contact your health care provider or visit [email or web address].     Disposition/Instructions    Additional COVID19 information to add for patients.   How can I protect others?  If you have symptoms (fever, cough, body aches or trouble breathing): Stay home and away from others (self-isolate) until:    At least 10 days have passed since your symptoms started, And     You ve had no fever--and no medicine that reduces fever--for 1 full day (24 hours), And      Your other symptoms have resolved (gotten better).     If you don t have symptoms, but a test showed that you have COVID-19 (you tested positive):    Stay home and away from others (self-isolate). Follow the tips under \"How do I self-isolate?\" below for 10 days (20 days if you have a weak immune system).    You don't need to be retested for COVID-19 before going back to " school or work. As long as you're fever-free and feeling better, you can go back to school, work and other activities after waiting the 10 or 20 days.     How do I self-isolate?    Stay in your own room, even for meals. Use your own bathroom if you can.     Stay away from others in your home. No hugging, kissing or shaking hands. No visitors.    Don t go to work, school or anywhere else.     Clean  high touch  surfaces often (doorknobs, counters, handles, etc.). Use a household cleaning spray or wipes. You ll find a full list on the EPA website:  www.epa.gov/pesticide-registration/list-n-disinfectants-use-against-sars-cov-2.    Cover your mouth and nose with a mask, tissue or washcloth to avoid spreading germs.    Wash your hands and face often. Use soap and water.    Caregivers in these groups are at risk for severe illness due to COVID-19:  o People 65 years and older  o People who live in a nursing home or long-term care facility  o People with chronic disease (lung, heart, cancer, diabetes, kidney, liver, immunologic)  o People who have a weakened immune system, including those who:  - Are in cancer treatment  - Take medicine that weakens the immune system, such as corticosteroids  - Had a bone marrow or organ transplant  - Have an immune deficiency  - Have poorly controlled HIV or AIDS  - Are obese (body mass index of 40 or higher)  - Smoke regularly    Caregivers should wear gloves while washing dishes, handling laundry and cleaning bedrooms and bathrooms.    Use caution when washing and drying laundry: Don t shake dirty laundry, and use the warmest water setting that you can.    For more tips, go to www.cdc.gov/coronavirus/2019-ncov/downloads/10Things.pdf.    How can I take care of myself?  1. Get lots of rest. Drink extra fluids (unless a doctor has told you not to).     2. Take Tylenol (acetaminophen) for fever or pain. If you have liver or kidney problems, ask your family doctor if it s okay to take  Tylenol.     Adults can take either:     650 mg (two 325 mg pills) every 4 to 6 hours, or     1,000 mg (two 500 mg pills) every 8 hours as needed.     Note: Don t take more than 3,000 mg in one day.   Acetaminophen is found in many medicines (both prescribed and over-the-counter medicines). Read all labels to be sure you don t take too much.     For children, check the Tylenol bottle for the right dose. The dose is based on the child s age or weight.    3. If you have other health problems (like cancer, heart failure, an organ transplant or severe kidney disease): Call your specialty clinic if you don t feel better in the next 2 days.    4. Know when to call 911: Emergency warning signs include:    Trouble breathing or shortness of breath    Pain or pressure in the chest that doesn t go away    Feeling confused like you haven t felt before, or not being able to wake up    Bluish-colored lips or face    What are the symptoms of COVID-19?     The most common symptoms are cough, fever and trouble breathing.     Less common symptoms include body aches, chills, diarrhea (loose, watery poops), fatigue (feeling very tired), headache, runny nose, sore throat and loss of smell.    COVID-19 can cause severe coughing (bronchitis) and lung infection (pneumonia).    How does it spread?     The virus may spread when a person coughs or sneezes into the air. The virus can travel about 6 feet this way, and it can live on surfaces.      Common  (household disinfectants) will kill the virus.    Who is at risk?  Anyone can catch COVID-19 if they re around someone who has the virus.    How can others protect themselves?     Stay away from people who have COVID-19 (or symptoms of COVID-19).    Wash hands often with soap and water. Or, use hand  with at least 60% alcohol.    Avoid touching the eyes, nose or mouth.     Wear a face mask when you go out in public, when sick or when caring for a sick person.    Where can I  get more information?    [facility]: About COVID-19: [email or web address]    CDC: What to Do If You re Sick: www.cdc.gov/coronavirus/2019-ncov/about/steps-when-sick.html    CDC: Ending Home Isolation: www.cdc.gov/coronavirus/2019-ncov/hcp/disposition-in-home-patients.html     CDC: Caring for Someone: www.cdc.gov/coronavirus/2019-ncov/if-you-are-sick/care-for-someone.html     [facility]: Interim Guidance for Hospital Discharge to Home: www.[facility]/diseases/coronavirus/hcp/hospdischarge.pdf    [facility] clinical trials (COVID-19 research studies): [email or web address]-clinical-[email or web address]     Below are the COVID-19 hotlines at the [place] Department [facility] ([facility]). Interpreters are available.   o For health questions: Call 610-629-4541 or 1-950.298.6041 (7 a.m. to 7 p.m.)  o For questions about schools and childcare: Call 347-877-3370 or 1-862.661.6008 (7 a.m. to 7 p.m.)          Thank you for taking steps to prevent the spread of this virus.  o Limit your contact with others.  o Wear a simple mask to cover your cough.  o Wash your hands well and often.    Resources    [facility]: About COVID-19: [email or web address]    CDC: What to Do If You're Sick: www.cdc.gov/coronavirus/2019-ncov/about/steps-when-sick.html    CDC: Ending Home Isolation: www.cdc.gov/coronavirus/2019-ncov/hcp/disposition-in-home-patients.html     CDC: Caring for Someone: www.cdc.gov/coronavirus/2019-ncov/if-you-are-sick/care-for-someone.html     [facility]: Interim Guidance for Hospital Discharge to Home: www.[facility]/diseases/coronavirus/hcp/hospdischarge.pdf    [facility] clinical trials (COVID-19 research studies): [email or web address]-clinical-trials     Below are the COVID-19 hotlines at the [place] [facility] ([facility]). Interpreters are available.   o For health questions: Call 944-847-6753 or 1-555.231.3293 (7 a.m. to 7 p.m.)  o For questions about  "schools and childcare: Call 244-635-2203 or 1-764.845.2657 (7 a.m. to 7 p.m.)       Reason for Disposition    [1] Dizziness (vertigo) present now AND [2] age > 60  (Exception: prior physician evaluation for this AND no different/worse than usual)    Dizziness is described as a spinning sensation (i.e., vertigo)    Additional Information    Negative: Severe difficulty breathing (e.g., struggling for each breath, speaks in single words)    Negative: [1] Difficulty breathing or swallowing AND [2] started suddenly after medicine, an allergic food or bee sting    Negative: Shock suspected (e.g., cold/pale/clammy skin, too weak to stand, low BP, rapid pulse)    Negative: Difficult to awaken or acting confused (e.g., disoriented, slurred speech)    Negative: [1] Weakness (i.e., paralysis, loss of muscle strength) of the face, arm or leg on one side of the body AND [2] sudden onset AND [3] present now    Negative: [1] Numbness (i.e., loss of sensation) of the face, arm or leg on one side of the body AND [2] sudden onset AND [3] present now    Negative: [1] Loss of speech or garbled speech AND [2] sudden onset AND [3] present now    Negative: Overdose (accidental or intentional) of medications    Negative: [1] Fainted > 15 minutes ago AND [2] still feels too weak or dizzy to stand    Negative: Heart beating < 50 beats per minute OR > 140 beats per minute    Negative: Sounds like a life-threatening emergency to the triager    Negative: Chest pain    Negative: [1] Vomiting AND [2] contains red blood or black (\"coffee ground\") material    Negative: Vomiting is main symptom    Negative: Diarrhea is main symptom    Negative: Rectal bleeding, bloody stool, or tarry-black stool    Negative: Headache is main symptom    Negative: Patient states that he/she is having an anxiety/panic attack    Negative: Dizziness from low blood sugar (i.e., < 60 mg/dl or 3.5 mmol/l)    Negative: [1] Weakness (i.e., paralysis, loss of muscle strength) " of the face, arm or leg on one side of the body AND [2] sudden onset AND [3] present now    Negative: [1] Numbness (i.e., loss of sensation) of the face, arm or leg on one side of the body AND [2] sudden onset AND [3] present now    Negative: [1] Loss of speech or garbled speech AND [2] sudden onset AND [3] present now    Negative: Difficult to awaken or acting confused (e.g., disoriented, slurred speech)    Negative: Sounds like a life-threatening emergency to the triager    Negative: Followed a head injury    Negative: Followed an ear injury    Negative: Dizziness relates to riding in a car, going to an amusement park, etc.    Negative: Localized weakness or numbness is main symptom    Negative: SEVERE dizziness (vertigo) (e.g., unable to walk without assistance)    Negative: [1] Dizziness (vertigo) present now AND [2] one or more stroke risk factors (i.e., hypertension, diabetes, prior stroke/TIA, heart attack)  (Exception: prior physician evaluation for this AND no different/worse than usual)    Negative: [1] Dizziness is main symptom AND [2] NO spinning sensation (i.e., vertigo)    Protocols used: DIZZINESS - VERTIGO-A-AH, DIZZINESS - [id number]

## 2022-01-02 NOTE — DISCHARGE INSTRUCTIONS
1.  I would  some over-the-counter melatonin to help with your sleep.  I want you to get in with your doctor this week to discuss further work-up for your night sweats.

## 2022-01-05 ENCOUNTER — VIRTUAL VISIT (OUTPATIENT)
Dept: INTERNAL MEDICINE | Facility: CLINIC | Age: 67
End: 2022-01-05
Payer: COMMERCIAL

## 2022-01-05 DIAGNOSIS — M79.10 MYALGIA: ICD-10-CM

## 2022-01-05 DIAGNOSIS — R05.9 COUGH: Primary | ICD-10-CM

## 2022-01-05 PROCEDURE — 99213 OFFICE O/P EST LOW 20 MIN: CPT | Mod: GT | Performed by: INTERNAL MEDICINE

## 2022-01-05 NOTE — PROGRESS NOTES
TOBY is a 66 year old who is being evaluated via a billable video visit.      How would you like to obtain your AVS? MyChart  If the video visit is dropped, the invitation should be resent by:   Will anyone else be joining your video visit?     Video Start Time: 3:14 PM    Assessment & Plan   Problem List Items Addressed This Visit     None      Visit Diagnoses     Cough    -  Primary    Relevant Orders    Symptomatic; Yes; 12/27/2021 COVID-19 Virus (Coronavirus) by PCR    Myalgia        Relevant Orders    Symptomatic; Yes; 12/27/2021 COVID-19 Virus (Coronavirus) by PCR           Patient who is sick, she is been sick for 2 weeks with night sweats.  Achiness.  She appears to have some sort of a URI in her face as we see on the video.  She feels she is getting a little better she did not sweat through her sheets last night.  She was seen in the emergency room and had normal labs normal urine.  I think she probably has COVID-19 she has been vaccinated and had her booster but she most likely has a mild case of this giving her sweats at night.  I would recommend she get a Covid test.  If her Covid test is negative then she will contact us again and we will consider further work-up for night sweats but she has the typical GI symptoms of Covid, fatigue, body aches.  Could consider imaging her if her night sweats continue and Covid is negative.             No follow-ups on file.    Fuentes Darby MD  [facility]   TOBY is a 66 year old who presents for the following health issues     HPI     She doesn't have a fever, feels sob and crummy, using her rescue inhaler.  Night sweats as well, better last night. Slept better last night.  Started two weeks ago, has had to change sheets every night.  Had some family over on New Years warner.  Didn't feel well, had vertigo as well.     Was in the ER and question of ct scan.    Some abdominal pain, eating richer foods. Gained a few pounds over the  holidays.     Everybody at work has had covid.  She is nauseated and diarrhea, no vomiting.      Past Medical History:   Diagnosis Date     Anxiety      Arthritis      Asthma, mild intermittent      Migraines      Tension headaches      Unspecified hypothyroidism      Current Outpatient Medications   Medication     albuterol (VENTOLIN HFA) 108 (90 Base) MCG/ACT inhaler     atorvastatin (LIPITOR) 20 MG tablet     fluticasone (FLONASE) 50 MCG/ACT nasal spray     fluticasone-salmeterol (ADVAIR DISKUS) 250-50 MCG/DOSE inhaler     ibuprofen (ADVIL/MOTRIN) 200 MG capsule     levothyroxine (SYNTHROID/LEVOTHROID) 50 MCG tablet     meclizine (ANTIVERT) 25 MG tablet     PARoxetine (PAXIL-CR) 37.5 MG 24 hr tablet     apixaban ANTICOAGULANT (ELIQUIS) 5 MG tablet     No current facility-administered medications for this visit.     Social History     Tobacco Use     Smoking status: Never Smoker     Smokeless tobacco: Never Used   Vaping Use     Vaping Use: Never used   Substance Use Topics     Alcohol use: Yes     Comment: wine - 2-3 times per week (1 glass)     Drug use: No           Review of Systems         Objective           Vitals:  No vitals were obtained today due to virtual visit.    Physical Exam   GENERAL: no distress  EYES: Eyes grossly normal to inspection.  No discharge or erythema, or obvious scleral/conjunctival abnormalities.  RESP: No audible wheeze, cough, or visible cyanosis.  No visible retractions or increased work of breathing.    SKIN: Visible skin clear. No significant rash, abnormal pigmentation or lesions.  NEURO: Cranial nerves grossly intact.  Mentation and speech appropriate for age.  PSYCH: Mentation appears normal, affect normal/bright, judgement and insight intact, normal speech and appearance well-groomed.                Video-Visit Details    Type of service:  Video Visit    Video End Time:3:23 PM    Originating Location (pt. Location): Home    Distant Location (provider location):  [facility]  [facility]     Platform used for Video Visit: Tamra

## 2022-01-14 ENCOUNTER — NURSE TRIAGE (OUTPATIENT)
Dept: INTERNAL MEDICINE | Facility: CLINIC | Age: 67
End: 2022-01-14
Payer: COMMERCIAL

## 2022-01-14 NOTE — TELEPHONE ENCOUNTER
"Patient calling requesting an appointment with Dr. Darby.  States that she has been having numbness in both of her arms from her shoulder down to her hand (left worse than right) x 1 week.  She states that it occurs mostly at night but today it has increased during the day.  She states that it is constantly throbbing and aching.  Feels like her hand is swollen when she goes to  things but it is not swollen and there is no discoloration.  Normal temp of fingers. Patient requested message to Dr. Darby to see if she could be seen or if had any suggestions.    [phone]    Answer Assessment - Initial Assessment Questions  1. ONSET: \"When did the pain start?\"      X 1 WEEK AGO  2. LOCATION: \"Where is the pain located?\"      BILATERAL ARMS-LEFT WORSE THAN RIGHT  3. PAIN: \"How bad is the pain?\" (Scale 1-10; or mild, moderate, severe)    - MILD (1-3): doesn't interfere with normal activities    - MODERATE (4-7): interferes with normal activities (e.g., work or school) or awakens from sleep    - SEVERE (8-10): excruciating pain, unable to do any normal activities, unable to hold a cup of water      8  4. WORK OR EXERCISE: \"Has there been any recent work or exercise that involved this part of the body?\"      NO  5. CAUSE: \"What do you think is causing the arm pain?\"      UNSURE  6. OTHER SYMPTOMS: \"Do you have any other symptoms?\" (e.g., neck pain, swelling, rash, fever, numbness, weakness)      NUMBNESS, CONSTANTLY THROBBING, ACHING  7. PREGNANCY: \"Is there any chance you are pregnant?\" \"When was your last menstrual period?\"      NO    Protocols used: ARM PAIN-A-OH    Silvia Stallings RN  "

## 2022-01-17 ENCOUNTER — OFFICE VISIT (OUTPATIENT)
Dept: INTERNAL MEDICINE | Facility: CLINIC | Age: 67
End: 2022-01-17
Payer: COMMERCIAL

## 2022-01-17 VITALS
RESPIRATION RATE: 16 BRPM | HEART RATE: 90 BPM | SYSTOLIC BLOOD PRESSURE: 128 MMHG | TEMPERATURE: 98.7 F | WEIGHT: 106 LBS | HEIGHT: 61 IN | BODY MASS INDEX: 20.01 KG/M2 | OXYGEN SATURATION: 97 % | DIASTOLIC BLOOD PRESSURE: 76 MMHG

## 2022-01-17 DIAGNOSIS — R20.2 BILATERAL ARM NUMBNESS AND TINGLING WHILE SLEEPING: Primary | ICD-10-CM

## 2022-01-17 DIAGNOSIS — R20.0 BILATERAL ARM NUMBNESS AND TINGLING WHILE SLEEPING: Primary | ICD-10-CM

## 2022-01-17 DIAGNOSIS — I75.023 BLUE TOE SYNDROME OF BOTH LOWER EXTREMITIES (H): ICD-10-CM

## 2022-01-17 PROCEDURE — 99214 OFFICE O/P EST MOD 30 MIN: CPT | Performed by: INTERNAL MEDICINE

## 2022-01-17 ASSESSMENT — MIFFLIN-ST. JEOR: SCORE: 948.19

## 2022-01-17 ASSESSMENT — PAIN SCALES - GENERAL: PAINLEVEL: SEVERE PAIN (6)

## 2022-01-17 NOTE — PROGRESS NOTES
Assessment & Plan   Problem List Items Addressed This Visit     None      Visit Diagnoses     Bilateral arm numbness and tingling while sleeping    -  Primary    Relevant Orders    MR Cervical Spine w/o Contrast    EMG    Blue toe syndrome of both lower extremities (H)             Is a 66-year-old woman who has a history of blue toe syndrome last year.  She had been seen by vascular placed on Eliquis and Revatio and she stopped those two medications.  Autoimmune work-up was done because of a positive JAUN but the rest of her testing was negative.  Not showing lupus.  Vascular medicine was okay with her being off the Eliquis and Revatio.     Now she had an episode of a cold or URI was not tested for COVID.  She has been vaccinated.  But then began having bilateral arm tingling numbness some weakness worse in her hands with bilateral in nature.  Today her exam is pretty normal question whether this is a cervical disc disease we will get an MRI of her cervical spine or whether it is some other neurological condition and would like to get an EMG.  We will start off of both of these tests and cancel one if the other test is showing a cause to her problem.  Patient may eventually need to see neurology             No follow-ups on file.    Fuentes Darby MD  [facility]    Lizandro LUIS is a 66 year old who presents for the following health issues     HPI     Chief Complaint   Patient presents with     Numbness     arm numbness, started over a week ago     Was sick on New Year time, not tested for covid.    Both hands and arms can get numb on her at certain times.  Happens when reading on her side holding book.  Been over the last week.  Worse at night.      Stopped her Eliquis as she doesn't think she has a fib but also had it for blue toe syndrome.  Also stopped Revatio which was for vascular disease.    Driving last week the steering wheel hurt with longer distances.       Been shoveling  "lately but no trauma.     Past Medical History:   Diagnosis Date     Anxiety      Arthritis      Asthma, mild intermittent      Migraines      Tension headaches      Unspecified hypothyroidism      Current Outpatient Medications   Medication     albuterol (VENTOLIN HFA) 108 (90 Base) MCG/ACT inhaler     atorvastatin (LIPITOR) 20 MG tablet     fluticasone (FLONASE) 50 MCG/ACT nasal spray     fluticasone-salmeterol (ADVAIR DISKUS) 250-50 MCG/DOSE inhaler     levothyroxine (SYNTHROID/LEVOTHROID) 50 MCG tablet     meclizine (ANTIVERT) 25 MG tablet     PARoxetine (PAXIL-CR) 37.5 MG 24 hr tablet     apixaban ANTICOAGULANT (ELIQUIS) 5 MG tablet     ibuprofen (ADVIL/MOTRIN) 200 MG capsule     No current facility-administered medications for this visit.     Social History     Tobacco Use     Smoking status: Never Smoker     Smokeless tobacco: Never Used   Vaping Use     Vaping Use: Never used   Substance Use Topics     Alcohol use: Yes     Comment: wine - 2-3 times per week (1 glass)     Drug use: No       Review of Systems         Objective    /76   Pulse 90   Temp 98.7  F (37.1  C) (Temporal)   Resp 16   Ht 1.549 m (5' 1\")   Wt 48.1 kg (106 lb)   LMP  (LMP Unknown)   SpO2 97%   BMI 20.03 kg/m    Body mass index is 20.03 kg/m .  Physical Exam   No acute distress  Strength is 5 out of 5 in arms  Reflexes are 2+ bilaterally  Sensation is mildly decreased in her hands  Pulses are positive in both the radial and ulnar arteries of both wrists            "

## 2022-01-18 ENCOUNTER — TELEPHONE (OUTPATIENT)
Dept: INTERNAL MEDICINE | Facility: CLINIC | Age: 67
End: 2022-01-18
Payer: COMMERCIAL

## 2022-01-18 ENCOUNTER — MYC MEDICAL ADVICE (OUTPATIENT)
Dept: INTERNAL MEDICINE | Facility: CLINIC | Age: 67
End: 2022-01-18
Payer: COMMERCIAL

## 2022-01-18 ENCOUNTER — HOSPITAL ENCOUNTER (OUTPATIENT)
Dept: MRI IMAGING | Facility: CLINIC | Age: 67
Discharge: HOME | End: 2022-01-18
Attending: INTERNAL MEDICINE | Admitting: INTERNAL MEDICINE
Payer: COMMERCIAL

## 2022-01-18 DIAGNOSIS — R20.2 BILATERAL ARM NUMBNESS AND TINGLING WHILE SLEEPING: ICD-10-CM

## 2022-01-18 DIAGNOSIS — R20.0 BILATERAL ARM NUMBNESS AND TINGLING WHILE SLEEPING: Primary | ICD-10-CM

## 2022-01-18 DIAGNOSIS — R20.0 BILATERAL ARM NUMBNESS AND TINGLING WHILE SLEEPING: ICD-10-CM

## 2022-01-18 DIAGNOSIS — R20.2 BILATERAL ARM NUMBNESS AND TINGLING WHILE SLEEPING: Primary | ICD-10-CM

## 2022-01-18 PROCEDURE — 72141 MRI NECK SPINE W/O DYE: CPT

## 2022-01-18 ASSESSMENT — ASTHMA QUESTIONNAIRES: ACT_TOTALSCORE: 19

## 2022-01-18 NOTE — LETTER
[address] [address]  Phone: [phone]    January 18, 2022        Brissa Mayer  [address] [id number]          To whom it may concern:    RE: Brissa Mayer    Patient may return to work 1/19/22 with the following:  Light duty-unable to repetitively lift more than 15 pounds, limit repetitive motion.  In effect until seen by neurology      Please contact me for questions or concerns.      Sincerely,        Fuentes Darby MD

## 2022-01-27 ENCOUNTER — OFFICE VISIT (OUTPATIENT)
Dept: NEUROSURGERY | Facility: CLINIC | Age: 67
End: 2022-01-27
Attending: INTERNAL MEDICINE
Payer: COMMERCIAL

## 2022-01-27 VITALS
HEIGHT: 61 IN | TEMPERATURE: 97.1 F | DIASTOLIC BLOOD PRESSURE: 64 MMHG | BODY MASS INDEX: 20.45 KG/M2 | SYSTOLIC BLOOD PRESSURE: 108 MMHG | WEIGHT: 108.3 LBS

## 2022-01-27 DIAGNOSIS — R20.2 BILATERAL ARM NUMBNESS AND TINGLING WHILE SLEEPING: ICD-10-CM

## 2022-01-27 DIAGNOSIS — R20.2 PARESTHESIA OF ARM: Primary | ICD-10-CM

## 2022-01-27 DIAGNOSIS — M54.2 NECK PAIN: ICD-10-CM

## 2022-01-27 DIAGNOSIS — R20.0 BILATERAL ARM NUMBNESS AND TINGLING WHILE SLEEPING: ICD-10-CM

## 2022-01-27 PROCEDURE — 99203 OFFICE O/P NEW LOW 30 MIN: CPT | Performed by: NURSE PRACTITIONER

## 2022-01-27 ASSESSMENT — MIFFLIN-ST. JEOR: SCORE: 958.63

## 2022-01-27 ASSESSMENT — PAIN SCALES - GENERAL: PAINLEVEL: SEVERE PAIN (6)

## 2022-01-27 NOTE — PATIENT INSTRUCTIONS
-Physical therapy ordered. They will contact you to schedule.   -Please contact us when your EMG is completed. We will review and contact you with the results.   -Please contact our clinic with questions or concerns at [phone].

## 2022-01-27 NOTE — PROGRESS NOTES
"Brissa Mayer is a 66 year old female who presents for:  Chief Complaint   Patient presents with     Neurologic Problem     Bilateral arm numbness         Initial Vitals:  Ht 5' 1\" (1.549 m)   Wt 108 lb 4.8 oz (49.1 kg)   LMP  (LMP Unknown)   BMI 20.46 kg/m   Estimated body mass index is 20.46 kg/m  as calculated from the following:    Height as of this encounter: 5' 1\" (1.549 m).    Weight as of this encounter: 108 lb 4.8 oz (49.1 kg).. Body surface area is 1.45 meters squared. BP completed using cuff size: regular  Severe Pain (6)    Nursing Comments: Pain is 6 out of 10. Bilateral arm numbness.    Zena Mares    "

## 2022-01-27 NOTE — LETTER
"    1/27/2022         RE: Brissa Mayer  [address] [facility] [address]        Dear Colleague,    Thank you for referring your patient, Brissa Mayer, to the [facility] [place]. Please see a copy of my visit note below.    Brissa Mayer is a 66 year old female who presents for:  Chief Complaint   Patient presents with     Neurologic Problem     Bilateral arm numbness         Initial Vitals:  Ht 5' 1\" (1.549 m)   Wt 108 lb 4.8 oz (49.1 kg)   LMP  (LMP Unknown)   BMI 20.46 kg/m   Estimated body mass index is 20.46 kg/m  as calculated from the following:    Height as of this encounter: 5' 1\" (1.549 m).    Weight as of this encounter: 108 lb 4.8 oz (49.1 kg).. Body surface area is 1.45 meters squared. BP completed using cuff size: regular  Severe Pain (6)    Nursing Comments: Pain is 6 out of 10. Bilateral arm numbness.    Zena Mares      [facility]  Neurosurgery Clinic Visit      CC: neck pain and bilateral hand/arm paresthesias    Primary care Provider: Fuentes Darby    Reason For Visit:   I was asked by Dr. Fuentes Darby to consult on the patient for bilateral arm numbness and tingling.    HPI: Brissa Mayer is a 66 year old female with a 3-4 week history of bilateral arm/hand paresthesias. No injury at the onset. She reports neck pain as well. No radicular arm pain or weakness. Symptoms worst at night or when lying in bed and reading a book. Has not had any recent treatments. She has a BUE EMG ordered.    Past Medical History:   Diagnosis Date     Anxiety      Arthritis      Asthma, mild intermittent      Migraines      Tension headaches      Unspecified hypothyroidism        Past Medical History reviewed with patient during visit.    Past Surgical History:   Procedure Laterality Date     BUNIONECTOMY       COLONOSCOPY       ESOPHAGOSCOPY, GASTROSCOPY, DUODENOSCOPY (EGD), COMBINED N/A 4/2/2019    Procedure: ESOPHAGOSCOPY, GASTROSCOPY, " DUODENOSCOPY (EGD);  Surgeon: Ochoa Cherry DO;  Location: [facility]     OTHER SURGICAL HISTORY  1980    Bunionectomy     Past Surgical History reviewed with patient during visit.    Current Outpatient Medications   Medication     albuterol (VENTOLIN HFA) 108 (90 Base) MCG/ACT inhaler     atorvastatin (LIPITOR) 20 MG tablet     fluticasone (FLONASE) 50 MCG/ACT nasal spray     fluticasone-salmeterol (ADVAIR DISKUS) 250-50 MCG/DOSE inhaler     levothyroxine (SYNTHROID/LEVOTHROID) 50 MCG tablet     meclizine (ANTIVERT) 25 MG tablet     PARoxetine (PAXIL-CR) 37.5 MG 24 hr tablet     No current facility-administered medications for this visit.       Allergies   Allergen Reactions     Compazine      Anxiety     Flagyl [Metronidazole Hcl] Nausea and Vomiting       Social History     Socioeconomic History     Marital status: Single     Spouse name: None     Number of children: None     Years of education: None     Highest education level: None   Occupational History     None   Tobacco Use     Smoking status: Never Smoker     Smokeless tobacco: Never Used   Vaping Use     Vaping Use: Never used   Substance and Sexual Activity     Alcohol use: Yes     Comment: wine - 2-3 times per week (1 glass)     Drug use: No     Sexual activity: Not Currently     Partners: Male     Birth control/protection: None   Other Topics Concern     Parent/sibling w/ CABG, MI or angioplasty before 65F 55M? No   Social History Narrative     None     Social Determinants of Health     Financial Resource Strain: Not on file   Food Insecurity: Not on file   Transportation Needs: Not on file   Physical Activity: Not on file   Stress: Not on file   Social Connections: Not on file   Intimate Partner Violence: Not on file   Housing Stability: Not on file       Family History   Problem Relation Age of Onset     Asthma Mother      Diabetes Mother      Cancer - colorectal Maternal Grandmother      Heart Disease Father         MI at age 55     Prostate  "Cancer Father      Asthma Father      Asthma Sister      Obesity Sister      Diabetes Sister      Coronary Artery Disease Sister      Asthma Sister      Obesity Sister      Asthma Sister      Asthma Brother          ROS: 10 point ROS neg other than the symptoms noted above in the HPI.    Vital Signs:   /64 (BP Location: Left arm, Patient Position: Sitting, Cuff Size: Adult Regular)   Temp 97.1  F (36.2  C) (Temporal)   Ht 5' 1\" (1.549 m)   Wt 108 lb 4.8 oz (49.1 kg)   LMP  (LMP Unknown)   BMI 20.46 kg/m        Examination:  Constitutional:  Alert, well nourished, NAD.  Memory: recent and remote memory   HEENT: Normocephalic, atraumatic.   Pulm:  Without shortness of breath   CV:  No pitting edema of BLE.      Neurological:  Awake  Alert  Oriented x 3  Speech clear  Tongue midline    Motor exam:  Shoulder Abduction:  Right:  5/5   Left:  5/5  Biceps:                      Right:  5/5   Left:  5/5  Triceps:                     Right:  5/5   Left:  5/5  Wrist Extensors:       Right:  5/5   Left:  5/5  Wrist Flexors:           Right:  5/5   Left:  5/5  Intrinsics:                   Right:  5/5   Left:  5/5    Sensation normal to bilateral upper and lower extremities  Muscle tone to bilateral upper and lower extremities   Gait: Able to stand from a seated position. Normal non-antalgic, non-myelopathic gait.  Able to heel/toe walk without loss of balance    Cervical examination reveals good range of motion.  No tenderness to palpation of the cervical spine or paraspinous muscles bilaterally.    Imaging:   Cervical MRI 1/18/2022  IMPRESSION:  1. Diffuse degenerative change of the cervical spine as detailed above.  2. No significant spinal canal stenosis at any level of the cervical spine.  3. Moderate neural foraminal stenosis bilaterally at C3-C4, on the right at C4-C5, bilaterally at C5-C6 and bilaterally at C6-C7.    Assessment/Plan:   Neck pain  Bilateral arm paresthesias    Discussed cervical MRI. Plan for " PT and await results for BUE EMG. She will contact us when the exam is completed and we will review those results. She verbalized understanding and agreement.    Patient Instructions   -Physical therapy ordered. They will contact you to schedule.   -Please contact us when your EMG is completed. We will review and contact you with the results.   -Please contact our clinic with questions or concerns at [phone].      Jie Mora CNP  [address] [address]  Tel [phone]  Fax [phone]        Again, thank you for allowing me to participate in the care of your patient.        Sincerely,        Jie Mora, ADRIA

## 2022-01-27 NOTE — PROGRESS NOTES
[facility]  Neurosurgery Clinic Visit      CC: neck pain and bilateral hand/arm paresthesias    Primary care Provider: Fuentes Darby    Reason For Visit:   I was asked by Dr. Fuentes Darby to consult on the patient for bilateral arm numbness and tingling.    HPI: Brissa Mayer is a 66 year old female with a 3-4 week history of bilateral arm/hand paresthesias. No injury at the onset. She reports neck pain as well. No radicular arm pain or weakness. Symptoms worst at night or when lying in bed and reading a book. Has not had any recent treatments. She has a BUE EMG ordered.    Past Medical History:   Diagnosis Date     Anxiety      Arthritis      Asthma, mild intermittent      Migraines      Tension headaches      Unspecified hypothyroidism        Past Medical History reviewed with patient during visit.    Past Surgical History:   Procedure Laterality Date     BUNIONECTOMY       COLONOSCOPY       ESOPHAGOSCOPY, GASTROSCOPY, DUODENOSCOPY (EGD), COMBINED N/A 4/2/2019    Procedure: ESOPHAGOSCOPY, GASTROSCOPY, DUODENOSCOPY (EGD);  Surgeon: Ochoa Cherry DO;  Location: [facility]     OTHER SURGICAL HISTORY  1980    Bunionectomy     Past Surgical History reviewed with patient during visit.    Current Outpatient Medications   Medication     albuterol (VENTOLIN HFA) 108 (90 Base) MCG/ACT inhaler     atorvastatin (LIPITOR) 20 MG tablet     fluticasone (FLONASE) 50 MCG/ACT nasal spray     fluticasone-salmeterol (ADVAIR DISKUS) 250-50 MCG/DOSE inhaler     levothyroxine (SYNTHROID/LEVOTHROID) 50 MCG tablet     meclizine (ANTIVERT) 25 MG tablet     PARoxetine (PAXIL-CR) 37.5 MG 24 hr tablet     No current facility-administered medications for this visit.       Allergies   Allergen Reactions     Compazine      Anxiety     Flagyl [Metronidazole Hcl] Nausea and Vomiting       Social History     Socioeconomic History     Marital status: Single     Spouse name: None     Number of children: None     Years of  "education: None     Highest education level: None   Occupational History     None   Tobacco Use     Smoking status: Never Smoker     Smokeless tobacco: Never Used   Vaping Use     Vaping Use: Never used   Substance and Sexual Activity     Alcohol use: Yes     Comment: wine - 2-3 times per week (1 glass)     Drug use: No     Sexual activity: Not Currently     Partners: Male     Birth control/protection: None   Other Topics Concern     Parent/sibling w/ CABG, MI or angioplasty before 65F 55M? No   Social History Narrative     None     Social Determinants of Health     Financial Resource Strain: Not on file   Food Insecurity: Not on file   Transportation Needs: Not on file   Physical Activity: Not on file   Stress: Not on file   Social Connections: Not on file   Intimate Partner Violence: Not on file   Housing Stability: Not on file       Family History   Problem Relation Age of Onset     Asthma Mother      Diabetes Mother      Cancer - colorectal Maternal Grandmother      Heart Disease Father         MI at age 55     Prostate Cancer Father      Asthma Father      Asthma Sister      Obesity Sister      Diabetes Sister      Coronary Artery Disease Sister      Asthma Sister      Obesity Sister      Asthma Sister      Asthma Brother          ROS: 10 point ROS neg other than the symptoms noted above in the HPI.    Vital Signs:   /64 (BP Location: Left arm, Patient Position: Sitting, Cuff Size: Adult Regular)   Temp 97.1  F (36.2  C) (Temporal)   Ht 5' 1\" (1.549 m)   Wt 108 lb 4.8 oz (49.1 kg)   LMP  (LMP Unknown)   BMI 20.46 kg/m        Examination:  Constitutional:  Alert, well nourished, NAD.  Memory: recent and remote memory   HEENT: Normocephalic, atraumatic.   Pulm:  Without shortness of breath   CV:  No pitting edema of BLE.      Neurological:  Awake  Alert  Oriented x 3  Speech clear  Tongue midline    Motor exam:  Shoulder Abduction:  Right:  5/5   Left:  5/5  Biceps:                      Right:  5/5   " Left:  5/5  Triceps:                     Right:  5/5   Left:  5/5  Wrist Extensors:       Right:  5/5   Left:  5/5  Wrist Flexors:           Right:  5/5   Left:  5/5  Intrinsics:                   Right:  5/5   Left:  5/5    Sensation normal to bilateral upper and lower extremities  Muscle tone to bilateral upper and lower extremities   Gait: Able to stand from a seated position. Normal non-antalgic, non-myelopathic gait.  Able to heel/toe walk without loss of balance    Cervical examination reveals good range of motion.  No tenderness to palpation of the cervical spine or paraspinous muscles bilaterally.    Imaging:   Cervical MRI 1/18/2022  IMPRESSION:  1. Diffuse degenerative change of the cervical spine as detailed above.  2. No significant spinal canal stenosis at any level of the cervical spine.  3. Moderate neural foraminal stenosis bilaterally at C3-C4, on the right at C4-C5, bilaterally at C5-C6 and bilaterally at C6-C7.    Assessment/Plan:   Neck pain  Bilateral arm paresthesias    Discussed cervical MRI. Plan for PT and await results for BUE EMG. She will contact us when the exam is completed and we will review those results. She verbalized understanding and agreement.    Patient Instructions   -Physical therapy ordered. They will contact you to schedule.   -Please contact us when your EMG is completed. We will review and contact you with the results.   -Please contact our clinic with questions or concerns at [phone].      Jie Mora, [facility]  [address] [place] [id number]  Tel [phone]  Fax [phone]

## 2022-02-01 ENCOUNTER — TRANSFERRED RECORDS (OUTPATIENT)
Dept: HEALTH INFORMATION MANAGEMENT | Facility: CLINIC | Age: 67
End: 2022-02-01
Payer: COMMERCIAL

## 2022-02-03 ENCOUNTER — HOSPITAL ENCOUNTER (OUTPATIENT)
Dept: PHYSICAL THERAPY | Facility: CLINIC | Age: 67
Setting detail: THERAPIES SERIES
End: 2022-02-03
Attending: NURSE PRACTITIONER
Payer: COMMERCIAL

## 2022-02-03 DIAGNOSIS — M54.2 NECK PAIN: ICD-10-CM

## 2022-02-03 DIAGNOSIS — R20.2 PARESTHESIA OF ARM: ICD-10-CM

## 2022-02-03 PROCEDURE — 97110 THERAPEUTIC EXERCISES: CPT | Mod: GP | Performed by: PHYSICAL THERAPIST

## 2022-02-03 PROCEDURE — 97161 PT EVAL LOW COMPLEX 20 MIN: CPT | Mod: GP | Performed by: PHYSICAL THERAPIST

## 2022-02-03 PROCEDURE — 97140 MANUAL THERAPY 1/> REGIONS: CPT | Mod: GP | Performed by: PHYSICAL THERAPIST

## 2022-02-03 NOTE — PROGRESS NOTES
02/03/22 1300   General Information   Type of Visit Initial OP Ortho PT Evaluation   Start of Care Date 02/03/22   Referring Physician Jie Mora NP   Patient/Family Goals Statement Reduce pain   Orders Evaluate and Treat   Date of Order 01/27/22   Certification Required? No   Medical Diagnosis B UE paresthesia   Surgical/Medical history reviewed Yes   Precautions/Limitations no known precautions/limitations   Weight-Bearing Status - LUE full weight-bearing   Weight-Bearing Status - RUE full weight-bearing   Weight-Bearing Status - LLE full weight-bearing   Weight-Bearing Status - RLE full weight-bearing   Body Part(s)   Body Part(s) Cervical Spine   Presentation and Etiology   Pertinent history of current problem (include personal factors and/or comorbidities that impact the POC) Pt reports she has neck pain and B UE pain and numbness down into her hands throughout both hands.  Pt notes this occurs usually when lying down at night or prolonged sitting.  L arm a little worse the R.  Pt notes slight decrease in  strength. Pt reports this came on about 3 weeks ago.  PMH: anxiety, arthritis, asthma, migraines, tension HA's, hypothyroidism, bunionectomy, hyperlipidemia, hypothyroidism, depression, adjustment disorder.     Impairments A. Pain;D. Decreased ROM;E. Decreased flexibility;F. Decreased strength and endurance   Functional Limitations perform activities of daily living;perform required work activities;perform desired leisure / sports activities   Symptom Location Neck, B UE's down into her hands   How/Where did it occur From insidious onset   Onset date of current episode/exacerbation 01/07/22   Chronicity New   Pain rating (0-10 point scale) Best (/10);Worst (/10)   Best (/10) 0   Worst (/10) 7   Pain quality H. Other   Pain quality comment numbness, pins and needles.   Frequency of pain/symptoms B. Intermittent   Pain/symptoms are: Worse during the night   Pain/symptoms exacerbated by A.  Sitting;G. Certain positions   Pain/symptoms eased by B. Walking;E. Changing positions   Progression of symptoms since onset: Improved   Current / Previous Interventions   Diagnostic Tests: MRI   MRI Results Results   MRI results Diffuse degenerative change of the cervical spine as detailed above. No significant spinal canal stenosis at any level of the cervical spine. Moderate neural foraminal stenosis bilaterally at C3-C4, on the right at C4-C5, bilaterally at C5-C6 and bilaterally at C6-C7.   Current Level of Function   Current Community Support Family/friend caregiver   Patient role/employment history A. Employed   Employment Comments 1 day per week, mostly works from home.  [employer] work.   Living environment House/[facility]   Home/community accessibility 3 level house   Current equipment-Gait/Locomotion None   Current equipment-ADL None   Fall Risk Screen   Fall screen completed by PT   Have you fallen 2 or more times in the past year? No   Have you fallen and had an injury in the past year? No   Abuse Screen (yes response referral indicated)   Feels Unsafe at Home or Work/School no   Feels Threatened by Someone no   Does Anyone Try to Keep You From Having Contact with Others or Doing Things Outside Your Home? no   Physical Signs of Abuse Present no   Cervical Spine   Observation Hand dynamometer: position 3: R: 42#  L: 28#.   Cervical Flexion ROM 60   Cervical Extension ROM 50   Cervical Right Side Bending ROM 30   Cervical Left Side Bending ROM 30   Cervical Right Rotation ROM 60   Cervical Left Rotation ROM 60   Shoulder Shrug (C2-C4) Strength 5   Shoulder Abd (C5) Strength 5   Elbow Flexion (C5, C6) Strength 5   Elbow Extension (C7) Strength 5   Thumb Abd (C8) Strength 5   5th Finger Add (T1) Strength 5   Vertebral Artery Test negative   Spurling Test negative   Planned Therapy Interventions   Planned Therapy Interventions joint mobilization;manual therapy;ROM;strengthening;stretching   Planned  Modality Interventions   Planned Modality Interventions Cryotherapy;Electrical stimulation;Hot packs;TENS;Traction;Ultrasound   Planned Modality Interventions Comments as needed   Clinical Impression   Criteria for Skilled Therapeutic Interventions Met yes, treatment indicated   PT Diagnosis Neck pain, B paresthesia.   Influenced by the following impairments Pain, paresthesia, impaired posture.   Functional limitations due to impairments Sleeping, reading, working at desk   Clinical Presentation Stable/Uncomplicated   Clinical Presentation Rationale clinical judgement   Clinical Decision Making (Complexity) Low complexity   Therapy Frequency 1 time/week   Predicted Duration of Therapy Intervention (days/wks) 6 weeks   Risk & Benefits of therapy have been explained Yes   Patient, Family & other staff in agreement with plan of care Yes   Clinical Impression Comments Pt is a 66 y.o. female who presented to PT with symptoms of B UE paresthesia.  Pt will benefit from skilled PT to improve cervical and thoracic mobility and postural stabilizatino.   Education Assessment   Preferred Learning Style Listening;Demonstration;Pictures/video   Barriers to Learning No barriers   ORTHO GOALS   PT Ortho Eval Goals 1;2   Ortho Goal 1   Goal Identifier HEP   Goal Description Pt will be independent with HEP in order to improve postural stabilization.   Target Date 03/17/22   Ortho Goal 2   Goal Identifier Sleep   Goal Description Pt will report no incidence of paresthesia over the past week in order to improve quality of sleep.   Target Date 03/17/22   Total Evaluation Time   PT Eval, Low Complexity Minutes (11755) 15

## 2022-02-10 ENCOUNTER — HOSPITAL ENCOUNTER (OUTPATIENT)
Dept: PHYSICAL THERAPY | Facility: CLINIC | Age: 67
Setting detail: THERAPIES SERIES
End: 2022-02-10
Attending: NURSE PRACTITIONER
Payer: COMMERCIAL

## 2022-02-10 PROCEDURE — 97110 THERAPEUTIC EXERCISES: CPT | Mod: GP | Performed by: PHYSICAL THERAPIST

## 2022-02-10 PROCEDURE — 97140 MANUAL THERAPY 1/> REGIONS: CPT | Mod: GP | Performed by: PHYSICAL THERAPIST

## 2022-02-13 ENCOUNTER — MYC MEDICAL ADVICE (OUTPATIENT)
Dept: INTERNAL MEDICINE | Facility: CLINIC | Age: 67
End: 2022-02-13
Payer: COMMERCIAL

## 2022-02-15 ENCOUNTER — TELEPHONE (OUTPATIENT)
Dept: OTHER | Facility: CLINIC | Age: 67
End: 2022-02-15

## 2022-02-15 ENCOUNTER — OFFICE VISIT (OUTPATIENT)
Dept: OTHER | Facility: CLINIC | Age: 67
End: 2022-02-15
Attending: INTERNAL MEDICINE
Payer: COMMERCIAL

## 2022-02-15 VITALS
SYSTOLIC BLOOD PRESSURE: 123 MMHG | OXYGEN SATURATION: 97 % | TEMPERATURE: 97.5 F | WEIGHT: 103.8 LBS | BODY MASS INDEX: 19.61 KG/M2 | HEART RATE: 75 BPM | DIASTOLIC BLOOD PRESSURE: 79 MMHG

## 2022-02-15 DIAGNOSIS — I75.023 BLUE TOE SYNDROME OF BOTH LOWER EXTREMITIES (H): Primary | ICD-10-CM

## 2022-02-15 PROCEDURE — 99214 OFFICE O/P EST MOD 30 MIN: CPT | Performed by: INTERNAL MEDICINE

## 2022-02-15 PROCEDURE — G0463 HOSPITAL OUTPT CLINIC VISIT: HCPCS

## 2022-02-15 RX ORDER — SILDENAFIL CITRATE 20 MG/1
10 TABLET ORAL 3 TIMES DAILY
Qty: 45 TABLET | Refills: 11 | Status: SHIPPED | OUTPATIENT
Start: 2022-02-15 | End: 2023-03-08

## 2022-02-15 NOTE — TELEPHONE ENCOUNTER
Wilner Garcia MD Busalacchi, Erin, RN    Please ask this patient if she would like to se lori today at 3:10 to address this. Cindy make her appt 60 minuytes. Please put the spot on hold until you can talk to her.    Ann Marie, CF           Previous Messages     ----- Message -----   From: Fuentes Darby MD   Sent: 2/14/2022   4:45 PM CST   To: Wilner Garcia MD     Hi     You saw her this fall for blue toe syndrome. She only had an JAUN of 1:160 and nothing else, nonsmoker. Now has the toes again on both feet. See her mychart from today with pictures.  Any other treatment, blood thinner or recommendations?     Thanks   Fuentes Darby

## 2022-02-15 NOTE — PROGRESS NOTES
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           HPI: Brissa Mayer  is a 66-year-old lifetime nonsmoking white female who in 2021 developed blue toes on her right foot then followed by the left.  They were slow to heal but have in fact healed since then. She said some of the tips turned black but then healed.  They were also quite painful at the time.  She denies any irregular heartbeat, frostbite or cold exposure.  She did have a cardiac echo when she was in her 40s and was told she had a murmur. She denies any rest pain, claudication, stroke or TIAs.  She now presents to me for her toe pain and possible ischemia. She has previously been seen by Sree Henry and [facility] with imaging to date revealing no atheroembolic focus on CTA C/A/P, no cardioembolic focus on TTE, and no significant ectopy on a ZioPatch. There was a single four beat run of VT, and runs of PSVT, with the longest run lasting 8 beats. She had been empirically treated with Eliquis AC, and Revatio to act as a vasodilator. She states those agents have helped her sxs. She has had worsening flow noted in her toes on duplex, and she was therefore sent to me to address a possible autoimmune mediated vasospastic component. She thinks she may have had COVID last year. She hasn't felt well since then. Breathing is with ongoing difficulty and she is using inhaler more often. Weight loss of 35 lbs unintentional, poor appetite, despite being normal weight at baseline. Having a lot of night sweats. No ongoing fevers/chills. Her mother  of Wegener's.     When last seen, I stated we should attempt to rule out an autoimmune mediated component. We checked labs revealing that her JAUN is positive at 1:160 in a dense fine speckled pattern. However, negative or normal studies included ESR and CRP, Ro, La, Scleroderma, RF, CCP, histone,  centromere, DS DNA Abs.     Since last having been seen, she stopped Eliquis and Revatio as she did not like how they made her feel. She has had complete resolution of sxs. She has no arthralgias or myalgias. She did stub her toe and noted that her nail on that toe became black and blue but that the toe itself is otherwise normal.    At her last visit, no atheroembolic or cardioembolic focus was notable on imaging as delineated below. Additionally all autoimmune labs other than JAUN were normal. Off of Eliquis and Revatio,  she now c/o recurrent sxs with painful toes which are dusky in appearance and have are devleoping purple to black splotches at the tips of the toes.               Review Of Systems  Skin: negative  Eyes: negative  Ears/Nose/Throat: negative  Respiratory: No shortness of breath, dyspnea on exertion, cough, or hemoptysis  Cardiovascular: as above  Gastrointestinal: negative  Genitourinary: negative  Musculoskeletal: as above  Neurologic: negative  Psychiatric: negative  Hematologic/Lymphatic/Immunologic: negative  Endocrine: negative        PAST MEDICAL HISTORY:                  Past Medical History:   Diagnosis Date     Anxiety      Arthritis      Asthma, mild intermittent      Migraines      Tension headaches      Unspecified hypothyroidism        PAST SURGICAL HISTORY:                  Past Surgical History:   Procedure Laterality Date     BUNIONECTOMY       COLONOSCOPY       ESOPHAGOSCOPY, GASTROSCOPY, DUODENOSCOPY (EGD), COMBINED N/A 4/2/2019    Procedure: ESOPHAGOSCOPY, GASTROSCOPY, DUODENOSCOPY (EGD);  Surgeon: Ochoa Cherry DO;  Location:  GI     OTHER SURGICAL HISTORY  1980    Bunionectomy       CURRENT MEDICATIONS:                  Current Outpatient Medications   Medication Sig Dispense Refill     albuterol (VENTOLIN HFA) 108 (90 Base) MCG/ACT inhaler INHALE TWO PUFFS BY MOUTH EVERY 6 HOURS AS NEEDED FOR SHORTNESS OF BREATH/DYSPNEA 18 g 3     atorvastatin (LIPITOR) 20 MG  tablet Take 1 tablet (20 mg) by mouth daily 90 tablet 3     fluticasone (FLONASE) 50 MCG/ACT nasal spray Spray 2 sprays into both nostrils daily 1 Package 1     fluticasone-salmeterol (ADVAIR DISKUS) 250-50 MCG/DOSE inhaler INHALE ONE PUFF BY MOUTH TWICE A  each 3     levothyroxine (SYNTHROID/LEVOTHROID) 50 MCG tablet TAKE ONE TABLET BY MOUTH ONCE DAILY 90 tablet 3     PARoxetine (PAXIL-CR) 37.5 MG 24 hr tablet TAKE ONE TABLET BY MOUTH EVERY MORNING 90 tablet 0     meclizine (ANTIVERT) 25 MG tablet Take 1 tablet (25 mg) by mouth every 6 hours as needed for dizziness 15 tablet 1       ALLERGIES:                  Allergies   Allergen Reactions     Compazine      Anxiety     Flagyl [Metronidazole Hcl] Nausea and Vomiting       SOCIAL HISTORY:                  Social History     Socioeconomic History     Marital status: Single     Spouse name: Not on file     Number of children: Not on file     Years of education: Not on file     Highest education level: Not on file   Occupational History     Not on file   Tobacco Use     Smoking status: Never Smoker     Smokeless tobacco: Never Used   Vaping Use     Vaping Use: Never used   Substance and Sexual Activity     Alcohol use: Yes     Comment: wine - 2-3 times per week (1 glass)     Drug use: No     Sexual activity: Not Currently     Partners: Male     Birth control/protection: None   Other Topics Concern     Parent/sibling w/ CABG, MI or angioplasty before 65F 55M? No   Social History Narrative     Not on file     Social Determinants of Health     Financial Resource Strain: Not on file   Food Insecurity: Not on file   Transportation Needs: Not on file   Physical Activity: Not on file   Stress: Not on file   Social Connections: Not on file   Intimate Partner Violence: Not on file   Housing Stability: Not on file       FAMILY HISTORY:                   Family History   Problem Relation Age of Onset     Asthma Mother      Diabetes Mother      Cancer - colorectal Maternal  Grandmother      Heart Disease Father         MI at age 55     Prostate Cancer Father      Asthma Father      Asthma Sister      Obesity Sister      Diabetes Sister      Coronary Artery Disease Sister      Asthma Sister      Obesity Sister      Asthma Sister      Asthma Brother              Physical exam Reveals:    O/P: WNL  HEENT: WNL  NECK: No JVD, thyromegaly, or lymphadenopathy  HEART: RRR, no murmurs, gallops, or rubs  LUNGS: CTA bilaterally without rales, wheezes, or rhonchi  GI: NABS, nondistended, nontender, soft  EXT:without cyanosis, clubbing, or edema; three plus DP and PT pulses; multiple blue toes with pinpoint purple discoloration    NEURO: nonfocal  : no flank tenderness             PPGs of bilateral lower extremity digits dated 9/30/21     Clinical history: 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 Canas     Technique: PPG waveforms were obtained with a sensor and infrared  light.     Findings:     Right foot:  First toe: Present, Normal  Second toe: Present, Severely abnormal  Third toe: Present, Severely abnormal  Fourth toe: Present, Moderately abnormal  Fifth toe: Present, Moderately abnormal     Left foot:  First toe: Present,Severely abnormal  Second toe: Present, Moderately abnormal  Third toe: Present, Moderately abnormal  Fourth toe: Present, Severely abnormal  Fifth toe: Present, Normal                                                                       Impression:  1. Abnormal PPG waveforms again noted. On side-by side comparison, the  biggest changes seen are in the left first digit, which is now  severely abnormal, compared to moderately on prior study, and the left  5th digit, which is now normal and was moderately abnormal.      HOA WESTON MD            CT ABDOMEN PELVIS WITH CONTRAST 9/2/2021 1:41 PM     CLINICAL HISTORY: Abdominal pain, acute, nonlocalized. Had noncontrast  CT yesterday, pain worse today.  Abdominal pain, unspecified abdominal  location.     TECHNIQUE: CT scan of the abdomen and pelvis was performed following  injection of IV contrast. Multiplanar reformats were obtained. Dose  reduction techniques were used.  CONTRAST: 50mL, Isovue 370     COMPARISON: CT abdomen and pelvis without IV contrast dated 9/1/2021  and 4/26/2018, CT abdomen and pelvis with IV contrast dated  12/16/2015.     FINDINGS:   LOWER CHEST: Visualized portions of the lung bases and mediastinal  contents are grossly unremarkable. There are thoracic aortic  calcifications.     HEPATOBILIARY: Questionable subtle bubble of gas is seen in the common  bile duct in the head of the pancreas indicating possible partially  sphincterotomy. Recommend clinical correlation. No definite mass in  the head of the pancreas or choledocholithiasis is seen. There may be  minimal gallbladder wall thickening. No definite cholelithiasis is  identified. No pericholecystic fluid is seen. Liver enhances normally  without focal lesion, intrahepatic biliary ductal dilatation or  choledocholithiasis.     PANCREAS: Normal.     SPLEEN: Normal.     ADRENAL GLANDS: Normal.     KIDNEYS/BLADDER: Fat-containing lesion inferior pole left kidney is  most consistent with adrenal angiomyolipoma, a benign lesion. This is  stable as compared to the prior study. The kidneys otherwise enhance  normally. No hydronephrosis, nephrolithiasis, hydroureter, or ureteral  calculus is identified. Ureters are difficult to follow due to the  relative lack of intraperitoneal adipose tissue. Urinary bladder is  grossly unremarkable.     BOWEL: The colon is difficult to follow given the lack of  intraperitoneal adipose tissue. No definite pericolonic inflammatory  changes to suggest acute diverticulitis. Appendix is not well seen. No  pericecal inflammatory change to suggest acute appendicitis. There is  stranding slightly increased density in the peritoneal adipose tissues  around the  anterior aspect of the mid to lower abdomen, more so on the  right. This is of uncertain clinical significance and etiology but  could represent mesenteritis. Small bowel appears grossly of normal  caliber. There is abnormal thickening of the wall of the gastric  antrum/pyloric/proximal duodenal region which could represent an  inflammatory process such as gastritis or ulcer disease. No obvious  perforated ulcer is identified. There is no free air or free fluid  around this region. The stomach otherwise contains a large amount of  ingested material and moderate amount of air.     PELVIC ORGANS: Enhancing structure just posterior to the urinary  bladder in the deep pelvis likely represents an atrophic uterus and is  not appreciably changed since the prior study dated 12/16/2015. The  ovaries are not well seen. No adnexal masses are identified.     ADDITIONAL FINDINGS: Incidental note of circumaortic left renal vein,  a normal variant. There is nonaneurysmal atherosclerosis. No definite  adenopathy, free air is identified. There is trace free fluid in the  pelvis.     MUSCULOSKELETAL: No aggressive osseous lesions or acute osseous  fractures are identified. There are degenerative changes in the spine,  mostly in the facet joints of the lower lumbar spine. There is also  disc height loss at L4-L5 and L5-S1. Mild anterolisthesis of L5 on S1  is degenerative in etiology.                                                                      IMPRESSION:   1.  Abnormal thickening of the wall the gastric  antrum/pylorus/proximal duodenum could represent an inflammatory or  infectious process (gastritis and possible ulcer disease) versus less  likely neoplastic infiltration. Recommend clinical correlation.  2.  Slightly increased density in the peritoneal adipose tissues  throughout the mid to lower abdomen and pelvis and slightly more so on  the right could represent mesenteritis. There is a relative lack of  intraperitoneal  adipose tissue limiting evaluation of the hollow and  solid organs of the abdomen and pelvis. The amount of intraperitoneal  adipose has decreased since the prior studies from 2018 and 2015.  3.  Appendix is not definitely seen. No obvious evidence for acute  appendicitis is identified. Appendicitis cannot be excluded on the  basis of this study.  4.  Stable angiomyolipoma inferior left kidney is again noted.  5.  Small bubble of gas in the distal common bile duct could be from  prior sphincterotomy. Recommend clinical correlation.  6.  Relative lack of intraperitoneal adipose tissue limits evaluation  of the bowel and other structures of the abdomen and pelvis.     I discussed the thickening of the wall of the gastric  antrum/pylorus/proximal duodenum, slightly increased density in the  peroneal adipose tissues, and lack of other obvious etiology of  patient's symptoms with Dr. Cherry on 9/2/2021 at 1:52 PM. We also  discussed the limitations of this study due to the relative lack of  intraperitoneal adipose tissue.     DINORA MUSTAFA MD            CT ABDOMEN/PELVIS WITHOUT CONTRAST September 1, 2021 9:55 AM     CLINICAL HISTORY: Right lower quadrant abdominal pain. No history of  surgery or cancer.     TECHNIQUE: CT scan of the abdomen and pelvis was performed without IV  contrast. Multiplanar reformats were obtained. Dose reduction  techniques were used.  CONTRAST: None.     COMPARISON: CT abdomen/pelvis dated 12/16/2015. CT chest dated  3/22/2021. More recent CT abdomen/pelvis from 4/26/2008 is not  available.     FINDINGS: Relative lack of intraperitoneal adipose tissue limits this  evaluation. Lack of IV contrast limits evaluation of the solid organs  of the abdomen and pelvis.     LOWER CHEST: Single bulla is seen in the right lower lung lobe.  Visualized portions of the lung bases are otherwise unremarkable.  There are aortic calcifications. Visualized mediastinal contents are  otherwise  unremarkable.     HEPATOBILIARY: Normal.     PANCREAS: Normal.     SPLEEN: Normal.     ADRENAL GLANDS: Normal.     KIDNEYS/BLADDER: Fatty lesion in the inferior pole of the left kidney  measures up to 1.2 cm in diameter and is most consistent with a benign  renal angiomyolipoma. The kidneys are otherwise normal in appearance  for noncontrast CT. No hydronephrosis, nephrolithiasis, hydroureter or  ureteral calculus is identified. Ureters are very difficult to follow  due to relative lack of intraperitoneal adipose tissues. Urinary  bladder is grossly unremarkable.     BOWEL: The colon is grossly of normal appearance. Moderate amount of  stool is seen in the colon. Appendix is not well seen. No pericecal  inflammatory change to suggest acute appendicitis. Evaluation of the  bowel is limited due to lack of intraperitoneal adipose tissue and  lack of IV contrast. Small bowel is grossly of normal caliber. Stomach  contains a small amount of fluid and air. Gastric wall appears mildly  thickened which is likely due to incomplete distention.     LYMPH NODES: No lymphadenopathy is identified.     VASCULATURE: There is nonaneurysmal atherosclerosis.     PELVIC ORGANS: Structure which could represent an atrophic uterus is  seen just posterior to the urinary bladder. Ovaries are not well seen.  No obvious adnexal mass is identified.     OTHER: There appears to be trace free fluid in the pelvis. No free air  is identified in the peritoneal cavity.     MUSCULOSKELETAL: Degenerative changes are noted in the spine. No  aggressive osseous lesions or acute osseous fractures.                                                                      IMPRESSION:   1.  This study is significantly limited due to the relative lack of  intraperitoneal adipose tissue and also due to the lack of IV  contrast.  2.  No diverticulitis, urinary system calculus, urinary system  obstruction, or bowel obstruction is seen. Appendix is not  definitely  identified and no definite evidence for appendicitis is seen.  Appendicitis cannot be excluded due to the limitations of this study.     I discussed the significant limitations of this study as well as the  lack of obvious etiology for patient's symptoms with Dr. Lentz on  9/1/2021 at approximately 11:30 AM, when he became available.     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     Ordering Physician: Dr. Canas     Technique: PPG waveforms were obtained with a sensor and infrared  light.     Findings:     Right foot:  First toe: Present, Normal  Second toe: Present, Moderately abnormal  Third toe: Present, Severely abnormal  Fourth toe: Present, Mildly abnormal  Fifth toe: Present, Moderately abnormal     Left foot:  First toe: Present, Moderately abnormal  Second toe: Present, Moderately abnormal  Third toe: Present, Severely abnormal  Fourth toe: Present, Severely abnormal  Fifth toe: Present, Moderate-severely abnormal                                                                      Impression:  1. Abnormal PPG waveforms in Right lower extremity digits 2-5, and  Left lower extremity digits 1-5.       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 Right lower extremity: Normal arterial velocities suggesting no  hemodynamically significant stenosis.   2. Left lower extremity: Normal arterial velocities suggesting no  hemodynamically significant stenosis.      Findings:      Right lower extremity:      Common femoral artery: 99/8 cm/sec.  Deep femoral artery: 91/11 cm/sec.  Proximal SFA: 77/1 cm/sec.  Mid SFA: 76/1 cm/sec.  Distal SFA:  61/1 cm/sec.  Popliteal artery: 32/1 cm/sec.  Anterior tibial artery: 40/1 cm/sec.  Peroneal artery: 44/3 cm/sec.  PTA ankle: 65/1 cm/sec.  Waveforms are triphasic in the CFA to the proximal SFA, and are  biphasic from the mid SFA to the distal peroneal artery.      Left lower extremity:     Common femoral artery: 105/4 cm/sec.  Deep femoral artery: 89/7 cm/sec.  Proximal SFA: 79/2 cm/sec.  Mid SFA: 83/1 cm/sec.  Distal SFA: 74/1 cm/sec.  Popliteal artery: 40/4 cm/sec.  Anterior tibial: 53/1 cm/sec.  Peroneal artery: 28/1 cm/sec.  PTA ankle: 81/1 cm/sec.  Waveforms are triphasic in the CFA to the mid SFA, and are biphasic  from the distal SFA to the distal peroneal artery.      MICHAELA REEDER MD         [id number]  [id number]  [id number]  [id number]^TIERNEY^STEFANO     [facility]  Echocardiography Laboratory  [address] [place] [address]     Name: NISHA NIEVES  MRN: [id number]  : 1955  Study Date: 05/10/2021 10:07 AM  Age: 66 yrs  Gender: Female  Patient Location: [facility]  Reason For Study: Embolic disease of toe (H)  History: Hyperlipidemia,Asthma  Ordering Physician: STEFANO MONET  Referring Physician: Fuentes Darby  Performed By: Sharifa Hernandez     BSA: 1.4 m2  Height: 61 in  Weight: 100 lb  HR: 60  BP: 146/80 mmHg  ______________________________________________________________________________  Procedure  Complete Echo Adult.  ______________________________________________________________________________  Interpretation Summary     1. The left ventricle is normal in structure, function and size. The visual  ejection fraction is estimated at 65%.  2. The right ventricle is normal in structure, function and size.  3. No valve disease.     No previous echo for comparison.  ______________________________________________________________________________  Left Ventricle  The left ventricle is normal in structure, function and size. There is normal  left ventricular wall thickness.  The visual ejection fraction is estimated at  65%. Left ventricular diastolic function is normal. Normal left ventricular  wall motion.     Right Ventricle  The right ventricle is normal in structure, function and size.     Atria  Normal left atrial size. Right atrial size is normal. There is no atrial shunt  seen.     Mitral Valve  There is mild (1+) mitral regurgitation.     Tricuspid Valve  There is mild (1+) tricuspid regurgitation. The right ventricular systolic  pressure is approximated at 20.4 mmHg plus the right atrial pressure.     Aortic Valve  There is trace aortic regurgitation.     Pulmonic Valve  The pulmonic valve is normal in structure and function.     Vessels  Normal ascending, transverse (arch), and descending aorta. The inferior vena  cava was normal in size with preserved respiratory variability.     Pericardium  There is no pericardial effusion.     Rhythm  Sinus rhythm was noted.  ______________________________________________________________________________  MMode/2D Measurements & Calculations  IVSd: 0.70 cm     LVIDd: 3.8 cm  LVIDs: 2.8 cm  LVPWd: 0.80 cm  FS: 26.3 %  LV mass(C)d: 78.8 grams  LV mass(C)dI: 56.0 grams/m2  Ao root diam: 3.1 cm  LA dimension: 3.0 cm  asc Aorta Diam: 2.9 cm  LA/Ao: 0.97  RWT: 0.42     Doppler Measurements & Calculations  MV E max nathan: 72.0 cm/sec  MV A max nathan: 57.8 cm/sec  MV E/A: 1.2  MV dec slope: 273.0 cm/sec2  MV dec time: 0.26 sec  TR max nathan: 226.0 cm/sec  TR max P.4 mmHg  E/E' av.7  Lateral E/e': 8.9  Medial E/e': 8.5     ______________________________________________________________________________  Report approved by: Heena Isaac 05/10/2021 02:00 PM                 CTA CHEST, ABDOMEN, PELVIS RUNOFF WITH CONTRAST  3/22/2021 10:26 AM      HISTORY:  Blue toe syndrome. Concern for distal emboli. Evaluate for  embolic source.     COMPARISON: CT of the chest dated 2011     TECHNIQUE: CT angiogram of the chest, abdomen and pelvis  with  bilateral lower extremity runoff was performed following the  administration of 100 cc intravenous contrast. Images are reviewed in  multiple planes and 3-D reconstructions were also performed.    Radiation dose for this scan was reduced using automated exposure  control, adjustment of the mA and/or kV according to patient size, or  iterative reconstruction technique.     FINDINGS:      Chest: The thoracic aorta at the level of the sinuses of Valsalva has  a maximum diameter of approximately 3.1 cm. The ascending thoracic  aorta has a maximum diameter of approximately 3.6 cm. The aorta then  tapers at the level of the thoracic aortic arch. The descending  thoracic aorta is of normal caliber without aneurysmal dilatation or  significant stenosis. There is no significant soft plaque in the  thoracic aorta. The great vessels arising from the thoracic aortic  arch are patent without significant stenoses.     Abdomen/pelvis: There is minimal scattered calcified plaque in the  abdominal aorta. No evidence for an aneurysmal dilatation or  significant stenosis. The celiac trunk, superior mesenteric artery,  renal arteries, and inferior mesenteric artery are patent without  significant stenoses.     The iliac arteries are patent without significant stenoses.     Right lower extremity angiogram:  Common femoral artery: Ectatic with a maximum diameter of 10 mm. No  significant stenosis.  Profunda femoris artery: No significant stenosis.  Superficial femoral artery: No significant stenosis.  Popliteal artery: No significant stenosis.  Tibial arteries: Three-vessel runoff. No significant stenosis.     Left lower extremity angiogram:  Common femoral artery: No significant stenosis.  Profunda femoris artery: No significant stenosis.  Superficial femoral artery: No significant stenosis.  Popliteal artery: No significant stenosis.  Tibial arteries: Three-vessel runoff. No significant stenosis.     Soft tissues:     Chest:  Right apical scarring. 7 mm nodule in the right upper lobe.     Abdomen/pelvis: There is a 2.5 x 1.8 cm angiomyolipoma at the lower  pole of the left kidney. Remaining solid organs in the abdomen appear  grossly unremarkable.     The bowel appears grossly unremarkable.                                                                      IMPRESSION:  1. Mild aneurysmal dilatation of the ascending thoracic aorta which  has a maximum diameter of approximately 3.6 cm.  2. No significant soft plaque in the arteries of the chest, abdomen,  pelvis and lower extremities that could serve as a source for distal  emboli.  3. 2.7 cm angiomyolipoma at the lower pole of the left kidney.  Consider urological follow-up and follow-up CT scan in one year to  monitor for any increase in size.     JEANNE HEARN MD                    Component      Latest Ref Rng & Units 11/3/2021   JAUN interpretation      Negative Positive (A)   JAUN pattern 1       Dense fine speckled   JAUN titer 1       1:160   Neutrophil Cytoplasmic Antibody      <1:10 <1:10   Neutrophil Cytoplasmic Antibody Pattern       The ANCA IFA is <1:10.  No further testing will be performed.   SSA Cassie IgG Instrument Value      <7.0 U/mL <0.5   SSA (Ro) Antibody IgG      Negative Negative   SSB Cassie IgG Instrument Value      <7.0 U/mL <0.6   SSB (La) Antibody IgG      Negative Negative   Scl-70 Cassie IgG Instrument Value      <7.0 U/mL 0.8   Scleroderma Antibody Scl-70 EZEQUIEL IgG      Negative Negative   Centromere Cassie IgG Instrument Value      <7.0 U/mL <0.7   Centromere Antibody IgG      Negative Negative   Mayer EZEQUIEL Cassie IgG Instrument Value      <7.0 U/mL 2.5   Smith EZEQUIEL Antibody IgG      Negative Negative   Sed Rate      0 - 30 mm/hr 8   CRP Inflammation      0.0 - 8.0 mg/L <2.9   Rheumatoid Factor      <20 IU/mL 8   Cyclic Citrullinated Peptide Antibody, IgG      <7.0 U/mL 2.4   Histone Antibody,IgG      0.0 - 0.9 Units 0.3   DNA-ds      <10.0 IU/mL 1.6   Complement, Total,  S      38.7 - 89.9 U/mL     Complement C3      81 - 157 mg/dL     Complement C4      13 - 39 mg/dL        Component      Latest Ref Rng & Units 12/9/2021   JAUN interpretation      Negative     JAUN pattern 1           JAUN titer 1           Neutrophil Cytoplasmic Antibody      <1:10     Neutrophil Cytoplasmic Antibody Pattern           SSA Cassie IgG Instrument Value      <7.0 U/mL     SSA (Ro) Antibody IgG      Negative     SSB Cassie IgG Instrument Value      <7.0 U/mL     SSB (La) Antibody IgG      Negative     Scl-70 Cassie IgG Instrument Value      <7.0 U/mL     Scleroderma Antibody Scl-70 EZEQUIEL IgG      Negative     Centromere Cassie IgG Instrument Value      <7.0 U/mL     Centromere Antibody IgG      Negative     Mayer EZEQUIEL Cassie IgG Instrument Value      <7.0 U/mL     Smith EZEQUIEL Antibody IgG      Negative     Sed Rate      0 - 30 mm/hr     CRP Inflammation      0.0 - 8.0 mg/L     Rheumatoid Factor      <20 IU/mL     Cyclic Citrullinated Peptide Antibody, IgG      <7.0 U/mL     Histone Antibody,IgG      0.0 - 0.9 Units     DNA-ds      <10.0 IU/mL     Complement, Total, S      38.7 - 89.9 U/mL 53.1   Complement C3      81 - 157 mg/dL 92   Complement C4      13 - 39 mg/dL 18         A/P:     (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 She has an isolated elevated JAUN in a 1:160 in a dense speckled pattern. We ruled out other autoimmune mediated component however in that all other Abs are normal as above.   Plan: As none found, resume symptomatic treatment as already undertaken if sxs recur, RTC prn.                          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 No overt gangrene but pain at rest is concerning. No overt etiology given above w/u. Resume empiric treatment  Plan: apixaban ANTICOAGULANT (ELIQUIS) 5 MG tablet,          sildenafil (REVATIO) 20 MG tablet        RTC 6 weeks.

## 2022-02-15 NOTE — PROGRESS NOTES
[facility]        Patient is here for a  follow up.     Pt is currently taking Statin.    /79 (BP Location: Right arm, Patient Position: Chair, Cuff Size: Adult Regular)   Pulse 75   Temp 97.5  F (36.4  C) (Temporal)   Wt 103 lb 12.8 oz (47.1 kg)   LMP  (LMP Unknown)   SpO2 97%   Breastfeeding No   BMI 19.61 kg/m      The provider has been notified that the patient has no concerns.     Questions patient would like addressed today are: N/A.    Refills are needed: N/A    Has homecare services and agency name:  Myra Rincon MA

## 2022-02-15 NOTE — TELEPHONE ENCOUNTER
Called, left voice message for Lara to ask if she is available for an appointment with Dr. Garcia at 3:10 today.    Left voice message asking she return my call.      Parris Paniagua RN BSN  [facility]  [phone]

## 2022-02-16 ENCOUNTER — MYC MEDICAL ADVICE (OUTPATIENT)
Dept: INTERNAL MEDICINE | Facility: CLINIC | Age: 67
End: 2022-02-16
Payer: COMMERCIAL

## 2022-02-16 ENCOUNTER — TELEPHONE (OUTPATIENT)
Dept: OTHER | Facility: CLINIC | Age: 67
End: 2022-02-16
Payer: COMMERCIAL

## 2022-02-16 NOTE — TELEPHONE ENCOUNTER
Follow up to 2/15/22    Please arrange for:      In clinic visit in 6 weeks with Dr. Garcia.  No labs.  (end of March/beginning of April)    Parris Paniagua RN BSN  [facility]  [phone]

## 2022-02-16 NOTE — TELEPHONE ENCOUNTER
Scheduled Date: 4/4/22  Scheduled Time: 1:40  Provider:             Radha Vasquez I   [facility]  [email or web address]  [phone]

## 2022-02-19 ENCOUNTER — HEALTH MAINTENANCE LETTER (OUTPATIENT)
Age: 67
End: 2022-02-19

## 2022-02-22 NOTE — TELEPHONE ENCOUNTER
Dr. Garcia saw patient 2/15/22.  Parris Paniagua RN BSN  [facility]  [phone]

## 2022-03-08 ENCOUNTER — TELEPHONE (OUTPATIENT)
Dept: INTERNAL MEDICINE | Facility: CLINIC | Age: 67
End: 2022-03-08
Payer: COMMERCIAL

## 2022-03-08 DIAGNOSIS — G56.03 BILATERAL CARPAL TUNNEL SYNDROME: Primary | ICD-10-CM

## 2022-03-08 NOTE — TELEPHONE ENCOUNTER
Patient does NOT have any wrist braces, can these be ordered.  Bilateral wrist pain.    [facility] XRO/

## 2022-03-08 NOTE — TELEPHONE ENCOUNTER
----- Message from Fuentes Darby MD sent at 3/7/2022 12:27 PM CST -----  Please let her know her EMG only shows mild carpel tunnel syndrome.  I would wait on surgery, try to wear wrist braces at night.

## 2022-03-11 ENCOUNTER — VIRTUAL VISIT (OUTPATIENT)
Dept: PHARMACY | Facility: CLINIC | Age: 67
End: 2022-03-11
Payer: COMMERCIAL

## 2022-03-11 DIAGNOSIS — E78.5 HYPERLIPIDEMIA LDL GOAL <130: ICD-10-CM

## 2022-03-11 DIAGNOSIS — F33.0 MAJOR DEPRESSIVE DISORDER, RECURRENT EPISODE, MILD (H): ICD-10-CM

## 2022-03-11 DIAGNOSIS — F41.1 GENERALIZED ANXIETY DISORDER: ICD-10-CM

## 2022-03-11 DIAGNOSIS — J30.2 SEASONAL ALLERGIC RHINITIS, UNSPECIFIED TRIGGER: ICD-10-CM

## 2022-03-11 DIAGNOSIS — I75.029 BLUE TOE SYNDROME, UNSPECIFIED LATERALITY (H): Primary | ICD-10-CM

## 2022-03-11 DIAGNOSIS — J45.20 MILD INTERMITTENT ASTHMA WITHOUT COMPLICATION: ICD-10-CM

## 2022-03-11 DIAGNOSIS — E03.9 HYPOTHYROIDISM, UNSPECIFIED TYPE: ICD-10-CM

## 2022-03-11 PROCEDURE — 99607 MTMS BY PHARM ADDL 15 MIN: CPT | Performed by: PHARMACIST

## 2022-03-11 PROCEDURE — 99605 MTMS BY PHARM NP 15 MIN: CPT | Performed by: PHARMACIST

## 2022-03-11 NOTE — PROGRESS NOTES
Medication Therapy Management (MTM) Encounter    ASSESSMENT:                            Medication Adherence/Access: See below for considerations    Blue Toe Syndrome: Improved per patient. May benefit from spreading out dose for more consistent benefit from sildenafil.      Asthma/Allergies: Stable per patient. Could potentially change to generic or competitor of Advair to help with costs.     Hyperlipidemia: Stable.    Hypothyroidism: Stable. Last TSH is within normal limits.     Depression/Anxiety: Stable per patient.  Could potentially consider change to alternative selective serotonin reuptake inhibitor such as citalopram/escitalopram or SNRI like duloxetine or venlafaxine.     PLAN:                            1. You should invest in a new pill cutter that may allow for a more precise cut of your sildenafil tablets.  You may end up with a little more or less, but this should likely balance out throughout the day. Taking three times daily may allow for more consistent blood flow to your toes.    2. You should check with your pharmacy to see if they are applying any  coupons to your Eliquis or Advair medications. For Eliquis you can go to the website https://www.[employer]/afib/savings-and-support to obtain a copay card if needed.    There are also several Advair generic options including fluticasone-salmeterol 250-50 mcg diskus, Wixela Inhub, or fluticasone-salmeterol 113-14 mcg (Airduo generic) that may be more affordable if you are running into cost concerns with this.    3. You may benefit from talking to Dr. Darby in the future about alternatives to your paroxetine CR (Paxil) medication since this is higher cost. Some potential alternatives could include citalopram (or escitalopram), duloxetine, or venlafaxine.  These all would likely require a taper off paroxetine and a taper onto the newer agent.    4. If running into cost issues in the future (especially when you finally  retire and go on Medicare) you can contact the [place] Prescription Assistance Program/Fund ([employer]) at [phone]. They may be able to help and update you on your future options.      Follow-up: 1 year or sooner if needed    SUBJECTIVE/OBJECTIVE:                          Brissa Mayer is a 67 year old female called for an initial visit. She was referred to me from her [employer] - 3P insurance plan.      Reason for visit: Comprehensive medication review.    Allergies/ADRs: Reviewed in chart  Past Medical History: Reviewed in chart  Tobacco: She reports that she has never smoked. She has never used smokeless tobacco.  Alcohol: 7 beverages / week    Medication Adherence/Access: Patient takes medications directly from bottles.  Patient takes medications 2 time(s) per day.   Per patient, misses medication 0 times per week.   Medication barriers: affording medications - has used her FSA for the year already - some of these medications are expensive for patient. Feels that the paroxetine CR is one of the most expensive. She was helping out with the cost of her sister's medications, but now she has help from [facility] so this is big cost off of them.  The patient fills medications at [place]: YES.    Blue Toe Syndrome: Patient is taking Eliquis 5 mg twice daily and sildenafil 20 mg twice daily (sometimes difficult to cut sildenafil tablets).  Had gone off of some of these but toes started to look worse. Back on and things have improved. Follows with Dr. Garcia from [facility]. Denies any known side effects.     Asthma/Allergies: Current medications: Advair Diskus 250-50 mcg - one puff twice daily and albuterol HFA as needed. Patient is taking Flonase daily. Patient rinses their mouth after using steroid inhaler.   Patient reports the following symptoms: none.  Asthma Action Plan on file: NO  ACT Total Scores 11/21/2019 3/19/2021 1/17/2022   ACT TOTAL SCORE - - -   ASTHMA ER VISITS - - -   ASTHMA HOSPITALIZATIONS - - -    ACT TOTAL SCORE (Goal Greater than or Equal to 20) 19 12 19   In the past 12 months, how many times did you visit the emergency room for your asthma without being admitted to the hospital? 0 0 0   In the past 12 months, how many times were you hospitalized overnight because of your asthma? 0 0 0       Hyperlipidemia: Current therapy includes atorvastatin 20 mg daily.  Patient reports no significant myalgias or other side effects.  Recent Labs   Lab Test 03/19/21  1141 05/11/15  0737 04/01/14  0808   CHOL 256* 248* 160    108 94   * 129 59   TRIG 56 57 37   CHOLHDLRATIO  --  2.3 2.0       Hypothyroidism: Patient is taking levothyroxine 50 mcg daily. Patient is having the following symptoms: none.   TSH   Date Value Ref Range Status   01/02/2022 1.80 0.40 - 4.00 mU/L Final   03/19/2021 1.31 0.40 - 4.00 mU/L Final     Depression/Anxiety:  Current medications include: paroxetine CR 37.5 mg every morning.  Has tried sertraline and fluoxetine without much benefit.  This is very expensive relative to her other medications. She is open to considering alternatives to this. Pt reports that depression symptoms are controlled.  PHQ-9 SCORE 7/29/2020 3/19/2021 9/18/2021   PHQ-9 Total Score - - -   PHQ-9 Total Score [facility] 0 - 0   PHQ-9 Total Score 0 3 0       Today's Vitals: LMP  (LMP Unknown)      BP Readings from Last 3 Encounters:   02/15/22 123/79   01/27/22 108/64   01/17/22 128/76     Wt Readings from Last 5 Encounters:   02/15/22 103 lb 12.8 oz (47.1 kg)   01/27/22 108 lb 4.8 oz (49.1 kg)   01/17/22 106 lb (48.1 kg)   01/02/22 107 lb 11.2 oz (48.9 kg)   12/06/21 102 lb (46.3 kg)     ----------------      I spent 25 minutes with this patient today. A copy of the visit note was provided to the patient's provider(s).    The patient was sent via [employer] a summary of these recommendations.     Damien Douglas, PharmD, BCACP  Medication Therapy Management Pharmacist  Pager: [phone]    Telemedicine Visit  Details  Type of service:  Telephone visit  Start Time: 3:30 PM  End Time: 3:55 PM  Originating Location (patient location): Home  Distant Location (provider location):  [facility]     Medication Therapy Recommendations  Blue toe syndrome, unspecified laterality (H)    Current Medication: sildenafil (REVATIO) 20 MG tablet   Rationale: Does not understand instructions - Adherence - Adherence   Recommendation: Provide Education - sildenafil 20 MG tablet - Change sildenafil back to 10 mg by mouth three times daily as prescribed.   Status: Accepted - no CPA Needed

## 2022-03-14 NOTE — PATIENT INSTRUCTIONS
Recommendations from today's MTM visit:                                                    MTM (medication therapy management) is a service provided by a clinical pharmacist designed to help you get the most of out of your medicines.   Today we reviewed what your medicines are for, how to know if they are working, that your medicines are safe and how to make your medicine regimen as easy as possible.      1. You should invest in a new pill cutter that may allow for a more precise cut of your sildenafil tablets.  You may end up with a little more or less, but this should likely balance out throughout the day. Taking three times daily may allow for more consistent blood flow to your toes.    2. You should check with your pharmacy to see if they are applying any  coupons to your Eliquis or Advair medications. For Eliquis you can go to the website https://[email or web address] [facility]/afib/savings-and-support to obtain a copay card if needed.    There are also several Advair generic options including fluticasone-salmeterol 250-50 mcg diskus, Wixela Inhub, or fluticasone-salmeterol 113-14 mcg (Airduo generic) that may be more affordable if you are running into cost concerns with this.    3. You may benefit from talking to Dr. Darby in the future about alternatives to your paroxetine CR (Paxil) medication since this is higher cost. Some potential alternatives could include citalopram (or escitalopram), duloxetine, or venlafaxine.  These all would likely require a taper off paroxetine and a taper onto the newer agent.    4. If running into cost issues in the future (especially when you finally retire and go on Medicare) you can contact the [place] Prescription Assistance Program/Fund ([employer]) at [phone]. They may be able to help and update you on your future options.      Follow-up: 1 year or sooner if needed    It was great to speak with you today.  I value your experience and would be very  thankful for your time with providing feedback on our clinic survey. You may receive a survey via email or text message in the next few days.     To schedule another MTM appointment, please call the clinic directly or you may call the MTM scheduling line at [phone] or toll-free at [phone].     My Clinical Pharmacist's contact information:                                                      Please feel free to contact me with any questions or concerns you have.      Damien Douglas, PharmD, [facility]  Medication Therapy Management Pharmacist  Pager: [phone]

## 2022-03-18 NOTE — PROGRESS NOTES
[facility]    Outpatient Physical Therapy Discharge Note  Patient: Brissa Mayer  : 1955    Beginning/End Dates of Reporting Period:  2/3/22 to 2/10/22    Referring Provider: HAZEL Higgins CNP.    Therapy Diagnosis: Neck pain.     Client Self Report: Pt reports having good and bad days. Pt notes compliance to exercises.    Objective Measurements:  Objective Measure: cervical ROM: did not return to clinic for final measurements.     Objective Measure: Hand dynamometer: did not return to clinic for final measurements.         Goals:  Goal Identifier HEP   Goal Description Pt will be independent with HEP in order to improve postural stabilization.   Target Date 22   Date Met   2/10/22   Progress (detail required for progress note):       Goal Identifier Sleep   Goal Description Pt will report no incidence of paresthesia over the past week in order to improve quality of sleep.   Target Date 22   Date Met      Progress (detail required for progress note):  Pt reported improved sleep tolerance with mild paresthesia at 2/10/22 visit.       Plan:  Discharge from therapy.    Discharge:    Reason for Discharge: Patient chooses to discontinue therapy.  Pt reported good improvement on 2/10 visit and stated she would call to cancel final visit if she had no further concerns, her final visit was cancelled due to patient feeling well.    Equipment Issued: none.    Discharge Plan: Patient to continue home program.

## 2022-04-01 ENCOUNTER — MYC MEDICAL ADVICE (OUTPATIENT)
Dept: INTERNAL MEDICINE | Facility: CLINIC | Age: 67
End: 2022-04-01
Payer: COMMERCIAL

## 2022-04-01 ENCOUNTER — TELEPHONE (OUTPATIENT)
Dept: OTHER | Facility: CLINIC | Age: 67
End: 2022-04-01
Payer: COMMERCIAL

## 2022-04-01 NOTE — TELEPHONE ENCOUNTER
Future Appointments   Date Time Provider Department Center   4/6/2022 10:10 AM Wilner Garcia MD [facility]

## 2022-04-06 ENCOUNTER — OFFICE VISIT (OUTPATIENT)
Dept: OTHER | Facility: CLINIC | Age: 67
End: 2022-04-06
Attending: INTERNAL MEDICINE
Payer: COMMERCIAL

## 2022-04-06 VITALS
BODY MASS INDEX: 18.99 KG/M2 | WEIGHT: 100.6 LBS | HEART RATE: 73 BPM | OXYGEN SATURATION: 97 % | HEIGHT: 61 IN | SYSTOLIC BLOOD PRESSURE: 126 MMHG | DIASTOLIC BLOOD PRESSURE: 76 MMHG

## 2022-04-06 DIAGNOSIS — I75.023 BLUE TOE SYNDROME OF BOTH LOWER EXTREMITIES (H): ICD-10-CM

## 2022-04-06 PROCEDURE — 99213 OFFICE O/P EST LOW 20 MIN: CPT | Performed by: INTERNAL MEDICINE

## 2022-04-06 PROCEDURE — G0463 HOSPITAL OUTPT CLINIC VISIT: HCPCS

## 2022-04-06 NOTE — PROGRESS NOTES
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  They were slow to heal but have in fact healed since then. She said some of the tips turned black but then healed.  They were also quite painful at the time.  She denies any irregular heartbeat, frostbite or cold exposure.  She did have a cardiac echo when she was in her 40s and was told she had a murmur. She denies any rest pain, claudication, stroke or TIAs.  She now presents to me for her toe pain and possible ischemia. She has previously been seen by Sree Henry and [facility] with imaging to date revealing no atheroembolic focus on CTA C/A/P, no cardioembolic focus on TTE, and no significant ectopy on a ZioPatch. There was a single four beat run of VT, and runs of PSVT, with the longest run lasting 8 beats. She had been empirically treated with Eliquis AC, and Revatio to act as a vasodilator. She states those agents have helped her sxs. She has had worsening flow noted in her toes on duplex, and she was therefore sent to me to address a possible autoimmune mediated vasospastic component. She thinks she may have had COVID last year. She hasn't felt well since then. Breathing is with ongoing difficulty and she is using inhaler more often. Weight loss of 35 lbs unintentional, poor appetite, despite being normal weight at baseline. Having a lot of night sweats. No ongoing fevers/chills. Her mother  of Wegener's.     When last seen, I stated we should attempt to rule out an autoimmune mediated component. We checked labs revealing that her JAUN is positive at 1:160 in a dense fine speckled pattern. However, negative or normal studies included ESR and CRP, Ro, La, Scleroderma, RF, CCP, histone,  centromere, DS DNA Abs.     Since last having been seen, she stopped Eliquis and Revatio as she did not like how they made her feel. She has had complete resolution of sxs. She has no arthralgias or myalgias. She did stub her toe and noted that her nail on that toe became black and blue but that the toe itself is otherwise normal.    At a previous visit, no atheroembolic or cardioembolic focus was notable on imaging as delineated below. Additionally all autoimmune labs other than JAUN were normal. Off of Eliquis and Revatio,  she  c/o recurrent sxs with painful toes which were dusky in appearance and have are devleoping purple to black splotches at the tips of the toes. When seen on 2/15/22, we resumed empiric treatment with apixaban and sildenafil. With that the above lesuions have been healing and her dusky appearance to the toes has improved.    Review Of Systems  Skin: negative  Eyes: negative  Ears/Nose/Throat: negative  Respiratory: No shortness of breath, dyspnea on exertion, cough, or hemoptysis  Cardiovascular: negative  Gastrointestinal: negative  Genitourinary: negative  Musculoskeletal: healing punctate lesions at tips of toes  Neurologic: negative  Psychiatric: negative  Hematologic/Lymphatic/Immunologic: negative  Endocrine: negative        PAST MEDICAL HISTORY:                  Past Medical History:   Diagnosis Date     Anxiety      Arthritis      Asthma, mild intermittent      Major depressive disorder, single episode      Migraines      Tension headaches      Unspecified hypothyroidism        PAST SURGICAL HISTORY:                  Past Surgical History:   Procedure Laterality Date     BUNIONECTOMY       COLONOSCOPY       ESOPHAGOSCOPY, GASTROSCOPY, DUODENOSCOPY (EGD), COMBINED N/A 4/2/2019    Procedure: ESOPHAGOSCOPY, GASTROSCOPY, DUODENOSCOPY (EGD);  Surgeon: Ochoa Cherry DO;  Location:  GI     OTHER SURGICAL HISTORY  1980    Bunionectomy       CURRENT MEDICATIONS:                   Current Outpatient Medications   Medication Sig Dispense Refill     albuterol (VENTOLIN HFA) 108 (90 Base) MCG/ACT inhaler INHALE TWO PUFFS BY MOUTH EVERY 6 HOURS AS NEEDED FOR SHORTNESS OF BREATH/DYSPNEA 18 g 3     apixaban ANTICOAGULANT (ELIQUIS) 5 MG tablet Take 1 tablet (5 mg) by mouth 2 times daily 60 tablet 11     atorvastatin (LIPITOR) 20 MG tablet Take 1 tablet (20 mg) by mouth daily 90 tablet 3     fluticasone (FLONASE) 50 MCG/ACT nasal spray Spray 2 sprays into both nostrils daily 1 Package 1     fluticasone-salmeterol (ADVAIR DISKUS) 250-50 MCG/DOSE inhaler INHALE ONE PUFF BY MOUTH TWICE A  each 3     levothyroxine (SYNTHROID/LEVOTHROID) 50 MCG tablet TAKE ONE TABLET BY MOUTH ONCE DAILY 90 tablet 3     PARoxetine (PAXIL-CR) 37.5 MG 24 hr tablet TAKE ONE TABLET BY MOUTH EVERY MORNING 90 tablet 0     sildenafil (REVATIO) 20 MG tablet Take 0.5 tablets (10 mg) by mouth 3 times daily 45 tablet 11     meclizine (ANTIVERT) 25 MG tablet Take 1 tablet (25 mg) by mouth every 6 hours as needed for dizziness (Patient not taking: Reported on 4/6/2022) 15 tablet 1       ALLERGIES:                  Allergies   Allergen Reactions     Compazine      Anxiety     Flagyl [Metronidazole Hcl] Nausea and Vomiting       SOCIAL HISTORY:                  Social History     Socioeconomic History     Marital status: Single     Spouse name: Not on file     Number of children: Not on file     Years of education: Not on file     Highest education level: Not on file   Occupational History     Not on file   Tobacco Use     Smoking status: Never Smoker     Smokeless tobacco: Never Used   Vaping Use     Vaping Use: Never used   Substance and Sexual Activity     Alcohol use: Yes     Comment: wine - 2-3 times per week (1 glass)     Drug use: No     Sexual activity: Not Currently     Partners: Male     Birth control/protection: None   Other Topics Concern     Parent/sibling w/ CABG, MI or angioplasty before 65F 55M? No   Social  History Narrative     Not on file     Social Determinants of Health     Financial Resource Strain: Not on file   Food Insecurity: Not on file   Transportation Needs: Not on file   Physical Activity: Not on file   Stress: Not on file   Social Connections: Not on file   Intimate Partner Violence: Not on file   Housing Stability: Not on file       FAMILY HISTORY:                   Family History   Problem Relation Age of Onset     Asthma Mother      Diabetes Mother      Cancer - colorectal Maternal Grandmother      Heart Disease Father         MI at age 55     Prostate Cancer Father      Asthma Father      Asthma Sister      Obesity Sister      Diabetes Sister      Coronary Artery Disease Sister      Asthma Sister      Obesity Sister      Asthma Sister      Asthma Brother          Physical exam Reveals:    O/P: WNL  HEENT: WNL  NECK: No JVD, thyromegaly, or lymphadenopathy  HEART: RRR, no murmurs, gallops, or rubs  LUNGS: CTA bilaterally without rales, wheezes, or rhonchi  GI: NABS, nondistended, nontender, soft  EXT:without cyanosis, clubbing, or edema  NEURO: nonfocal  : no flank tenderness         PPGs of bilateral lower extremity digits dated 9/30/21     Clinical history: 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 Canas     Technique: PPG waveforms were obtained with a sensor and infrared  light.     Findings:     Right foot:  First toe: Present, Normal  Second toe: Present, Severely abnormal  Third toe: Present, Severely abnormal  Fourth toe: Present, Moderately abnormal  Fifth toe: Present, Moderately abnormal     Left foot:  First toe: Present,Severely abnormal  Second toe: Present, Moderately abnormal  Third toe: Present, Moderately abnormal  Fourth toe: Present, Severely abnormal  Fifth toe: Present, Normal                                                                       Impression:  1. Abnormal PPG waveforms again noted. On side-by side  comparison, the  biggest changes seen are in the left first digit, which is now  severely abnormal, compared to moderately on prior study, and the left  5th digit, which is now normal and was moderately abnormal.      HOA WESTON MD            CT ABDOMEN PELVIS WITH CONTRAST 9/2/2021 1:41 PM     CLINICAL HISTORY: Abdominal pain, acute, nonlocalized. Had noncontrast  CT yesterday, pain worse today. Abdominal pain, unspecified abdominal  location.     TECHNIQUE: CT scan of the abdomen and pelvis was performed following  injection of IV contrast. Multiplanar reformats were obtained. Dose  reduction techniques were used.  CONTRAST: 50mL, Isovue 370     COMPARISON: CT abdomen and pelvis without IV contrast dated 9/1/2021  and 4/26/2018, CT abdomen and pelvis with IV contrast dated  12/16/2015.     FINDINGS:   LOWER CHEST: Visualized portions of the lung bases and mediastinal  contents are grossly unremarkable. There are thoracic aortic  calcifications.     HEPATOBILIARY: Questionable subtle bubble of gas is seen in the common  bile duct in the head of the pancreas indicating possible partially  sphincterotomy. Recommend clinical correlation. No definite mass in  the head of the pancreas or choledocholithiasis is seen. There may be  minimal gallbladder wall thickening. No definite cholelithiasis is  identified. No pericholecystic fluid is seen. Liver enhances normally  without focal lesion, intrahepatic biliary ductal dilatation or  choledocholithiasis.     PANCREAS: Normal.     SPLEEN: Normal.     ADRENAL GLANDS: Normal.     KIDNEYS/BLADDER: Fat-containing lesion inferior pole left kidney is  most consistent with adrenal angiomyolipoma, a benign lesion. This is  stable as compared to the prior study. The kidneys otherwise enhance  normally. No hydronephrosis, nephrolithiasis, hydroureter, or ureteral  calculus is identified. Ureters are difficult to follow due to the  relative lack of intraperitoneal adipose  tissue. Urinary bladder is  grossly unremarkable.     BOWEL: The colon is difficult to follow given the lack of  intraperitoneal adipose tissue. No definite pericolonic inflammatory  changes to suggest acute diverticulitis. Appendix is not well seen. No  pericecal inflammatory change to suggest acute appendicitis. There is  stranding slightly increased density in the peritoneal adipose tissues  around the anterior aspect of the mid to lower abdomen, more so on the  right. This is of uncertain clinical significance and etiology but  could represent mesenteritis. Small bowel appears grossly of normal  caliber. There is abnormal thickening of the wall of the gastric  antrum/pyloric/proximal duodenal region which could represent an  inflammatory process such as gastritis or ulcer disease. No obvious  perforated ulcer is identified. There is no free air or free fluid  around this region. The stomach otherwise contains a large amount of  ingested material and moderate amount of air.     PELVIC ORGANS: Enhancing structure just posterior to the urinary  bladder in the deep pelvis likely represents an atrophic uterus and is  not appreciably changed since the prior study dated 12/16/2015. The  ovaries are not well seen. No adnexal masses are identified.     ADDITIONAL FINDINGS: Incidental note of circumaortic left renal vein,  a normal variant. There is nonaneurysmal atherosclerosis. No definite  adenopathy, free air is identified. There is trace free fluid in the  pelvis.     MUSCULOSKELETAL: No aggressive osseous lesions or acute osseous  fractures are identified. There are degenerative changes in the spine,  mostly in the facet joints of the lower lumbar spine. There is also  disc height loss at L4-L5 and L5-S1. Mild anterolisthesis of L5 on S1  is degenerative in etiology.                                                                      IMPRESSION:   1.  Abnormal thickening of the wall the  gastric  antrum/pylorus/proximal duodenum could represent an inflammatory or  infectious process (gastritis and possible ulcer disease) versus less  likely neoplastic infiltration. Recommend clinical correlation.  2.  Slightly increased density in the peritoneal adipose tissues  throughout the mid to lower abdomen and pelvis and slightly more so on  the right could represent mesenteritis. There is a relative lack of  intraperitoneal adipose tissue limiting evaluation of the hollow and  solid organs of the abdomen and pelvis. The amount of intraperitoneal  adipose has decreased since the prior studies from 2018 and 2015.  3.  Appendix is not definitely seen. No obvious evidence for acute  appendicitis is identified. Appendicitis cannot be excluded on the  basis of this study.  4.  Stable angiomyolipoma inferior left kidney is again noted.  5.  Small bubble of gas in the distal common bile duct could be from  prior sphincterotomy. Recommend clinical correlation.  6.  Relative lack of intraperitoneal adipose tissue limits evaluation  of the bowel and other structures of the abdomen and pelvis.     I discussed the thickening of the wall of the gastric  antrum/pylorus/proximal duodenum, slightly increased density in the  peroneal adipose tissues, and lack of other obvious etiology of  patient's symptoms with Dr. Cherry on 9/2/2021 at 1:52 PM. We also  discussed the limitations of this study due to the relative lack of  intraperitoneal adipose tissue.     DINORA MUSTAFA MD            CT ABDOMEN/PELVIS WITHOUT CONTRAST September 1, 2021 9:55 AM     CLINICAL HISTORY: Right lower quadrant abdominal pain. No history of  surgery or cancer.     TECHNIQUE: CT scan of the abdomen and pelvis was performed without IV  contrast. Multiplanar reformats were obtained. Dose reduction  techniques were used.  CONTRAST: None.     COMPARISON: CT abdomen/pelvis dated 12/16/2015. CT chest dated  3/22/2021. More recent CT abdomen/pelvis  from 4/26/2008 is not  available.     FINDINGS: Relative lack of intraperitoneal adipose tissue limits this  evaluation. Lack of IV contrast limits evaluation of the solid organs  of the abdomen and pelvis.     LOWER CHEST: Single bulla is seen in the right lower lung lobe.  Visualized portions of the lung bases are otherwise unremarkable.  There are aortic calcifications. Visualized mediastinal contents are  otherwise unremarkable.     HEPATOBILIARY: Normal.     PANCREAS: Normal.     SPLEEN: Normal.     ADRENAL GLANDS: Normal.     KIDNEYS/BLADDER: Fatty lesion in the inferior pole of the left kidney  measures up to 1.2 cm in diameter and is most consistent with a benign  renal angiomyolipoma. The kidneys are otherwise normal in appearance  for noncontrast CT. No hydronephrosis, nephrolithiasis, hydroureter or  ureteral calculus is identified. Ureters are very difficult to follow  due to relative lack of intraperitoneal adipose tissues. Urinary  bladder is grossly unremarkable.     BOWEL: The colon is grossly of normal appearance. Moderate amount of  stool is seen in the colon. Appendix is not well seen. No pericecal  inflammatory change to suggest acute appendicitis. Evaluation of the  bowel is limited due to lack of intraperitoneal adipose tissue and  lack of IV contrast. Small bowel is grossly of normal caliber. Stomach  contains a small amount of fluid and air. Gastric wall appears mildly  thickened which is likely due to incomplete distention.     LYMPH NODES: No lymphadenopathy is identified.     VASCULATURE: There is nonaneurysmal atherosclerosis.     PELVIC ORGANS: Structure which could represent an atrophic uterus is  seen just posterior to the urinary bladder. Ovaries are not well seen.  No obvious adnexal mass is identified.     OTHER: There appears to be trace free fluid in the pelvis. No free air  is identified in the peritoneal cavity.     MUSCULOSKELETAL: Degenerative changes are noted in the spine.  No  aggressive osseous lesions or acute osseous fractures.                                                                      IMPRESSION:   1.  This study is significantly limited due to the relative lack of  intraperitoneal adipose tissue and also due to the lack of IV  contrast.  2.  No diverticulitis, urinary system calculus, urinary system  obstruction, or bowel obstruction is seen. Appendix is not definitely  identified and no definite evidence for appendicitis is seen.  Appendicitis cannot be excluded due to the limitations of this study.     I discussed the significant limitations of this study as well as the  lack of obvious etiology for patient's symptoms with Dr. Lentz on  9/1/2021 at approximately 11:30 AM, when he became available.     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     Ordering Physician: Dr. Canas     Technique: PPG waveforms were obtained with a sensor and infrared  light.     Findings:     Right foot:  First toe: Present, Normal  Second toe: Present, Moderately abnormal  Third toe: Present, Severely abnormal  Fourth toe: Present, Mildly abnormal  Fifth toe: Present, Moderately abnormal     Left foot:  First toe: Present, Moderately abnormal  Second toe: Present, Moderately abnormal  Third toe: Present, Severely abnormal  Fourth toe: Present, Severely abnormal  Fifth toe: Present, Moderate-severely abnormal                                                                      Impression:  1. Abnormal PPG waveforms in Right lower extremity digits 2-5, and  Left lower extremity digits 1-5.       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 Right lower extremity: Normal arterial velocities suggesting no  hemodynamically significant stenosis.   2. Left lower extremity: Normal arterial velocities suggesting no  hemodynamically significant stenosis.      Findings:      Right lower extremity:      Common femoral artery: 99/8 cm/sec.  Deep femoral artery: 91/11 cm/sec.  Proximal SFA: 77/1 cm/sec.  Mid SFA: 76/1 cm/sec.  Distal SFA: 61/1 cm/sec.  Popliteal artery: 32/1 cm/sec.  Anterior tibial artery: 40/1 cm/sec.  Peroneal artery: 44/3 cm/sec.  PTA ankle: 65/1 cm/sec.  Waveforms are triphasic in the CFA to the proximal SFA, and are  biphasic from the mid SFA to the distal peroneal artery.      Left lower extremity:     Common femoral artery: 105/4 cm/sec.  Deep femoral artery: 89/7 cm/sec.  Proximal SFA: 79/2 cm/sec.  Mid SFA: 83/1 cm/sec.  Distal SFA: 74/1 cm/sec.  Popliteal artery: 40/4 cm/sec.  Anterior tibial: 53/1 cm/sec.  Peroneal artery: 28/1 cm/sec.  PTA ankle: 81/1 cm/sec.  Waveforms are triphasic in the CFA to the mid SFA, and are biphasic  from the distal SFA to the distal peroneal artery.      MICHAELA REEDER MD         [id number]  [id number]  [id number]  [id number]^TIERNEY^STEFANO     [facility]  Echocardiography Laboratory  [address] [address]     Name: NISHA NIEVES  MRN: [id number]  : 1955  Study Date: 05/10/2021 10:07 AM  Age: 66 yrs  Gender: Female  Patient Location: [facility]  Reason For Study: Embolic disease of toe (H)  History: Hyperlipidemia,Asthma  Ordering Physician: STEFANO MONET  Referring Physician: Fuentes Darby  Performed By: Sharifa Hernandez     BSA: 1.4 m2  Height: 61 in  Weight: 100 lb  HR: 60  BP: 146/80 mmHg  ______________________________________________________________________________  Procedure  Complete Echo  Adult.  ______________________________________________________________________________  Interpretation Summary     1. The left ventricle is normal in structure, function and size. The visual  ejection fraction is estimated at 65%.  2. The right ventricle is normal in structure, function and size.  3. No valve disease.     No previous echo for comparison.  ______________________________________________________________________________  Left Ventricle  The left ventricle is normal in structure, function and size. There is normal  left ventricular wall thickness. The visual ejection fraction is estimated at  65%. Left ventricular diastolic function is normal. Normal left ventricular  wall motion.     Right Ventricle  The right ventricle is normal in structure, function and size.     Atria  Normal left atrial size. Right atrial size is normal. There is no atrial shunt  seen.     Mitral Valve  There is mild (1+) mitral regurgitation.     Tricuspid Valve  There is mild (1+) tricuspid regurgitation. The right ventricular systolic  pressure is approximated at 20.4 mmHg plus the right atrial pressure.     Aortic Valve  There is trace aortic regurgitation.     Pulmonic Valve  The pulmonic valve is normal in structure and function.     Vessels  Normal ascending, transverse (arch), and descending aorta. The inferior vena  cava was normal in size with preserved respiratory variability.     Pericardium  There is no pericardial effusion.     Rhythm  Sinus rhythm was noted.  ______________________________________________________________________________  MMode/2D Measurements & Calculations  IVSd: 0.70 cm     LVIDd: 3.8 cm  LVIDs: 2.8 cm  LVPWd: 0.80 cm  FS: 26.3 %  LV mass(C)d: 78.8 grams  LV mass(C)dI: 56.0 grams/m2  Ao root diam: 3.1 cm  LA dimension: 3.0 cm  asc Aorta Diam: 2.9 cm  LA/Ao: 0.97  RWT: 0.42     Doppler Measurements & Calculations  MV E max nathan: 72.0 cm/sec  MV A max nathan: 57.8 cm/sec  MV E/A: 1.2  MV dec slope: 273.0  cm/sec2  MV dec time: 0.26 sec  TR max nathan: 226.0 cm/sec  TR max P.4 mmHg  E/E' av.7  Lateral E/e': 8.9  Medial E/e': 8.5     ______________________________________________________________________________  Report approved by: Heena Isaac 05/10/2021 02:00 PM                 CTA CHEST, ABDOMEN, PELVIS RUNOFF WITH CONTRAST  3/22/2021 10:26 AM      HISTORY:  Blue toe syndrome. Concern for distal emboli. Evaluate for  embolic source.     COMPARISON: CT of the chest dated 2011     TECHNIQUE: CT angiogram of the chest, abdomen and pelvis with  bilateral lower extremity runoff was performed following the  administration of 100 cc intravenous contrast. Images are reviewed in  multiple planes and 3-D reconstructions were also performed.    Radiation dose for this scan was reduced using automated exposure  control, adjustment of the mA and/or kV according to patient size, or  iterative reconstruction technique.     FINDINGS:      Chest: The thoracic aorta at the level of the sinuses of Valsalva has  a maximum diameter of approximately 3.1 cm. The ascending thoracic  aorta has a maximum diameter of approximately 3.6 cm. The aorta then  tapers at the level of the thoracic aortic arch. The descending  thoracic aorta is of normal caliber without aneurysmal dilatation or  significant stenosis. There is no significant soft plaque in the  thoracic aorta. The great vessels arising from the thoracic aortic  arch are patent without significant stenoses.     Abdomen/pelvis: There is minimal scattered calcified plaque in the  abdominal aorta. No evidence for an aneurysmal dilatation or  significant stenosis. The celiac trunk, superior mesenteric artery,  renal arteries, and inferior mesenteric artery are patent without  significant stenoses.     The iliac arteries are patent without significant stenoses.     Right lower extremity angiogram:  Common femoral artery: Ectatic with a maximum diameter of 10 mm.  No  significant stenosis.  Profunda femoris artery: No significant stenosis.  Superficial femoral artery: No significant stenosis.  Popliteal artery: No significant stenosis.  Tibial arteries: Three-vessel runoff. No significant stenosis.     Left lower extremity angiogram:  Common femoral artery: No significant stenosis.  Profunda femoris artery: No significant stenosis.  Superficial femoral artery: No significant stenosis.  Popliteal artery: No significant stenosis.  Tibial arteries: Three-vessel runoff. No significant stenosis.     Soft tissues:     Chest: Right apical scarring. 7 mm nodule in the right upper lobe.     Abdomen/pelvis: There is a 2.5 x 1.8 cm angiomyolipoma at the lower  pole of the left kidney. Remaining solid organs in the abdomen appear  grossly unremarkable.     The bowel appears grossly unremarkable.                                                                      IMPRESSION:  1. Mild aneurysmal dilatation of the ascending thoracic aorta which  has a maximum diameter of approximately 3.6 cm.  2. No significant soft plaque in the arteries of the chest, abdomen,  pelvis and lower extremities that could serve as a source for distal  emboli.  3. 2.7 cm angiomyolipoma at the lower pole of the left kidney.  Consider urological follow-up and follow-up CT scan in one year to  monitor for any increase in size.     JEANNE HEARN MD                    Component      Latest Ref Rng & Units 11/3/2021   JAUN interpretation      Negative Positive (A)   JAUN pattern 1       Dense fine speckled   JAUN titer 1       1:160   Neutrophil Cytoplasmic Antibody      <1:10 <1:10   Neutrophil Cytoplasmic Antibody Pattern       The ANCA IFA is <1:10.  No further testing will be performed.   SSA Cassie IgG Instrument Value      <7.0 U/mL <0.5   SSA (Ro) Antibody IgG      Negative Negative   SSB Cassie IgG Instrument Value      <7.0 U/mL <0.6   SSB (La) Antibody IgG      Negative Negative   Scl-70 Cassie IgG Instrument  Value      <7.0 U/mL 0.8   Scleroderma Antibody Scl-70 EZEQUIEL IgG      Negative Negative   Centromere Cassie IgG Instrument Value      <7.0 U/mL <0.7   Centromere Antibody IgG      Negative Negative   Mayer EZEQUIEL Cassie IgG Instrument Value      <7.0 U/mL 2.5   Smith EZEQUIEL Antibody IgG      Negative Negative   Sed Rate      0 - 30 mm/hr 8   CRP Inflammation      0.0 - 8.0 mg/L <2.9   Rheumatoid Factor      <20 IU/mL 8   Cyclic Citrullinated Peptide Antibody, IgG      <7.0 U/mL 2.4   Histone Antibody,IgG      0.0 - 0.9 Units 0.3   DNA-ds      <10.0 IU/mL 1.6   Complement, Total, S      38.7 - 89.9 U/mL     Complement C3      81 - 157 mg/dL     Complement C4      13 - 39 mg/dL        Component      Latest Ref Rng & Units 12/9/2021   JAUN interpretation      Negative     JAUN pattern 1           JAUN titer 1           Neutrophil Cytoplasmic Antibody      <1:10     Neutrophil Cytoplasmic Antibody Pattern           SSA Cassie IgG Instrument Value      <7.0 U/mL     SSA (Ro) Antibody IgG      Negative     SSB Cassie IgG Instrument Value      <7.0 U/mL     SSB (La) Antibody IgG      Negative     Scl-70 Cassie IgG Instrument Value      <7.0 U/mL     Scleroderma Antibody Scl-70 EZEQUIEL IgG      Negative     Centromere Cassie IgG Instrument Value      <7.0 U/mL     Centromere Antibody IgG      Negative     Mayer EZEQUIEL Cassie IgG Instrument Value      <7.0 U/mL     Smith EZEQUIEL Antibody IgG      Negative     Sed Rate      0 - 30 mm/hr     CRP Inflammation      0.0 - 8.0 mg/L     Rheumatoid Factor      <20 IU/mL     Cyclic Citrullinated Peptide Antibody, IgG      <7.0 U/mL     Histone Antibody,IgG      0.0 - 0.9 Units     DNA-ds      <10.0 IU/mL     Complement, Total, S      38.7 - 89.9 U/mL 53.1   Complement C3      81 - 157 mg/dL 92   Complement C4      13 - 39 mg/dL 18         A/P:        Plan: As none found, resume symptomatic treatment as already undertaken if sxs recur, RTC prn.                             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 No overt gangrene but pain at rest has dissipated when having resumed the below. No overt etiology given above w/u. No obvious cardioembolic or atheroembolic focus. She has an isolated elevated JAUN in a 1:160 in a dense speckled pattern. We ruled out other autoimmune mediated component however in that all other Abs are normal as above.  Continue empiric treatment indefinitely, RTC 6 months or sooner prn any nonhealing ulcers..   Plan: apixaban ANTICOAGULANT (ELIQUIS) 5 MG tablet,         sildenafil (REVATIO) 20 MG tablet        RTC 6 weeks.

## 2022-04-06 NOTE — PROGRESS NOTES
"Patient is here to discuss  F/U to 2/15 OV Blue toe syndrome of both lower extremities    /76 (BP Location: Right arm, Patient Position: Chair, Cuff Size: Adult Regular)   Pulse 73   Ht 5' 1\" (1.549 m)   Wt 100 lb 9.6 oz (45.6 kg)   LMP  (LMP Unknown)   SpO2 97%   BMI 19.01 kg/m      Questions patient would like addressed today are: N/A.    Refills are needed: N/A    Has homecare services and agency name:  Myra Valdez  "

## 2022-04-13 DIAGNOSIS — F43.23 ADJUSTMENT DISORDER WITH MIXED ANXIETY AND DEPRESSED MOOD: ICD-10-CM

## 2022-04-14 RX ORDER — PAROXETINE HYDROCHLORIDE HEMIHYDRATE 37.5 MG/1
TABLET, FILM COATED, EXTENDED RELEASE ORAL
Qty: 90 TABLET | Refills: 0 | Status: SHIPPED | OUTPATIENT
Start: 2022-04-14 | End: 2022-08-12

## 2022-04-26 ENCOUNTER — VIRTUAL VISIT (OUTPATIENT)
Dept: INTERNAL MEDICINE | Facility: CLINIC | Age: 67
End: 2022-04-26
Payer: COMMERCIAL

## 2022-04-26 DIAGNOSIS — K21.00 GASTROESOPHAGEAL REFLUX DISEASE WITH ESOPHAGITIS WITHOUT HEMORRHAGE: Primary | ICD-10-CM

## 2022-04-26 PROCEDURE — 99213 OFFICE O/P EST LOW 20 MIN: CPT | Mod: GT | Performed by: INTERNAL MEDICINE

## 2022-04-26 RX ORDER — PANTOPRAZOLE SODIUM 40 MG/1
40 TABLET, DELAYED RELEASE ORAL DAILY
Qty: 90 TABLET | Refills: 1 | Status: SHIPPED | OUTPATIENT
Start: 2022-04-26 | End: 2023-03-31

## 2022-04-26 ASSESSMENT — PAIN SCALES - GENERAL: PAINLEVEL: MODERATE PAIN (5)

## 2022-04-26 NOTE — PROGRESS NOTES
TOBY is a 67 year old who is being evaluated via a billable video visit.      How would you like to obtain your AVS? MyChart  If the video visit is dropped, the invitation should be resent by: Text to cell phone: [phone]  Will anyone else be joining your video visit? No  Video Start Time: 3:43 PM    Assessment & Plan     Gastroesophageal reflux disease with esophagitis without hemorrhage  Reflux from stress, alcohol, caffeine and nsaids. No sign of bleeding despite her blood thinner. Will add protonix, stay off alcohol, limit coffee and avoid spicy foods. Should improve in 1-2 weeks. Would do EGD if any symptoms recur.       - pantoprazole (PROTONIX) 40 MG EC tablet; Take 1 tablet (40 mg) by mouth daily                 No follow-ups on file.    Fuentes Darby MD  [facility]   TOBY is a 67 year old who presents for the following health issues     History of Present Illness       Reason for visit:  Acid refux problem  Symptom onset:  1-2 weeks ago  Symptoms include:  Severe stomach upset & aches with vomiting  Symptom intensity:  Severe  Symptom progression:  Improving  Had these symptoms before:  Yes  Has tried/received treatment for these symptoms:  Yes  Previous treatment was successful:  Yes  Prior treatment description:  Acid reducers & diet modification  What makes it worse:  Caffeine  What makes it better:  Antacids    She eats 0-1 servings of fruits and vegetables daily.She consumes 0 sweetened beverage(s) daily.She exercises with enough effort to increase her heart rate 20 to 29 minutes per day.  She exercises with enough effort to increase her heart rate 3 or less days per week. She is missing 3 dose(s) of medications per week.  She is not taking prescribed medications regularly due to cost of medication.     Last 3 weeks she has had more heartburn and reflux,, nausea and vomiting at times.   Taking antacids, limits caffeine. Drinking more water and tea, avoiding  spicy food. Off alcohol lately, no wine. Was using some ibuprofen with headaches.     Stress with her sister being sick, working in the office more.     Taking pepto bismol tablets.       Review of Systems         Objective    Vitals - Patient Reported  Pain Score: Moderate Pain (5)  Pain Loc: Abdomen      Vitals:  No vitals were obtained today due to virtual visit.    Physical Exam   GENERAL: Healthy, alert and no distress  EYES: Eyes grossly normal to inspection.  No discharge or erythema, or obvious scleral/conjunctival abnormalities.  RESP: No audible wheeze, cough, or visible cyanosis.  No visible retractions or increased work of breathing.    SKIN: Visible skin clear. No significant rash, abnormal pigmentation or lesions.  NEURO: Cranial nerves grossly intact.  Mentation and speech appropriate for age.  PSYCH: Mentation appears normal, affect normal/bright, judgement and insight intact, normal speech and appearance well-groomed.                Video-Visit Details    Type of service:  Video Visit    Video End Time:3:54 PM    Originating Location (pt. Location): Home    Distant Location (provider location):  [facility]     Platform used for Video Visit: Chadd

## 2022-06-06 DIAGNOSIS — E03.4 HYPOTHYROIDISM DUE TO ACQUIRED ATROPHY OF THYROID: ICD-10-CM

## 2022-06-08 RX ORDER — LEVOTHYROXINE SODIUM 50 UG/1
TABLET ORAL
Qty: 90 TABLET | Refills: 3 | Status: SHIPPED | OUTPATIENT
Start: 2022-06-08 | End: 2023-06-14

## 2022-07-11 DIAGNOSIS — E78.5 HYPERLIPIDEMIA LDL GOAL <130: ICD-10-CM

## 2022-07-13 RX ORDER — ATORVASTATIN CALCIUM 20 MG/1
20 TABLET, FILM COATED ORAL DAILY
Qty: 90 TABLET | Refills: 3 | Status: SHIPPED | OUTPATIENT
Start: 2022-07-13 | End: 2023-03-08

## 2022-07-13 NOTE — TELEPHONE ENCOUNTER
Pending Prescriptions:                       Disp   Refills    atorvastatin (LIPITOR) 20 MG tablet [Pharm*90 tab*3        Sig: Take 1 tablet (20 mg) by mouth daily      Routing refill request to provider for review/approval because:  Labs not current:  LDL  LDL Cholesterol Calculated   Date Value Ref Range Status   03/19/2021 137 (H) <100 mg/dL Final     Comment:     Above desirable:  100-129 mg/dl  Borderline High:  130-159 mg/dL  High:             160-189 mg/dL  Very high:       >189 mg/dl         Nash Singletary RN

## 2022-08-02 ENCOUNTER — TELEPHONE (OUTPATIENT)
Dept: BEHAVIORAL HEALTH | Facility: CLINIC | Age: 67
End: 2022-08-02

## 2022-08-02 ENCOUNTER — VIRTUAL VISIT (OUTPATIENT)
Dept: PSYCHOLOGY | Facility: CLINIC | Age: 67
End: 2022-08-02
Payer: COMMERCIAL

## 2022-08-02 DIAGNOSIS — F33.0 MAJOR DEPRESSIVE DISORDER, RECURRENT EPISODE, MILD (H): Primary | ICD-10-CM

## 2022-08-02 DIAGNOSIS — F63.9 IMPULSE CONTROL DISORDER IN ADULT: ICD-10-CM

## 2022-08-02 DIAGNOSIS — F41.1 GENERALIZED ANXIETY DISORDER: ICD-10-CM

## 2022-08-02 PROCEDURE — 90791 PSYCH DIAGNOSTIC EVALUATION: CPT | Mod: GT | Performed by: MARRIAGE & FAMILY THERAPIST

## 2022-08-02 ASSESSMENT — ANXIETY QUESTIONNAIRES
7. FEELING AFRAID AS IF SOMETHING AWFUL MIGHT HAPPEN: SEVERAL DAYS
3. WORRYING TOO MUCH ABOUT DIFFERENT THINGS: MORE THAN HALF THE DAYS
GAD7 TOTAL SCORE: 13
GAD7 TOTAL SCORE: 13
1. FEELING NERVOUS, ANXIOUS, OR ON EDGE: MORE THAN HALF THE DAYS
2. NOT BEING ABLE TO STOP OR CONTROL WORRYING: MORE THAN HALF THE DAYS
6. BECOMING EASILY ANNOYED OR IRRITABLE: MORE THAN HALF THE DAYS
4. TROUBLE RELAXING: MORE THAN HALF THE DAYS
5. BEING SO RESTLESS THAT IT IS HARD TO SIT STILL: MORE THAN HALF THE DAYS
7. FEELING AFRAID AS IF SOMETHING AWFUL MIGHT HAPPEN: SEVERAL DAYS
8. IF YOU CHECKED OFF ANY PROBLEMS, HOW DIFFICULT HAVE THESE MADE IT FOR YOU TO DO YOUR WORK, TAKE CARE OF THINGS AT HOME, OR GET ALONG WITH OTHER PEOPLE?: VERY DIFFICULT
IF YOU CHECKED OFF ANY PROBLEMS ON THIS QUESTIONNAIRE, HOW DIFFICULT HAVE THESE PROBLEMS MADE IT FOR YOU TO DO YOUR WORK, TAKE CARE OF THINGS AT HOME, OR GET ALONG WITH OTHER PEOPLE: VERY DIFFICULT
GAD7 TOTAL SCORE: 13

## 2022-08-02 ASSESSMENT — PATIENT HEALTH QUESTIONNAIRE - PHQ9
SUM OF ALL RESPONSES TO PHQ QUESTIONS 1-9: 9
10. IF YOU CHECKED OFF ANY PROBLEMS, HOW DIFFICULT HAVE THESE PROBLEMS MADE IT FOR YOU TO DO YOUR WORK, TAKE CARE OF THINGS AT HOME, OR GET ALONG WITH OTHER PEOPLE: VERY DIFFICULT
SUM OF ALL RESPONSES TO PHQ QUESTIONS 1-9: 9

## 2022-08-02 NOTE — TELEPHONE ENCOUNTER
Phone Encounter   C spoke with patient, by PCP request. C informed and explained integrated health model, use of brief therapy interventions, as well as referrals and support services for ongoing long-term therapy. Patient previously met with this writer years ago. Patient is scheduled for an initial visit on 8/25.    DC Lundberg, Behavioral Health Clinician

## 2022-08-03 NOTE — PROGRESS NOTES
"    [facility]  Provider Name: Ranjit Vargas     credentials: LMFT    PATIENT'S NAME: Brissa Mayer  PREFERRED NAME: TOBY  PRONOUNS: she/Her/hers   MRN: [id number]  : 1955  ADDRESS: [address] [place] [id number]  ACCT. NUMBER:  [id number]  DATE OF SERVICE: 22  START TIME: 2:36 PM   END TIME: 3:30 PM   PREFERRED PHONE: [phone]  May we leave a program related message: Yes  SERVICE MODALITY:  Video Visit:      Provider verified identity through the following two step process.  Patient provided:  Patient photo and Patient     Telemedicine Visit: The patient's condition can be safely assessed and treated via synchronous audio and visual telemedicine encounter.      Reason for Telemedicine Visit: Patient has requested telehealth visit    Originating Site (Patient Location): Patient's home    Distant Site (Provider Location): [facility]    Consent:  The patient/guardian has verbally consented to: the potential risks and benefits of telemedicine (video visit) versus in person care; bill my insurance or make self-payment for services provided; and responsibility for payment of non-covered services.     Patient would like the video invitation sent by:  My Chart    Mode of Communication:  Video Conference via [employer]well    As the provider I attest to compliance with applicable laws and regulations related to telemedicine.    UNIVERSAL ADULT Mental Health DIAGNOSTIC ASSESSMENT    Identifying Information:  Patient is a 67 year old,    individual.    Patient was referred for an assessment by self and  primary care clinic.  Patient attended the session alone.    Chief Complaint:   The reason for seeking services at this time is: \"Help with acute anxiety.\".  The problem(s) began 3/26/2022.    Patient has attempted to resolve these concerns in the past through Counseling multiple times all her life, most recently in 2018.    Social/Family " "History:  Patient reported they grew up in [facility]  .  They were raised by biological parents  .  Parents were always together.  Patient reported that their childhood was \"rebellious and resentful.\".  Patient described their current relationships with family of origin as, as her father struggled with significant alcoholism and her mother did not intervene the way she wished she would..     The patient describes their cultural background as .  Cultural influences and impact on patient's life structure, values, norms, and healthcare: None.  Contextual influences on patient's health include: Individual Factors  .    These factors will be addressed in the Preliminary Treatment plan. Patient identified their preferred language to be English. Patient reported they does not need the assistance of an  or other support involved in therapy.     Patient reported had no significant delays in developmental tasks.   Patient's highest education level was some college  .  Patient identified the following learning problems: none reported.  Modifications will not be used to assist communication in therapy.   Patient reports they are  able to understand written materials.    Patient reported the following relationship history a few dating relationships, but no significant commitments, despite getting close to engagements.  Patient's current relationship status is single for her entire life.   Patient identified their sexual orientation as heterosexual.  Patient reported having   child(shara). Patient identified siblings; pets; friends as part of their support system.  Patient identified the quality of these relationships as good,  .      Patient's current living/housing situation involves staying in own home/apartment.  The immediate members of family and household include Drew Mayer, 64,Sister  and they report that housing is not stable.    Patient is currently employed fulltime.  Patient reports their finances " are obtained through employment; family. Patient does identify finances as a current stressor.      Patient reported that they have not been involved with the legal system.   Patient does not report being under probation/ parole/ jurisdiction. They are not under any current court jurisdiction. .    Patient's Strengths and Limitations:  Patient identified the following strengths or resources that will help them succeed in treatment: exercise routine, insight, intelligence, sense of humor, strong social skills and work ethic. Things that may interfere with the patient's success in treatment include: financial hardship, physical health concerns and housing instability.     Assessments:  The following assessments were completed by patient for this visit:  PHQ2:   PHQ-2 ( 1999 [employer]) 4/25/2022 9/27/2017 12/11/2015 12/8/2015 10/12/2015 10/23/2012   Q1: Little interest or pleasure in doing things 0 0 0 - 0 1   Q2: Feeling down, depressed or hopeless 0 0 0 - 0 1   PHQ-2 Score 0 0 0 - 0 2   Q1: Little interest or pleasure in doing things Not at all - - Not at all - -   Q2: Feeling down, depressed or hopeless Not at all - - Not at all - -   PHQ-2 Score 0 - - 0 - -     PHQ9:   PHQ-9 SCORE 4/4/2018 5/10/2019 12/4/2019 7/29/2020 3/19/2021 9/18/2021 8/2/2022   PHQ-9 Total Score - - - - - - -   PHQ-9 Total Score MyChart 3 (Minimal depression) - - 0 - 0 9 (Mild depression)   PHQ-9 Total Score 3 3 11 0 3 0 9     GAD2:   JOCELINE-2 8/2/2022   Feeling nervous, anxious, or on edge 1   Not being able to stop or control worrying 2   JOCELINE-2 Total Score 3     GAD7:   JOCELINE-7 SCORE 9/9/2015 10/12/2015 11/22/2016 3/30/2017 11/8/2017 3/19/2021 8/2/2022   Total Score - - - - - - -   Total Score - - - - - - 13 (moderate anxiety)   Total Score 21 0 7 1 6 7 13     CAGE-AID:   CAGE-AID Total Score 8/2/2022   Total Score 1   Total Score MyChart 1 (A total score of 2 or greater is considered clinically significant)     PROMIS 10-Global Health (only  subscores and total score):   PROMIS-10 Scores Only 1/27/2022 8/2/2022   Global Mental Health Score 12 9   Global Physical Health Score 14 18   PROMIS TOTAL - SUBSCORES 26 27     [place] Suicide Severity Rating Scale (Lifetime/Recent)  [place] Suicide Severity Rating (Lifetime/Recent) 2/8/2019   Q1 Wished to be Dead (Past Month) no   Q2 Suicidal Thoughts (Past Month) no       Personal and Family Medical History:  Patient does report a family history of mental health concerns.  Patient reports family history includes Asthma in her brother, father, mother, sister, sister, and sister; Cancer - colorectal in her maternal grandmother; Coronary Artery Disease in her sister; Diabetes in her mother and sister; Heart Disease in her father; Obesity in her sister and sister; Prostate Cancer in her father..     Patient does report Mental Health Diagnosis and/or Treatment.  Patient Patient reported the following previous diagnoses which include(s): an anxiety disorder; depression .  Patient reported symptoms began in childhood.  Patient has received mental health services in the past:  therapy  .  Psychiatric Hospitalizations: none . Patient denies a history of civil commitment.  Currently, patient none  receiving other mental health services.  These include none.         Patient has had a physical exam to rule out medical causes for current symptoms.  Date of last physical exam was within the past year. Client was encouraged to follow up with PCP if symptoms were to develop. The patient has a [facility] Provider, who is named Fuentes Darby.  Patient reports the following current medical concerns: Asthma, hyperlipidemia, hypothyroidism, gastro reflux, and vascular issue.  Patient denies any issues with pain..   There are not significant appetite / nutritional concerns / weight changes.   Patient does not report a history of head injury / trauma / cognitive impairment.      Patient reports taking Paxil 37.5 mg.   She takes medication for her other health conditions, -see file    Medication Adherence:  Patient reports taking.  taking prescribed medications as prescribed.    Patient Allergies:    Allergies   Allergen Reactions     Compazine      Anxiety     Flagyl [Metronidazole Hcl] Nausea and Vomiting       Medical History:    Past Medical History:   Diagnosis Date     Anxiety      Arthritis      Asthma, mild intermittent      Major depressive disorder, single episode      Migraines      Tension headaches      Unspecified hypothyroidism          Current Mental Status Exam:   Appearance:  Appropriate    Eye Contact:  Good   Psychomotor:  Normal       Gait / station:  no problem  Attitude / Demeanor: Cooperative  Friendly Pleasant  Speech      Rate / Production: Normal/ Responsive      Volume:  Normal  volume      Language:  intact and no obvious problem  Mood:   Anxious  Depressed  Irritable  Sad  Agitated  Affect:   Worrisome    Thought Content: Clear   Thought Process: Coherent       Associations: No loosening of associations  Insight:   Fair   Judgment:  Poor  Orientation:  Person Place Time Situation  Attention/concentration: Fair      Substance Use:  Patient did report a family history of substance use concerns; see medical history section for details.  Patient has not received chemical dependency treatment in the past.  Patient has not ever been to detox.      Patient is not currently receiving any chemical dependency treatment.           Substance History of use Age of first use Date of last use     Pattern and duration of use (include amounts and frequency)   Alcohol currently use   10 8/1/2022 REPORTS SUBSTANCE USE: reports using substance 1 times per day and has 1-2 glasses of wine   at a time.   Patient reports heaviest use was Years previous to this.   Cannabis   used in the past 19 5/20/2022 REPORTS SUBSTANCE USE: N/A client did not report     Amphetamines   never used     REPORTS SUBSTANCE USE: N/A client did  not report   Cocaine/crack    used in the past 15  6/2/1970  REPORTS SUBSTANCE USE: N/A client did not report   Hallucinogens used in the past   23  7/2/1978  REPORTS SUBSTANCE USE: N/A client did not report   Inhalants never used         REPORTS SUBSTANCE USE: N/A   Heroin never used         REPORTS SUBSTANCE USE: N/A   Other Opiates never used     REPORTS SUBSTANCE USE: N/A   Benzodiazepine   never used     REPORTS SUBSTANCE USE: N/A   Barbiturates never used     REPORTS SUBSTANCE USE: N/A   Over the counter meds never used     REPORTS SUBSTANCE USE: N/A   Caffeine currently use 13   REPORTS SUBSTANCE USE: Not reported times    Nicotine  never used     REPORTS SUBSTANCE USE: N/A   Other substances not listed above:  Identify:  never used     REPORTS SUBSTANCE USE: N/A     Patient reported the following problems as a result of their substance use: no problems, not applicable.    Substance Use: No symptoms    Based on the negative CAGE score and clinical interview there  are not indications of drug or alcohol abuse.  If report is more detailed by client regarding alcohol consumption, there may indicate possible alcohol misuse.  Currently undetermined based on vague reporting client.      Significant Losses / Trauma / Abuse / Neglect Issues:   Patient did not serve in the .  There are indications or report of significant loss, trauma, abuse or neglect issues related to: are indications or report of significant loss, trauma, abuse or neglect issues related to alcoholism with father led to irritability and produced an overcontrolling environment, including when her father stopped drinking alcohol and insisted that anyone in the family that misused substances needed treatment..  Concerns for possible neglect are not present.     Safety Assessment:   Patient denies current homicidal ideation and behaviors.  Patient denies current self-injurious ideation and behaviors.    Patient denied risk behaviors associated  with substance use.  Patient reported impulsive/compulsive spending behaviors reported impulsive decisionmaking associated with mental health symptoms.  Patient reports the following current concerns for their personal safety: None.  Patient reports there are not firearms in the house.       There are no firearms in the home..    History of Safety Concerns:  Patient denied a history of homicidal ideation.     Patient denied a history of personal safety concerns.    Patient denied a history of assaultive behaviors.    Patient denied a history of sexual assault behaviors.     Patient denied a history of risk behaviors associated with substance use.  Patient reported a history of impulsive/compulsive spending behaviors reported a history of impulsive decision making associated with mental health symptoms.  Patient reports the following protective factors: forward or future oriented thinking; regular sleep; sense of belonging; purpose; living with other people; daily obligations; commitment to well being; access to a variety of clinical interventions and pets    Risk Plan:  See Recommendations for Safety and Risk Management Plan    Review of Symptoms per patient report:  Depression: Lack of interest, Change in energy level, Difficulties concentrating, Feelings of helplessness, Low self-worth, Ruminations, Irritability, Feeling sad, down, or depressed and Anger outbursts  Annamarie:  Impulsiveness  Psychosis: No Symptoms  Anxiety: Excessive worry, Nervousness, Physical complaints, such as headaches, stomachaches, muscle tension, Ruminations, Irritability and Anger outbursts  Panic:  No symptoms  Post Traumatic Stress Disorder:  No Symptoms   Eating Disorder: No Symptoms  ADD / ADHD:  Poor organizational skills, Interrupts, Impulsive, Restlessness/fidgety and Hyperverbal  Conduct Disorder: No symptoms  Autism Spectrum Disorder: No symptoms  Obsessive Compulsive Disorder: Counting and Occasional hair pulling    Patient reports  the following compulsive behaviors and treatment history: Hair Pulling - has not had treatment. and Shopping / Spending - Will be having had treatment..      Diagnostic Criteria:   Generalized Anxiety Disorder  B. The person finds it difficult to control the worry.  C. Select 3 or more symptoms (required for diagnosis). Only one item is required in children.   - Restlessness or feeling keyed up or on edge.    - Being easily fatigued.    - Difficulty concentrating or mind going blank.    - Irritability.    - Muscle tension.   D. The focus of the anxiety and worry is not confined to features of an Axis I disorder.  E. The anxiety, worry, or physical symptoms cause clinically significant distress or impairment in social, occupational, or other important areas of functioning.   F. The disturbance is not due to the direct physiological effects of a substance (e.g., a drug of abuse, a medication) or a general medical condition (e.g., hyperthyroidism) and does not occur exclusively during a Mood Disorder, a Psychotic Disorder, or a Pervasive Developmental Disorder.    - The aformentioned symptoms began in childhood Multiple ago and occurs 7 days per week and is experienced as moderate. Major Depressive Disorder  A) Recurrent episode(s) - symptoms have been present during the same 2-week period and represent a change from previous functioning 5 or more symptoms (required for diagnosis)   - Depressed mood. Note: In children and adolescents, can be irritable mood.     - Diminished interest or pleasure in all, or almost all, activities.    - Psychomotor activity agitation.    - Feelings of worthlessness or inappropriate guilt.    - Diminished ability to think or concentrate, or indecisiveness.   B) The symptoms cause clinically significant distress or impairment in social, occupational, or other important areas of functioning  C) The episode is not attributable to the physiological effects of a substance or to another medical  condition  D) The occurence of major depressive episode is not better explained by other thought / psychotic disorders  E) There has never been a manic episode or hypomanic episode Unspecified impulse control disorder a) failure to resist an impulse (spending) B) Increased tension prior to spending  C) failure to resist impulse despite it causing financial harm to her and her family D) Intense need to gratify immediate desire and failure to resist the impulse/temptation.       Functional Status:  Patient reports the following functional impairments:  health maintenance, home life with disabled sibling , money management, relationship(s) and work / vocational responsibilities.     Nonprogrammatic care:  Patient is requesting basic services to address current mental health concerns.    Clinical Summary:  1. Reason for assessment: Anxiety depression and impulse control issues negatively impacting her functioning in her home, financial wellbeing, as well as occupational stress  2. Psychosocial, Cultural and Contextual Factors: Work conflict with supervisor, challenging with environment, multiple family challenges, 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  3. Principal DSM5 Diagnoses  (Sustained by DSM5 Criteria Listed Above):   296.31 (F33.0) Major Depressive Disorder, Recurrent Episode, Mild With anxious distress.  4. Other Diagnoses that is relevant to services:   300.02 (F41.1) Generalized Anxiety Disorder; F63.9 Unspecified Impulse control disorder (spending/oniomania)  5. Provisional Diagnosis: NA  6. Prognosis: Expect Improvement and Relieve Acute Symptoms.  7. Likely consequences of symptoms if not treated: Work-related issues, housing issues, financial functioning, ability to caretaker for sister.  8. Client strengths include:  caring, creative, educated, employed, goal-focused, has a previous history of therapy, intelligent, motivated, willing to relate to others and work history .     Recommendations:     1. Plan  for Safety and Risk Management:   Safety and Risk: Recommended that patient call 911 or go to the local ED should there be a change in any of these risk factors..          Report to child / adult protection services was NA.     2. Patient's identified cultural concerns will be addressed by Individualized psychotherapy.     3. Initial Treatment will focus on:    Depressed Mood - Improve mood and overall resilience.     4. Resources/Service Plan:    services are not indicated.   Modifications to assist communication are not indicated.   Additional disability accommodations are not indicated.      5. Collaboration:   Collaboration / coordination of treatment will be initiated with the following  support professionals: primary care physician.      6.  Referrals:   The following referral(s) will be initiated: [facility]. Next Scheduled Appointment: 1-2 week intervals recommended.     A Release of Information has been obtained for the following: NA.     Emergency Contact  was not obtained.     7. MIRELA:    MIRELA:  Discussed the general effects of drugs and alcohol on health and well-being. Provider gave patient printed information about the effects of chemical use on their health and well being. Recommendations:  Reduce consumption of alcohol      8. Records:   These were not available for review at time of assessment.   Information in this assessment was obtained from the medical record and  provided by patient who is a good historian.    Patient will have open access to their mental health medical record.        Provider Name/ Credentials:  DC Chaudhari  August 3, 2022          Answers for HPI/ROS submitted by the patient on 8/2/2022  If you checked off any problems, how difficult have these problems made it for you to do your work, take care of things at home, or get along with other people?: Very difficult  PHQ9 TOTAL SCORE: 9  JOCELINE 7 TOTAL SCORE: 13

## 2022-08-08 ENCOUNTER — MYC MEDICAL ADVICE (OUTPATIENT)
Dept: INTERNAL MEDICINE | Facility: CLINIC | Age: 67
End: 2022-08-08

## 2022-08-10 DIAGNOSIS — F43.23 ADJUSTMENT DISORDER WITH MIXED ANXIETY AND DEPRESSED MOOD: ICD-10-CM

## 2022-08-11 ENCOUNTER — VIRTUAL VISIT (OUTPATIENT)
Dept: PSYCHOLOGY | Facility: CLINIC | Age: 67
End: 2022-08-11
Payer: COMMERCIAL

## 2022-08-11 DIAGNOSIS — F41.1 GENERALIZED ANXIETY DISORDER: ICD-10-CM

## 2022-08-11 DIAGNOSIS — F33.0 MAJOR DEPRESSIVE DISORDER, RECURRENT EPISODE, MILD (H): Primary | ICD-10-CM

## 2022-08-11 DIAGNOSIS — F63.9 IMPULSE CONTROL DISORDER IN ADULT: ICD-10-CM

## 2022-08-11 PROCEDURE — 90832 PSYTX W PT 30 MINUTES: CPT | Mod: GT | Performed by: MARRIAGE & FAMILY THERAPIST

## 2022-08-11 NOTE — PROGRESS NOTES
[facility]                                     Progress Note    Patient Name: Brissa Mayer  Date: 8/11/22           Service Type: Individual      Session Start Time: 10 am  Session End Time: 10:30 am     Session Length: 30 minutes    Session #: 1    Attendees: Client    Service Modality:  Video Visit:      Provider verified identity through the following two step process.  Patient provided:  Patient was verified at admission/transfer    Telemedicine Visit: The patient's condition can be safely assessed and treated via synchronous audio and visual telemedicine encounter.      Reason for Telemedicine Visit: Patient has requested telehealth visit    Originating Site (Patient Location): Patient's home    Distant Site (Provider Location): [facility]    Consent:  The patient/guardian has verbally consented to: the potential risks and benefits of telemedicine (video visit) versus in person care; bill my insurance or make self-payment for services provided; and responsibility for payment of non-covered services.     Patient would like the video invitation sent by:  My Chart    Mode of Communication:  Video Conference via Amwell    As the provider I attest to compliance with applicable laws and regulations related to telemedicine.    DATA  Interactive Complexity: No  Crisis: No        Progress Since Last Session (Related to Symptoms / Goals / Homework):   Symptoms: No change Continued mild depressive symptoms    Homework: Completed in session      Episode of Care Goals: No improvement - PREPARATION (Decided to change - considering how); Intervened by negotiating a change plan and determining options / strategies for behavior change, identifying triggers, exploring social supports, and working towards setting a date to begin behavior change     Current / Ongoing Stressors and Concerns:   Client presents for individual follow-up session to address mild depressive disorder  generalized anxiety disorder and impulse control disorder (spending).  She states she has difficulty with durations and impulses related to wanting her sister to lose weight through her impending surgery in 2 weeks.  She states she feels very stressed, and that she is responsible for getting her sister to do the right things.  Therapist intervened and stated that there are several other approaches that could benefit her sister as well as herself, by using her stress level and assuming a more listening rather than dictating role.  She agreed to consider this, and was positively responsive to less demanding/persuasive language.  She states that she finds her stress level and anxiety to be excessive and asked for an increase in her medication of Paxil from her physician.  The client demonstrated good insight and was positively responsive to suggestion.  She states that she has been assisting her parents and now her sister for the past 30 years, and she is feeling some emotional burnout from the stress of it all.  Therapist suggested yoga, calming breath exercises, and continued regulation of frustration with several/polite means.     Treatment Objective(s) Addressed in This Session:   identify feelings and emotions that occur prior to aggressive behaviors  use relaxation strategies 2-5 times per day to reduce the physical symptoms of anxiety  Increase interest, engagement, and pleasure in doing things  Decrease frequency and intensity of feeling down, depressed, hopeless  Feel less tired and more energy during the day   Identify negative self-talk and behaviors: challenge core beliefs, myths, and actions  Improve concentration, focus, and mindfulness in daily activities   Feel less fidgety, restless or slow in daily activities / interpersonal interactions       Intervention:   Motivational Interviewing    MI Intervention: Expressed Empathy/Understanding, Supported Autonomy, Collaboration, Evocation, Permission to raise  concern or advise, Open-ended questions, Reflections: simple and complex, Change talk (evoked) and Reframe     Change Talk Expressed by the Patient: Desire to change Ability to change Reasons to change Need to change Committment to change Activation Taking steps    Provider Response to Change Talk: E - Evoked more info from patient about behavior change, A - Affirmed patient's thoughts, decisions, or attempts at behavior change, R - Reflected patient's change talk and S - Summarized patient's change talk statements      Assessments completed prior to visit:  The following assessments were completed by patient for this visit:  PHQ2:   PHQ-2 ( 1999 [employer]) 4/25/2022 9/27/2017 12/11/2015 12/8/2015 10/12/2015 10/23/2012   Q1: Little interest or pleasure in doing things 0 0 0 - 0 1   Q2: Feeling down, depressed or hopeless 0 0 0 - 0 1   PHQ-2 Score 0 0 0 - 0 2   Q1: Little interest or pleasure in doing things Not at all - - Not at all - -   Q2: Feeling down, depressed or hopeless Not at all - - Not at all - -   PHQ-2 Score 0 - - 0 - -     PHQ9:   PHQ-9 SCORE 4/4/2018 5/10/2019 12/4/2019 7/29/2020 3/19/2021 9/18/2021 8/2/2022   PHQ-9 Total Score - - - - - - -   PHQ-9 Total Score MyChart 3 (Minimal depression) - - 0 - 0 9 (Mild depression)   PHQ-9 Total Score 3 3 11 0 3 0 9     GAD2:   JOCELINE-2 8/2/2022   Feeling nervous, anxious, or on edge 1   Not being able to stop or control worrying 2   JOCELINE-2 Total Score 3     GAD7:   JOCELINE-7 SCORE 9/9/2015 10/12/2015 11/22/2016 3/30/2017 11/8/2017 3/19/2021 8/2/2022   Total Score - - - - - - -   Total Score - - - - - - 13 (moderate anxiety)   Total Score 21 0 7 1 6 7 13     CAGE-AID:   CAGE-AID Total Score 8/2/2022   Total Score 1   Total Score MyChart 1 (A total score of 2 or greater is considered clinically significant)     PROMIS 10-Global Health (only subscores and total score):   PROMIS-10 Scores Only 1/27/2022 8/2/2022   Global Mental Health Score 12 9   Global Physical Health Score  14 18   PROMIS TOTAL - SUBSCORES 26 27         ASSESSMENT: Current Emotional / Mental Status (status of significant symptoms):   Risk status (Self / Other harm or suicidal ideation)   Patient denies current fears or concerns for personal safety.   Patient denies current or recent suicidal ideation or behaviors.   Patient denies current or recent homicidal ideation or behaviors.   Patient denies current or recent self injurious behavior or ideation.   Patient denies other safety concerns.   Patient reports there has been no change in risk factors since their last session.     Patient reports there has been no change in protective factors since their last session.     Recommended that patient call 911 or go to the local ED should there be a change in any of these risk factors.     Appearance:   Appropriate    Eye Contact:   Good    Psychomotor Behavior: Normal  Hyperactive    Attitude:   Cooperative  Pleasant   Orientation:   All   Speech    Rate / Production: Normal/ Responsive Talkative    Volume:  Normal    Mood:    Anxious  Depressed  Irritable  Sad    Affect:    Worrisome    Thought Content:  Clear    Thought Form:  Coherent  Logical    Insight:    Good      Medication Review:   No changes to current psychiatric medication(s)     Medication Compliance:   Yes     Changes in Health Issues:   None reported     Chemical Use Review:   Substance Use: Chemical use reviewed, no active concerns identified      Tobacco Use: No current tobacco use.      Diagnosis:  1. Major depressive disorder, recurrent episode, mild (HCC)    2. Generalized anxiety disorder    3. Impulse control disorder in adult        Collateral Reports Completed:   Not Applicable    PLAN: (Patient Tasks / Therapist Tasks / Other)  Continue individualized psychotherapy sessions weekly, addressing coping strategies to improve mood and reduce worry and increase impulse control.          DC Chaudhari

## 2022-08-12 RX ORDER — PAROXETINE HYDROCHLORIDE HEMIHYDRATE 37.5 MG/1
TABLET, FILM COATED, EXTENDED RELEASE ORAL
Qty: 90 TABLET | Refills: 0 | Status: SHIPPED | OUTPATIENT
Start: 2022-08-12 | End: 2022-11-11

## 2022-08-12 NOTE — TELEPHONE ENCOUNTER
"Requested Prescriptions   Pending Prescriptions Disp Refills    PARoxetine (PAXIL-CR) 37.5 MG 24 hr tablet [Pharmacy Med Name: PAROXETINE HCL ER 37.5MG TB24] 90 tablet 0     Sig: TAKE ONE TABLET BY MOUTH EVERY MORNING.  Appointment needed for additional refills.        SSRIs Protocol Failed - 8/10/2022  3:33 PM        Failed - PHQ-9 score less than 5 in past 6 months     Please review last PHQ-9 score.           Passed - Medication is active on med list        Passed - Patient is age 18 or older        Passed - No active pregnancy on record        Passed - No positive pregnancy test in last 12 months        Passed - Recent (6 mo) or future (30 days) visit within the authorizing provider's specialty     Patient had office visit in the last 6 months or has a visit in the next 30 days with authorizing provider or within the authorizing provider's specialty.  See \"Patient Info\" tab in inbasket, or \"Choose Columns\" in Meds & Orders section of the refill encounter.                CASTRO RicoN, RN          "

## 2022-08-17 ENCOUNTER — VIRTUAL VISIT (OUTPATIENT)
Dept: PSYCHOLOGY | Facility: CLINIC | Age: 67
End: 2022-08-17
Payer: COMMERCIAL

## 2022-08-17 DIAGNOSIS — F63.9 IMPULSE CONTROL DISORDER IN ADULT: ICD-10-CM

## 2022-08-17 DIAGNOSIS — F41.1 GENERALIZED ANXIETY DISORDER: ICD-10-CM

## 2022-08-17 DIAGNOSIS — F33.0 MAJOR DEPRESSIVE DISORDER, RECURRENT EPISODE, MILD (H): Primary | ICD-10-CM

## 2022-08-17 PROCEDURE — 90832 PSYTX W PT 30 MINUTES: CPT | Mod: GT | Performed by: MARRIAGE & FAMILY THERAPIST

## 2022-08-17 NOTE — PROGRESS NOTES
[facility]                                     Progress Note    Patient Name: Brissa Mayer  Date: 8/17/22           Service Type: Individual      Session Start Time: 10 am  Session End Time: 10:20 am     Session Length: 20 minutes    Session #: 2    Attendees: Client    Service Modality:  Video Visit:      Provider verified identity through the following two step process.  Patient provided:  Patient was verified at admission/transfer    Telemedicine Visit: The patient's condition can be safely assessed and treated via synchronous audio and visual telemedicine encounter.      Reason for Telemedicine Visit: Patient has requested telehealth visit    Originating Site (Patient Location): Patient's home    Distant Site (Provider Location): [facility]    Consent:  The patient/guardian has verbally consented to: the potential risks and benefits of telemedicine (video visit) versus in person care; bill my insurance or make self-payment for services provided; and responsibility for payment of non-covered services.     Patient would like the video invitation sent by:  My Chart    Mode of Communication:  Video Conference via Amwell    As the provider I attest to compliance with applicable laws and regulations related to telemedicine.    DATA  Interactive Complexity: No  Crisis: No        Progress Since Last Session (Related to Symptoms / Goals / Homework):   Symptoms: Improving Depressive symptoms appear to be reduced in severity, frequency, and duration.  Challenges with impulse control has increased, as she had a spending problem over the weekend.  Her anxiety is also reducing in intensity but is at the baseline rate of frequency.    Homework: Completed in session      Episode of Care Goals: Minimal progress - ACTION (Actively working towards change); Intervened by reinforcing change plan / affirming steps taken     Current / Ongoing Stressors and Concerns:   Client  presents for individual follow-up session to address mild depressive disorder generalized anxiety disorder and impulse control disorder (spending).      Client indicated she has improved her interactions with her sister recently after a discussion ensued following her sister's hospitalization for an injury and illness.  She states that she agreed to no longer bother her sister about food consumption as well as disease management.  Her sister agreed to manage her own health in a more responsible way, which was the primary reason for the client anger/anxiety with her sister.  She states that her stress relieved significantly after this discussion with her sister.  She states that her mood also is improving and that she is sleeping better.  Conflicts with her neighbor have reduced, and she attempted to reach out to a family member of her neighbors and attempt to apologize for past negative interactions.  She stated she felt better about reaching out, and understood that she may never get a proper response, and she is okay with that. Therapist and client talked about impulse control, specifically her spending sprees.  She stated that she had a relapse of spending sprees including a $250 purchase for clothing for her sister.  She states her awareness and accountability for her actions and her own internalized justification.  She states that she realizes that it was too much she needs to continue addressing her coping strategies and delaying risk situations if possible.  Therapist talked about the envelope system the client inquired about.  Therapist highlighted resources including Adolfo Kamara's financial planning management available at her local library.  She stated that she would look into this resource, and started to use the envelope system similar to her friends' use, but she thinks her money for each category of expense, while also working towards establishing an emergency savings fund.  The client stated she had  to resume her work schedule, but inquired about another session in 1 week.  She agreed to address the impulse control over the week.     Treatment Objective(s) Addressed in This Session:   identify feelings and emotions that occur prior to aggressive behaviors  use relaxation strategies 2-5 times per day to reduce the physical symptoms of anxiety  Increase interest, engagement, and pleasure in doing things  Decrease frequency and intensity of feeling down, depressed, hopeless  Feel less tired and more energy during the day   Identify negative self-talk and behaviors: challenge core beliefs, myths, and actions  Improve concentration, focus, and mindfulness in daily activities   Feel less fidgety, restless or slow in daily activities / interpersonal interactions       Intervention:   Motivational Interviewing    MI Intervention: Expressed Empathy/Understanding, Supported Autonomy, Collaboration, Evocation, Permission to raise concern or advise, Open-ended questions, Reflections: simple and complex, Change talk (evoked) and Reframe     Change Talk Expressed by the Patient: Desire to change Ability to change Reasons to change Need to change Committment to change Activation Taking steps    Provider Response to Change Talk: E - Evoked more info from patient about behavior change, A - Affirmed patient's thoughts, decisions, or attempts at behavior change, R - Reflected patient's change talk and S - Summarized patient's change talk statements       Assessments completed prior to visit:  The following assessments were completed by patient for this visit:  PHQ2:   PHQ-2 ( 1999 [employer]) 4/25/2022 9/27/2017 12/11/2015 12/8/2015 10/12/2015 10/23/2012   Q1: Little interest or pleasure in doing things 0 0 0 - 0 1   Q2: Feeling down, depressed or hopeless 0 0 0 - 0 1   PHQ-2 Score 0 0 0 - 0 2   Q1: Little interest or pleasure in doing things Not at all - - Not at all - -   Q2: Feeling down, depressed or hopeless Not at all - - Not  at all - -   PHQ-2 Score 0 - - 0 - -     PHQ9:   PHQ-9 SCORE 4/4/2018 5/10/2019 12/4/2019 7/29/2020 3/19/2021 9/18/2021 8/2/2022   PHQ-9 Total Score - - - - - - -   PHQ-9 Total Score MyChart 3 (Minimal depression) - - 0 - 0 9 (Mild depression)   PHQ-9 Total Score 3 3 11 0 3 0 9     GAD2:   JOCELINE-2 8/2/2022   Feeling nervous, anxious, or on edge 1   Not being able to stop or control worrying 2   JOCELINE-2 Total Score 3     GAD7:   JOCELINE-7 SCORE 9/9/2015 10/12/2015 11/22/2016 3/30/2017 11/8/2017 3/19/2021 8/2/2022   Total Score - - - - - - -   Total Score - - - - - - 13 (moderate anxiety)   Total Score 21 0 7 1 6 7 13     CAGE-AID:   CAGE-AID Total Score 8/2/2022   Total Score 1   Total Score MyChart 1 (A total score of 2 or greater is considered clinically significant)     PROMIS 10-Global Health (only subscores and total score):   PROMIS-10 Scores Only 1/27/2022 8/2/2022   Global Mental Health Score 12 9   Global Physical Health Score 14 18   PROMIS TOTAL - SUBSCORES 26 27         ASSESSMENT: Current Emotional / Mental Status (status of significant symptoms):   Risk status (Self / Other harm or suicidal ideation)   Patient denies current fears or concerns for personal safety.   Patient denies current or recent suicidal ideation or behaviors.   Patient denies current or recent homicidal ideation or behaviors.   Patient denies current or recent self injurious behavior or ideation.   Patient denies other safety concerns.   Patient reports there has been no change in risk factors since their last session.     Patient reports there has been no change in protective factors since their last session.     Recommended that patient call 911 or go to the local ED should there be a change in any of these risk factors.     Appearance:   Appropriate    Eye Contact:   Good    Psychomotor Behavior: Normal  Hyperactive    Attitude:   Cooperative  Pleasant   Orientation:   All   Speech    Rate / Production: Normal/ Responsive  Talkative    Volume:  Normal    Mood:    Anxious  Euthymic   Affect:    Worrisome    Thought Content:  Clear    Thought Form:  Coherent  Logical    Insight:    Good      Medication Review:   No changes to current psychiatric medication(s)     Medication Compliance:   Yes     Changes in Health Issues:   None reported     Chemical Use Review:   Substance Use: Chemical use reviewed, no active concerns identified      Tobacco Use: No current tobacco use.      Diagnosis:  1. Major depressive disorder, recurrent episode, mild (HCC)    2. Generalized anxiety disorder    3. Impulse control disorder in adult        Collateral Reports Completed:   Not Applicable    PLAN: (Patient Tasks / Therapist Tasks / Other)  Continue individualized psychotherapy sessions weekly, addressing coping strategies to improve mood and reduce worry and increase impulse control.          DC Chaudhari

## 2022-08-23 ENCOUNTER — VIRTUAL VISIT (OUTPATIENT)
Dept: PSYCHOLOGY | Facility: CLINIC | Age: 67
End: 2022-08-23
Payer: COMMERCIAL

## 2022-08-23 DIAGNOSIS — F63.9 IMPULSE CONTROL DISORDER IN ADULT: ICD-10-CM

## 2022-08-23 DIAGNOSIS — F33.0 MAJOR DEPRESSIVE DISORDER, RECURRENT EPISODE, MILD (H): Primary | ICD-10-CM

## 2022-08-23 DIAGNOSIS — F41.1 GENERALIZED ANXIETY DISORDER: ICD-10-CM

## 2022-08-23 PROCEDURE — 90832 PSYTX W PT 30 MINUTES: CPT | Mod: GT | Performed by: MARRIAGE & FAMILY THERAPIST

## 2022-08-23 NOTE — PROGRESS NOTES
[facility]                                     Progress Note    Patient Name: Brissa Mayer  Date: 8/23/22           Service Type: Individual      Session Start Time: 10 am  Session End Time: 10:31 am     Session Length: 31 minutes    Session #: 3    Attendees: Client    Service Modality:  Video Visit:      Provider verified identity through the following two step process.  Patient provided:  Patient was verified at admission/transfer    Telemedicine Visit: The patient's condition can be safely assessed and treated via synchronous audio and visual telemedicine encounter.      Reason for Telemedicine Visit: Patient has requested telehealth visit    Originating Site (Patient Location): Patient's home    Distant Site (Provider Location): [facility]    Consent:  The patient/guardian has verbally consented to: the potential risks and benefits of telemedicine (video visit) versus in person care; bill my insurance or make self-payment for services provided; and responsibility for payment of non-covered services.     Patient would like the video invitation sent by:  My Chart    Mode of Communication:  Video Conference via [employer]well    As the provider I attest to compliance with applicable laws and regulations related to telemedicine.    DATA  Interactive Complexity: No  Crisis: No        Progress Since Last Session (Related to Symptoms / Goals / Homework):   Symptoms: Improving Depressive symptoms appear to be reduced in severity, frequency, and duration.  Challenges with impulse control has increased, as she had a reoccuring spending problem.  Her anxiety is also reducing in intensity but is at the baseline rate of frequency.    Homework: Completed in session      Episode of Care Goals: Satisfactory progress - ACTION (Actively working towards change); Intervened by reinforcing change plan / affirming steps taken     Current / Ongoing Stressors and Concerns:   Client  presents for individual follow-up session to address mild depressive disorder generalized anxiety disorder and impulse control disorder (spending).    The client presented, stating that her mood has been down somewhat, as she is recognizing her sisters medical restrictions and pain issues.  She states that rather than being instantly upset about her sister's lack of effort, she is now starting to try and find some empathic responsiveness.  She states that at the same time, she has utilized self-care by spending time with her friend and relaxing as well as reading books.  She states that she also has other strategies including watching her favorite shows, as well as gardening, and she enjoys cleaning her house.  She stated that she had less worry in general, as far as intensity, but continued to have some issues related to her employment situation.  She states that she struggles with a new leader that is vague and sometimes demanding.  Therapist highlighted strategies for her to recognize her irritation, and to relax into it, rather than fighting reality or arguing with her superior.  She was also urged to change her routine of meeting her friend in [place] for lunch, and spending money at the local consignment shop there.  She states that her goal is to have 0 expenses on consignment purchases, and that she wishes to come back in 1 day or possibly a week if she finds something that she enjoys.  Therapist provided strategies of changing thought patterns related to her anxiousness, as well as thoughts precipitating her impulse expenses.  Client responded favorably.     Treatment Objective(s) Addressed in This Session:   identify feelings and emotions that occur prior to aggressive behaviors  use relaxation strategies 2-5 times per day to reduce the physical symptoms of anxiety  Increase interest, engagement, and pleasure in doing things  Decrease frequency and intensity of feeling down, depressed, hopeless  Feel  less tired and more energy during the day   Identify negative self-talk and behaviors: challenge core beliefs, myths, and actions  Improve concentration, focus, and mindfulness in daily activities   Feel less fidgety, restless or slow in daily activities / interpersonal interactions       Intervention:   Motivational Interviewing    MI Intervention: Expressed Empathy/Understanding, Supported Autonomy, Collaboration, Evocation, Permission to raise concern or advise, Open-ended questions, Reflections: simple and complex, Change talk (evoked) and Reframe     Change Talk Expressed by the Patient: Desire to change Ability to change Reasons to change Need to change Committment to change Activation Taking steps    Provider Response to Change Talk: E - Evoked more info from patient about behavior change, A - Affirmed patient's thoughts, decisions, or attempts at behavior change, R - Reflected patient's change talk and S - Summarized patient's change talk statements       Assessments completed prior to visit:  The following assessments were completed by patient for this visit:  PHQ2:   PHQ-2 ( 1999 [employer]) 4/25/2022 9/27/2017 12/11/2015 12/8/2015 10/12/2015 10/23/2012   Q1: Little interest or pleasure in doing things 0 0 0 - 0 1   Q2: Feeling down, depressed or hopeless 0 0 0 - 0 1   PHQ-2 Score 0 0 0 - 0 2   Q1: Little interest or pleasure in doing things Not at all - - Not at all - -   Q2: Feeling down, depressed or hopeless Not at all - - Not at all - -   PHQ-2 Score 0 - - 0 - -     PHQ9:   PHQ-9 SCORE 4/4/2018 5/10/2019 12/4/2019 7/29/2020 3/19/2021 9/18/2021 8/2/2022   PHQ-9 Total Score - - - - - - -   PHQ-9 Total Score MyChart 3 (Minimal depression) - - 0 - 0 9 (Mild depression)   PHQ-9 Total Score 3 3 11 0 3 0 9     GAD2:   JOCELINE-2 8/2/2022   Feeling nervous, anxious, or on edge 1   Not being able to stop or control worrying 2   JOCELINE-2 Total Score 3     GAD7:   JOCELINE-7 SCORE 9/9/2015 10/12/2015 11/22/2016 3/30/2017  11/8/2017 3/19/2021 8/2/2022   Total Score - - - - - - -   Total Score - - - - - - 13 (moderate anxiety)   Total Score 21 0 7 1 6 7 13     CAGE-AID:   CAGE-AID Total Score 8/2/2022   Total Score 1   Total Score MyChart 1 (A total score of 2 or greater is considered clinically significant)     PROMIS 10-Global Health (only subscores and total score):   PROMIS-10 Scores Only 1/27/2022 8/2/2022   Global Mental Health Score 12 9   Global Physical Health Score 14 18   PROMIS TOTAL - SUBSCORES 26 27         ASSESSMENT: Current Emotional / Mental Status (status of significant symptoms):   Risk status (Self / Other harm or suicidal ideation)   Patient denies current fears or concerns for personal safety.   Patient denies current or recent suicidal ideation or behaviors.   Patient denies current or recent homicidal ideation or behaviors.   Patient denies current or recent self injurious behavior or ideation.   Patient denies other safety concerns.   Patient reports there has been no change in risk factors since their last session.     Patient reports there has been no change in protective factors since their last session.     Recommended that patient call 911 or go to the local ED should there be a change in any of these risk factors.      Appearance:   Appropriate    Eye Contact:   Good    Psychomotor Behavior: Normal  Hyperactive    Attitude:   Cooperative  Friendly Pleasant   Orientation:   All   Speech    Rate / Production: Normal/ Responsive Talkative    Volume:  Normal    Mood:    Anxious  Sad    Affect:    Worrisome    Thought Content:  Clear    Thought Form:  Coherent  Logical    Insight:    Good      Medication Review:   No changes to current psychiatric medication(s)     Medication Compliance:   Yes     Changes in Health Issues:   None reported     Chemical Use Review:   Substance Use: Chemical use reviewed, no active concerns identified      Tobacco Use: No current tobacco use.      Diagnosis:  1. Major depressive  disorder, recurrent episode, mild (HCC)    2. Generalized anxiety disorder    3. Impulse control disorder in adult        Collateral Reports Completed:   Not Applicable    PLAN: (Patient Tasks / Therapist Tasks / Other)  Continue individualized psychotherapy sessions weekly, addressing coping strategies to improve mood and reduce worry and increase impulse control.          DC Chaudhari                                               Individual Treatment Plan    Patient's Name: Brissa Mayer  YOB: 1955    Date of Creation: 8/23/2022  Date Treatment Plan Last Reviewed/Revised: 8/23/2022    DSM5 Diagnoses:  1. Major depressive disorder, recurrent episode, mild (HCC)    2. Generalized anxiety disorder    3. Impulse control disorder in adult      Psychosocial / Contextual Factors: Sister's failing health, workplace conflict with immediate supervisor, impulse control challenges affecting financial functioning  PROMIS (reviewed every 90 days):     Referral / Collaboration:  Referral to another professional/service is not indicated at this time..    Anticipated number of session for this episode of care: 6-9 sessions  Anticipation frequency of session: Weekly  Anticipated Duration of each session: 38-52 minutes  Treatment plan will be reviewed in 90 days or when goals have been changed.       MeasurableTreatment Goal(s) related to diagnosis / functional impairment(s)  Goal 1: Patient will improve mood, sense of ease, and impulse control.    I will know I've met my goal when I can relax.      Objective #A (Patient Action)    Patient will identify feelings and emotions that occur prior to aggressive behaviors  use relaxation strategies 2-5 times per day to reduce the physical symptoms of anxiety  Increase interest, engagement, and pleasure in doing things  Decrease frequency and intensity of feeling down, depressed, hopeless  Feel less tired and more energy during the day   Identify negative  self-talk and behaviors: challenge core beliefs, myths, and actions  Improve concentration, focus, and mindfulness in daily activities   Feel less fidgety, restless or slow in daily activities / interpersonal interactions  Patient will identify patterns of escalation  (i.e. tightness in chest, flushed face, increased heart rate, clenched hands, etc.)  use progressive relaxation exercise once in the morning and once in the evening to help relieve tension  practice deep diaphragm breathing once daily for at least 5 minutes to reduce anger / irritability and regain a calmer, more clear state of mind.  Status: New - Date: 8/23/2022   Intervention(s)  Therapist will teach emotional regulation skills.        Patient has reviewed and agreed to the above plan.      Ranjit Vargas, DC  August 23, 2022

## 2022-08-27 ENCOUNTER — OFFICE VISIT (OUTPATIENT)
Dept: PRIMARY CARE CLINIC | Age: 67
End: 2022-08-27
Payer: MEDICARE

## 2022-08-27 ENCOUNTER — HOSPITAL ENCOUNTER (OUTPATIENT)
Dept: GENERAL RADIOLOGY | Age: 67
Discharge: HOME | End: 2022-08-29
Payer: MEDICARE

## 2022-08-27 VITALS
TEMPERATURE: 98.1 F | WEIGHT: 123 LBS | HEIGHT: 64 IN | DIASTOLIC BLOOD PRESSURE: 78 MMHG | OXYGEN SATURATION: 97 % | HEART RATE: 62 BPM | BODY MASS INDEX: 21 KG/M2 | SYSTOLIC BLOOD PRESSURE: 122 MMHG

## 2022-08-27 DIAGNOSIS — S22.41XA CLOSED FRACTURE OF MULTIPLE RIBS OF RIGHT SIDE, INITIAL ENCOUNTER: ICD-10-CM

## 2022-08-27 DIAGNOSIS — R07.81 RIB PAIN ON RIGHT SIDE: ICD-10-CM

## 2022-08-27 DIAGNOSIS — M79.621 PAIN IN RIGHT UPPER ARM: ICD-10-CM

## 2022-08-27 DIAGNOSIS — R07.81 RIB PAIN ON RIGHT SIDE: Primary | ICD-10-CM

## 2022-08-27 PROCEDURE — G8400 PT W/DXA NO RESULTS DOC: HCPCS | Performed by: NURSE PRACTITIONER

## 2022-08-27 PROCEDURE — 73060 X-RAY EXAM OF HUMERUS: CPT

## 2022-08-27 PROCEDURE — 99214 OFFICE O/P EST MOD 30 MIN: CPT | Performed by: NURSE PRACTITIONER

## 2022-08-27 PROCEDURE — 1090F PRES/ABSN URINE INCON ASSESS: CPT | Performed by: NURSE PRACTITIONER

## 2022-08-27 PROCEDURE — 71101 X-RAY EXAM UNILAT RIBS/CHEST: CPT

## 2022-08-27 PROCEDURE — 99212 OFFICE O/P EST SF 10 MIN: CPT | Performed by: NURSE PRACTITIONER

## 2022-08-27 PROCEDURE — G8427 DOCREV CUR MEDS BY ELIG CLIN: HCPCS | Performed by: NURSE PRACTITIONER

## 2022-08-27 PROCEDURE — 1123F ACP DISCUSS/DSCN MKR DOCD: CPT | Performed by: NURSE PRACTITIONER

## 2022-08-27 PROCEDURE — 1036F TOBACCO NON-USER: CPT | Performed by: NURSE PRACTITIONER

## 2022-08-27 PROCEDURE — 3017F COLORECTAL CA SCREEN DOC REV: CPT | Performed by: NURSE PRACTITIONER

## 2022-08-27 PROCEDURE — G8420 CALC BMI NORM PARAMETERS: HCPCS | Performed by: NURSE PRACTITIONER

## 2022-08-27 ASSESSMENT — PATIENT HEALTH QUESTIONNAIRE - PHQ9
SUM OF ALL RESPONSES TO PHQ QUESTIONS 1-9: 0
1. LITTLE INTEREST OR PLEASURE IN DOING THINGS: 0
SUM OF ALL RESPONSES TO PHQ QUESTIONS 1-9: 0
2. FEELING DOWN, DEPRESSED OR HOPELESS: 0
SUM OF ALL RESPONSES TO PHQ QUESTIONS 1-9: 0
SUM OF ALL RESPONSES TO PHQ9 QUESTIONS 1 & 2: 0
SUM OF ALL RESPONSES TO PHQ QUESTIONS 1-9: 0

## 2022-08-27 ASSESSMENT — ENCOUNTER SYMPTOMS: RESPIRATORY NEGATIVE: 1

## 2022-08-27 NOTE — PROGRESS NOTES
[facility]             [address], [address]                        Telephone [phone]             Fax [phone]     Lilia Bennett  1955  [facility]:[id number]   Date of visit:  8/27/2022    Subjective:    Lilia Bennett is a 69 y.o.  female who presents to [facility] today (8/27/2022) for evaluation of:    Chief Complaint   Patient presents with    Arm Pain     Right upper arm/ fell and hit edge of counter 3 weeks ago       Arm Pain   The incident occurred more than 1 week ago (X 3 weeks ago she fell into the counter landing on right arm). The incident occurred at home. The injury mechanism was a fall. The pain is present in the upper right arm (and right lower ribs). Quality: sharp with lifting right arm above head. The pain does not radiate. The pain is at a severity of 7/10. The pain has been Constant since the incident. Pertinent negatives include no chest pain, numbness or tingling. Associated symptoms comments: Right upper arm pain and right lower rib pain. . The symptoms are aggravated by movement. Treatments tried: ibuprofen, Voltaren ointment. The treatment provided no relief. She has the following problem list:  There is no problem list on file for this patient.        Current medications are:  Current Outpatient Medications   Medication Sig Dispense Refill    linaclotide (LINZESS) 145 MCG capsule Take 145 mcg by mouth every morning (before breakfast)      cetirizine (ZYRTEC) 10 MG tablet Take 10 mg by mouth daily      HYDROcodone-acetaminophen (NORCO) 5-325 MG per tablet Take 1 tablet by mouth every 6 hours as needed for Pain 30 tablet 0    Fluticasone Propionate, Inhal, 50 MCG/BLIST AEPB Inhale 50 mcg into the lungs daily 2 sprays daily      ondansetron (ZOFRAN-ODT) 4 MG disintegrating tablet Take 4 mg by mouth every 6 hours as needed for Nausea or Vomiting      Glucosamine-Chondroitin (OSTEO BI-FLEX REGULAR STRENGTH PO) Take by mouth daily      vitamin B-12 (CYANOCOBALAMIN) 1000 MCG tablet Take 1,000 mcg by mouth daily      vitamin E 400 UNIT capsule Take 400 Units by mouth daily      Calcium Carbonate (CALCIUM 600 PO) Take by mouth daily      Ascorbic Acid (VITAMIN C) 500 MG CHEW Take by mouth daily      Multiple Vitamins-Minerals (MULTIVITAMIN PO) Take by mouth daily      Tolterodine Tartrate (DETROL PO)   Take 4 mg by mouth 2 times daily       esomeprazole Magnesium (NEXIUM) 20 MG PACK   Take 40 mg by mouth daily       Levothyroxine Sodium (SYNTHROID PO)   Take 75 mcg by mouth daily       Eszopiclone (LUNESTA PO)   Take 2 mg by mouth daily       SIMVASTATIN PO   Take 40 mg by mouth daily       polyethylene glycol-electrolytes (NULYTELY WITH FLAVOR PACKS) 420 G solution Take as directed the day of the bowel prep. (Patient not taking: Reported on 8/27/2022) 4000 mL 0    bisacodyl (BISAC-EVAC) 5 MG EC tablet Take both tabs at noon the day of the bowel prep. (Patient not taking: Reported on 8/27/2022) 2 tablet 0     No current facility-administered medications for this visit. She is allergic to pcn [penicillins] and sulfa antibiotics. .    She  reports that she has never smoked. She has never used smokeless tobacco.      Objective:    Vitals:    08/27/22 1038   BP: 122/78   Site: Left Upper Arm   Position: Sitting   Cuff Size: Large Adult   Pulse: 62   Temp: 98.1 °F (36.7 °C)   SpO2: 97%   Weight: 123 lb (55.8 kg)   Height: 5' 4\" (1.626 m)     Body mass index is 21.11 kg/m². Review of Systems   Constitutional: Negative. Respiratory: Negative. Cardiovascular: Negative. Negative for chest pain. Musculoskeletal:         Right upper arm pain and right lower rib pain   Neurological:  Negative for tingling and numbness. Physical Exam  Vitals and nursing note reviewed. Constitutional:       Appearance: Normal appearance. She is well-developed. HENT:      Head: Normocephalic. Jaw: There is normal jaw occlusion. Mouth/Throat:      Lips: Pink. Mouth: Mucous membranes are moist.      Pharynx: Oropharynx is clear. Uvula midline. Eyes:      Pupils: Pupils are equal, round, and reactive to light. Cardiovascular:      Rate and Rhythm: Normal rate and regular rhythm. Pulses: Normal pulses. Radial pulses are 2+ on the right side and 2+ on the left side. Heart sounds: Normal heart sounds. Pulmonary:      Effort: Pulmonary effort is normal.      Breath sounds: Normal breath sounds and air entry. Chest:      Chest wall: Tenderness present. No swelling or crepitus. There is no dullness to percussion. Musculoskeletal:      Right upper arm: Tenderness and bony tenderness present. No swelling. Arms:       Cervical back: Normal range of motion and neck supple. Skin:     General: Skin is warm and dry. Neurological:      General: No focal deficit present. Mental Status: She is alert and oriented to person, place, and time. Psychiatric:         Behavior: Behavior normal.         Thought Content: Thought content normal.       Assessment and Plan:    XR RIBS RIGHT INCLUDE CHEST (MIN 3 VIEWS)    Result Date: 8/27/2022  EXAMINATION: XRAY VIEWS OF THE RIGHT RIBS WITH FRONTAL XRAY VIEW OF THE CHEST 8/27/2022 11:18 am COMPARISON: None. HISTORY: ORDERING SYSTEM PROVIDED HISTORY: Rib pain on right side TECHNOLOGIST PROVIDED HISTORY: fall 3 weeks ago landing on right side Reason for Exam: fell 3 weeks ago pain mostly on right arm FINDINGS: The visualized lungs are clear. No pneumothorax or significant pleural effusion. Cardiac and mediastinal silhouettes unremarkable. Recent nondisplaced fractures lateral aspect right 7th 8th and 9th ribs with callus formation developing. No additional osseous abnormality. No acute findings. Subacute nondisplaced healing lateral aspect right 7th 8th and 9th rib fractures.      XR HUMERUS RIGHT (MIN 2 VIEWS)    Result Date: 8/27/2022  EXAMINATION: TWO XRAY VIEWS OF THE RIGHT HUMERUS 8/27/2022 11:18 am COMPARISON: None. HISTORY: ORDERING SYSTEM PROVIDED HISTORY: Pain in right upper arm TECHNOLOGIST PROVIDED HISTORY: fell 3 weeks ago landing on right upper arm against counter FINDINGS: There is no evidence of acute fracture. There is normal alignment. No acute joint abnormality. No focal osseous lesion. No focal soft tissue abnormality. No acute osseous abnormality. Diagnosis Orders   1. Rib pain on right side  XR RIBS RIGHT INCLUDE CHEST (MIN 3 VIEWS)      2. Pain in right upper arm  XR HUMERUS RIGHT (MIN 2 VIEWS)      3. Closed fracture of multiple ribs of right side, initial encounter          I discussed radiology report with patient during visit. Take Tylenol or ibuprofen as needed for pain. Apply Bio Freeze or [employer] as needed. Apply cold compresses intermittently as needed. As pain recedes, begin normal activities slowly as tolerated. Follow up with PCP if symptoms do not improve or worsen. I recommended physical therapy, she declined at this time. The use, risks, benefits, and side effects of prescribed or recommended medications were discussed. All questions were answered and the patient/caregiver voiced understanding. No orders of the defined types were placed in this encounter.         Electronically signed by HEIDY Cho CNP on 8/27/22 at 10:49 AM [email or web address]

## 2022-09-01 ENCOUNTER — VIRTUAL VISIT (OUTPATIENT)
Dept: PSYCHOLOGY | Facility: CLINIC | Age: 67
End: 2022-09-01
Payer: COMMERCIAL

## 2022-09-01 DIAGNOSIS — F33.0 MAJOR DEPRESSIVE DISORDER, RECURRENT EPISODE, MILD (H): Primary | ICD-10-CM

## 2022-09-01 DIAGNOSIS — J45.20 MILD INTERMITTENT ASTHMA WITHOUT COMPLICATION: ICD-10-CM

## 2022-09-01 DIAGNOSIS — F41.1 GENERALIZED ANXIETY DISORDER: ICD-10-CM

## 2022-09-01 DIAGNOSIS — F63.9 IMPULSE CONTROL DISORDER IN ADULT: ICD-10-CM

## 2022-09-01 PROCEDURE — 90837 PSYTX W PT 60 MINUTES: CPT | Mod: GT | Performed by: MARRIAGE & FAMILY THERAPIST

## 2022-09-01 NOTE — PROGRESS NOTES
[facility]                                     Progress Note    Patient Name: Brissa Mayer  Date: 9/01/22           Service Type: Individual      Session Start Time: 9:47 AM  Session End Time: 10:46 AM     Session Length: 59 minutes    Session #: 5    Attendees: Client    Service Modality:  Video Visit:      Provider verified identity through the following two step process.  Patient provided:  Patient was verified at admission/transfer    Telemedicine Visit: The patient's condition can be safely assessed and treated via synchronous audio and visual telemedicine encounter.      Reason for Telemedicine Visit: Patient has requested telehealth visit    Originating Site (Patient Location): Patient's home    Distant Site (Provider Location): [facility]    Consent:  The patient/guardian has verbally consented to: the potential risks and benefits of telemedicine (video visit) versus in person care; bill my insurance or make self-payment for services provided; and responsibility for payment of non-covered services.     Patient would like the video invitation sent by:  My Chart    Mode of Communication:  Video Conference via [employer]well    As the provider I attest to compliance with applicable laws and regulations related to telemedicine.    DATA  Extended Session (53+ minutes): PROLONGED SERVICE IN THE OUTPATIENT SETTING REQUIRING DIRECT (FACE-TO-FACE) PATIENT CONTACT BEYOND THE USUAL SERVICE:    - Patient's presenting concerns require more intensive intervention than could be completed within the usual service  Interactive Complexity: No  Crisis: No        Progress Since Last Session (Related to Symptoms / Goals / Homework):   Symptoms: Worsening Depressive symptoms appear to be back to baseline rates in severity, frequency, and duration.  Challenges with impulse control has increased, as she had a reoccuring spending problem and has had episodes of verbal rage.  Her anxiety  has reduced intensity but is at the baseline rate of frequency.    Homework: Completed in session      Episode of Care Goals: Minimal progress - RELAPSE (Returned to unhealthy behavior); Intervened by reassessing readiness to change and identifying appropriate stage.  Identified reasons for relapse, successes, and change talk     Current / Ongoing Stressors and Concerns:   Client presents for individual follow-up session to address mild depressive disorder generalized anxiety disorder and impulse control disorder (spending).      Client reported her sister had some medical issues recently, which led to a major conflict between her and her sister.  She stated that she got frustrated and verbally yelled at her at the clinic when she went to pick her up.  She stated that she was enraged that her sister was not managing her health well, that she had to go to the emergency room again as a result, and that she was inconsiderate of her social needs.  The client states that she had plans to go out with her friend as well as her sister and brother-in-law.  She stated that she also was experiencing work related stress and was not able to eat at the specified time that she was planning on.  She then indicated that she yelled in front of a nurse at her sister, and the nurse told her that she needed to calm down.  She stated that she has also been frustrated with other triggers including her sisters frequent incontinence and that their family history also had instances with her father as well as her paternal grandmother of having to be a caretaker and be disregarded frequently.  Therapist then highlighted the client's statement that her sister did not care how the client felt.  She was challenged as this was a mind reading error in thinking.  The client talked about her irritability and impulsive anger that occurred with her verbal aggression.  She was encouraged to use her spiritual resources which she identified as the  serenity prayer, as well as using radical acceptance of the situation, formulate reasonable expectations for herself as well as others, and utilize frustration tolerance and emotion regulation techniques of deep breathing and other relaxation strategies.  She was provided examples, and she committed to practicing them between sessions.     Treatment Objective(s) Addressed in This Session:   identify feelings and emotions that occur prior to aggressive behaviors  use relaxation strategies 2-5 times per day to reduce the physical symptoms of anxiety  Increase interest, engagement, and pleasure in doing things  Decrease frequency and intensity of feeling down, depressed, hopeless  Feel less tired and more energy during the day   Identify negative self-talk and behaviors: challenge core beliefs, myths, and actions  Improve concentration, focus, and mindfulness in daily activities   Feel less fidgety, restless or slow in daily activities / interpersonal interactions       Intervention:   Motivational Interviewing  MI Intervention: Expressed Empathy/Understanding, Supported Autonomy, Collaboration, Evocation, Permission to raise concern or advise, Open-ended questions, Reflections: simple and complex, Change talk (evoked) and Reframe   Change Talk Expressed by the Patient: Desire to change Ability to change Reasons to change Need to change Committment to change Activation Taking steps  Provider Response to Change Talk: E - Evoked more info from patient about behavior change, A - Affirmed patient's thoughts, decisions, or attempts at behavior change, R - Reflected patient's change talk and S - Summarized patient's change talk statements      Cognitive behavior therapy challenging mind reading and other distorted thinking patterns.    Assessments completed prior to visit:  The following assessments were completed by patient for this visit:  PHQ2:   PHQ-2 ( 1999 [employer]) 4/25/2022 9/27/2017 12/11/2015 12/8/2015 10/12/2015  10/23/2012   Q1: Little interest or pleasure in doing things 0 0 0 - 0 1   Q2: Feeling down, depressed or hopeless 0 0 0 - 0 1   PHQ-2 Score 0 0 0 - 0 2   Q1: Little interest or pleasure in doing things Not at all - - Not at all - -   Q2: Feeling down, depressed or hopeless Not at all - - Not at all - -   PHQ-2 Score 0 - - 0 - -     PHQ9:   PHQ-9 SCORE 4/4/2018 5/10/2019 12/4/2019 7/29/2020 3/19/2021 9/18/2021 8/2/2022   PHQ-9 Total Score - - - - - - -   PHQ-9 Total Score MyChart 3 (Minimal depression) - - 0 - 0 9 (Mild depression)   PHQ-9 Total Score 3 3 11 0 3 0 9     GAD2:   JOCELINE-2 8/2/2022   Feeling nervous, anxious, or on edge 1   Not being able to stop or control worrying 2   JOCELINE-2 Total Score 3     GAD7:   JOCELINE-7 SCORE 9/9/2015 10/12/2015 11/22/2016 3/30/2017 11/8/2017 3/19/2021 8/2/2022   Total Score - - - - - - -   Total Score - - - - - - 13 (moderate anxiety)   Total Score 21 0 7 1 6 7 13     CAGE-AID:   CAGE-AID Total Score 8/2/2022   Total Score 1   Total Score MyChart 1 (A total score of 2 or greater is considered clinically significant)     PROMIS 10-Global Health (only subscores and total score):   PROMIS-10 Scores Only 1/27/2022 8/2/2022   Global Mental Health Score 12 9   Global Physical Health Score 14 18   PROMIS TOTAL - SUBSCORES 26 27         ASSESSMENT: Current Emotional / Mental Status (status of significant symptoms):   Risk status (Self / Other harm or suicidal ideation)   Patient denies current fears or concerns for personal safety.   Patient denies current or recent suicidal ideation or behaviors.   Patient denies current or recent homicidal ideation or behaviors.   Patient denies current or recent self injurious behavior or ideation.   Patient denies other safety concerns.   Patient reports there has been no change in risk factors since their last session.     Patient reports there has been no change in protective factors since their last session.     Recommended that patient call 911 or go  to the local ED should there be a change in any of these risk factors.      Appearance:   Appropriate    Eye Contact:   Good    Psychomotor Behavior: Normal  Hyperactive    Attitude:   Cooperative    Orientation:   All   Speech    Rate / Production: Hyperverbal  Talkative    Volume:  Loud    Mood:    Angry  Depressed  Irritable  Sad    Affect:    Labile  Worrisome    Thought Content:  Clear    Thought Form:  Coherent  Logical    Insight:    Good      Medication Review:   No changes to current psychiatric medication(s)     Medication Compliance:   Yes     Changes in Health Issues:   None reported     Chemical Use Review:   Substance Use: Chemical use reviewed, no active concerns identified      Tobacco Use: No current tobacco use.      Diagnosis:  1. Major depressive disorder, recurrent episode, mild (HCC)    2. Generalized anxiety disorder    3. Impulse control disorder in adult        Collateral Reports Completed:   Not Applicable    PLAN: (Patient Tasks / Therapist Tasks / Other)  Continue individualized psychotherapy sessions weekly, addressing coping strategies to improve mood and reduce worry and increase impulse control.          Ranjit Vargas LMFT                                               Individual Treatment Plan    Patient's Name: Brissa Mayer  YOB: 1955    Date of Creation: 8/23/2022  Date Treatment Plan Last Reviewed/Revised: 8/23/2022    DSM5 Diagnoses:  1. Major depressive disorder, recurrent episode, mild (HCC)    2. Generalized anxiety disorder    3. Impulse control disorder in adult      Psychosocial / Contextual Factors: Sister's failing health, workplace conflict with immediate supervisor, impulse control challenges affecting financial functioning  PROMIS (reviewed every 90 days):     Referral / Collaboration:  Referral to another professional/service is not indicated at this time..    Anticipated number of session for this episode of care: 6-9 sessions  Anticipation  frequency of session: Weekly  Anticipated Duration of each session: 38-52 minutes  Treatment plan will be reviewed in 90 days or when goals have been changed.       MeasurableTreatment Goal(s) related to diagnosis / functional impairment(s)  Goal 1: Patient will improve mood, sense of ease, and impulse control.    I will know I've met my goal when I can relax.      Objective #A (Patient Action)    Patient will identify feelings and emotions that occur prior to aggressive behaviors  use relaxation strategies 2-5 times per day to reduce the physical symptoms of anxiety  Increase interest, engagement, and pleasure in doing things  Decrease frequency and intensity of feeling down, depressed, hopeless  Feel less tired and more energy during the day   Identify negative self-talk and behaviors: challenge core beliefs, myths, and actions  Improve concentration, focus, and mindfulness in daily activities   Feel less fidgety, restless or slow in daily activities / interpersonal interactions  Patient will identify patterns of escalation  (i.e. tightness in chest, flushed face, increased heart rate, clenched hands, etc.)  use progressive relaxation exercise once in the morning and once in the evening to help relieve tension  practice deep diaphragm breathing once daily for at least 5 minutes to reduce anger / irritability and regain a calmer, more clear state of mind.  Status: New - Date: 8/23/2022   Intervention(s)  Therapist will teach emotional regulation skills.        Patient has reviewed and agreed to the above plan.      DC Chaudhari  August 23, 2022

## 2022-09-06 RX ORDER — FLUTICASONE PROPIONATE AND SALMETEROL 250; 50 UG/1; UG/1
POWDER RESPIRATORY (INHALATION)
Qty: 180 EACH | Refills: 3 | Status: SHIPPED | OUTPATIENT
Start: 2022-09-06 | End: 2023-08-18

## 2022-09-06 NOTE — TELEPHONE ENCOUNTER
"[facility]  Routing refill request to provider for review/approval because:    Requested Prescriptions   Pending Prescriptions Disp Refills     fluticasone-salmeterol (ADVAIR DISKUS) 250-50 MCG/ACT inhaler [Pharmacy Med Name: ADVAIR DISKUS 250-50MCG/ACT AEPB]  3     Sig: INHALE ONE PUFF BY MOUTH TWICE A DAY       Inhaled Steroids Protocol Failed - 9/1/2022  8:28 AM        Failed - Asthma control assessment score within normal limits in last 6 months     Please review ACT score.           Passed - Patient is age 12 or older        Passed - Medication is active on med list        Passed - Recent (6 mo) or future (30 days) visit within the authorizing provider's specialty     Patient had office visit in the last 6 months or has a visit in the next 30 days with authorizing provider or within the authorizing provider's specialty.  See \"Patient Info\" tab in inbasket, or \"Choose Columns\" in Meds & Orders section of the refill encounter.           Long-Acting Beta Agonist Inhalers Protocol  Failed - 9/1/2022  8:28 AM        Failed - Asthma control assessment score within normal limits in last 6 months     Please review ACT score.           Passed - Patient is age 12 or older        Passed - Order for Serevent, Striverdi, or Foradil and pt has steroid inhaler        Passed - Medication is active on med list        Passed - Recent (6 mo) or future (30 days) visit within the authorizing provider's specialty     Patient had office visit in the last 6 months or has a visit in the next 30 days with authorizing provider or within the authorizing provider's specialty.  See \"Patient Info\" tab in inbasket, or \"Choose Columns\" in Meds & Orders section of the refill encounter.               Carol Ann Temple RN    "

## 2022-09-10 ENCOUNTER — OFFICE VISIT (OUTPATIENT)
Dept: PRIMARY CARE CLINIC | Age: 67
End: 2022-09-10
Payer: MEDICARE

## 2022-09-10 VITALS
WEIGHT: 122 LBS | SYSTOLIC BLOOD PRESSURE: 100 MMHG | OXYGEN SATURATION: 98 % | HEIGHT: 64 IN | BODY MASS INDEX: 20.83 KG/M2 | DIASTOLIC BLOOD PRESSURE: 68 MMHG | HEART RATE: 77 BPM | TEMPERATURE: 98.2 F

## 2022-09-10 DIAGNOSIS — M79.621 PAIN IN RIGHT UPPER ARM: Primary | ICD-10-CM

## 2022-09-10 DIAGNOSIS — M77.8 SUBACROMIAL TENDONITIS OF RIGHT SHOULDER: ICD-10-CM

## 2022-09-10 PROCEDURE — G8427 DOCREV CUR MEDS BY ELIG CLIN: HCPCS | Performed by: FAMILY MEDICINE

## 2022-09-10 PROCEDURE — 99212 OFFICE O/P EST SF 10 MIN: CPT | Performed by: FAMILY MEDICINE

## 2022-09-10 PROCEDURE — G8420 CALC BMI NORM PARAMETERS: HCPCS | Performed by: FAMILY MEDICINE

## 2022-09-10 PROCEDURE — 99213 OFFICE O/P EST LOW 20 MIN: CPT | Performed by: FAMILY MEDICINE

## 2022-09-10 PROCEDURE — 3017F COLORECTAL CA SCREEN DOC REV: CPT | Performed by: FAMILY MEDICINE

## 2022-09-10 PROCEDURE — 1036F TOBACCO NON-USER: CPT | Performed by: FAMILY MEDICINE

## 2022-09-10 PROCEDURE — 1090F PRES/ABSN URINE INCON ASSESS: CPT | Performed by: FAMILY MEDICINE

## 2022-09-10 PROCEDURE — G8400 PT W/DXA NO RESULTS DOC: HCPCS | Performed by: FAMILY MEDICINE

## 2022-09-10 PROCEDURE — 1123F ACP DISCUSS/DSCN MKR DOCD: CPT | Performed by: FAMILY MEDICINE

## 2022-09-10 ASSESSMENT — ENCOUNTER SYMPTOMS
RESPIRATORY NEGATIVE: 1
GASTROINTESTINAL NEGATIVE: 1
EYES NEGATIVE: 1

## 2022-09-10 NOTE — PROGRESS NOTES
Subjective:      Patient ID: Anu Bridges is a 69 y.o. female. HPI  acute urgent care visit for pain in the right upper arm from a fall about 5 weeks ago. She was walking into a freezer and slipped on the floor, fell onto her right side. Had a couple of broken ribs 7,8,9 on the right side. The right upper arm pain was not immediate after the fall. She feels there may have been mild pain. 5 days after the fall the pain was more noticeable  and consistent. Pain worse at night. Using ice packs and ibuprofen. Reaching up seems to make it worse. She has to lift the right arm with the left hand in bed to roll over. 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       Past Medical History:   Diagnosis Date    Cholecystitis     GERD (gastroesophageal reflux disease)     High cholesterol     Thyroid disorder      Past Surgical History:   Procedure Laterality Date    BREAST SURGERY Left     tumors    CARPAL TUNNEL RELEASE      CHOLECYSTECTOMY  5/2015    laparoscopic    [facility]  2005    [facility]    COLONOSCOPY  11/09/2015    diverticulosis    FOOT SURGERY      torsal tunnel right ankle    KNEE SURGERY Bilateral     OTHER SURGICAL HISTORY      umbilicus removal     OTHER SURGICAL HISTORY Left     fixed hammer toe, excised lipoma - dr Maxim Erazo ENDOSCOPY  2005    normal- dr [facility]     Current Outpatient Medications   Medication Sig Dispense Refill    linaclotide (LINZESS) 145 MCG capsule Take 145 mcg by mouth every morning (before breakfast)      cetirizine (ZYRTEC) 10 MG tablet Take 10 mg by mouth daily      HYDROcodone-acetaminophen (NORCO) 5-325 MG per tablet Take 1 tablet by mouth every 6 hours as needed for Pain 30 tablet 0    Fluticasone Propionate, Inhal, 50 MCG/BLIST AEPB Inhale 50 mcg into the lungs daily 2 sprays daily      Glucosamine-Chondroitin (OSTEO BI-FLEX REGULAR STRENGTH PO) Take by mouth daily      vitamin B-12 (CYANOCOBALAMIN) 1000 MCG tablet Take 1,000 mcg by mouth daily      vitamin E 400 UNIT capsule Take 400 Units by mouth daily      Calcium Carbonate (CALCIUM 600 PO) Take by mouth daily      Ascorbic Acid (VITAMIN C) 500 MG CHEW Take by mouth daily      Multiple Vitamins-Minerals (MULTIVITAMIN PO) Take by mouth daily      Tolterodine Tartrate (DETROL PO)   Take 4 mg by mouth 2 times daily       esomeprazole Magnesium (NEXIUM) 20 MG PACK   Take 40 mg by mouth daily       Levothyroxine Sodium (SYNTHROID PO)   Take 75 mcg by mouth daily       Eszopiclone (LUNESTA PO)   Take 2 mg by mouth daily       SIMVASTATIN PO   Take 40 mg by mouth daily       polyethylene glycol-electrolytes (NULYTELY WITH FLAVOR PACKS) 420 G solution Take as directed the day of the bowel prep. (Patient not taking: No sig reported) 4000 mL 0    bisacodyl (BISAC-EVAC) 5 MG EC tablet Take both tabs at noon the day of the bowel prep. (Patient not taking: Reported on 8/27/2022) 2 tablet 0    ondansetron (ZOFRAN-ODT) 4 MG disintegrating tablet Take 4 mg by mouth every 6 hours as needed for Nausea or Vomiting (Patient not taking: Reported on 9/10/2022)       No current facility-administered medications for this visit. Allergies   Allergen Reactions    Pcn [Penicillins] Hives    Sulfa Antibiotics Hives         Review of Systems   Constitutional: Negative. HENT: Negative. Eyes: Negative. Respiratory: Negative. Cardiovascular: Negative. Gastrointestinal: Negative. Genitourinary: Negative. Musculoskeletal:  Positive for arthralgias (right shoulder/upper arm.). Skin: Negative. Neurological: Negative. Psychiatric/Behavioral: Negative. Objective:   Physical Exam  Constitutional:       General: She is not in acute distress. Appearance: She is normal weight. She is not toxic-appearing. HENT:      Head: Normocephalic and atraumatic. Cardiovascular:      Rate and Rhythm: Normal rate. Pulmonary:      Effort: Pulmonary effort is normal.   Abdominal:      General: There is no distension. Musculoskeletal:      Right shoulder: Tenderness present. No swelling or deformity. Decreased range of motion (impingement with abduction). Decreased strength. Neurological:      Mental Status: She is alert. /68   Pulse 77   Temp 98.2 °F (36.8 °C) (Tympanic)   Ht 5' 4\" (1.626 m)   Wt 122 lb (55.3 kg)   SpO2 98%   BMI 20.94 kg/m²   EXAMINATION:   TWO XRAY VIEWS OF THE RIGHT HUMERUS       8/27/2022 11:18 am       COMPARISON:   None. HISTORY:   ORDERING SYSTEM PROVIDED HISTORY: Pain in right upper arm   TECHNOLOGIST PROVIDED HISTORY:   fell 3 weeks ago landing on right upper arm against counter       FINDINGS:   There is no evidence of acute fracture. There is normal alignment. No acute   joint abnormality. No focal osseous lesion. No focal soft tissue abnormality. Impression   No acute osseous abnormality. Assessment:      Encounter Diagnoses   Name Primary? Pain in right upper arm Yes    Subacromial tendonitis of right shoulder            Plan:      Symptoms seem to be more shoulder related. Not tender over the biceps or triceps with palpation. Not having pain with elbow use. Impingement symptoms suspicious for subacromial tendonitis. Cont. Ibuprofen  Plan trial of PT. She prefers this in [facility] where she lives.               Michael Eckert MD

## 2022-09-26 ENCOUNTER — VIRTUAL VISIT (OUTPATIENT)
Dept: PSYCHOLOGY | Facility: CLINIC | Age: 67
End: 2022-09-26
Payer: COMMERCIAL

## 2022-09-26 DIAGNOSIS — F41.1 GENERALIZED ANXIETY DISORDER: ICD-10-CM

## 2022-09-26 DIAGNOSIS — F63.9 IMPULSE CONTROL DISORDER IN ADULT: ICD-10-CM

## 2022-09-26 DIAGNOSIS — F33.0 MAJOR DEPRESSIVE DISORDER, RECURRENT EPISODE, MILD (H): Primary | ICD-10-CM

## 2022-09-26 PROCEDURE — 90834 PSYTX W PT 45 MINUTES: CPT | Mod: GT | Performed by: MARRIAGE & FAMILY THERAPIST

## 2022-09-26 ASSESSMENT — PATIENT HEALTH QUESTIONNAIRE - PHQ9
10. IF YOU CHECKED OFF ANY PROBLEMS, HOW DIFFICULT HAVE THESE PROBLEMS MADE IT FOR YOU TO DO YOUR WORK, TAKE CARE OF THINGS AT HOME, OR GET ALONG WITH OTHER PEOPLE: SOMEWHAT DIFFICULT
SUM OF ALL RESPONSES TO PHQ QUESTIONS 1-9: 6
SUM OF ALL RESPONSES TO PHQ QUESTIONS 1-9: 6

## 2022-09-26 NOTE — PROGRESS NOTES
[facility]                                     Progress Note    Patient Name: Brissa Mayer  Date: 9/26/22           Service Type: Individual      Session Start Time: 2:25 PM  session End Time: 3:10 PM     Session Length: 45 minutes    Session #: 5    Attendees: Client    Service Modality:  Video Visit:      Provider verified identity through the following two step process.  Patient provided:  Patient was verified at admission/transfer    Telemedicine Visit: The patient's condition can be safely assessed and treated via synchronous audio and visual telemedicine encounter.      Reason for Telemedicine Visit: Patient has requested telehealth visit    Originating Site (Patient Location): Patient's home    Distant Site (Provider Location): [facility]    Consent:  The patient/guardian has verbally consented to: the potential risks and benefits of telemedicine (video visit) versus in person care; bill my insurance or make self-payment for services provided; and responsibility for payment of non-covered services.     Patient would like the video invitation sent by:  My Chart    Mode of Communication:  Video Conference via Amwell    As the provider I attest to compliance with applicable laws and regulations related to telemedicine.    DATA  Extended Session (53+ minutes): PROLONGED SERVICE IN THE OUTPATIENT SETTING REQUIRING DIRECT (FACE-TO-FACE) PATIENT CONTACT BEYOND THE USUAL SERVICE:    - Patient's presenting concerns require more intensive intervention than could be completed within the usual service  Interactive Complexity: No  Crisis: No        Progress Since Last Session (Related to Symptoms / Goals / Homework):   Symptoms: Worsening Depressive symptoms appear to be back to baseline rates in severity, frequency, and duration.  Challenges with impulse control has increased, as she had a reoccuring spending problem and has had episodes of verbal rage.  Her anxiety  has reduced intensity but is at the baseline rate of frequency.    Homework: Completed in session      Episode of Care Goals: Minimal progress - RELAPSE (Returned to unhealthy behavior); Intervened by reassessing readiness to change and identifying appropriate stage.  Identified reasons for relapse, successes, and change talk     Current / Ongoing Stressors and Concerns:   Client presents for individual follow-up session to address mild depressive disorder generalized anxiety disorder and impulse control disorder (spending).      Client reported less temper dysregulation when sister struggles with cognitive decline. Other sister Vivian is a high maintenance, her  has Bipolar, and had multiple losses.  Client has been dealing with a medical issue, and has found ways of maintaining a positive attitude, despite being under the weather.  She denied impulsive purchasing and also impulsive aggression.  Despite having a major event she is planning for her work, she has been able to work on her own frustration intolerance/Mood stability strategies which include: Taking things as they come, things will work out, focus on calming, letting things go with the flow, etc.  Recently she disclosed being complimented for monica demeanor.  This is significant in its departure from previous impulse control issues.  She states that she feels like she is managing herself much better, but continues to realize she has significant stress within her body and within her focus.  Therapist provided several strategies of self-regulation, and incorporated her successes with extrapolating future possibilities.  She responded favorably.       Treatment Objective(s) Addressed in This Session:   identify feelings and emotions that occur prior to aggressive behaviors  use relaxation strategies 2-5 times per day to reduce the physical symptoms of anxiety  Increase interest, engagement, and pleasure in doing things  Decrease frequency and  intensity of feeling down, depressed, hopeless  Feel less tired and more energy during the day   Identify negative self-talk and behaviors: challenge core beliefs, myths, and actions  Improve concentration, focus, and mindfulness in daily activities   Feel less fidgety, restless or slow in daily activities / interpersonal interactions       Intervention:   Motivational Interviewing  MI Intervention: Expressed Empathy/Understanding, Supported Autonomy, Collaboration, Evocation, Permission to raise concern or advise, Open-ended questions, Reflections: simple and complex, Change talk (evoked) and Reframe   Change Talk Expressed by the Patient: Desire to change Ability to change Reasons to change Need to change Committment to change Activation Taking steps  Provider Response to Change Talk: E - Evoked more info from patient about behavior change, A - Affirmed patient's thoughts, decisions, or attempts at behavior change, R - Reflected patient's change talk and S - Summarized patient's change talk statements       Cognitive behavior therapy- challenging mind reading and other distorted thinking patterns.    Assessments completed prior to visit:  The following assessments were completed by patient for this visit:  PHQ2:   PHQ-2 ( 1999 [employer]) 4/25/2022 9/27/2017 12/11/2015 12/8/2015 10/12/2015 10/23/2012   Q1: Little interest or pleasure in doing things 0 0 0 - 0 1   Q2: Feeling down, depressed or hopeless 0 0 0 - 0 1   PHQ-2 Score 0 0 0 - 0 2   Q1: Little interest or pleasure in doing things Not at all - - Not at all - -   Q2: Feeling down, depressed or hopeless Not at all - - Not at all - -   PHQ-2 Score 0 - - 0 - -     PHQ9:   PHQ-9 SCORE 5/10/2019 12/4/2019 7/29/2020 3/19/2021 9/18/2021 8/2/2022 9/26/2022   PHQ-9 Total Score - - - - - - -   PHQ-9 Total Score MyChart - - 0 - 0 9 (Mild depression) 6 (Mild depression)   PHQ-9 Total Score 3 11 0 3 0 9 6     GAD2:   JOCELINE-2 8/2/2022 9/26/2022   Feeling nervous, anxious, or  on edge 1 1   Not being able to stop or control worrying 2 1   JOCELINE-2 Total Score 3 2     GAD7:   JOCELINE-7 SCORE 9/9/2015 10/12/2015 11/22/2016 3/30/2017 11/8/2017 3/19/2021 8/2/2022   Total Score - - - - - - -   Total Score - - - - - - 13 (moderate anxiety)   Total Score 21 0 7 1 6 7 13     CAGE-AID:   CAGE-AID Total Score 8/2/2022   Total Score 1   Total Score MyChart 1 (A total score of 2 or greater is considered clinically significant)     PROMIS 10-Global Health (only subscores and total score):   PROMIS-10 Scores Only 1/27/2022 8/2/2022   Global Mental Health Score 12 9   Global Physical Health Score 14 18   PROMIS TOTAL - SUBSCORES 26 27         ASSESSMENT: Current Emotional / Mental Status (status of significant symptoms):   Risk status (Self / Other harm or suicidal ideation)   Patient denies current fears or concerns for personal safety.   Patient denies current or recent suicidal ideation or behaviors.   Patient denies current or recent homicidal ideation or behaviors.   Patient denies current or recent self injurious behavior or ideation.   Patient denies other safety concerns.   Patient reports there has been no change in risk factors since their last session.     Patient reports there has been no change in protective factors since their last session.     Recommended that patient call 911 or go to the local ED should there be a change in any of these risk factors.      Appearance:   Appropriate    Eye Contact:   Good    Psychomotor Behavior: Normal  Hyperactive    Attitude:   Cooperative    Orientation:   All   Speech    Rate / Production: Hyperverbal  Talkative    Volume:  Loud    Mood:    Depressed  Sad    Affect:    Appropriate    Thought Content:  Clear    Thought Form:  Coherent  Logical    Insight:    Good      Medication Review:   No changes to current psychiatric medication(s)     Medication Compliance:   Yes     Changes in Health Issues:   None reported     Chemical Use Review:   Substance Use:  Chemical use reviewed, no active concerns identified      Tobacco Use: No current tobacco use.      Diagnosis:  1. Major depressive disorder, recurrent episode, mild (HCC)    2. Generalized anxiety disorder    3. Impulse control disorder in adult        Collateral Reports Completed:   Not Applicable    PLAN: (Patient Tasks / Therapist Tasks / Other)  Continue individualized psychotherapy sessions weekly, addressing coping strategies to improve mood and reduce worry and increase impulse control.          DC Chaudhari                                               Individual Treatment Plan    Patient's Name: Brissa Mayer  YOB: 1955    Date of Creation: 8/23/2022  Date Treatment Plan Last Reviewed/Revised: 8/23/2022    DSM5 Diagnoses:  1. Major depressive disorder, recurrent episode, mild (HCC)    2. Generalized anxiety disorder    3. Impulse control disorder in adult      Psychosocial / Contextual Factors: Sister's failing health, workplace conflict with immediate supervisor, impulse control challenges affecting financial functioning  PROMIS (reviewed every 90 days):     Referral / Collaboration:  Referral to another professional/service is not indicated at this time..    Anticipated number of session for this episode of care: 6-9 sessions  Anticipation frequency of session: Weekly  Anticipated Duration of each session: 38-52 minutes  Treatment plan will be reviewed in 90 days or when goals have been changed.       MeasurableTreatment Goal(s) related to diagnosis / functional impairment(s)  Goal 1: Patient will improve mood, sense of ease, and impulse control.    I will know I've met my goal when I can relax.      Objective #A (Patient Action)    Patient will identify feelings and emotions that occur prior to aggressive behaviors  use relaxation strategies 2-5 times per day to reduce the physical symptoms of anxiety  Increase interest, engagement, and pleasure in doing things  Decrease  frequency and intensity of feeling down, depressed, hopeless  Feel less tired and more energy during the day   Identify negative self-talk and behaviors: challenge core beliefs, myths, and actions  Improve concentration, focus, and mindfulness in daily activities   Feel less fidgety, restless or slow in daily activities / interpersonal interactions  Patient will identify patterns of escalation  (i.e. tightness in chest, flushed face, increased heart rate, clenched hands, etc.)  use progressive relaxation exercise once in the morning and once in the evening to help relieve tension  practice deep diaphragm breathing once daily for at least 5 minutes to reduce anger / irritability and regain a calmer, more clear state of mind.  Status: New - Date: 8/23/2022   Intervention(s)  Therapist will teach emotional regulation skills.        Patient has reviewed and agreed to the above plan.      DC Chaudhari  August 23, 2022  Answers for HPI/ROS submitted by the patient on 9/26/2022  If you checked off any problems, how difficult have these problems made it for you to do your work, take care of things at home, or get along with other people?: Somewhat difficult  PHQ9 TOTAL SCORE: 6

## 2022-10-10 ENCOUNTER — MYC MEDICAL ADVICE (OUTPATIENT)
Dept: INTERNAL MEDICINE | Facility: CLINIC | Age: 67
End: 2022-10-10

## 2022-10-10 ENCOUNTER — E-VISIT (OUTPATIENT)
Dept: INTERNAL MEDICINE | Facility: CLINIC | Age: 67
End: 2022-10-10
Payer: COMMERCIAL

## 2022-10-10 DIAGNOSIS — J06.9 VIRAL URI: Primary | ICD-10-CM

## 2022-10-10 PROCEDURE — 99207 PR NON-BILLABLE SERV PER CHARTING: CPT | Performed by: INTERNAL MEDICINE

## 2022-10-10 NOTE — PATIENT INSTRUCTIONS
The symptoms you describe suggest a viral cause, which is much more common than a bacterial cause. Antibiotics will treat bacterial infections, but have no effect on viral infections. If possible, especially if improving, start with symptom care for the first 7-10 days, then consider seeking further treatment or taking an antibiotic. Bacterial infections generally are more severe, including symptoms such as pus, fever over 101degrees F, or rapidly worsening.

## 2022-10-16 ENCOUNTER — HEALTH MAINTENANCE LETTER (OUTPATIENT)
Age: 67
End: 2022-10-16

## 2022-10-19 ENCOUNTER — MYC MEDICAL ADVICE (OUTPATIENT)
Dept: INTERNAL MEDICINE | Facility: CLINIC | Age: 67
End: 2022-10-19

## 2022-10-19 DIAGNOSIS — J32.9 CHRONIC SINUSITIS, UNSPECIFIED LOCATION: Primary | ICD-10-CM

## 2022-10-24 ENCOUNTER — VIRTUAL VISIT (OUTPATIENT)
Dept: PSYCHOLOGY | Facility: CLINIC | Age: 67
End: 2022-10-24
Payer: COMMERCIAL

## 2022-10-24 DIAGNOSIS — F63.9 IMPULSE CONTROL DISORDER IN ADULT: ICD-10-CM

## 2022-10-24 DIAGNOSIS — F33.0 MAJOR DEPRESSIVE DISORDER, RECURRENT EPISODE, MILD (H): Primary | ICD-10-CM

## 2022-10-24 DIAGNOSIS — F41.1 GENERALIZED ANXIETY DISORDER: ICD-10-CM

## 2022-10-24 PROCEDURE — 90832 PSYTX W PT 30 MINUTES: CPT | Mod: GT | Performed by: MARRIAGE & FAMILY THERAPIST

## 2022-10-24 NOTE — PROGRESS NOTES
[facility]                                     Progress Note    Patient Name: Brissa Mayer  Date: 10/24/22           Service Type: Individual      Session Start Time: 2:18 PM  Session End Time: 2:40 PM     Session Length: 22 minutes    Session #: 6    Attendees: Client    Service Modality:  Video Visit:      Provider verified identity through the following two step process.  Patient provided:  Patient was verified at admission/transfer    Telemedicine Visit: The patient's condition can be safely assessed and treated via synchronous audio and visual telemedicine encounter.      Reason for Telemedicine Visit: Patient has requested telehealth visit    Originating Site (Patient Location): Patient's home    Distant Site (Provider Location): [facility]    Consent:  The patient/guardian has verbally consented to: the potential risks and benefits of telemedicine (video visit) versus in person care; bill my insurance or make self-payment for services provided; and responsibility for payment of non-covered services.     Patient would like the video invitation sent by:  My Chart    Mode of Communication:  Video Conference via Amwell    As the provider I attest to compliance with applicable laws and regulations related to telemedicine.    DATA  Extended Session (53+ minutes): No  Interactive Complexity: No  Crisis: No        Progress Since Last Session (Related to Symptoms / Goals / Homework):   Symptoms: Improving Depressive symptoms have returned to improved status with severity, frequency, and duration.  Challenges with impulse control has waxed and waned (spending problem and has had episodes of verbal rage).  Her anxiety has reduced intensity and frequency is less than at baseline.    Homework: Completed in session      Episode of Care Goals: Minimal progress - RELAPSE (Returned to unhealthy behavior); Intervened by reassessing readiness to change and identifying  appropriate stage.  Identified reasons for relapse, successes, and change talk     Current / Ongoing Stressors and Concerns:   Client presents for individual follow-up session to address mild depressive disorder generalized anxiety disorder and impulse control disorder (spending).      Client increasing mood stability. She reported down feelings at times, and has been experiencing more stress with health issues for her sister, her brother-in-law's Bipolar symptoms being under-treated, and feeling mistreated at work. She discussed that her co-worker is able to do things she is not, and she has been asserting herself lately to address this with her boss. She uses e-mail, as an attempt to de-escalate. Therapist assigned her homework of using relaxation of her muscles and breathing exercises when not needed at work, so she may associate them and ultimately use them on the job. Her patience and relaxation at home has improved, and the relationship with her sister Drew is much improved.      Treatment Objective(s) Addressed in This Session:   identify feelings and emotions that occur prior to aggressive behaviors  use relaxation strategies 2-5 times per day to reduce the physical symptoms of anxiety  Increase interest, engagement, and pleasure in doing things  Decrease frequency and intensity of feeling down, depressed, hopeless  Feel less tired and more energy during the day   Identify negative self-talk and behaviors: challenge core beliefs, myths, and actions  Improve concentration, focus, and mindfulness in daily activities   Feel less fidgety, restless or slow in daily activities / interpersonal interactions       Intervention:   Motivational Interviewing  MI Intervention: Expressed Empathy/Understanding, Supported Autonomy, Collaboration, Evocation, Permission to raise concern or advise, Open-ended questions, Reflections: simple and complex, Change talk (evoked) and Reframe   Change Talk Expressed by the Patient:  Desire to change Ability to change Reasons to change Need to change Committment to change Activation Taking steps  Provider Response to Change Talk: E - Evoked more info from patient about behavior change, A - Affirmed patient's thoughts, decisions, or attempts at behavior change, R - Reflected patient's change talk and S - Summarized patient's change talk statements       Cognitive behavior therapy- challenging mind reading and other distorted thinking patterns.    Assessments completed prior to visit:  The following assessments were completed by patient for this visit:  PHQ2:   PHQ-2 ( 1999 [employer]) 4/25/2022 9/27/2017 12/11/2015 12/8/2015 10/12/2015 10/23/2012   Q1: Little interest or pleasure in doing things 0 0 0 - 0 1   Q2: Feeling down, depressed or hopeless 0 0 0 - 0 1   PHQ-2 Score 0 0 0 - 0 2   Q1: Little interest or pleasure in doing things Not at all - - Not at all - -   Q2: Feeling down, depressed or hopeless Not at all - - Not at all - -   PHQ-2 Score 0 - - 0 - -     PHQ9:   PHQ-9 SCORE 5/10/2019 12/4/2019 7/29/2020 3/19/2021 9/18/2021 8/2/2022 9/26/2022   PHQ-9 Total Score - - - - - - -   PHQ-9 Total Score MyChart - - 0 - 0 9 (Mild depression) 6 (Mild depression)   PHQ-9 Total Score 3 11 0 3 0 9 6     GAD2:   JOCELINE-2 8/2/2022 9/26/2022   Feeling nervous, anxious, or on edge 1 1   Not being able to stop or control worrying 2 1   JOCELINE-2 Total Score 3 2     GAD7:   JOCELINE-7 SCORE 9/9/2015 10/12/2015 11/22/2016 3/30/2017 11/8/2017 3/19/2021 8/2/2022   Total Score - - - - - - -   Total Score - - - - - - 13 (moderate anxiety)   Total Score 21 0 7 1 6 7 13     CAGE-AID:   CAGE-AID Total Score 8/2/2022   Total Score 1   Total Score MyChart 1 (A total score of 2 or greater is considered clinically significant)     PROMIS 10-Global Health (only subscores and total score):   PROMIS-10 Scores Only 1/27/2022 8/2/2022   Global Mental Health Score 12 9   Global Physical Health Score 14 18   PROMIS TOTAL - SUBSCORES 26 27          ASSESSMENT: Current Emotional / Mental Status (status of significant symptoms):   Risk status (Self / Other harm or suicidal ideation)   Patient denies current fears or concerns for personal safety.   Patient denies current or recent suicidal ideation or behaviors.   Patient denies current or recent homicidal ideation or behaviors.   Patient denies current or recent self injurious behavior or ideation.   Patient denies other safety concerns.   Patient reports there has been no change in risk factors since their last session.     Patient reports there has been no change in protective factors since their last session.     Recommended that patient call 911 or go to the local ED should there be a change in any of these risk factors.      Appearance:   Appropriate  coughing and said she was sick the past 2 weeks.    Eye Contact:   Good    Psychomotor Behavior: Normal  Hyperactive    Attitude:   Cooperative    Orientation:   All   Speech    Rate / Production: Hyperverbal  Talkative    Volume:  Loud    Mood:    Anxious  Sad    Affect:    Constricted    Thought Content:  Clear    Thought Form:  Coherent  Logical    Insight:    Good      Medication Review:   No changes to current psychiatric medication(s)     Medication Compliance:   Yes     Changes in Health Issues:   None reported     Chemical Use Review:   Substance Use: Chemical use reviewed, no active concerns identified      Tobacco Use: No current tobacco use.      Diagnosis:  1. Major depressive disorder, recurrent episode, mild (HCC)    2. Generalized anxiety disorder    3. Impulse control disorder in adult        Collateral Reports Completed:   Not Applicable    PLAN: (Patient Tasks / Therapist Tasks / Other)  Continue individualized psychotherapy sessions weekly, addressing coping strategies to improve mood and reduce worry and increase impulse control.          DC Chaudhari                                               Individual Treatment  Plan    Patient's Name: Brissa Mayer  YOB: 1955    Date of Creation: 8/23/2022  Date Treatment Plan Last Reviewed/Revised: 8/23/2022    DSM5 Diagnoses:  1. Major depressive disorder, recurrent episode, mild (HCC)    2. Generalized anxiety disorder    3. Impulse control disorder in adult      Psychosocial / Contextual Factors: Sister's failing health, workplace conflict with immediate supervisor, impulse control challenges affecting financial functioning  PROMIS (reviewed every 90 days):     Referral / Collaboration:  Referral to another professional/service is not indicated at this time..    Anticipated number of session for this episode of care: 6-9 sessions  Anticipation frequency of session: Weekly  Anticipated Duration of each session: 38-52 minutes  Treatment plan will be reviewed in 90 days or when goals have been changed.       MeasurableTreatment Goal(s) related to diagnosis / functional impairment(s)  Goal 1: Patient will improve mood, sense of ease, and impulse control.    I will know I've met my goal when I can relax.      Objective #A (Patient Action)    Patient will identify feelings and emotions that occur prior to aggressive behaviors  use relaxation strategies 2-5 times per day to reduce the physical symptoms of anxiety  Increase interest, engagement, and pleasure in doing things  Decrease frequency and intensity of feeling down, depressed, hopeless  Feel less tired and more energy during the day   Identify negative self-talk and behaviors: challenge core beliefs, myths, and actions  Improve concentration, focus, and mindfulness in daily activities   Feel less fidgety, restless or slow in daily activities / interpersonal interactions  Patient will identify patterns of escalation  (i.e. tightness in chest, flushed face, increased heart rate, clenched hands, etc.)  use progressive relaxation exercise once in the morning and once in the evening to help relieve tension  practice deep  diaphragm breathing once daily for at least 5 minutes to reduce anger / irritability and regain a calmer, more clear state of mind.  Status: New - Date: 8/23/2022   Intervention(s)  Therapist will teach emotional regulation skills.        Patient has reviewed and agreed to the above plan.      DC Chaudhari  August 23, 2022  Answers for HPI/ROS submitted by the patient on 9/26/2022  If you checked off any problems, how difficult have these problems made it for you to do your work, take care of things at home, or get along with other people?: Somewhat difficult  PHQ9 TOTAL SCORE: 6

## 2022-10-25 RX ORDER — CEFUROXIME AXETIL 500 MG/1
500 TABLET ORAL 2 TIMES DAILY
Qty: 20 TABLET | Refills: 0 | Status: SHIPPED | OUTPATIENT
Start: 2022-10-25 | End: 2023-03-31

## 2022-11-07 ENCOUNTER — VIRTUAL VISIT (OUTPATIENT)
Dept: PSYCHOLOGY | Facility: CLINIC | Age: 67
End: 2022-11-07
Payer: COMMERCIAL

## 2022-11-07 DIAGNOSIS — F63.9 IMPULSE CONTROL DISORDER IN ADULT: ICD-10-CM

## 2022-11-07 DIAGNOSIS — F41.1 GENERALIZED ANXIETY DISORDER: Primary | ICD-10-CM

## 2022-11-07 DIAGNOSIS — F33.40 MAJOR DEPRESSIVE DISORDER, RECURRENT, IN REMISSION (H): ICD-10-CM

## 2022-11-07 PROCEDURE — 90832 PSYTX W PT 30 MINUTES: CPT | Mod: GT | Performed by: MARRIAGE & FAMILY THERAPIST

## 2022-11-07 ASSESSMENT — PATIENT HEALTH QUESTIONNAIRE - PHQ9
SUM OF ALL RESPONSES TO PHQ QUESTIONS 1-9: 7
10. IF YOU CHECKED OFF ANY PROBLEMS, HOW DIFFICULT HAVE THESE PROBLEMS MADE IT FOR YOU TO DO YOUR WORK, TAKE CARE OF THINGS AT HOME, OR GET ALONG WITH OTHER PEOPLE: SOMEWHAT DIFFICULT
SUM OF ALL RESPONSES TO PHQ QUESTIONS 1-9: 7

## 2022-11-07 NOTE — PROGRESS NOTES
[facility]                                     Progress Note    Patient Name: Brissa Mayer  Date: 11/07/22           Service Type: Individual      Session Start Time: 1:19 PM  Session End Time: 1:40 PM     Session Length: 16 minutes    Session #: 7    Attendees: Client    Service Modality:  Video Visit:      Provider verified identity through the following two step process.  Patient provided:  Patient was verified at admission/transfer    Telemedicine Visit: The patient's condition can be safely assessed and treated via synchronous audio and visual telemedicine encounter.      Reason for Telemedicine Visit: Patient has requested telehealth visit    Originating Site (Patient Location): Patient's home    Distant Site (Provider Location): [facility]    Consent:  The patient/guardian has verbally consented to: the potential risks and benefits of telemedicine (video visit) versus in person care; bill my insurance or make self-payment for services provided; and responsibility for payment of non-covered services.     Patient would like the video invitation sent by:  My Chart    Mode of Communication:  Video Conference via Amwell    As the provider I attest to compliance with applicable laws and regulations related to telemedicine.    DATA  Extended Session (53+ minutes): No  Interactive Complexity: No  Crisis: No        Progress Since Last Session (Related to Symptoms / Goals / Homework):   Symptoms: Improving Depressive symptoms have returned to improved status with severity, frequency, and duration.  Challenges with impulse control has waxed and waned (spending problem and has had episodes of verbal rage).  Her anxiety has reduced intensity and frequency is less than at baseline.    Homework: Completed in session      Episode of Care Goals: Achieved / completed to satisfaction - MAINTENANCE (Working to maintain change, with risk of relapse); Intervened by continuing to  positively reinforce healthy behavior choice      Current / Ongoing Stressors and Concerns:   Client presents for individual follow-up session to address mild depressive disorder generalized anxiety disorder and impulse control disorder (spending).      Client increasing mood stability, again for several weeks. This marks the resolution of her depressive episode, and she was provided support for maintenance.  She indicated that her anxiety has also improved significantly since starting therapy.  She states that she is taking time to rest and relax, read a book, and take things much slower than she had before.  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  She states that she also has found a new way of communicating with her supervisor through text, rather than emailing the entire group that she works with.  She finds that her impulse control is also improving significantly over the past few months.  With these developments, the client was provided encouragement to continue maintaining the progress made in all areas including depression, anxiety, and impulse control.  Her diagnosis for depression was changed to resolved or in remission.  Her impulse control is improving, and she believes that her progress will be maintained through continued relaxation strategies that she can do on her own.  Therapist offered the client to return for counseling if she deems it appropriate in the future, but for now her case will be discharged.  She will be successfully discharged effective today.     Treatment Objective(s) Addressed in This Session:   identify feelings and emotions that occur prior to aggressive behaviors  use relaxation strategies 2-5 times per day to reduce the physical symptoms of anxiety  Increase interest, engagement, and pleasure in doing things  Decrease frequency and intensity of feeling down, depressed, hopeless  Feel less tired  and more energy during the day   Identify negative self-talk and behaviors: challenge core beliefs, myths, and actions  Improve concentration, focus, and mindfulness in daily activities   Feel less fidgety, restless or slow in daily activities / interpersonal interactions       Intervention:   Motivational Interviewing  MI Intervention: Expressed Empathy/Understanding, Supported Autonomy, Collaboration, Evocation, Permission to raise concern or advise, Open-ended questions, Reflections: simple and complex, Change talk (evoked) and Reframe   Change Talk Expressed by the Patient: Desire to change Ability to change Reasons to change Need to change Committment to change Activation Taking steps  Provider Response to Change Talk: E - Evoked more info from patient about behavior change, A - Affirmed patient's thoughts, decisions, or attempts at behavior change, R - Reflected patient's change talk and S - Summarized patient's change talk statements       Cognitive behavior therapy- challenging mind reading and other distorted thinking patterns.    Assessments completed prior to visit:  The following assessments were completed by patient for this visit:  PHQ2:   PHQ-2 ( 1999 [employer]) 4/25/2022 9/27/2017 12/11/2015 12/8/2015 10/12/2015 10/23/2012   Q1: Little interest or pleasure in doing things 0 0 0 - 0 1   Q2: Feeling down, depressed or hopeless 0 0 0 - 0 1   PHQ-2 Score 0 0 0 - 0 2   Q1: Little interest or pleasure in doing things Not at all - - Not at all - -   Q2: Feeling down, depressed or hopeless Not at all - - Not at all - -   PHQ-2 Score 0 - - 0 - -     PHQ9:   PHQ-9 SCORE 12/4/2019 7/29/2020 3/19/2021 9/18/2021 8/2/2022 9/26/2022 11/7/2022   PHQ-9 Total Score - - - - - - -   PHQ-9 Total Score MyChart - 0 - 0 9 (Mild depression) 6 (Mild depression) 7 (Mild depression)   PHQ-9 Total Score 11 0 3 0 9 6 7     GAD2:   JOCELINE-2 8/2/2022 9/26/2022 11/7/2022   Feeling nervous, anxious, or on edge 1 1 1   Not being able to  stop or control worrying 2 1 1   JOCELINE-2 Total Score 3 2 2     GAD7:   JOCELINE-7 SCORE 9/9/2015 10/12/2015 11/22/2016 3/30/2017 11/8/2017 3/19/2021 8/2/2022   Total Score - - - - - - -   Total Score - - - - - - 13 (moderate anxiety)   Total Score 21 0 7 1 6 7 13     CAGE-AID:   CAGE-AID Total Score 8/2/2022   Total Score 1   Total Score MyChart 1 (A total score of 2 or greater is considered clinically significant)     PROMIS 10-Global Health (only subscores and total score):   PROMIS-10 Scores Only 1/27/2022 8/2/2022 11/7/2022   Global Mental Health Score 12 9 12   Global Physical Health Score 14 18 17   PROMIS TOTAL - SUBSCORES 26 27 29         ASSESSMENT: Current Emotional / Mental Status (status of significant symptoms):   Risk status (Self / Other harm or suicidal ideation)   Patient denies current fears or concerns for personal safety.   Patient denies current or recent suicidal ideation or behaviors.   Patient denies current or recent homicidal ideation or behaviors.   Patient denies current or recent self injurious behavior or ideation.   Patient denies other safety concerns.   Patient reports there has been no change in risk factors since their last session.     Patient reports there has been no change in protective factors since their last session.     Recommended that patient call 911 or go to the local ED should there be a change in any of these risk factors.      Appearance:   Appropriate    Eye Contact:   Good    Psychomotor Behavior: Normal  Hyperactive    Attitude:   Cooperative  Friendly Pleasant   Orientation:   All   Speech    Rate / Production: Normal/ Responsive    Volume:  Normal    Mood:    Normal Euthymic   Affect:    Appropriate    Thought Content:  Clear    Thought Form:  Coherent  Logical    Insight:    Good      Medication Review:   No changes to current psychiatric medication(s)     Medication Compliance:   Yes     Changes in Health Issues:   None reported     Chemical Use Review:   Substance  Use: Chemical use reviewed, no active concerns identified      Tobacco Use: No current tobacco use.      Diagnosis:  1. Generalized anxiety disorder    2. Impulse control disorder in adult    3. Major depressive disorder, recurrent, in remission (H)        Collateral Reports Completed:   Not Applicable    PLAN: (Patient Tasks / Therapist Tasks / Other)  Client will resume sessions if she deems it appropriate for ongoing support or to resume services as previously arranged.      DC Chaudhari                                                     Individual Treatment Plan    Patient's Name: rBissa Mayer  YOB: 1955    Date of Creation: 8/23/2022  Date Treatment Plan Last Reviewed/Revised: 8/23/2022    DSM5 Diagnoses:  1. Major depressive disorder, recurrent episode, mild (HCC)    2. Generalized anxiety disorder    3. Impulse control disorder in adult      Psychosocial / Contextual Factors: Sister's failing health, workplace conflict with immediate supervisor, impulse control challenges affecting financial functioning  PROMIS (reviewed every 90 days):     Referral / Collaboration:  Referral to another professional/service is not indicated at this time..    Anticipated number of session for this episode of care: 6-9 sessions  Anticipation frequency of session: Weekly  Anticipated Duration of each session: 38-52 minutes  Treatment plan will be reviewed in 90 days or when goals have been changed.       MeasurableTreatment Goal(s) related to diagnosis / functional impairment(s)  Goal 1: Patient will improve mood, sense of ease, and impulse control.    I will know I've met my goal when I can relax.      Objective #A (Patient Action)    Patient will identify feelings and emotions that occur prior to aggressive behaviors  use relaxation strategies 2-5 times per day to reduce the physical symptoms of anxiety  Increase interest, engagement, and pleasure in doing things  Decrease frequency and intensity  of feeling down, depressed, hopeless  Feel less tired and more energy during the day   Identify negative self-talk and behaviors: challenge core beliefs, myths, and actions  Improve concentration, focus, and mindfulness in daily activities   Feel less fidgety, restless or slow in daily activities / interpersonal interactions  Patient will identify patterns of escalation  (i.e. tightness in chest, flushed face, increased heart rate, clenched hands, etc.)  use progressive relaxation exercise once in the morning and once in the evening to help relieve tension  practice deep diaphragm breathing once daily for at least 5 minutes to reduce anger / irritability and regain a calmer, more clear state of mind.  Status: New - Date: 8/23/2022   Intervention(s)  Therapist will teach emotional regulation skills.        Patient has reviewed and agreed to the above plan.      DC Chaudhari  August 23, 2022  Answers for HPI/ROS submitted by the patient on 9/26/2022  If you checked off any problems, how difficult have these problems made it for you to do your work, take care of things at home, or get along with other people?: Somewhat difficult  PHQ9 TOTAL SCORE: 6                         Discharge Summary  Multiple Sessions    Client Name: Brissa Mayer MRN#: [id number] YOB: 1955    Discharge Date:   November 7, 2022    Service Modality: Video Visit:      Provider verified identity through the following two step process.  Patient provided:  Patient was verified at admission/transfer    Telemedicine Visit: The patient's condition can be safely assessed and treated via synchronous audio and visual telemedicine encounter.      Reason for Telemedicine Visit: Patient has requested telehealth visit    Originating Site (Patient Location): Patient's home    Distant Site (Provider Location): [facility]    Consent:  The patient/guardian has verbally consented to: the potential risks and  benefits of telemedicine (video visit) versus in person care; bill my insurance or make self-payment for services provided; and responsibility for payment of non-covered services.     Patient would like the video invitation sent by:  My Chart    Mode of Communication:  Video Conference via Amwell    Distant Location (Provider):  On-site    As the provider I attest to compliance with applicable laws and regulations related to telemedicine.    Service Type: Individual     Session Start Time: 1:19 PM  Session End Time: 1:40 PM     Session Length: 16 minutes    Session #: 7    Attendees: Client    Service Modality:  Video Visit:           Focus of Treatment Objective(s):  Client's presenting concerns included:  Anxiety depression and impulse control issues negatively impacting her functioning in her home, financial wellbeing, as well as occupational stress    Stage of Change at time of Discharge: MAINTENANCE (Working to maintain change, with risk of relapse)    Medication Adherence:  Yes    Chemical Use:  No    Assessment: Current Emotional / Mental Status (status of significant symptoms):    Risk status (Self / Other harm or suicidal ideation)  Client denies current fears or concerns for personal safety.  Client denies current or recent suicidal ideation or behaviors.  Client denies current or recent homicidal ideation or behaviors.  Client denies current or recent self injurious behavior or ideation.  Client denies other safety concerns.  A safety and risk management plan has not been developed at this time, however client was given the after-hours number should there be a change in any of these risk factors.      ppearance:    Appropriate    Eye Contact:   Good    Psychomotor Behavior: Normal  Hyperactive    Attitude:   Cooperative  Friendly Pleasant   Orientation:   All   Speech    Rate / Production: Normal/ Responsive    Volume:  Normal    Mood:    Normal Euthymic   Affect:    Appropriate    Thought Content:  Clear     Thought Form:  Coherent  Logical    Insight:    Good     DSM5 Diagnoses: (Sustained by DSM5 Criteria Listed Above)  Diagnoses:   Encounter Diagnoses   Name Primary?     Generalized anxiety disorder Yes     Impulse control disorder in adult      Major depressive disorder, recurrent, in remission (H)      Psychosocial & Contextual Factors: Work conflict with supervisor, challenging with environment, multiple family challenges, 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  WHODAS 2.0 (12 item) Score:   WHODAS 2.0 Total Score 8/2/2022   Total Score 24   Total Score MyChart 24     Reason for Discharge:  Client is satisfied with progress      Aftercare Plan:  Client agreed to follow safety contract after discharge  Client may resume counseling services at any time in the future by calling the Located within [facility] Intake Office, [phone].      DC Chaudhari  November 7, 2022    Answers for HPI/ROS submitted by the patient on 11/7/2022  If you checked off any problems, how difficult have these problems made it for you to do your work, take care of things at home, or get along with other people?: Somewhat difficult  PHQ9 TOTAL SCORE: 7

## 2022-11-21 ENCOUNTER — TELEPHONE (OUTPATIENT)
Dept: FAMILY MEDICINE CLINIC | Age: 67
End: 2022-11-21

## 2022-11-21 NOTE — TELEPHONE ENCOUNTER
Thania in PT calling to get an new order for pt for PT, pt had previously went to [facility] but would now like to come to the clinic for her PT.

## 2022-11-22 NOTE — TELEPHONE ENCOUNTER
Message left with patient to return call. Patient last seen by Dr. Emir Ramirez in Urgent Care on 9/10/2022. Will need to be seen by PCP to have continued therapy orders.

## 2022-11-27 ENCOUNTER — MYC MEDICAL ADVICE (OUTPATIENT)
Dept: INTERNAL MEDICINE | Facility: CLINIC | Age: 67
End: 2022-11-27

## 2022-11-27 ENCOUNTER — NURSE TRIAGE (OUTPATIENT)
Dept: NURSING | Facility: CLINIC | Age: 67
End: 2022-11-27

## 2022-11-27 NOTE — TELEPHONE ENCOUNTER
"Nurse Triage SBAR    Situation: Hives    Background: Patient, Lara, calling. Consent: not needed.    Assessment: Hives \"big red welts\" x 2.5 months, worsening over the past week. Hives were coming and going but are now constant and very itchy. Hives were present on her arm at first and now are are on her arm, legs, and torso. She's taking Benadryl with no relief.     She had respiratory symptoms and was on an antibiotic one month ago. Her respiratory symptoms improved.     Protocol Recommended Disposition: See Physician within 24 hours. Pt already sent a message to her PCP so she plans on waiting to hear back from her clinic before scheduling an appointment. I advised her to call back with any new or worsening symptoms. Patient verbalized understanding and had no further questions.      Sandhya Fajardo RN  [facility] Nurse Advisor      Reason for Disposition    [1] MODERATE-SEVERE hives persist (i.e., hives interfere with normal activities or work) AND [2] taking antihistamine (e.g., Benadryl, Claritin) > 24 hours    Additional Information    Negative: [1] Life-threatening reaction (anaphylaxis) in the past to similar substance (e.g., food, insect bite/sting, chemical, etc.) AND [2] < 2 hours since exposure    Negative: Difficulty breathing or wheezing now    Negative: [1] Swollen tongue AND [2] rapid onset    Negative: [1] Hoarseness or cough now AND [2] rapid onset    Negative: Shock suspected (e.g., cold/pale/clammy skin, too weak to stand, low BP, rapid pulse)    Negative: Difficult to awaken or acting confused (e.g., disoriented, slurred speech)    Negative: Sounds like a life-threatening emergency to the triager    Negative: [1] Bee, wasp, or yellow jacket sting AND [2] within last 24 hours    Negative: Blood-colored, dark red or purple rash    Negative: [1] Drug rash suspected AND [2] started taking new medicine within last 2 weeks(Exception: antihistamine, eye drops, ear drops, decongestant " or other OTC cough/cold medicines)    Negative: Doesn't match the SYMPTOMS of hives    Negative: Swollen tongue    Negative: [1] Widespread hives, itching or facial swelling AND [2] onset < 2 hours of exposure to high-risk allergen (e.g., sting, nuts, 1st dose of antibiotic)    Negative: Patient sounds very sick or weak to the triager    Protocols used: HIVES-A-

## 2022-11-29 ENCOUNTER — HOSPITAL ENCOUNTER (OUTPATIENT)
Dept: PHYSICAL THERAPY | Age: 67
Setting detail: THERAPIES SERIES
Discharge: HOME | End: 2022-11-29
Payer: MEDICARE

## 2022-11-29 PROCEDURE — 97161 PT EVAL LOW COMPLEX 20 MIN: CPT | Performed by: PHYSICAL THERAPIST

## 2022-11-29 ASSESSMENT — PAIN DESCRIPTION - DESCRIPTORS: DESCRIPTORS: ACHING

## 2022-11-29 ASSESSMENT — PAIN SCALES - GENERAL: PAINLEVEL_OUTOF10: 2

## 2022-11-29 ASSESSMENT — PAIN DESCRIPTION - LOCATION: LOCATION: SHOULDER;ARM

## 2022-11-29 ASSESSMENT — PAIN - FUNCTIONAL ASSESSMENT: PAIN_FUNCTIONAL_ASSESSMENT: PREVENTS OR INTERFERES WITH MANY ACTIVE NOT PASSIVE ACTIVITIES

## 2022-11-29 ASSESSMENT — PAIN DESCRIPTION - ONSET: ONSET: SUDDEN

## 2022-11-29 ASSESSMENT — PAIN DESCRIPTION - FREQUENCY: FREQUENCY: CONTINUOUS

## 2022-11-29 NOTE — PROGRESS NOTES
Physical Therapy    Physical Therapy Daily Treatment Note    Date:  2022    Patient Name:  Kiki Taylor    :  1955  MRN: [id number]  Restrictions/Precautions:  Probability of partial R rot cuff tear   Medical/Treatment Diagnosis Information:   Diagnosis: M79.621 R upper arm pain. Treatment Diagnosis: R shld, rotator cuff pain & weakness  Insurance/Certification information:  PT Insurance Information: Medicare  Physician Information:   Jayme Blunt NP  Plan of care signed (Y/N):    Visit# / total visits: 1 /10  Pain level: 710       Time In:11:10   Time Out:11:45    Progress Note: [x]  Yes  []  No  Next due by: Visit #10 , or 22     Subjective: See eval      Objective: See eval  Observation:   Test measurements:      Exercises:   Exercise/Equipment Resistance/Repetitions Other comments   R shld extension stretch 10x    Int rotation stretch 10x    B rowing/ extension     6-way isometric     6-way t-band yel         Flex to 90 deg     Scaption up/down          Ext rotate sidely  10    Abd in sidely                    [x] Provided verbal/tactile cueing for activities related to strengthening, flexibility, endurance, ROM. (17277)  [] Provided verbal/tactile cueing for activities related to improving balance, coordination, kinesthetic sense, posture, motor skill, proprioception. (13229)    Therapeutic Activities:     [] Therapeutic activities, direct (one-on-one) patient contact (use of dynamic activities to improve functional performance). (90573)    Gait:   [] Provided training and instruction to the patient for ambulation re-education. (62097)    Self-Care/ADL's  [] Self-care/home management training and compensatory training, meal preparation, safety procedures, and instructions in use of assistive technology devices/adaptive equipment, direct one-on-one contact.  (56208)    Home Exercise Program:   Impingement reduction with extension & int rotation stretch  [x] Reviewed/Progressed HEP activities related to strengthening, flexibility, endurance, ROM. (68359)  [] Reviewed/Progressed HEP activities related to improving balance, coordination, kinesthetic sense, posture, motor skill, proprioception.  (83351)    Manual Treatments:    [] Provided manual therapy to mobilize soft tissue/joints for the purpose of modulating pain, promoting relaxation,  increasing ROM, reducing/eliminating soft tissue swelling/inflammation/restriction, improving soft tissue extensibility. (52925)    Service Based Modalities:  Eval 35'    Timed Code Treatment Minutes:        Total Treatment Minutes:   28'    Treatment/Activity Tolerance:  [x] Patient tolerated treatment well [] Patient limited by fatique  [] Patient limited by pain  [] Patient limited by other medical complications  [] Other:     Prognosis: [x] Good [] Fair  [] Poor    Patient Requires Follow-up: [x] Yes  [] No      Goals:  Short Term Goals  Time Frame for Short Term Goals: 1 week  Short Term Goal 1: Start HEP    Long Term Goals  Time Frame for Long Term Goals : 4 weeks  Long Term Goal 1: R shld pain controlled at 2/10 to allow regular ADL  Long Term Goal 2: R shld AROM flexion 170 deg, abd 160 deg, behind back to T12, behind head to T2 for efficient dressing & hygiene  Long Term Goal 3: R shld 4+/5 strength for home & work required lifting  Long Term Goal 4: Able to sleep thru 90% of the night          Plan:   [] Continue per plan of care [] Alter current plan (see comments)  [x] Plan of care initiated [] Hold pending MD visit [] Discharge  Plan for Next Session:  6-way shld t-band    Electronically signed by:  Rsoalio Woodard PT,PT

## 2022-11-29 NOTE — PROGRESS NOTES
Physical Therapy  Initial Assessment  Date: 2022  Patient Name: Mary Ellen Gooden  MRN: [id number]  : 1955    Referring Physician: JANET Méndez   PCP: Kurt Marcus     Medical Diagnosis: Pain in right shoulder [M25.511]  Other chronic pain [G89.29] M79.621 R upper arm pain. Treatment Diagnosis: R shld, rotator cuff pain & weakness      Insurance: Payor: MEDICARE / Plan: MEDICARE PART A AND B / Product Type: *No Product type* /   Insurance ID: [id number] - (Medicare)      Restrictions:none       Subjective:   General  Chart Reviewed: Yes  Patient Assessed for Rehabilitation Services: Yes  History obtained from[de-identified] Patient, Chart Review  Family/Caregiver Present: No  Diagnosis: M79.621 R upper arm pain. Referring Provider (secondary): Kurt Marcus  Follows Commands: Within Functional Limits  PT Visit Information  Onset Date: 22  PT Insurance Information: Medicare  Referring Provider (secondary): Kurt Marcus  Subjective  Subjective: Patient fell at work, slipped in a freezer back in 2022. Injured R shld. No prior history of shld problems. Is R side dominant. Had about 7 -8 PT sessions in [facility]. No ortho consult yet. Prior diagnostic testing[de-identified] X-ray  Pain Screening  Patient Currently in Pain: Yes  Pain Assessment: 0-10  Pain Level: 2  Best Pain Level: 2  Worst Pain Level: 7  Patient's Stated Pain Goal: 1  Pain Location: Shoulder, Arm  Pain Descriptors: Aching  Pain Frequency: Continuous  Pain Onset: Sudden  Functional Pain Assessment: Prevents or interferes with many active not passive activities  Aggravating factors:  Movement, Reaching, Lifting, Sleeping     Vision/Hearing:  Vision  Vision: Within Functional Limits  Hearing  Hearing: Within functional limits    Orientation:  Orientation  Overall Orientation Status: Within Normal Limits  Follows Commands: Within Functional Limits    Functional Status:  Functional Status  Prior level of function: Independent  Occupation: Part time employment  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, seasonal  Job Duties: Repetitive lifting  Receives Help From: Family  ADL Assistance: Independent  Homemaking Assistance: Independent  Ambulation Assistance: Independent  Transfer Assistance: Independent  Active : Yes  Mode of Transportation: Car    Objective:     PROM RUE (degrees)  R Shoulder Flex  (0-180): 175  R Shoulder Ext  (0-45): 55  R Shoulder ABduction (0-180): 110  R Shoulder Int Rotation  (0-70): 55 +pain  R Shoulder Ext Rotation  (0-90): 90  AROM RUE (degrees)  R Shoulder Flexion (0-180): 80 ,+pain  R Shoulder Extension (0-45): 55  R Shoulder ABduction (0-180): 80 +pain  R Shoulder Int Rotation  (0-70): L3  R Shoulder Ext Rotation (0-90): C5    Strength RUE  Comment: +pain & weakness flex, abd, scaption, ext rot  R Shoulder Flexion: 3-/5  R Shoulder Extension: 4/5  R Shoulder ABduction: 3-/5  R Shoulder Internal Rotation: 4/5  R Shoulder External Rotation: 3-/5     Additional Measures  Special Tests: Negative drop arm test for rot cuff full thickness involvement  Other: + Ext rotator and 90-90 lag signs for partial thickness rot cuff involvement     Assessment:    Conditions Requiring Skilled Therapeutic Intervention  Body Structures, Functions, Activity Limitations Requiring Skilled Therapeutic Intervention: Increased pain;Decreased ROM; Decreased strength  Therapy Prognosis: Good  Treatment Diagnosis: R shld, rotator cuff pain & weakness  Activity Tolerance  Activity Tolerance: Patient tolerated treatment well  Activity Tolerance: Patient tolerated treatment well         Plan:    Physcial Therapy Plan  Plan weeks: 4  Current Treatment Recommendations: Strengthening, ROM, Manual Therapy - Joint Manipulation, Home exercise program, Modalities    OutComes Score:                SPADI Total Pain Score : 70 % (11/29/22 1141)  SPADI Total Disability Score  : 67.5 % (11/29/22 1141)  SPADI Total Score : 89 (11/29/22 1141) Goals:  Short Term Goals  Time Frame for Short Term Goals: 1 week  Short Term Goal 1: Start HEP  Long Term Goals  Time Frame for Long Term Goals : 4 weeks  Long Term Goal 1: R shld pain controlled at 2/10 to allow regular ADL  Long Term Goal 2: R shld AROM flexion 170 deg, abd 160 deg, behind back to T12, behind head to T2 for efficient dressing & hygiene  Long Term Goal 3: R shld 4+/5 strength for home & work required lifting  Long Term Goal 4: Able to sleep thru 90% of the night       Therapy Time:   Individual Concurrent Group Co-treatment   Time In  11:10         Time Out  11:45         Minutes  35                 Helen Leigh, PT

## 2022-11-29 NOTE — PLAN OF CARE
[facility]    [x] [place]  Phone: [phone]  Fax: [phone]      [] [place]  Phone: [phone]  Fax: [phone]        To:        Patient: Fidelia Ordonez  : 1955   MRN: [id number]  Evaluation Date: 2022      Diagnosis Information:  Diagnosis: M79.621 R upper arm pain. Treatment Diagnosis: R shld, rotator cuff pain & weakness     Physical Therapy Certification Form  Dear Allie Waddell NP  The following patient has been evaluated for physical therapy services and for therapy to continue, Medicare requires monthly physician review of the treatment plan. Please review the attached evaluation and/or summary of the patient's plan of care, and verify that you agree therapy should continue by signing the attached document and sending it back to our office.     Plan of Care/Treatment to date:  [x] Therapeutic Exercise    [] Modalities:  [] Therapeutic Activity     [x] Ultrasound  [] Electrical Stimulation  [] Gait Training      [] Cervical Traction [] Lumbar Traction  [] Neuromuscular Re-education    [] Cold/hotpack [] Iontophoresis   [x] Instruction in HEP     Other:  [x] Manual Therapy      []             [] Aquatic Therapy      []                 Goals:  Short Term Goals  Time Frame for Short Term Goals: 1 week  Short Term Goal 1: Start HEP    Long Term Goals  Time Frame for Long Term Goals : 4 weeks  Long Term Goal 1: R shld pain controlled at 2/10 to allow regular ADL  Long Term Goal 2: R shld AROM flexion 170 deg, abd 160 deg, behind back to T12, behind head to T2 for efficient dressing & hygiene  Long Term Goal 3: R shld 4+/5 strength for home & work required lifting  Long Term Goal 4: Able to sleep thru 90% of the night    Frequency/Duration:22 - 22  # Days per week: [] 1 day # Weeks: [] 1 week [] 5 weeks     [] 2 days   [] 2 weeks [] 6 weeks     [x] 3 days   [] 3 weeks [] 7 weeks     [] 4 days   [x] 4 weeks [] 8 weeks    Rehab Potential: [] Excellent [x] Good [] Fair  [] Poor     Electronically signed by:  Shahbaz Landaverde PT      If you have any questions or concerns, please don't hesitate to call.   Thank you for your referral.      Physician Signature:________________________________Date:__________________  By signing above, therapists plan is approved by physician

## 2022-12-01 ENCOUNTER — HOSPITAL ENCOUNTER (OUTPATIENT)
Dept: PHYSICAL THERAPY | Age: 67
Setting detail: THERAPIES SERIES
Discharge: HOME | End: 2022-12-01
Payer: MEDICARE

## 2022-12-01 PROCEDURE — 97110 THERAPEUTIC EXERCISES: CPT | Performed by: PHYSICAL THERAPY ASSISTANT

## 2022-12-01 NOTE — PROGRESS NOTES
Physical Therapy    Physical Therapy Daily Treatment Note    Date:  2022    Patient Name:  Rao Chanel    :  1955  MRN: [id number]  Restrictions/Precautions:  Probability of partial R rot cuff tear   Medical/Treatment Diagnosis Information:   Diagnosis: M79.621 R upper arm pain. Treatment Diagnosis: R shld, rotator cuff pain & weakness  Insurance/Certification information:  PT Insurance Information: Medicare  Physician Information:   Virginia Finley NP  Plan of care signed (Y/N):    Visit# / total visits: 2/10  Pain level: 5/10       Time In: 0891 Time Out:1135    Progress Note: []  Yes  [x]  No  Next due by: Visit #10 , or 22     Subjective: Pt. Relates pain this date rated 5/10 through shoulder. Objective: POLLO complete per flow chart to facilitate strength, motion and stability to allow ease with daily reaching and lifting needs. Initiated and advanced several exercises to improve motion and stability. Verbal and tactile cuing for proper technique and order of exercises. Difficulty with ER noted, limited strength and motion. Observation:   Test measurements:      Exercises:   Exercise/Equipment Resistance/Repetitions Other comments   R shld extension stretch 10x    Int rotation stretch 10x    B rowing/ extension 15x  RED (Initiated)   6-way isometric 10x5'' ball   6-way t-band 10x Yellow Initiated        Flex to 90 deg 10x    Scaption up/down 10x         Ext rotate sidely  10    Abd in sidely     Supine punch/ABC's 10x                   Pulley 5         [x] Provided verbal/tactile cueing for activities related to strengthening, flexibility, endurance, ROM. (68465)  [] Provided verbal/tactile cueing for activities related to improving balance, coordination, kinesthetic sense, posture, motor skill, proprioception. ()    Therapeutic Activities:     [] Therapeutic activities, direct (one-on-one) patient contact (use of dynamic activities to improve functional performance).  (10517)    Gait: [] Provided training and instruction to the patient for ambulation re-education. (65275)    Self-Care/ADL's  [] Self-care/home management training and compensatory training, meal preparation, safety procedures, and instructions in use of assistive technology devices/adaptive equipment, direct one-on-one contact. (79381)    Home Exercise Program:   Impingement reduction with extension & int rotation stretch  [x] Reviewed/Progressed HEP activities related to strengthening, flexibility, endurance, ROM. (13774)  [] Reviewed/Progressed HEP activities related to improving balance, coordination, kinesthetic sense, posture, motor skill, proprioception.  (41704)    Manual Treatments:    [] Provided manual therapy to mobilize soft tissue/joints for the purpose of modulating pain, promoting relaxation,  increasing ROM, reducing/eliminating soft tissue swelling/inflammation/restriction, improving soft tissue extensibility.  (39728)    Service Based Modalities:      Timed Code Treatment Minutes:   43' POLLO    Total Treatment Minutes:   43'    Treatment/Activity Tolerance:  [x] Patient tolerated treatment well [] Patient limited by fatique  [] Patient limited by pain  [] Patient limited by other medical complications  [] Other:     Prognosis: [x] Good [] Fair  [] Poor    Patient Requires Follow-up: [x] Yes  [] No      Goals:  Short Term Goals  Time Frame for Short Term Goals: 1 week  Short Term Goal 1: Start HEP (Initiated at eval)    Long Term Goals  Time Frame for Long Term Goals : 4 weeks  Long Term Goal 1: R shld pain controlled at 2/10 to allow regular ADL  Long Term Goal 2: R shld AROM flexion 170 deg, abd 160 deg, behind back to T12, behind head to T2 for efficient dressing & hygiene  Long Term Goal 3: R shld 4+/5 strength for home & work required lifting  Long Term Goal 4: Able to sleep thru 90% of the night    Plan:   [x] Continue per plan of care [] Alter current plan (see comments)  [] Plan of care initiated [] Hold pending MD visit [] Discharge  Plan for Next Session:  Monitor tolerance and advance as able. Electronically signed by:   Haris Gong PTA

## 2022-12-05 ENCOUNTER — HOSPITAL ENCOUNTER (OUTPATIENT)
Dept: PHYSICAL THERAPY | Age: 67
Setting detail: THERAPIES SERIES
Discharge: HOME | End: 2022-12-05
Payer: MEDICARE

## 2022-12-05 PROCEDURE — 97110 THERAPEUTIC EXERCISES: CPT

## 2022-12-05 NOTE — PROGRESS NOTES
Physical Therapy    Physical Therapy Daily Treatment Note    Date:  2022    Patient Name:  Narcisa Ji    :  1955  MRN: [id number]  Restrictions/Precautions:  Probability of partial R rot cuff tear   Medical/Treatment Diagnosis Information:   Diagnosis: M79.621 R upper arm pain. Treatment Diagnosis: R shld, rotator cuff pain & weakness  Insurance/Certification information:  PT Insurance Information: Medicare  Physician Information:   Beth Andrade NP  Plan of care signed (Y/N):    Visit# / total visits:    3/10  Pain level: 6/10       Time In: 11:40 Time Out: 12:18    Progress Note: []  Yes  [x]  No  Next due by: Visit #10 , or 22     Subjective: Pt. Relates pain this date rated 6/10 through shoulder. States it is not as bad as it had been. Objective: POLLO complete per flow chart to facilitate strength, motion and stability to allow ease with daily reaching and lifting needs. Verbal and tactile cuing for proper technique and order of exercises. Challenged by current exercises, therefore none progressed. Observation:   Test measurements:      Exercises:   Exercise/Equipment Resistance/Repetitions Other comments   R shld extension stretch 10x10\"    Int rotation stretch 10x10\"    B rowing/ extension 15x  RED    6-way isometric 10x5'' ball   6-way t-band 10x Yellow         Flex to 90 deg 10x    Scaption up/down 10x         Ext rotate sidely  10    Abd in sidely 10x    Supine punch/ABC's 10x                   Pulley 5         [x] Provided verbal/tactile cueing for activities related to strengthening, flexibility, endurance, ROM. (75448)  [] Provided verbal/tactile cueing for activities related to improving balance, coordination, kinesthetic sense, posture, motor skill, proprioception. (62959)    Therapeutic Activities:     [] Therapeutic activities, direct (one-on-one) patient contact (use of dynamic activities to improve functional performance).  (34425)    Gait:   [] Provided training and instruction to the patient for ambulation re-education. (05048)    Self-Care/ADL's  [] Self-care/home management training and compensatory training, meal preparation, safety procedures, and instructions in use of assistive technology devices/adaptive equipment, direct one-on-one contact. (98436)    Home Exercise Program:   Impingement reduction with extension & int rotation stretch  [x] Reviewed/Progressed HEP activities related to strengthening, flexibility, endurance, ROM. (65297)  [] Reviewed/Progressed HEP activities related to improving balance, coordination, kinesthetic sense, posture, motor skill, proprioception.  (29614)    Manual Treatments:    [] Provided manual therapy to mobilize soft tissue/joints for the purpose of modulating pain, promoting relaxation,  increasing ROM, reducing/eliminating soft tissue swelling/inflammation/restriction, improving soft tissue extensibility.  (76317)    Service Based Modalities:      Timed Code Treatment Minutes:   45' POLLO    Total Treatment Minutes:   45'    Treatment/Activity Tolerance:  [x] Patient tolerated treatment well [] Patient limited by fatique  [] Patient limited by pain  [] Patient limited by other medical complications  [] Other:     Prognosis: [x] Good [] Fair  [] Poor    Patient Requires Follow-up: [x] Yes  [] No      Goals:  Short Term Goals  Time Frame for Short Term Goals: 1 week  Short Term Goal 1: Start HEP (Initiated at eval)    Long Term Goals  Time Frame for Long Term Goals : 4 weeks  Long Term Goal 1: R shld pain controlled at 2/10 to allow regular ADL  Long Term Goal 2: R shld AROM flexion 170 deg, abd 160 deg, behind back to T12, behind head to T2 for efficient dressing & hygiene  Long Term Goal 3: R shld 4+/5 strength for home & work required lifting  Long Term Goal 4: Able to sleep thru 90% of the night    Plan:   [x] Continue per plan of care [] Alter current plan (see comments)  [] Plan of care initiated [] Hold pending MD visit [] Discharge  Plan for Next Session:  Monitor tolerance and advance as able.      Electronically signed by:  Sheba Gann PTA

## 2022-12-06 ENCOUNTER — HOSPITAL ENCOUNTER (OUTPATIENT)
Dept: PHYSICAL THERAPY | Age: 67
Setting detail: THERAPIES SERIES
Discharge: HOME | End: 2022-12-06
Payer: MEDICARE

## 2022-12-06 PROCEDURE — 97110 THERAPEUTIC EXERCISES: CPT

## 2022-12-06 NOTE — PROGRESS NOTES
Physical Therapy    Physical Therapy Daily Treatment Note    Date:  2022    Patient Name:  Jeff Shaw    :  1955  MRN: [id number]  Restrictions/Precautions:  Probability of partial R rot cuff tear   Medical/Treatment Diagnosis Information:   Diagnosis: M79.621 R upper arm pain. Treatment Diagnosis: R shld, rotator cuff pain & weakness  Insurance/Certification information:  PT Insurance Information: Medicare  Physician Information:   Keagan Cat NP  Plan of care signed (Y/N):    Visit# / total visits:    4/10  Pain level: 5/10       Time In: 2:33        Time Out: 3:14    Progress Note: []  Yes  [x]  No  Next due by: Visit #10 , or 22     Subjective: Pt reports shoulder is doing a little better with pain reporting 5/10. Pt reports reaching into cupboards is still biggest challenge. Objective: POLLO complete per flow chart to facilitate strength, motion and stability to allow ease with daily reaching and lifting needs. Verbal and tactile cuing for proper technique and order of exercises. Observation:   Test measurements:      Exercises:   Exercise/Equipment Resistance/Repetitions Other comments   R shld extension stretch 10x10\"    Int rotation stretch 10x10\"    B rowing/ extension 15x  RED    6-way isometric 10x5'' ball   6-way t-band 10x Yellow         Flex to 90 deg 15x    Scaption up/down 15x         Ext rotate sidely  15x    Abd in sidely 10x    Supine punch/ABC's 15x/1x                   Pulley 5'         [x] Provided verbal/tactile cueing for activities related to strengthening, flexibility, endurance, ROM. (30802)  [] Provided verbal/tactile cueing for activities related to improving balance, coordination, kinesthetic sense, posture, motor skill, proprioception. (75854)    Therapeutic Activities:     [] Therapeutic activities, direct (one-on-one) patient contact (use of dynamic activities to improve functional performance).  (12245)    Gait:   [] Provided training and instruction to the patient for ambulation re-education. (20482)    Self-Care/ADL's  [] Self-care/home management training and compensatory training, meal preparation, safety procedures, and instructions in use of assistive technology devices/adaptive equipment, direct one-on-one contact. (65880)    Home Exercise Program:   Impingement reduction with extension & int rotation stretch  [x] Reviewed/Progressed HEP activities related to strengthening, flexibility, endurance, ROM. (59518)  [] Reviewed/Progressed HEP activities related to improving balance, coordination, kinesthetic sense, posture, motor skill, proprioception.  (69145)    Manual Treatments:    [] Provided manual therapy to mobilize soft tissue/joints for the purpose of modulating pain, promoting relaxation,  increasing ROM, reducing/eliminating soft tissue swelling/inflammation/restriction, improving soft tissue extensibility.  (11052)    Service Based Modalities:      Timed Code Treatment Minutes:   39' POLLO    Total Treatment Minutes:   39'    Treatment/Activity Tolerance:  [x] Patient tolerated treatment well [] Patient limited by fatique  [] Patient limited by pain  [] Patient limited by other medical complications  [] Other:     Prognosis: [x] Good [] Fair  [] Poor    Patient Requires Follow-up: [x] Yes  [] No      Goals:  Short Term Goals  Time Frame for Short Term Goals: 1 week  Short Term Goal 1: Start HEP (Initiated at eval)    Long Term Goals  Time Frame for Long Term Goals : 4 weeks  Long Term Goal 1: R shld pain controlled at 2/10 to allow regular ADL  Long Term Goal 2: R shld AROM flexion 170 deg, abd 160 deg, behind back to T12, behind head to T2 for efficient dressing & hygiene  Long Term Goal 3: R shld 4+/5 strength for home & work required lifting  Long Term Goal 4: Able to sleep thru 90% of the night    Plan:   [x] Continue per plan of care [] Alter current plan (see comments)  [] Plan of care initiated [] Hold pending MD visit [] Discharge  Plan for Next Session:  Monitor tolerance and advance as able.      Electronically signed by:  Jania Henry PTA

## 2022-12-06 NOTE — PROGRESS NOTES
I have reviewed and agree to the content of the note written by the PTA.   Electronically signed by Tong Juarez PT [id number]

## 2022-12-06 NOTE — PROGRESS NOTES
I have reviewed and agree to the content of the note written by the PTA.   Electronically signed by Carly Rodriguez PT [id number]

## 2022-12-07 NOTE — PROGRESS NOTES
I have reviewed and agree to the content of the note written by the PTA.   Electronically signed by Yogi cMkeon PT [id number]

## 2022-12-08 ENCOUNTER — HOSPITAL ENCOUNTER (OUTPATIENT)
Dept: PHYSICAL THERAPY | Age: 67
Setting detail: THERAPIES SERIES
Discharge: HOME | End: 2022-12-08
Payer: MEDICARE

## 2022-12-08 PROCEDURE — 97110 THERAPEUTIC EXERCISES: CPT

## 2022-12-08 NOTE — PROGRESS NOTES
Physical Therapy    Physical Therapy Daily Treatment Note    Date:  2022    Patient Name:  Gunjan Zuluaga    :  1955  MRN: [id number]  Restrictions/Precautions:  Probability of partial R rot cuff tear   Medical/Treatment Diagnosis Information:   Diagnosis: M79.621 R upper arm pain. Treatment Diagnosis: R shld, rotator cuff pain & weakness  Insurance/Certification information:  PT Insurance Information: Medicare  Physician Information:   Dot Bound NP  Plan of care signed (Y/N):    Visit# / total visits:    5/10  Pain level: 10       Time In: 4:18       Time Out: 4:57    Progress Note: []  Yes  [x]  No  Next due by: Visit #10 , or 22     Subjective: Pt reports pain in shoulder is about the same and sleeping is difficult as likes to sleep on side. Objective: POLLO complete per flow chart to facilitate strength, motion and stability to allow ease with daily reaching and lifting needs. Verbal and tactile cueing for proper technique and order of exercises. Observation:   Test measurements:      Exercises:   Exercise/Equipment Resistance/Repetitions Other comments   R shld extension stretch 10x10\"    Int rotation stretch 10x10\"    B rowing/ extension 15x  RED    6-way isometric 10x5'' ball   6-way t-band 10x red         Flex to 90 deg 15x    Scaption up/down 15x         Ext rotate sidely  15x    Abd in sidely 10x    Supine punch/ABC's 15x/1x         Prone scap program 10x         Pulley 5'         [x] Provided verbal/tactile cueing for activities related to strengthening, flexibility, endurance, ROM. (68028)  [] Provided verbal/tactile cueing for activities related to improving balance, coordination, kinesthetic sense, posture, motor skill, proprioception. (80912)    Therapeutic Activities:     [] Therapeutic activities, direct (one-on-one) patient contact (use of dynamic activities to improve functional performance).  (55059)    Gait:   [] Provided training and instruction to the patient for ambulation re-education. (75694)    Self-Care/ADL's  [] Self-care/home management training and compensatory training, meal preparation, safety procedures, and instructions in use of assistive technology devices/adaptive equipment, direct one-on-one contact. (24343)    Home Exercise Program:   Impingement reduction with extension & int rotation stretch  [x] Reviewed/Progressed HEP activities related to strengthening, flexibility, endurance, ROM. (36369)  [] Reviewed/Progressed HEP activities related to improving balance, coordination, kinesthetic sense, posture, motor skill, proprioception.  (67318)    Manual Treatments:    [] Provided manual therapy to mobilize soft tissue/joints for the purpose of modulating pain, promoting relaxation,  increasing ROM, reducing/eliminating soft tissue swelling/inflammation/restriction, improving soft tissue extensibility.  (84455)    Service Based Modalities:      Timed Code Treatment Minutes:   44' POLLO    Total Treatment Minutes:   44'    Treatment/Activity Tolerance:  [x] Patient tolerated treatment well [] Patient limited by fatique  [] Patient limited by pain  [] Patient limited by other medical complications  [] Other:     Prognosis: [x] Good [] Fair  [] Poor    Patient Requires Follow-up: [x] Yes  [] No      Goals:  Short Term Goals  Time Frame for Short Term Goals: 1 week  Short Term Goal 1: Start HEP (Initiated at eval)    Long Term Goals  Time Frame for Long Term Goals : 4 weeks  Long Term Goal 1: R shld pain controlled at 2/10 to allow regular ADL  Long Term Goal 2: R shld AROM flexion 170 deg, abd 160 deg, behind back to T12, behind head to T2 for efficient dressing & hygiene (see above)  Long Term Goal 3: R shld 4+/5 strength for home & work required lifting  Long Term Goal 4: Able to sleep thru 90% of the night    Plan:   [x] Continue per plan of care [] Alter current plan (see comments)  [] Plan of care initiated [] Hold pending MD visit [] Discharge  Plan for Next Session:  Monitor tolerance and advance as able.      Electronically signed by:  Blayne Junior PTA

## 2022-12-12 ENCOUNTER — APPOINTMENT (OUTPATIENT)
Dept: PHYSICAL THERAPY | Age: 67
End: 2022-12-12
Payer: MEDICARE

## 2022-12-13 ENCOUNTER — APPOINTMENT (OUTPATIENT)
Dept: PHYSICAL THERAPY | Age: 67
End: 2022-12-13
Payer: MEDICARE

## 2022-12-14 ENCOUNTER — APPOINTMENT (OUTPATIENT)
Dept: PHYSICAL THERAPY | Age: 67
End: 2022-12-14
Payer: MEDICARE

## 2022-12-16 ENCOUNTER — HOSPITAL ENCOUNTER (OUTPATIENT)
Dept: PHYSICAL THERAPY | Age: 67
Setting detail: THERAPIES SERIES
Discharge: HOME | End: 2022-12-16
Payer: MEDICARE

## 2022-12-16 PROCEDURE — 97110 THERAPEUTIC EXERCISES: CPT

## 2022-12-16 NOTE — PROGRESS NOTES
Physical Therapy    Physical Therapy Daily Treatment Note    Date:  2022    Patient Name:  Toya Collazo    :  1955  MRN: [id number]  Restrictions/Precautions:  Probability of partial R rot cuff tear   Medical/Treatment Diagnosis Information:   Diagnosis: M79.621 R upper arm pain. Treatment Diagnosis: R shld, rotator cuff pain & weakness  Insurance/Certification information:  PT Insurance Information: Medicare  Physician Information:   Roman Overton NP  Plan of care signed (Y/N):    Visit# / total visits:    6/10  Pain level: 6/10       Time In: 10:22      Time Out: 11:00    Progress Note: []  Yes  [x]  No  Next due by: Visit #10 , or 22     Subjective: Pt late to session. Objective: POLLO complete per flow chart to facilitate strength, motion and stability to allow ease with daily reaching and lifting needs. Verbal and tactile cueing for proper technique and order of exercises. Observation:   Test measurements:      Exercises:   Exercise/Equipment Resistance/Repetitions Other comments   R shld extension stretch 10x10\"    Int rotation stretch 10x10\"    B rowing/ extension 15x  RED    6-way isometric 10x5'' ball   6-way t-band 10x red    Finger ladder 5x2 Fwd/lat   Flex to 90 deg 15x    Scaption up/down 15x         Ext rotate sidely  15x    Abd in sidely 15x    Supine punch/ABC's 15x/1x         Prone scap program 10x         Pulley 5'         [x] Provided verbal/tactile cueing for activities related to strengthening, flexibility, endurance, ROM. (39734)  [] Provided verbal/tactile cueing for activities related to improving balance, coordination, kinesthetic sense, posture, motor skill, proprioception. (62053)    Therapeutic Activities:     [] Therapeutic activities, direct (one-on-one) patient contact (use of dynamic activities to improve functional performance). (93037)    Gait:   [] Provided training and instruction to the patient for ambulation re-education.  (02663)    Self-Care/ADL's  [] Self-care/home management training and compensatory training, meal preparation, safety procedures, and instructions in use of assistive technology devices/adaptive equipment, direct one-on-one contact. (12906)    Home Exercise Program:   Impingement reduction with extension & int rotation stretch  [x] Reviewed/Progressed HEP activities related to strengthening, flexibility, endurance, ROM. (42383)  [] Reviewed/Progressed HEP activities related to improving balance, coordination, kinesthetic sense, posture, motor skill, proprioception.  (52360)    Manual Treatments:    [] Provided manual therapy to mobilize soft tissue/joints for the purpose of modulating pain, promoting relaxation,  increasing ROM, reducing/eliminating soft tissue swelling/inflammation/restriction, improving soft tissue extensibility. (68788)    Service Based Modalities:      Timed Code Treatment Minutes:   45' POLLO    Total Treatment Minutes:   45'    Treatment/Activity Tolerance:  [x] Patient tolerated treatment well [] Patient limited by fatique  [] Patient limited by pain  [] Patient limited by other medical complications  [] Other:     Prognosis: [x] Good [] Fair  [] Poor    Patient Requires Follow-up: [x] Yes  [] No      Goals:  Short Term Goals  Time Frame for Short Term Goals: 1 week  Short Term Goal 1: Start HEP (Initiated at eval)    Long Term Goals  Time Frame for Long Term Goals : 4 weeks  Long Term Goal 1: R shld pain controlled at 2/10 to allow regular ADL  Long Term Goal 2: R shld AROM flexion 170 deg, abd 160 deg, behind back to T12, behind head to T2 for efficient dressing & hygiene (see above)  Long Term Goal 3: R shld 4+/5 strength for home & work required lifting  Long Term Goal 4: Able to sleep thru 90% of the night    Plan:   [x] Continue per plan of care [] Alter current plan (see comments)  [] Plan of care initiated [] Hold pending MD visit [] Discharge  Plan for Next Session:  Monitor tolerance and advance as able. Electronically signed by:  Kari Chun, PTA

## 2022-12-19 ENCOUNTER — HOSPITAL ENCOUNTER (OUTPATIENT)
Dept: PHYSICAL THERAPY | Age: 67
Setting detail: THERAPIES SERIES
End: 2022-12-19
Payer: MEDICARE

## 2022-12-19 NOTE — PROGRESS NOTES
I have reviewed and agree to the content of the note written by the PTA.   Electronically signed by Madelyn Adams PT [id number]

## 2022-12-21 ENCOUNTER — HOSPITAL ENCOUNTER (OUTPATIENT)
Dept: PHYSICAL THERAPY | Age: 67
Setting detail: THERAPIES SERIES
Discharge: HOME | End: 2022-12-21
Payer: MEDICARE

## 2022-12-21 NOTE — PROGRESS NOTES
Physical Therapy  Outpatient Physical Therapy    [] [place]  Phone: [phone]  Fax: [phone]      [] [place]  Phone: [phone]  Fax: [phone]    Physical Therapy  Cancellation/No-show Note  Patient Name:  Elvie Rg  :  1955   Date:  2022  Cancelled visits to date: 1  No-shows to date: 0    For today's appointment patient:  [x]  Cancelled  []  Rescheduled appointment  []  No-show     Reason given by patient:  []  Patient ill  []  Conflicting appointment  []  No transportation    []  Conflict with work  []  No reason given  []  Other:     Comments:  Patient showed to session with new cancer diagnosis. Decided to hold today's session until patient returns to her doctor for further information next Friday. Patient will be placed on hold until doctor advises whether PT is to be done with new diagnosis.      Electronically signed by: Sharon Mcbride PTA

## 2022-12-27 ENCOUNTER — APPOINTMENT (OUTPATIENT)
Dept: PHYSICAL THERAPY | Age: 67
End: 2022-12-27
Payer: MEDICARE

## 2022-12-30 ENCOUNTER — APPOINTMENT (OUTPATIENT)
Dept: PHYSICAL THERAPY | Age: 67
End: 2022-12-30
Payer: MEDICARE

## 2023-02-14 ENCOUNTER — TRANSFERRED RECORDS (OUTPATIENT)
Dept: HEALTH INFORMATION MANAGEMENT | Facility: CLINIC | Age: 68
End: 2023-02-14

## 2023-03-07 DIAGNOSIS — I75.023 BLUE TOE SYNDROME OF BOTH LOWER EXTREMITIES (H): ICD-10-CM

## 2023-03-07 RX ORDER — APIXABAN 5 MG/1
TABLET, FILM COATED ORAL
Qty: 60 TABLET | Refills: 0 | Status: SHIPPED | OUTPATIENT
Start: 2023-03-07 | End: 2023-03-08

## 2023-03-07 NOTE — TELEPHONE ENCOUNTER
apixaban ANTICOAGULANT (ELIQUIS) 5 MG tablet  Last Written Prescription Date:  2/15/22  Last Fill Quantity: 60,  # refills: 11     Last office visit: 4/6/2022     Future Office Visit:   Next 5 appointments (look out 90 days)    Mar 08, 2023  8:10 AM  Return Visit with Wilner Garcia MD  [facility] ([facility] ) [address] [place] [id number] [phone]         Unable to fill per [facility] [place].  Medication and pharmacy loaded.     Direct Oral Anticoagulant Agents Failed 03/07/2023 03:18 PM   Protocol Details  Normal Platelets on file in past 12 months    Serum creatinine less than or equal to 1.4 on file in past 12 mos    Weight is greater than 60 kg for the past year

## 2023-03-08 ENCOUNTER — OFFICE VISIT (OUTPATIENT)
Dept: OTHER | Facility: CLINIC | Age: 68
End: 2023-03-08
Attending: INTERNAL MEDICINE
Payer: COMMERCIAL

## 2023-03-08 VITALS
HEIGHT: 61 IN | SYSTOLIC BLOOD PRESSURE: 128 MMHG | HEART RATE: 75 BPM | BODY MASS INDEX: 22.36 KG/M2 | DIASTOLIC BLOOD PRESSURE: 78 MMHG | WEIGHT: 118.4 LBS | OXYGEN SATURATION: 99 %

## 2023-03-08 DIAGNOSIS — I73.00 RAYNAUD'S DISEASE WITHOUT GANGRENE: Primary | ICD-10-CM

## 2023-03-08 DIAGNOSIS — I75.023 BLUE TOE SYNDROME OF BOTH LOWER EXTREMITIES (H): ICD-10-CM

## 2023-03-08 DIAGNOSIS — E78.5 HYPERLIPIDEMIA LDL GOAL <130: ICD-10-CM

## 2023-03-08 PROCEDURE — 99214 OFFICE O/P EST MOD 30 MIN: CPT | Performed by: INTERNAL MEDICINE

## 2023-03-08 PROCEDURE — G0463 HOSPITAL OUTPT CLINIC VISIT: HCPCS

## 2023-03-08 RX ORDER — AMLODIPINE BESYLATE 2.5 MG/1
2.5 TABLET ORAL DAILY
Qty: 90 TABLET | Refills: 3 | Status: SHIPPED | OUTPATIENT
Start: 2023-03-08 | End: 2024-01-29

## 2023-03-08 RX ORDER — ATORVASTATIN CALCIUM 20 MG/1
20 TABLET, FILM COATED ORAL DAILY
Qty: 90 TABLET | Refills: 3 | Status: SHIPPED | OUTPATIENT
Start: 2023-03-08 | End: 2024-01-29

## 2023-03-08 RX ORDER — SILDENAFIL CITRATE 20 MG/1
10 TABLET ORAL 3 TIMES DAILY
Qty: 135 TABLET | Refills: 3 | Status: SHIPPED | OUTPATIENT
Start: 2023-03-08 | End: 2024-01-29

## 2023-03-08 NOTE — PROGRESS NOTES
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              HPI: Brissa Mayer  is a 68-year-old lifetime nonsmoking white female who in 2021 developed blue toes on her right foot then followed by the left.  They were slow to heal but have in fact healed since then. She said some of the tips turned black but then healed.  They were also quite painful at the time.  She denies any irregular heartbeat, frostbite or cold exposure.  She did have a cardiac echo when she was in her 40s and was told she had a murmur. She denies any rest pain, claudication, stroke or TIAs.  She now presents to me for her toe pain and possible ischemia. She has previously been seen by Sree Henry and [facility] with imaging to date revealing no atheroembolic focus on CTA C/A/P, no cardioembolic focus on TTE, and no significant ectopy on a ZioPatch. There was a single four beat run of VT, and runs of PSVT, with the longest run lasting 8 beats. She had been empirically treated with Eliquis AC, and Revatio to act as a vasodilator. She states those agents have helped her sxs. She has had worsening flow noted in her toes on duplex, and she was therefore sent to me to address a possible autoimmune mediated vasospastic component. She thinks she may have had COVID last year. She hasn't felt well since then. Breathing is with ongoing difficulty and she is using inhaler more often. Weight loss of 35 lbs unintentional, poor appetite, despite being normal weight at baseline. Having a lot of night sweats. No ongoing fevers/chills. Her mother  of Wegener's.     When last seen, I stated we should attempt to rule out an autoimmune mediated component. We checked labs revealing that her JAUN is positive at 1:160 in a dense fine speckled pattern. However, negative or  normal studies included ESR and CRP, Ro, La, Scleroderma, RF, CCP, histone, centromere, DS DNA Abs.     Since last having been seen, she stopped Eliquis and Revatio as she did not like how they made her feel. She has had complete resolution of sxs. She has no arthralgias or myalgias. She did stub her toe and noted that her nail on that toe became black and blue but that the toe itself is otherwise normal.     At her last visit, no atheroembolic or cardioembolic focus was notable on imaging as delineated below. Additionally all autoimmune labs other than JAUN were normal. Off of Eliquis and Revatio,  she now c/o recurrent sxs with painful toes which were dusky in appearance and developed purple to black splotches at the tips of the toes. These improved witht he reinstitution of those meds.    She has recently in the cooler weather experienced recurrent discoloration, numbness and tingling in her toes, which has run in parallel with cooler weather.                  Review Of Systems  Skin: negative  Eyes: negative  Ears/Nose/Throat: negative  Respiratory: No shortness of breath, dyspnea on exertion, cough, or hemoptysis  Cardiovascular: as above  Gastrointestinal: negative  Genitourinary: negative  Musculoskeletal: as above  Neurologic: negative  Psychiatric: negative  Hematologic/Lymphatic/Immunologic: negative  Endocrine: negative           PAST MEDICAL HISTORY:                  Past Medical History        Past Medical History:   Diagnosis Date     Anxiety       Arthritis       Asthma, mild intermittent       Migraines       Tension headaches       Unspecified hypothyroidism              PAST SURGICAL HISTORY:                  Past Surgical History         Past Surgical History:   Procedure Laterality Date     BUNIONECTOMY         COLONOSCOPY         ESOPHAGOSCOPY, GASTROSCOPY, DUODENOSCOPY (EGD), COMBINED N/A 4/2/2019     Procedure: ESOPHAGOSCOPY, GASTROSCOPY, DUODENOSCOPY (EGD);  Surgeon: Ochoa Cherry,  DO;  Location:  GI     OTHER SURGICAL HISTORY   1980     Bunionectomy            CURRENT MEDICATIONS:                  Current Outpatient Prescriptions          Current Outpatient Medications   Medication Sig Dispense Refill     albuterol (VENTOLIN HFA) 108 (90 Base) MCG/ACT inhaler INHALE TWO PUFFS BY MOUTH EVERY 6 HOURS AS NEEDED FOR SHORTNESS OF BREATH/DYSPNEA 18 g 3     atorvastatin (LIPITOR) 20 MG tablet Take 1 tablet (20 mg) by mouth daily 90 tablet 3     fluticasone (FLONASE) 50 MCG/ACT nasal spray Spray 2 sprays into both nostrils daily 1 Package 1     fluticasone-salmeterol (ADVAIR DISKUS) 250-50 MCG/DOSE inhaler INHALE ONE PUFF BY MOUTH TWICE A  each 3     levothyroxine (SYNTHROID/LEVOTHROID) 50 MCG tablet TAKE ONE TABLET BY MOUTH ONCE DAILY 90 tablet 3     PARoxetine (PAXIL-CR) 37.5 MG 24 hr tablet TAKE ONE TABLET BY MOUTH EVERY MORNING 90 tablet 0     meclizine (ANTIVERT) 25 MG tablet Take 1 tablet (25 mg) by mouth every 6 hours as needed for dizziness 15 tablet 1            ALLERGIES:                        Allergies   Allergen Reactions     Compazine         Anxiety     Flagyl [Metronidazole Hcl] Nausea and Vomiting         SOCIAL HISTORY:                  Social History   Social History            Socioeconomic History     Marital status: Single       Spouse name: Not on file     Number of children: Not on file     Years of education: Not on file     Highest education level: Not on file   Occupational History     Not on file   Tobacco Use     Smoking status: Never Smoker     Smokeless tobacco: Never Used   Vaping Use     Vaping Use: Never used   Substance and Sexual Activity     Alcohol use: Yes       Comment: wine - 2-3 times per week (1 glass)     Drug use: No     Sexual activity: Not Currently       Partners: Male       Birth control/protection: None   Other Topics Concern     Parent/sibling w/ CABG, MI or angioplasty before 65F 55M? No   Social History Narrative     Not on file       Social Determinants of Health      Financial Resource Strain: Not on file   Food Insecurity: Not on file   Transportation Needs: Not on file   Physical Activity: Not on file   Stress: Not on file   Social Connections: Not on file   Intimate Partner Violence: Not on file   Housing Stability: Not on file            FAMILY HISTORY:                   Family History         Family History   Problem Relation Age of Onset     Asthma Mother       Diabetes Mother       Cancer - colorectal Maternal Grandmother       Heart Disease Father           MI at age 55     Prostate Cancer Father       Asthma Father       Asthma Sister       Obesity Sister       Diabetes Sister       Coronary Artery Disease Sister       Asthma Sister       Obesity Sister       Asthma Sister       Asthma Brother                       Physical exam Reveals:     O/P: WNL  HEENT: WNL  NECK: No JVD, thyromegaly, or lymphadenopathy  HEART: RRR, no murmurs, gallops, or rubs  LUNGS: CTA bilaterally without rales, wheezes, or rhonchi  GI: NABS, nondistended, nontender, soft  EXT:without cyanosis, clubbing, or edema; three plus DP and PT pulses; multiple blue toes , better than previously noted.  NEURO: nonfocal  : no flank tenderness                 PPGs of bilateral lower extremity digits dated 9/30/21     Clinical history: 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 Canas     Technique: PPG waveforms were obtained with a sensor and infrared  light.     Findings:     Right foot:  First toe: Present, Normal  Second toe: Present, Severely abnormal  Third toe: Present, Severely abnormal  Fourth toe: Present, Moderately abnormal  Fifth toe: Present, Moderately abnormal     Left foot:  First toe: Present,Severely abnormal  Second toe: Present, Moderately abnormal  Third toe: Present, Moderately abnormal  Fourth toe: Present, Severely abnormal  Fifth toe: Present, Normal                                                                        Impression:  1. Abnormal PPG waveforms again noted. On side-by side comparison, the  biggest changes seen are in the left first digit, which is now  severely abnormal, compared to moderately on prior study, and the left  5th digit, which is now normal and was moderately abnormal.      HOA WESTON MD            CT ABDOMEN PELVIS WITH CONTRAST 9/2/2021 1:41 PM     CLINICAL HISTORY: Abdominal pain, acute, nonlocalized. Had noncontrast  CT yesterday, pain worse today. Abdominal pain, unspecified abdominal  location.     TECHNIQUE: CT scan of the abdomen and pelvis was performed following  injection of IV contrast. Multiplanar reformats were obtained. Dose  reduction techniques were used.  CONTRAST: 50mL, Isovue 370     COMPARISON: CT abdomen and pelvis without IV contrast dated 9/1/2021  and 4/26/2018, CT abdomen and pelvis with IV contrast dated  12/16/2015.     FINDINGS:   LOWER CHEST: Visualized portions of the lung bases and mediastinal  contents are grossly unremarkable. There are thoracic aortic  calcifications.     HEPATOBILIARY: Questionable subtle bubble of gas is seen in the common  bile duct in the head of the pancreas indicating possible partially  sphincterotomy. Recommend clinical correlation. No definite mass in  the head of the pancreas or choledocholithiasis is seen. There may be  minimal gallbladder wall thickening. No definite cholelithiasis is  identified. No pericholecystic fluid is seen. Liver enhances normally  without focal lesion, intrahepatic biliary ductal dilatation or  choledocholithiasis.     PANCREAS: Normal.     SPLEEN: Normal.     ADRENAL GLANDS: Normal.     KIDNEYS/BLADDER: Fat-containing lesion inferior pole left kidney is  most consistent with adrenal angiomyolipoma, a benign lesion. This is  stable as compared to the prior study. The kidneys otherwise enhance  normally. No hydronephrosis, nephrolithiasis,  hydroureter, or ureteral  calculus is identified. Ureters are difficult to follow due to the  relative lack of intraperitoneal adipose tissue. Urinary bladder is  grossly unremarkable.     BOWEL: The colon is difficult to follow given the lack of  intraperitoneal adipose tissue. No definite pericolonic inflammatory  changes to suggest acute diverticulitis. Appendix is not well seen. No  pericecal inflammatory change to suggest acute appendicitis. There is  stranding slightly increased density in the peritoneal adipose tissues  around the anterior aspect of the mid to lower abdomen, more so on the  right. This is of uncertain clinical significance and etiology but  could represent mesenteritis. Small bowel appears grossly of normal  caliber. There is abnormal thickening of the wall of the gastric  antrum/pyloric/proximal duodenal region which could represent an  inflammatory process such as gastritis or ulcer disease. No obvious  perforated ulcer is identified. There is no free air or free fluid  around this region. The stomach otherwise contains a large amount of  ingested material and moderate amount of air.     PELVIC ORGANS: Enhancing structure just posterior to the urinary  bladder in the deep pelvis likely represents an atrophic uterus and is  not appreciably changed since the prior study dated 12/16/2015. The  ovaries are not well seen. No adnexal masses are identified.     ADDITIONAL FINDINGS: Incidental note of circumaortic left renal vein,  a normal variant. There is nonaneurysmal atherosclerosis. No definite  adenopathy, free air is identified. There is trace free fluid in the  pelvis.     MUSCULOSKELETAL: No aggressive osseous lesions or acute osseous  fractures are identified. There are degenerative changes in the spine,  mostly in the facet joints of the lower lumbar spine. There is also  disc height loss at L4-L5 and L5-S1. Mild anterolisthesis of L5 on S1  is degenerative in  etiology.                                                                      IMPRESSION:   1.  Abnormal thickening of the wall the gastric  antrum/pylorus/proximal duodenum could represent an inflammatory or  infectious process (gastritis and possible ulcer disease) versus less  likely neoplastic infiltration. Recommend clinical correlation.  2.  Slightly increased density in the peritoneal adipose tissues  throughout the mid to lower abdomen and pelvis and slightly more so on  the right could represent mesenteritis. There is a relative lack of  intraperitoneal adipose tissue limiting evaluation of the hollow and  solid organs of the abdomen and pelvis. The amount of intraperitoneal  adipose has decreased since the prior studies from 2018 and 2015.  3.  Appendix is not definitely seen. No obvious evidence for acute  appendicitis is identified. Appendicitis cannot be excluded on the  basis of this study.  4.  Stable angiomyolipoma inferior left kidney is again noted.  5.  Small bubble of gas in the distal common bile duct could be from  prior sphincterotomy. Recommend clinical correlation.  6.  Relative lack of intraperitoneal adipose tissue limits evaluation  of the bowel and other structures of the abdomen and pelvis.     I discussed the thickening of the wall of the gastric  antrum/pylorus/proximal duodenum, slightly increased density in the  peroneal adipose tissues, and lack of other obvious etiology of  patient's symptoms with Dr. Cherry on 9/2/2021 at 1:52 PM. We also  discussed the limitations of this study due to the relative lack of  intraperitoneal adipose tissue.     DINORA MUSTAFA MD            CT ABDOMEN/PELVIS WITHOUT CONTRAST September 1, 2021 9:55 AM     CLINICAL HISTORY: Right lower quadrant abdominal pain. No history of  surgery or cancer.     TECHNIQUE: CT scan of the abdomen and pelvis was performed without IV  contrast. Multiplanar reformats were obtained. Dose reduction  techniques were  used.  CONTRAST: None.     COMPARISON: CT abdomen/pelvis dated 12/16/2015. CT chest dated  3/22/2021. More recent CT abdomen/pelvis from 4/26/2008 is not  available.     FINDINGS: Relative lack of intraperitoneal adipose tissue limits this  evaluation. Lack of IV contrast limits evaluation of the solid organs  of the abdomen and pelvis.     LOWER CHEST: Single bulla is seen in the right lower lung lobe.  Visualized portions of the lung bases are otherwise unremarkable.  There are aortic calcifications. Visualized mediastinal contents are  otherwise unremarkable.     HEPATOBILIARY: Normal.     PANCREAS: Normal.     SPLEEN: Normal.     ADRENAL GLANDS: Normal.     KIDNEYS/BLADDER: Fatty lesion in the inferior pole of the left kidney  measures up to 1.2 cm in diameter and is most consistent with a benign  renal angiomyolipoma. The kidneys are otherwise normal in appearance  for noncontrast CT. No hydronephrosis, nephrolithiasis, hydroureter or  ureteral calculus is identified. Ureters are very difficult to follow  due to relative lack of intraperitoneal adipose tissues. Urinary  bladder is grossly unremarkable.     BOWEL: The colon is grossly of normal appearance. Moderate amount of  stool is seen in the colon. Appendix is not well seen. No pericecal  inflammatory change to suggest acute appendicitis. Evaluation of the  bowel is limited due to lack of intraperitoneal adipose tissue and  lack of IV contrast. Small bowel is grossly of normal caliber. Stomach  contains a small amount of fluid and air. Gastric wall appears mildly  thickened which is likely due to incomplete distention.     LYMPH NODES: No lymphadenopathy is identified.     VASCULATURE: There is nonaneurysmal atherosclerosis.     PELVIC ORGANS: Structure which could represent an atrophic uterus is  seen just posterior to the urinary bladder. Ovaries are not well seen.  No obvious adnexal mass is identified.     OTHER: There appears to be trace free fluid in  the pelvis. No free air  is identified in the peritoneal cavity.     MUSCULOSKELETAL: Degenerative changes are noted in the spine. No  aggressive osseous lesions or acute osseous fractures.                                                                      IMPRESSION:   1.  This study is significantly limited due to the relative lack of  intraperitoneal adipose tissue and also due to the lack of IV  contrast.  2.  No diverticulitis, urinary system calculus, urinary system  obstruction, or bowel obstruction is seen. Appendix is not definitely  identified and no definite evidence for appendicitis is seen.  Appendicitis cannot be excluded due to the limitations of this study.     I discussed the significant limitations of this study as well as the  lack of obvious etiology for patient's symptoms with Dr. Lentz on  9/1/2021 at approximately 11:30 AM, when he became available.     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     Ordering Physician: Dr. Canas     Technique: PPG waveforms were obtained with a sensor and infrared  light.     Findings:     Right foot:  First toe: Present, Normal  Second toe: Present, Moderately abnormal  Third toe: Present, Severely abnormal  Fourth toe: Present, Mildly abnormal  Fifth toe: Present, Moderately abnormal     Left foot:  First toe: Present, Moderately abnormal  Second toe: Present, Moderately abnormal  Third toe: Present, Severely abnormal  Fourth toe: Present, Severely abnormal  Fifth toe: Present, Moderate-severely abnormal                                                                      Impression:  1. Abnormal PPG waveforms in Right lower extremity digits 2-5, and  Left lower extremity digits 1-5.       MICHAELA REEDER MD         Duplex ultrasound of bilateral lower extremity arteries dated  7/30/2021 9:52 AM      Clinical information : Blue toe  syndrome of both lower extremities (H)        Comparison: none                                                                      Impression:   1. Right lower extremity: Normal arterial velocities suggesting no  hemodynamically significant stenosis.   2. Left lower extremity: Normal arterial velocities suggesting no  hemodynamically significant stenosis.      Findings:      Right lower extremity:      Common femoral artery: 99/8 cm/sec.  Deep femoral artery: 91/11 cm/sec.  Proximal SFA: 77/1 cm/sec.  Mid SFA: 76/1 cm/sec.  Distal SFA: 61/1 cm/sec.  Popliteal artery: 32/1 cm/sec.  Anterior tibial artery: 40/1 cm/sec.  Peroneal artery: 44/3 cm/sec.  PTA ankle: 65/1 cm/sec.  Waveforms are triphasic in the CFA to the proximal SFA, and are  biphasic from the mid SFA to the distal peroneal artery.      Left lower extremity:     Common femoral artery: 105/4 cm/sec.  Deep femoral artery: 89/7 cm/sec.  Proximal SFA: 79/2 cm/sec.  Mid SFA: 83/1 cm/sec.  Distal SFA: 74/1 cm/sec.  Popliteal artery: 40/4 cm/sec.  Anterior tibial: 53/1 cm/sec.  Peroneal artery: 28/1 cm/sec.  PTA ankle: 81/1 cm/sec.  Waveforms are triphasic in the CFA to the mid SFA, and are biphasic  from the distal SFA to the distal peroneal artery.      MICHAELA REEDER MD         [id number]  [id number]  [id number]  [id number]^TIERNEY^STEFANO     [facility]  Echocardiography Laboratory  [address] [place] [address]     Name: NISHA NIEVES  MRN: [id number]  : 1955  Study Date: 05/10/2021 10:07 AM  Age: 66 yrs  Gender: Female  Patient Location: [facility]  Reason For Study: Embolic disease of toe (H)  History: Hyperlipidemia,Asthma  Ordering Physician: STEFANO MONET  Referring Physician: Fuentes Darby  Performed By: Sharifa Hernandez     BSA: 1.4 m2  Height: 61 in  Weight: 100 lb  HR: 60  BP: 146/80 mmHg  ______________________________________________________________________________  Procedure  Complete Echo  Adult.  ______________________________________________________________________________  Interpretation Summary     1. The left ventricle is normal in structure, function and size. The visual  ejection fraction is estimated at 65%.  2. The right ventricle is normal in structure, function and size.  3. No valve disease.     No previous echo for comparison.  ______________________________________________________________________________  Left Ventricle  The left ventricle is normal in structure, function and size. There is normal  left ventricular wall thickness. The visual ejection fraction is estimated at  65%. Left ventricular diastolic function is normal. Normal left ventricular  wall motion.     Right Ventricle  The right ventricle is normal in structure, function and size.     Atria  Normal left atrial size. Right atrial size is normal. There is no atrial shunt  seen.     Mitral Valve  There is mild (1+) mitral regurgitation.     Tricuspid Valve  There is mild (1+) tricuspid regurgitation. The right ventricular systolic  pressure is approximated at 20.4 mmHg plus the right atrial pressure.     Aortic Valve  There is trace aortic regurgitation.     Pulmonic Valve  The pulmonic valve is normal in structure and function.     Vessels  Normal ascending, transverse (arch), and descending aorta. The inferior vena  cava was normal in size with preserved respiratory variability.     Pericardium  There is no pericardial effusion.     Rhythm  Sinus rhythm was noted.  ______________________________________________________________________________  MMode/2D Measurements & Calculations  IVSd: 0.70 cm     LVIDd: 3.8 cm  LVIDs: 2.8 cm  LVPWd: 0.80 cm  FS: 26.3 %  LV mass(C)d: 78.8 grams  LV mass(C)dI: 56.0 grams/m2  Ao root diam: 3.1 cm  LA dimension: 3.0 cm  asc Aorta Diam: 2.9 cm  LA/Ao: 0.97  RWT: 0.42     Doppler Measurements & Calculations  MV E max nathan: 72.0 cm/sec  MV A max nathan: 57.8 cm/sec  MV E/A: 1.2  MV dec slope: 273.0  cm/sec2  MV dec time: 0.26 sec  TR max nathan: 226.0 cm/sec  TR max P.4 mmHg  E/E' av.7  Lateral E/e': 8.9  Medial E/e': 8.5     ______________________________________________________________________________  Report approved by: Heena Isaac 05/10/2021 02:00 PM                 CTA CHEST, ABDOMEN, PELVIS RUNOFF WITH CONTRAST  3/22/2021 10:26 AM      HISTORY:  Blue toe syndrome. Concern for distal emboli. Evaluate for  embolic source.     COMPARISON: CT of the chest dated 2011     TECHNIQUE: CT angiogram of the chest, abdomen and pelvis with  bilateral lower extremity runoff was performed following the  administration of 100 cc intravenous contrast. Images are reviewed in  multiple planes and 3-D reconstructions were also performed.    Radiation dose for this scan was reduced using automated exposure  control, adjustment of the mA and/or kV according to patient size, or  iterative reconstruction technique.     FINDINGS:      Chest: The thoracic aorta at the level of the sinuses of Valsalva has  a maximum diameter of approximately 3.1 cm. The ascending thoracic  aorta has a maximum diameter of approximately 3.6 cm. The aorta then  tapers at the level of the thoracic aortic arch. The descending  thoracic aorta is of normal caliber without aneurysmal dilatation or  significant stenosis. There is no significant soft plaque in the  thoracic aorta. The great vessels arising from the thoracic aortic  arch are patent without significant stenoses.     Abdomen/pelvis: There is minimal scattered calcified plaque in the  abdominal aorta. No evidence for an aneurysmal dilatation or  significant stenosis. The celiac trunk, superior mesenteric artery,  renal arteries, and inferior mesenteric artery are patent without  significant stenoses.     The iliac arteries are patent without significant stenoses.     Right lower extremity angiogram:  Common femoral artery: Ectatic with a maximum diameter of 10 mm.  No  significant stenosis.  Profunda femoris artery: No significant stenosis.  Superficial femoral artery: No significant stenosis.  Popliteal artery: No significant stenosis.  Tibial arteries: Three-vessel runoff. No significant stenosis.     Left lower extremity angiogram:  Common femoral artery: No significant stenosis.  Profunda femoris artery: No significant stenosis.  Superficial femoral artery: No significant stenosis.  Popliteal artery: No significant stenosis.  Tibial arteries: Three-vessel runoff. No significant stenosis.     Soft tissues:     Chest: Right apical scarring. 7 mm nodule in the right upper lobe.     Abdomen/pelvis: There is a 2.5 x 1.8 cm angiomyolipoma at the lower  pole of the left kidney. Remaining solid organs in the abdomen appear  grossly unremarkable.     The bowel appears grossly unremarkable.                                                                      IMPRESSION:  1. Mild aneurysmal dilatation of the ascending thoracic aorta which  has a maximum diameter of approximately 3.6 cm.  2. No significant soft plaque in the arteries of the chest, abdomen,  pelvis and lower extremities that could serve as a source for distal  emboli.  3. 2.7 cm angiomyolipoma at the lower pole of the left kidney.  Consider urological follow-up and follow-up CT scan in one year to  monitor for any increase in size.     JEANNE HEARN MD                    Component      Latest Ref Rng & Units 11/3/2021   JAUN interpretation      Negative Positive (A)   JAUN pattern 1       Dense fine speckled   JAUN titer 1       1:160   Neutrophil Cytoplasmic Antibody      <1:10 <1:10   Neutrophil Cytoplasmic Antibody Pattern       The ANCA IFA is <1:10.  No further testing will be performed.   SSA Cassie IgG Instrument Value      <7.0 U/mL <0.5   SSA (Ro) Antibody IgG      Negative Negative   SSB Cassie IgG Instrument Value      <7.0 U/mL <0.6   SSB (La) Antibody IgG      Negative Negative   Scl-70 Cassie IgG Instrument  Value      <7.0 U/mL 0.8   Scleroderma Antibody Scl-70 EZEQUIEL IgG      Negative Negative   Centromere Cassie IgG Instrument Value      <7.0 U/mL <0.7   Centromere Antibody IgG      Negative Negative   Mayer EZEQUIEL Cassie IgG Instrument Value      <7.0 U/mL 2.5   Smith EZEQUIEL Antibody IgG      Negative Negative   Sed Rate      0 - 30 mm/hr 8   CRP Inflammation      0.0 - 8.0 mg/L <2.9   Rheumatoid Factor      <20 IU/mL 8   Cyclic Citrullinated Peptide Antibody, IgG      <7.0 U/mL 2.4   Histone Antibody,IgG      0.0 - 0.9 Units 0.3   DNA-ds      <10.0 IU/mL 1.6   Complement, Total, S      38.7 - 89.9 U/mL     Complement C3      81 - 157 mg/dL     Complement C4      13 - 39 mg/dL        Component      Latest Ref Rng & Units 12/9/2021   JAUN interpretation      Negative     JAUN pattern 1           JAUN titer 1           Neutrophil Cytoplasmic Antibody      <1:10     Neutrophil Cytoplasmic Antibody Pattern           SSA Cassie IgG Instrument Value      <7.0 U/mL     SSA (Ro) Antibody IgG      Negative     SSB Cassie IgG Instrument Value      <7.0 U/mL     SSB (La) Antibody IgG      Negative     Scl-70 Cassie IgG Instrument Value      <7.0 U/mL     Scleroderma Antibody Scl-70 EZEQUIEL IgG      Negative     Centromere Cassie IgG Instrument Value      <7.0 U/mL     Centromere Antibody IgG      Negative     Mayer EZEQUIEL Cassie IgG Instrument Value      <7.0 U/mL     Smith EZEQUIEL Antibody IgG      Negative     Sed Rate      0 - 30 mm/hr     CRP Inflammation      0.0 - 8.0 mg/L     Rheumatoid Factor      <20 IU/mL     Cyclic Citrullinated Peptide Antibody, IgG      <7.0 U/mL     Histone Antibody,IgG      0.0 - 0.9 Units     DNA-ds      <10.0 IU/mL     Complement, Total, S      38.7 - 89.9 U/mL 53.1   Complement C3      81 - 157 mg/dL 92   Complement C4      13 - 39 mg/dL 18                            A/P:           (I73.00) Raynaud's disease without gangrene  (primary encounter diagnosis)  Comment: Start amlodipine, check below labs on 3/10/23, RTC six weeks to go voer  lab results and discuss response to amlodipine, undertake thermal protection strategies.  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 Her sxs improved with Revatio and Eliquis, so we will continue those for now. During warmer months, and with her now being three years remote form COVID, we will consider cessation of these agents during upcoming warmer weather.   Plan: apixaban ANTICOAGULANT (ELIQUIS ANTICOAGULANT)         5 MG tablet, amLODIPine (NORVASC) 2.5 MG         tablet, Comprehensive metabolic panel,         sildenafil (REVATIO) 20 MG tablet       (E78.5) Hyperlipidemia LDL goal <130  Comment:Check the below  Plan: atorvastatin (LIPITOR) 20 MG tablet, C-Reactive        Protein, High Sensitivity, Lipoprotein (a),         LipoFit by NMR, Comprehensive metabolic panel            31 minutes total care on today's date.

## 2023-03-08 NOTE — PROGRESS NOTES
"Patient is here to discuss follow up    /78 (BP Location: Right arm, Patient Position: Chair, Cuff Size: Adult Regular)   Pulse 75   Ht 5' 1\" (1.549 m)   Wt 118 lb 6.4 oz (53.7 kg)   LMP  (LMP Unknown)   SpO2 99%   BMI 22.37 kg/m      Questions patient would like addressed today are: N/A.    Refills are needed: No N/A    Has homecare services and agency name:  Myra GARBER"

## 2023-03-10 ENCOUNTER — LAB (OUTPATIENT)
Dept: LAB | Facility: CLINIC | Age: 68
End: 2023-03-10
Payer: COMMERCIAL

## 2023-03-10 DIAGNOSIS — E78.5 HYPERLIPIDEMIA LDL GOAL <130: ICD-10-CM

## 2023-03-10 DIAGNOSIS — I75.023 BLUE TOE SYNDROME OF BOTH LOWER EXTREMITIES (H): ICD-10-CM

## 2023-03-10 DIAGNOSIS — I73.00 RAYNAUD'S DISEASE WITHOUT GANGRENE: ICD-10-CM

## 2023-03-10 LAB
ALBUMIN SERPL BCG-MCNC: 4.2 G/DL (ref 3.5–5.2)
ALP SERPL-CCNC: 68 U/L (ref 35–104)
ALT SERPL W P-5'-P-CCNC: 18 U/L (ref 10–35)
ANION GAP SERPL CALCULATED.3IONS-SCNC: 7 MMOL/L (ref 7–15)
APO A-I SERPL-MCNC: 25 MG/DL
AST SERPL W P-5'-P-CCNC: 25 U/L (ref 10–35)
BILIRUB SERPL-MCNC: 0.3 MG/DL
BUN SERPL-MCNC: 13.1 MG/DL (ref 8–23)
CALCIUM SERPL-MCNC: 9.7 MG/DL (ref 8.8–10.2)
CHLORIDE SERPL-SCNC: 104 MMOL/L (ref 98–107)
CREAT SERPL-MCNC: 0.57 MG/DL (ref 0.51–0.95)
CRP SERPL HS-MCNC: <0.15 MG/L
CRP SERPL-MCNC: <3 MG/L
DEPRECATED HCO3 PLAS-SCNC: 29 MMOL/L (ref 22–29)
ERYTHROCYTE [SEDIMENTATION RATE] IN BLOOD BY WESTERGREN METHOD: 13 MM/HR (ref 0–30)
GFR SERPL CREATININE-BSD FRML MDRD: >90 ML/MIN/1.73M2
GLUCOSE SERPL-MCNC: 100 MG/DL (ref 70–99)
POTASSIUM SERPL-SCNC: 3.7 MMOL/L (ref 3.4–5.3)
PROT SERPL-MCNC: 7.3 G/DL (ref 6.4–8.3)
SODIUM SERPL-SCNC: 140 MMOL/L (ref 136–145)

## 2023-03-10 PROCEDURE — 86431 RHEUMATOID FACTOR QUANT: CPT

## 2023-03-10 PROCEDURE — 86038 ANTINUCLEAR ANTIBODIES: CPT

## 2023-03-10 PROCEDURE — 86141 C-REACTIVE PROTEIN HS: CPT

## 2023-03-10 PROCEDURE — 99000 SPECIMEN HANDLING OFFICE-LAB: CPT

## 2023-03-10 PROCEDURE — 80053 COMPREHEN METABOLIC PANEL: CPT

## 2023-03-10 PROCEDURE — 83695 ASSAY OF LIPOPROTEIN(A): CPT

## 2023-03-10 PROCEDURE — 86160 COMPLEMENT ANTIGEN: CPT

## 2023-03-10 PROCEDURE — 85652 RBC SED RATE AUTOMATED: CPT

## 2023-03-10 PROCEDURE — 86039 ANTINUCLEAR ANTIBODIES (ANA): CPT

## 2023-03-10 PROCEDURE — 36415 COLL VENOUS BLD VENIPUNCTURE: CPT

## 2023-03-10 PROCEDURE — 83704 LIPOPROTEIN BLD QUAN PART: CPT | Mod: 90

## 2023-03-11 ENCOUNTER — MYC MEDICAL ADVICE (OUTPATIENT)
Dept: INTERNAL MEDICINE | Facility: CLINIC | Age: 68
End: 2023-03-11
Payer: COMMERCIAL

## 2023-03-13 LAB
C3 SERPL-MCNC: 108 MG/DL (ref 81–157)
C4 SERPL-MCNC: 24 MG/DL (ref 13–39)
RHEUMATOID FACT SER NEPH-ACNC: 8 IU/ML

## 2023-03-14 LAB
ANA PAT SER IF-IMP: ABNORMAL
ANA SER QL IF: ABNORMAL
ANA TITR SER IF: ABNORMAL {TITER}

## 2023-03-16 LAB
CHOLEST SERPL-MCNC: 179 MG/DL
HDL SERPL QN: 10.7 NM
HDL SERPL-SCNC: 29.4 UMOL/L
HDLC SERPL-MCNC: 106 MG/DL
HLD.LARGE SERPL-SCNC: >17 UMOL/L
LDL SERPL QN: 21.1 NM
LDL SERPL-SCNC: 725 NMOL/L
LDL SMALL SERPL-SCNC: <165 NMOL/L
LDLC SERPL CALC-MCNC: 64 MG/DL
PATHOLOGY STUDY: ABNORMAL
TRIGL SERPL-MCNC: 44 MG/DL
VLDL LARGE SERPL-SCNC: <1.5 NMOL/L
VLDL SERPL QN: 49.8 NM

## 2023-03-20 ENCOUNTER — APPOINTMENT (OUTPATIENT)
Dept: RADIOLOGY | Facility: CLINIC | Age: 68
End: 2023-03-20
Attending: PHYSICIAN ASSISTANT
Payer: COMMERCIAL

## 2023-03-20 ENCOUNTER — HOSPITAL ENCOUNTER (EMERGENCY)
Facility: CLINIC | Age: 68
Discharge: HOME | End: 2023-03-20
Admitting: PHYSICIAN ASSISTANT
Payer: COMMERCIAL

## 2023-03-20 VITALS
HEART RATE: 78 BPM | RESPIRATION RATE: 18 BRPM | SYSTOLIC BLOOD PRESSURE: 154 MMHG | BODY MASS INDEX: 22.28 KG/M2 | WEIGHT: 118 LBS | DIASTOLIC BLOOD PRESSURE: 87 MMHG | OXYGEN SATURATION: 98 % | TEMPERATURE: 98.1 F | HEIGHT: 61 IN

## 2023-03-20 DIAGNOSIS — R06.02 SHORTNESS OF BREATH: ICD-10-CM

## 2023-03-20 LAB
ATRIAL RATE - MUSE: 74 BPM
DIASTOLIC BLOOD PRESSURE - MUSE: NORMAL MMHG
INTERPRETATION ECG - MUSE: NORMAL
P AXIS - MUSE: 79 DEGREES
PR INTERVAL - MUSE: 170 MS
QRS DURATION - MUSE: 80 MS
QT - MUSE: 388 MS
QTC - MUSE: 430 MS
R AXIS - MUSE: 73 DEGREES
SYSTOLIC BLOOD PRESSURE - MUSE: NORMAL MMHG
T AXIS - MUSE: 80 DEGREES
VENTRICULAR RATE- MUSE: 74 BPM

## 2023-03-20 PROCEDURE — 71046 X-RAY EXAM CHEST 2 VIEWS: CPT

## 2023-03-20 PROCEDURE — 93005 ELECTROCARDIOGRAM TRACING: CPT | Performed by: EMERGENCY MEDICINE

## 2023-03-20 PROCEDURE — 99284 EMERGENCY DEPT VISIT MOD MDM: CPT | Mod: 25

## 2023-03-20 RX ORDER — PREDNISONE 10 MG/1
TABLET ORAL
Qty: 30 TABLET | Refills: 0 | Status: SHIPPED | OUTPATIENT
Start: 2023-03-20 | End: 2023-03-31

## 2023-03-20 RX ORDER — IPRATROPIUM BROMIDE AND ALBUTEROL SULFATE 2.5; .5 MG/3ML; MG/3ML
1 SOLUTION RESPIRATORY (INHALATION) EVERY 6 HOURS PRN
Qty: 90 ML | Refills: 1 | Status: SHIPPED | OUTPATIENT
Start: 2023-03-20

## 2023-03-20 ASSESSMENT — ENCOUNTER SYMPTOMS
CHILLS: 0
COUGH: 1
FATIGUE: 0
WHEEZING: 0
FEVER: 0
NEUROLOGICAL NEGATIVE: 1
MUSCULOSKELETAL NEGATIVE: 1
GASTROINTESTINAL NEGATIVE: 1
SHORTNESS OF BREATH: 1

## 2023-03-20 NOTE — ED PROVIDER NOTES
EMERGENCY DEPARTMENT ENCOUNTER      NAME: Brissa Mayer  AGE: 68 year old female  YOB: 1955  MRN: [id number]  EVALUATION DATE & TIME: No admission date for patient encounter.    PCP: Fuentes Darby    ED PROVIDER: Riley Kim PA-C      Chief Complaint   Patient presents with     Shortness of Breath     Asthma Exacerbation         FINAL IMPRESSION:  1. Shortness of breath          MEDICAL DECISION MAKING:    Pertinent Labs & Imaging studies reviewed. (See chart for details)  68 year old female presents to the Emergency Department for evaluation of 2-week history of cough and shortness of breath.    After obtaining history of present illness, reviewing vital signs and examining the patient plan to screen with EKG and a chest x-ray.  Patient does not appear toxic.  No fever or hypoxia.  I did not feel that further emergent work-up in the form of blood work or advanced imaging such as CT scan was necessary.  If EKG and chest x-ray negative likely will plan to treat mild asthma exacerbation with taper dose of steroids and also prescribe DuoNeb solution for her to use in her neb machine.  She does admit that her neb solution is likely .    Medical Decision Making    History:    Supplemental history from: Documented in chart, if applicable    External Record(s) reviewed: Documented in chart, if applicable.    Work Up:    Chart documentation includes differential considered and any EKGs or imaging independently interpreted by provider, where specified.    In additional to work up documented, I considered the following work up: Documented in chart, if applicable. and Imaging CT, but deferred due to No hypoxia or tachycardia, and no significant appearance of distress on physical exam..    External consultation:    Discussion of management with another provider: Documented in chart, if applicable    Complicating factors:    Care impacted by chronic illness: N/A    Care affected by social determinants  of health: N/A    Disposition considerations: Discharge. I prescribed additional prescription strength medication(s) as charted. N/A.        Chest x-ray, unremarkable.  No indication for antibiotics at this time.  Plan to treat with steroids and DuoNeb solution.  Patient does have plenty of inhaler.    ED COURSE    I met with the patient, obtained history, performed an initial exam, and discussed options and plan for diagnostics and treatment here in the ED.    At the conclusion of the encounter I discussed the results of all of the tests and the disposition. The questions were answered. The patient or family acknowledged understanding and was agreeable with the care plan.     MEDICATIONS GIVEN IN THE EMERGENCY:  Medications - No data to display    NEW PRESCRIPTIONS STARTED AT TODAY'S ER VISIT  New Prescriptions    IPRATROPIUM - ALBUTEROL 0.5 MG/2.5 MG/3 ML (DUONEB) 0.5-2.5 (3) MG/3ML NEB SOLUTION    Take 1 vial (3 mLs) by nebulization every 6 hours as needed for shortness of breath, wheezing or cough    PREDNISONE (DELTASONE) 10 MG TABLET    Take 4 tablets (40 mg) by mouth daily for 3 days, THEN 3 tablets (30 mg) daily for 3 days, THEN 2 tablets (20 mg) daily for 3 days, THEN 1 tablet (10 mg) daily for 3 days.            =================================================================    HPI    Patient information was obtained from:   Brissa Mayer is a 68 year old female with a pertinent history of asthma, anxiety who presents to this ED for evaluation of 2-week history of cough and shortness of breath.  Patient admits that she has some very mild URI type symptoms that started in the exacerbation.  She states that when she coughs she does have some discomfort in her chest but only when she coughs.  No radiation to her neck or her arm.  Activity does make her more short of breath.  She does use daily Advair inhalers as well as rescue inhaler.  She is using neb treatments at home 1-2 times a day but she believes  her medication is .  She denies any fever or chills.  No complaints of abdominal discomfort.  Because of prolonged symptoms she presented here to the ED.      REVIEW OF SYSTEMS   Review of Systems   Constitutional: Negative for chills, fatigue and fever.   Respiratory: Positive for cough and shortness of breath. Negative for wheezing.    Cardiovascular: Positive for chest pain.   Gastrointestinal: Negative.    Genitourinary: Negative.    Musculoskeletal: Negative.    Skin: Negative.    Neurological: Negative.    All other systems reviewed and are negative.         PAST MEDICAL HISTORY:  Past Medical History:   Diagnosis Date     Anxiety      Arthritis      Asthma, mild intermittent      Major depressive disorder, single episode      Migraines      Tension headaches      Unspecified hypothyroidism        PAST SURGICAL HISTORY:  Past Surgical History:   Procedure Laterality Date     BUNIONECTOMY       COLONOSCOPY       ESOPHAGOSCOPY, GASTROSCOPY, DUODENOSCOPY (EGD), COMBINED N/A 2019    Procedure: ESOPHAGOSCOPY, GASTROSCOPY, DUODENOSCOPY (EGD);  Surgeon: Ochoa Cherry DO;  Location: [facility]     OTHER SURGICAL HISTORY  1980    Bunionectomy         CURRENT MEDICATIONS:    No current facility-administered medications for this encounter.    Current Outpatient Medications:      ipratropium - albuterol 0.5 mg/2.5 mg/3 mL (DUONEB) 0.5-2.5 (3) MG/3ML neb solution, Take 1 vial (3 mLs) by nebulization every 6 hours as needed for shortness of breath, wheezing or cough, Disp: 90 mL, Rfl: 1     predniSONE (DELTASONE) 10 MG tablet, Take 4 tablets (40 mg) by mouth daily for 3 days, THEN 3 tablets (30 mg) daily for 3 days, THEN 2 tablets (20 mg) daily for 3 days, THEN 1 tablet (10 mg) daily for 3 days., Disp: 30 tablet, Rfl: 0     albuterol (VENTOLIN HFA) 108 (90 Base) MCG/ACT inhaler, INHALE TWO PUFFS BY MOUTH EVERY 6 HOURS AS NEEDED FOR SHORTNESS OF BREATH/DYSPNEA, Disp: 18 g, Rfl: 3     amLODIPine (NORVASC) 2.5  MG tablet, Take 1 tablet (2.5 mg) by mouth daily, Disp: 90 tablet, Rfl: 3     apixaban ANTICOAGULANT (ELIQUIS ANTICOAGULANT) 5 MG tablet, Take 1 tablet (5 mg) by mouth 2 times daily, Disp: 180 tablet, Rfl: 3     atorvastatin (LIPITOR) 20 MG tablet, Take 1 tablet (20 mg) by mouth daily, Disp: 90 tablet, Rfl: 3     cefuroxime (CEFTIN) 500 MG tablet, Take 1 tablet (500 mg) by mouth 2 times daily, Disp: 20 tablet, Rfl: 0     fluticasone (FLONASE) 50 MCG/ACT nasal spray, Spray 2 sprays into both nostrils daily, Disp: 1 Package, Rfl: 1     fluticasone-salmeterol (ADVAIR DISKUS) 250-50 MCG/ACT inhaler, INHALE ONE PUFF BY MOUTH TWICE A DAY, Disp: 180 each, Rfl: 3     levothyroxine (SYNTHROID/LEVOTHROID) 50 MCG tablet, TAKE ONE TABLET BY MOUTH ONCE DAILY, Disp: 90 tablet, Rfl: 3     meclizine (ANTIVERT) 25 MG tablet, Take 1 tablet (25 mg) by mouth every 6 hours as needed for dizziness (Patient not taking: No sig reported), Disp: 15 tablet, Rfl: 1     pantoprazole (PROTONIX) 40 MG EC tablet, Take 1 tablet (40 mg) by mouth daily, Disp: 90 tablet, Rfl: 1     PARoxetine (PAXIL-CR) 37.5 MG 24 hr tablet, TAKE ONE TABLET BY MOUTH EVERY MORNING . APPOINTMENT NEEDED FOR ADDITIONAL REFILLS, Disp: 90 tablet, Rfl: 3     sildenafil (REVATIO) 20 MG tablet, Take 0.5 tablets (10 mg) by mouth 3 times daily, Disp: 135 tablet, Rfl: 3      ALLERGIES:  Allergies   Allergen Reactions     Compazine      Anxiety     Flagyl [Metronidazole Hcl] Nausea and Vomiting       FAMILY HISTORY:  Family History   Problem Relation Age of Onset     Asthma Mother      Diabetes Mother      Cancer - colorectal Maternal Grandmother      Heart Disease Father         MI at age 55     Prostate Cancer Father      Asthma Father      Asthma Sister      Obesity Sister      Diabetes Sister      Coronary Artery Disease Sister      Asthma Sister      Obesity Sister      Asthma Sister      Asthma Brother        SOCIAL HISTORY:   Social History     Socioeconomic History      "Marital status: Single   Tobacco Use     Smoking status: Never     Smokeless tobacco: Never   Vaping Use     Vaping Use: Never used   Substance and Sexual Activity     Alcohol use: Not Currently     Comment: Can t drink wine with stomach issues     Drug use: No     Sexual activity: Not Currently     Partners: Male     Birth control/protection: None   Other Topics Concern     Parent/sibling w/ CABG, MI or angioplasty before 65F 55M? No       VITALS:  Patient Vitals for the past 24 hrs:   BP Temp Temp src Pulse Resp SpO2 Height Weight   03/20/23 1116 (!) 154/87 98.1  F (36.7  C) Temporal 78 18 98 % 1.549 m (5' 1\") 53.5 kg (118 lb)       PHYSICAL EXAM    Physical Exam  Vitals and nursing note reviewed.   Constitutional:       General: She is not in acute distress.     Appearance: She is normal weight. She is not ill-appearing, toxic-appearing or diaphoretic.   HENT:      Head: Normocephalic.      Right Ear: External ear normal.      Left Ear: External ear normal.   Eyes:      Conjunctiva/sclera: Conjunctivae normal.   Cardiovascular:      Rate and Rhythm: Normal rate.      Pulses: Normal pulses.   Pulmonary:      Effort: Pulmonary effort is normal. No respiratory distress.      Breath sounds: No stridor. No wheezing or rales.   Musculoskeletal:         General: No tenderness or deformity. Normal range of motion.      Cervical back: Normal range of motion. No tenderness.      Right lower leg: No edema.      Left lower leg: No edema.   Skin:     General: Skin is warm and dry.      Findings: No rash.   Neurological:      General: No focal deficit present.      Mental Status: She is alert.      Sensory: No sensory deficit.      Motor: No weakness.          LAB:  All pertinent labs reviewed and interpreted.  Results for orders placed or performed during the hospital encounter of 03/20/23   Chest XR,  PA & LAT    Impression    IMPRESSION: The lungs are mildly hyperinflated with flattening of the diaphragm. No focal " consolidation, pneumothorax or pleural effusion. Heart size and pulmonary vascularity are normal. No free air under the diaphragm.       RADIOLOGY:  Reviewed all pertinent imaging. Please see official radiology report.  Chest XR,  PA & LAT   Final Result   IMPRESSION: The lungs are mildly hyperinflated with flattening of the diaphragm. No focal consolidation, pneumothorax or pleural effusion. Heart size and pulmonary vascularity are normal. No free air under the diaphragm.          EKG:    Performed at: 1124    The EKG is showing sinus rhythm at a rate of 74 bpm.  The ST segments are normal with no acute elevation or depression.  T waves are upright.  QTc measured at 430.  No significant change when compared to previous EKG from 2018.    I have independently reviewed and interpreted the EKG(s) documented above.    Riley Kim PA-C  Emergency Medicine  [facility]     Riley Kim PA-C  03/20/23 1246

## 2023-03-20 NOTE — DISCHARGE INSTRUCTIONS
Your chest x-ray today is unremarkable.  Based on your cough and shortness of breath and your asthma history we will attempt treatment with a taper dose of steroids and a new prescription of the DuoNeb solution to use at home as needed.  Obviously if you develop worsening symptoms or fever please consider returning to the ED or following up with her primary care.

## 2023-03-20 NOTE — ED TRIAGE NOTES
Pt presents with cough, and shortness of breath related to on-going asthma exacerbation for a few weeks. Endorses chest pain with coughing and deep breathing     Triage Assessment     Row Name 03/20/23 1117       Triage Assessment (Adult)    Airway WDL WDL       Respiratory WDL    Respiratory WDL X;rhythm/pattern;cough    Rhythm/Pattern, Respiratory shortness of breath    Cough Frequency frequent    Cough Type dry       Skin Circulation/Temperature WDL    Skin Circulation/Temperature WDL WDL       Cardiac WDL    Cardiac WDL X;chest pain       Chest Pain Assessment    Chest Pain Location midsternal       Peripheral/Neurovascular WDL    Peripheral Neurovascular WDL WDL       Cognitive/Neuro/Behavioral WDL    Cognitive/Neuro/Behavioral WDL WDL

## 2023-03-28 ENCOUNTER — TELEPHONE (OUTPATIENT)
Dept: INTERNAL MEDICINE | Facility: CLINIC | Age: 68
End: 2023-03-28
Payer: COMMERCIAL

## 2023-03-28 NOTE — TELEPHONE ENCOUNTER
Patient was referred to MTM by her insurance plan. Called patient to schedule an MTM visit. Patient has been following with Damien Galvez  for patient to return call.    Marie Carter, Pharm.D, [facility]  Medication Therapy Management Pharmacist

## 2023-03-31 ENCOUNTER — MYC MEDICAL ADVICE (OUTPATIENT)
Dept: PHARMACY | Facility: CLINIC | Age: 68
End: 2023-03-31
Payer: COMMERCIAL

## 2023-03-31 ENCOUNTER — VIRTUAL VISIT (OUTPATIENT)
Dept: PHARMACY | Facility: CLINIC | Age: 68
End: 2023-03-31
Payer: COMMERCIAL

## 2023-03-31 DIAGNOSIS — I75.029 BLUE TOE SYNDROME, UNSPECIFIED LATERALITY (H): Primary | ICD-10-CM

## 2023-03-31 DIAGNOSIS — E78.5 HYPERLIPIDEMIA LDL GOAL <130: ICD-10-CM

## 2023-03-31 DIAGNOSIS — J30.2 SEASONAL ALLERGIC RHINITIS, UNSPECIFIED TRIGGER: ICD-10-CM

## 2023-03-31 DIAGNOSIS — E03.9 HYPOTHYROIDISM, UNSPECIFIED TYPE: ICD-10-CM

## 2023-03-31 DIAGNOSIS — F41.1 GENERALIZED ANXIETY DISORDER: ICD-10-CM

## 2023-03-31 DIAGNOSIS — J45.20 MILD INTERMITTENT ASTHMA WITHOUT COMPLICATION: ICD-10-CM

## 2023-03-31 DIAGNOSIS — F33.0 MAJOR DEPRESSIVE DISORDER, RECURRENT EPISODE, MILD (H): ICD-10-CM

## 2023-03-31 PROCEDURE — 99605 MTMS BY PHARM NP 15 MIN: CPT | Performed by: PHARMACIST

## 2023-03-31 PROCEDURE — 99607 MTMS BY PHARM ADDL 15 MIN: CPT | Performed by: PHARMACIST

## 2023-03-31 NOTE — PROGRESS NOTES
Medication Therapy Management (MTM) Encounter    ASSESSMENT:                            Medication Adherence/Access: See below for considerations    Blue Toe Syndrome: Plan in place with vascular surgery. Unable to assess amlodipine.     Asthma/Allergies: Stable now per patient.    Hyperlipidemia: Stable.     Hypothyroidism: Stable. Last TSH is within normal limits.     Depression/Anxiety:  Not well controlled. Likely withdrawal from stopping selective serotonin reuptake inhibitor.  Would potential benefit from an alternative member of the same drug class such as citalopram or escitalopram since she has never tried.     PLAN:                            Will send plan to Dr. Darby for consideration of the followin. Consider starting escitalopram 5 mg daily.     Follow-up: 3 months or sooner if needed    SUBJECTIVE/OBJECTIVE:                          Brissa Mayer is a 68 year old female called for a follow-up visit.  Today's visit is a follow-up MTM visit from 3/11/2022.     Reason for visit: Comprehensive medication review/anxiety.    Allergies/ADRs: Reviewed in chart  Past Medical History: Reviewed in chart  Tobacco: She reports that she has never smoked. She has never used smokeless tobacco.  Alcohol: 7 beverages / week    Medication Adherence/Access: Patient takes medications directly from bottles.  Patient takes medications 2 time(s) per day.   Per patient, misses medication 0 times per week.   Medication barriers: affording medications  The patient fills medications at [facility]: YES.    Blue Toe Syndrome: Patient is taking Eliquis 5 mg twice daily and sildenafil 10 mg three times daily (sometimes difficult to cut sildenafil tablets). Yet to start amlodipine 2.5 mg daily. Had gone off of some of these but toes started to look worse. Has seen benefit/improvement. Follows with Dr. Garcia from [facility]. Denies any known side effects.   BP Readings from Last 3 Encounters:   23 (!) 154/87   23  128/78   04/06/22 126/76     Asthma/Allergies: Current medications: Advair Diskus 250-50 mcg - one puff twice daily and albuterol HFA as needed. Damp air was impacted her breathing more - needed nebulizers and prednisone. Patient is taking Flonase daily. Patient rinses their mouth after using steroid inhaler.   Patient reports the following symptoms: none.  Asthma Action Plan on file: NO      11/21/2019     1:06 PM 3/19/2021    10:37 AM 1/17/2022     3:56 PM   ACT Total Scores   ACT TOTAL SCORE (Goal Greater than or Equal to 20) 19 12 19   In the past 12 months, how many times did you visit the emergency room for your asthma without being admitted to the hospital? 0 0 0   In the past 12 months, how many times were you hospitalized overnight because of your asthma? 0 0 0       Hyperlipidemia: Current therapy includes atorvastatin 20 mg daily.  Patient reports no significant myalgias or other side effects.  Recent Labs   Lab Test 03/19/21  1141 05/11/15  0737   CHOL 256* 248*    108   * 129   TRIG 56 57   CHOLHDLRATIO  --  2.3     Hypothyroidism: Patient is taking levothyroxine 50 mcg daily. Patient is having the following symptoms: none.   TSH   Date Value Ref Range Status   01/02/2022 1.80 0.40 - 4.00 mU/L Final   03/19/2021 1.31 0.40 - 4.00 mU/L Final     Depression/Anxiety:  Current medications include: nothing. Felt like the paroxetine CR was no longer working so went off cold turkey when she was due for refill because of the cost.  Significant withdrawals/issues stopping. Has tried sertraline and fluoxetine without much benefit.  Lots of stressors with work and caring for her sister.  She wonders about starting something new.       11/8/2017     3:42 PM 3/19/2021    10:39 AM 8/2/2022     2:30 PM   JOCELINE-7 SCORE   Total Score   13 (moderate anxiety)   Total Score 6 7 13         8/2/2022     2:11 PM 9/26/2022     1:26 PM 11/7/2022    12:06 PM   PHQ   PHQ-9 Total Score 9 6 7   Q9: Thoughts of better off  dead/self-harm past 2 weeks Not at all Not at all Not at all       Today's Vitals: LMP  (LMP Unknown)      Wt Readings from Last 5 Encounters:   03/20/23 118 lb (53.5 kg)   03/08/23 118 lb 6.4 oz (53.7 kg)   04/06/22 100 lb 9.6 oz (45.6 kg)   02/15/22 103 lb 12.8 oz (47.1 kg)   01/27/22 108 lb 4.8 oz (49.1 kg)     ----------------  Post Discharge Medication Reconciliation Status: discharge medications reconciled, continue medications without change.    I spent 18 minutes with this patient today. I offer these suggestions for consideration by Dr. Fuentes Darby. A copy of the visit note was provided to the patient's provider(s).    A summary of these recommendations was sent via [employer].    Damien Douglas, PharmD, BCACP  Medication Therapy Management Pharmacist  Pager: [phone]    Telemedicine Visit Details  Type of service:  Telephone visit  Start Time: 10:29 AM  End Time: 10:47 AM     Medication Therapy Recommendations  Adjustment disorder with mixed anxiety and depressed mood    Rationale: Untreated condition - Needs additional medication therapy - Indication   Recommendation: Start Medication - escitalopram 5 MG tablet - Consider starting escitalopram 5 mg by mouth daily   Status: Accepted per Provider

## 2023-03-31 NOTE — PATIENT INSTRUCTIONS
"Recommendations from today's MTM visit:                                                    MTM (medication therapy management) is a service provided by a clinical pharmacist designed to help you get the most of out of your medicines.      I will send a message to Dr. Darby about potentially starting a new antidepressant/antianxiety medication - either citalopram (Celexa) or escitalopram (Lexapro)    Schedule a follow-up visit with Dr. Darby in 4-6 weeks or sooner to discuss.     Follow-up: 3 months or sooner if needed    It was great speaking with you today.  I value your experience and would be very thankful for your time in providing feedback in our clinic survey. In the next few days, you may receive an email or text message from [employer] with a link to a survey related to your  clinical pharmacist.\"     To schedule another MTM appointment, please call the clinic directly or you may call the MTM scheduling line at [phone] or toll-free at [phone].     My Clinical Pharmacist's contact information:                                                      Please feel free to contact me with any questions or concerns you have.      Damien Douglas, PharmD, BCACP  Medication Therapy Management Pharmacist  Pager: [phone]    "

## 2023-04-01 ENCOUNTER — HEALTH MAINTENANCE LETTER (OUTPATIENT)
Age: 68
End: 2023-04-01

## 2023-04-03 ENCOUNTER — MYC MEDICAL ADVICE (OUTPATIENT)
Dept: INTERNAL MEDICINE | Facility: CLINIC | Age: 68
End: 2023-04-03
Payer: COMMERCIAL

## 2023-04-03 RX ORDER — ESCITALOPRAM OXALATE 5 MG/1
5 TABLET ORAL DAILY
Qty: 30 TABLET | Refills: 1 | Status: SHIPPED | OUTPATIENT
Start: 2023-04-03 | End: 2023-06-07

## 2023-04-03 NOTE — PROGRESS NOTES
Received response from Dr. Darby:    RE: MTM Considerations - citalopram/escitalopram  Received: 3 days ago  Fuentes Darby MD [place], Jae Mata, [facility]  Lexapro 5 mg a day would be good, sorry she stopped the paxil.       Plan:    1. Start escitalopram 5 mg by mouth daily.  Schedule a follow-up with Dr. Darby in 4-6 weeks or sooner if needed.    All changes were made via verbal approval with Fuentes Darby.    Damien Douglas, PharmD, [facility]  Medication Therapy Management Pharmacist  Pager: [phone]

## 2023-04-27 ENCOUNTER — MYC MEDICAL ADVICE (OUTPATIENT)
Dept: INTERNAL MEDICINE | Facility: CLINIC | Age: 68
End: 2023-04-27
Payer: COMMERCIAL

## 2023-04-27 DIAGNOSIS — I75.023 BLUE TOE SYNDROME OF BOTH LOWER EXTREMITIES (H): ICD-10-CM

## 2023-04-27 RX ORDER — APIXABAN 5 MG/1
TABLET, FILM COATED ORAL
Qty: 60 TABLET | Refills: 0 | Status: SHIPPED | OUTPATIENT
Start: 2023-04-27 | End: 2023-06-14

## 2023-04-27 NOTE — TELEPHONE ENCOUNTER
Requested Prescriptions   Pending Prescriptions Disp Refills     ELIQUIS ANTICOAGULANT 5 MG tablet [Pharmacy Med Name: ELIQUIS 5MG TABS] 60 tablet 0     Sig: TAKE 1 TABLET (5 MG) BY MOUTH 2 TIMES DAILY       Direct Oral Anticoagulant Agents Failed - 4/27/2023 10:55 AM        Failed - Normal Platelets on file in past 12 months     Recent Labs   Lab Test 01/02/22  1622                  Failed - Weight is greater than 60 kg for the past year     Wt Readings from Last 3 Encounters:   03/20/23 118 lb (53.5 kg)   03/08/23 118 lb 6.4 oz (53.7 kg)   04/06/22 100 lb 9.6 oz (45.6 kg)             Passed - Medication is active on med list        Passed - Patient is 18-79 years of age        Passed - Serum creatinine less than or equal to 1.4 on file in past 12 mos     Recent Labs   Lab Test 03/10/23  0804   CR 0.57       Ok to refill medication if creatinine is low          Passed - No active pregnancy on record        Passed - No positive pregnancy test within past 12 months        Passed - Recent (6 mo) or future (30 days) visit within the authorizing provider's specialty           Routing refill request to provider for review/approval because:  Drug not on the [facility] refill protocol   See above

## 2023-05-29 ENCOUNTER — MYC MEDICAL ADVICE (OUTPATIENT)
Dept: INTERNAL MEDICINE | Facility: CLINIC | Age: 68
End: 2023-05-29
Payer: COMMERCIAL

## 2023-06-06 DIAGNOSIS — F41.1 GENERALIZED ANXIETY DISORDER: ICD-10-CM

## 2023-06-06 DIAGNOSIS — F33.0 MAJOR DEPRESSIVE DISORDER, RECURRENT EPISODE, MILD (H): ICD-10-CM

## 2023-06-07 RX ORDER — ESCITALOPRAM OXALATE 5 MG/1
TABLET ORAL
Qty: 30 TABLET | Refills: 1 | Status: SHIPPED | OUTPATIENT
Start: 2023-06-07 | End: 2023-08-04

## 2023-06-07 NOTE — TELEPHONE ENCOUNTER
"Requested Prescriptions   Pending Prescriptions Disp Refills    escitalopram (LEXAPRO) 5 MG tablet [Pharmacy Med Name: ESCITALOPRAM OXALATE 5MG TABS] 30 tablet 1     Sig: TAKE 1 TABLET (5 MG) BY MOUTH DAILY. THIS PRESCRIPTION IS TO REPLACE PAXIL.       SSRIs Protocol Failed - 6/6/2023  2:59 PM        Failed - PHQ-9 score less than 5 in past 6 months     Please review last PHQ-9 score.           Failed - Recent (6 mo) or future (30 days) visit within the authorizing provider's specialty     Patient had office visit in the last 6 months or has a visit in the next 30 days with authorizing provider or within the authorizing provider's specialty.  See \"Patient Info\" tab in inbasket, or \"Choose Columns\" in Meds & Orders section of the refill encounter.            Passed - Medication is active on med list        Passed - Patient is age 18 or older        Passed - No active pregnancy on record        Passed - No positive pregnancy test in last 12 months             "

## 2023-06-14 DIAGNOSIS — E03.4 HYPOTHYROIDISM DUE TO ACQUIRED ATROPHY OF THYROID: ICD-10-CM

## 2023-06-14 DIAGNOSIS — I75.023 BLUE TOE SYNDROME OF BOTH LOWER EXTREMITIES (H): ICD-10-CM

## 2023-06-14 RX ORDER — LEVOTHYROXINE SODIUM 50 UG/1
TABLET ORAL
Qty: 90 TABLET | Refills: 3 | Status: SHIPPED | OUTPATIENT
Start: 2023-06-14 | End: 2024-06-03

## 2023-06-14 RX ORDER — APIXABAN 5 MG/1
TABLET, FILM COATED ORAL
Qty: 60 TABLET | Refills: 10 | Status: SHIPPED | OUTPATIENT
Start: 2023-06-14 | End: 2024-01-29

## 2023-06-14 NOTE — TELEPHONE ENCOUNTER
Unable to refill per [facility] protocol.   Med and pharm loaded.    Isabella KAY, RN    [facility]  Office: [phone]  Fax: [phone]

## 2023-06-14 NOTE — TELEPHONE ENCOUNTER
"Requested Prescriptions   Pending Prescriptions Disp Refills    levothyroxine (SYNTHROID/LEVOTHROID) 50 MCG tablet [Pharmacy Med Name: LEVOTHYROXINE SODIUM 50MCG TABS] 90 tablet 3     Sig: TAKE ONE TABLET BY MOUTH ONCE DAILY       Thyroid Protocol Failed - 6/14/2023  1:23 PM        Failed - Normal TSH on file in past 12 months     Recent Labs   Lab Test 01/02/22  1622   TSH 1.80                Passed - Patient is 12 years or older        Passed - Recent (12 mo) or future (30 days) visit within the authorizing provider's specialty     Patient has had an office visit with the authorizing provider or a provider within the authorizing providers department within the previous 12 mos or has a future within next 30 days. See \"Patient Info\" tab in inbasket, or \"Choose Columns\" in Meds & Orders section of the refill encounter.              Passed - Medication is active on med list        Passed - No active pregnancy on record     If patient is pregnant or has had a positive pregnancy test, please check TSH.          Passed - No positive pregnancy test in past 12 months     If patient is pregnant or has had a positive pregnancy test, please check TSH.                Ginger Danielle RN  "

## 2023-06-26 NOTE — TELEPHONE ENCOUNTER
Patient Education     Exercise for a Healthier Heart     Exercise with a friend. When activity is fun, you're more likely to stick with it.   You may wonder how you can improve the health of your heart. If you’re thinking about exercise, you’re on the right track. You don’t need to become an athlete. But you do need a certain amount of brisk exercise to help strengthen your heart. If you have been diagnosed with a heart condition, your healthcare provider may advise exercise to help stabilize your condition. To help make exercise a habit, choose safe, fun activities.   Before you start  Check with your healthcare provider before starting an exercise program. This is especially important if you have not been active for a while. It's also important if you have a long-term (chronic) health problem such as heart disease, diabetes, or obesity. Or if you are at high risk for having these problems.   Why exercise?  Exercising regularly offers many healthy rewards. It can help you do all of the following:  Improve your blood cholesterol level to help prevent further heart trouble  Lower your blood pressure to help prevent a stroke or heart attack  Control diabetes, or reduce your risk of getting this disease  Improve your heart and lung function  Reach and stay at a healthy weight  Make your muscles stronger so you can stay active  Prevent falls and fractures by slowing the loss of bone mass (osteoporosis)  Manage stress better  Reduce your blood pressure  Improve your sense of self and your body image  Exercise tips    Ease into your routine. Set small goals. Then build on them. If you are not sure what your activity level should be, talk with your healthcare provider first before starting an exercise routine.  Exercise on most days. Aim for a total of 150 minutes (2 hours and 30 minutes) or more of moderate-intensity aerobic activity each week. Or 75 minutes (1 hour and 15 minutes) or more of vigorous-intensity aerobic  Rx is pending, approve if okay   activity each week. Or try for a combination of both. Moderate activity means that you breathe heavier and your heart rate increases but you can still talk. Think about doing 40 minutes of moderate exercise, 3 to 4 times a week. For best results, activity should last for about 40 minutes to lower blood pressure and cholesterol. It's OK to work up to the 40-minute period over time. Examples of moderate-intensity activity are walking 1 mile in 15 minutes. Or doing 30 to 45 minutes of yard work.  Step up your daily activity level.  Along with your exercise program, try being more active the whole day. Walk instead of drive. Or park further away so that you take more steps each day. Do more household tasks or yard work. You may not be able to meet the advised mount of physical activity. But doing some moderate- or vigorous-intensity aerobic activity can help reduce your risk for heart disease. Your healthcare provider can help you figure out what is best for you.  Choose 1 or more activities you enjoy.  Walking is one of the easiest things you can do. You can also try swimming, riding a bike, dancing, or taking an exercise class.    When to call your healthcare provider  Call your healthcare provider if you have any of these:   Chest pain or feel dizzy or lightheaded  Burning, tightness, pressure, or heaviness in your chest, neck, shoulders, back, or arms  Abnormal shortness of breath  More joint or muscle pain  A very fast or irregular heartbeat (palpitations)  [employer] last reviewed this educational content on 7/1/2019  © 6584-8428 [employer]. [address] [place] [address], [place] [address]. All rights reserved. This information is not intended as a substitute for professional medical care. Always follow your healthcare professional's instructions.           Patient Education     4 Steps for Eating Healthier  Changing the way you eat can improve your health. It can lower your cholesterol and blood pressure,  and help you stay at a healthy weight. Your diet doesn’t have to be bland and boring to be healthy. Just watch your calories and follow these steps:    Step 1. Eat fewer unhealthy fats  Choose more fish and lean meats instead of fatty cuts of meat.  Skip butter and lard, and use less margarine.  Pass on foods that have palm, coconut, or hydrogenated oils.  Eat fewer high-fat dairy foods like cheese, ice cream, and whole milk.  Get a heart-healthy cookbook and try some low-fat recipes.  Step 2. Go light on salt  Keep the saltshaker off the table.  Limit high-salt ingredients, such as soy sauce, bouillon, and garlic salt.  Instead of adding salt when cooking, season your food with herbs and flavorings. Try lemon, garlic, and onion, or salt-free herb seasonings.  Limit convenience foods, such as boxed or canned foods and restaurant food.  Read food labels and choose lower-sodium options.  Step 3. Limit sugar  Pause before you add sugars to pancakes, cereal, coffee, or tea. This includes white and brown table sugar, syrup, honey, and molasses. Cut your usual amount by half.  Use non-sugar sweeteners. Stevia, aspartame, and sucralose can satisfy a sweet tooth without adding calories.  Swap out sugar-filled soda and other drinks. Buy sugar-free or low-calorie beverages. Remember water is always the best choice.  Read labels and choose foods with less added sugar. Keep in mind that dairy foods and foods with fruit will have some natural sugar.  Cut the sugar in recipes by 1/3 to 1/2. Boost the flavor with extracts like almond, vanilla, or orange. Or add spices such as cinnamon or nutmeg.  Step 4. Eat more fiber  Eat fresh fruits and vegetables every day.  Boost your diet with whole grains. Go for oats, whole-grain rice, and bran.  Add beans and lentils to your meals.  Drink more water to match your fiber increase to help prevent constipation.  Date Last Reviewed: 6/1/2017  © 6800-9605 [employer] [address]  [place] [address]. All rights reserved. This information is not intended as a substitute for professional medical care. Always follow your healthcare professional's instructions.

## 2023-07-26 ENCOUNTER — TRANSFERRED RECORDS (OUTPATIENT)
Dept: HEALTH INFORMATION MANAGEMENT | Facility: CLINIC | Age: 68
End: 2023-07-26
Payer: COMMERCIAL

## 2023-08-04 DIAGNOSIS — F41.1 GENERALIZED ANXIETY DISORDER: ICD-10-CM

## 2023-08-04 DIAGNOSIS — F33.0 MAJOR DEPRESSIVE DISORDER, RECURRENT EPISODE, MILD (H): ICD-10-CM

## 2023-08-04 RX ORDER — ESCITALOPRAM OXALATE 5 MG/1
5 TABLET ORAL DAILY
Qty: 30 TABLET | Refills: 1 | Status: SHIPPED | OUTPATIENT
Start: 2023-08-04 | End: 2023-08-18

## 2023-08-14 ASSESSMENT — ANXIETY QUESTIONNAIRES
GAD7 TOTAL SCORE: 9
5. BEING SO RESTLESS THAT IT IS HARD TO SIT STILL: SEVERAL DAYS
1. FEELING NERVOUS, ANXIOUS, OR ON EDGE: MORE THAN HALF THE DAYS
4. TROUBLE RELAXING: SEVERAL DAYS
3. WORRYING TOO MUCH ABOUT DIFFERENT THINGS: SEVERAL DAYS
2. NOT BEING ABLE TO STOP OR CONTROL WORRYING: SEVERAL DAYS
IF YOU CHECKED OFF ANY PROBLEMS ON THIS QUESTIONNAIRE, HOW DIFFICULT HAVE THESE PROBLEMS MADE IT FOR YOU TO DO YOUR WORK, TAKE CARE OF THINGS AT HOME, OR GET ALONG WITH OTHER PEOPLE: SOMEWHAT DIFFICULT
7. FEELING AFRAID AS IF SOMETHING AWFUL MIGHT HAPPEN: SEVERAL DAYS
GAD7 TOTAL SCORE: 9
6. BECOMING EASILY ANNOYED OR IRRITABLE: MORE THAN HALF THE DAYS

## 2023-08-14 ASSESSMENT — ASTHMA QUESTIONNAIRES: ACT_TOTALSCORE: 13

## 2023-08-18 ENCOUNTER — OFFICE VISIT (OUTPATIENT)
Dept: INTERNAL MEDICINE | Facility: CLINIC | Age: 68
End: 2023-08-18
Payer: COMMERCIAL

## 2023-08-18 VITALS
OXYGEN SATURATION: 98 % | WEIGHT: 118.1 LBS | BODY MASS INDEX: 22.3 KG/M2 | DIASTOLIC BLOOD PRESSURE: 82 MMHG | SYSTOLIC BLOOD PRESSURE: 138 MMHG | HEART RATE: 65 BPM | RESPIRATION RATE: 19 BRPM | HEIGHT: 61 IN

## 2023-08-18 DIAGNOSIS — F33.0 MAJOR DEPRESSIVE DISORDER, RECURRENT EPISODE, MILD (H): ICD-10-CM

## 2023-08-18 DIAGNOSIS — J45.30 MILD PERSISTENT ASTHMA WITHOUT COMPLICATION: ICD-10-CM

## 2023-08-18 DIAGNOSIS — M25.512 CHRONIC LEFT SHOULDER PAIN: ICD-10-CM

## 2023-08-18 DIAGNOSIS — J45.20 MILD INTERMITTENT ASTHMA WITHOUT COMPLICATION: ICD-10-CM

## 2023-08-18 DIAGNOSIS — E03.4 HYPOTHYROIDISM DUE TO ACQUIRED ATROPHY OF THYROID: ICD-10-CM

## 2023-08-18 DIAGNOSIS — G89.29 CHRONIC LEFT SHOULDER PAIN: ICD-10-CM

## 2023-08-18 DIAGNOSIS — Z00.00 ENCOUNTER FOR ROUTINE ADULT HEALTH EXAMINATION WITHOUT ABNORMAL FINDINGS: Primary | ICD-10-CM

## 2023-08-18 DIAGNOSIS — Z12.31 VISIT FOR SCREENING MAMMOGRAM: ICD-10-CM

## 2023-08-18 DIAGNOSIS — F41.1 GENERALIZED ANXIETY DISORDER: ICD-10-CM

## 2023-08-18 DIAGNOSIS — E78.5 HYPERLIPIDEMIA LDL GOAL <130: ICD-10-CM

## 2023-08-18 LAB
CHOLEST SERPL-MCNC: 190 MG/DL
HDLC SERPL-MCNC: 123 MG/DL
LDLC SERPL CALC-MCNC: 59 MG/DL
NONHDLC SERPL-MCNC: 67 MG/DL
TRIGL SERPL-MCNC: 42 MG/DL
TSH SERPL DL<=0.005 MIU/L-ACNC: 0.94 UIU/ML (ref 0.3–4.2)

## 2023-08-18 PROCEDURE — 84443 ASSAY THYROID STIM HORMONE: CPT | Performed by: INTERNAL MEDICINE

## 2023-08-18 PROCEDURE — 36415 COLL VENOUS BLD VENIPUNCTURE: CPT | Performed by: INTERNAL MEDICINE

## 2023-08-18 PROCEDURE — 99397 PER PM REEVAL EST PAT 65+ YR: CPT | Performed by: INTERNAL MEDICINE

## 2023-08-18 PROCEDURE — 80061 LIPID PANEL: CPT | Performed by: INTERNAL MEDICINE

## 2023-08-18 PROCEDURE — 99214 OFFICE O/P EST MOD 30 MIN: CPT | Mod: 25 | Performed by: INTERNAL MEDICINE

## 2023-08-18 RX ORDER — ESCITALOPRAM OXALATE 5 MG/1
5 TABLET ORAL DAILY
Qty: 90 TABLET | Refills: 3 | Status: SHIPPED | OUTPATIENT
Start: 2023-08-18 | End: 2024-03-15

## 2023-08-18 RX ORDER — ALBUTEROL SULFATE 90 UG/1
AEROSOL, METERED RESPIRATORY (INHALATION)
Qty: 18 G | Refills: 3 | Status: SHIPPED | OUTPATIENT
Start: 2023-08-18

## 2023-08-18 RX ORDER — FLUTICASONE PROPIONATE AND SALMETEROL 250; 50 UG/1; UG/1
POWDER RESPIRATORY (INHALATION)
Qty: 180 EACH | Refills: 3 | Status: SHIPPED | OUTPATIENT
Start: 2023-08-18 | End: 2024-08-26

## 2023-08-18 ASSESSMENT — PATIENT HEALTH QUESTIONNAIRE - PHQ9
SUM OF ALL RESPONSES TO PHQ QUESTIONS 1-9: 3
10. IF YOU CHECKED OFF ANY PROBLEMS, HOW DIFFICULT HAVE THESE PROBLEMS MADE IT FOR YOU TO DO YOUR WORK, TAKE CARE OF THINGS AT HOME, OR GET ALONG WITH OTHER PEOPLE: SOMEWHAT DIFFICULT
SUM OF ALL RESPONSES TO PHQ QUESTIONS 1-9: 3

## 2023-08-18 ASSESSMENT — PAIN SCALES - GENERAL: PAINLEVEL: EXTREME PAIN (8)

## 2023-08-18 NOTE — PATIENT INSTRUCTIONS
Patient Education   Personalized Prevention Plan  You are due for the preventive services outlined below.  Your care team is available to assist you in scheduling these services.  If you have already completed any of these items, please share that information with your care team to update in your medical record.  Health Maintenance Due   Topic Date Due     Hepatitis C Screening  Never done     Zoster (Shingles) Vaccine (1 of 2) Never done     Mammogram  11/14/2017     Asthma Action Plan - yearly  07/24/2018     COVID-19 Vaccine (4 - Moderna series) 01/13/2022     Diptheria Tetanus Pertussis (DTAP/TDAP/TD) Vaccine (2 - Td or Tdap) 10/16/2022     Thyroid Function Lab  01/02/2023     ANNUAL REVIEW OF HM ORDERS  04/26/2023     Your Health Risk Assessment indicates you feel you are not in good emotional health.    Recreation   Recreation is not limited to sports and team events. It includes any activity that provides relaxation, interest, enjoyment, and exercise. Recreation provides an outlet for physical, mental, and social energy. It can give a sense of worth and achievement. It can help you stay healthy.    Mental Exercise and Social Involvement  Mental and emotional health is as important as physical health. Keep in touch with friends and family. Stay as active as possible. Continue to learn and challenge yourself.   Things you can do to stay mentally active are:  Learn something new, like a foreign language or musical instrument.   Play SCRABBLE or do crossword puzzles. If you cannot find people to play these games with you at home, you can play them with others on your computer through the Internet.   Join a games club--anything from card games to chess or checkers or lawn bowling.   Start a new hobby.   Go back to school.   Volunteer.   Read.   Keep up with world events.
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
## 2023-08-25 ENCOUNTER — TELEPHONE (OUTPATIENT)
Dept: BEHAVIORAL HEALTH | Facility: CLINIC | Age: 68
End: 2023-08-25
Payer: COMMERCIAL

## 2023-08-25 ENCOUNTER — MYC MEDICAL ADVICE (OUTPATIENT)
Dept: BEHAVIORAL HEALTH | Facility: CLINIC | Age: 68
End: 2023-08-25

## 2023-08-25 NOTE — TELEPHONE ENCOUNTER
----- Message from Stan Hoyos sent at 8/25/2023  9:28 AM CDT -----  Transition Clinic Referral   [place]         Please Check Type of Referral Requested:       __x__THERAPY: The Transition clinic is able to schedule patients without current medical insurance; these patient will be referred to our Social Work Care Coordinator for Medical Insurance              Assistance. We are open for referral for psychotherapy. Patient is referred from:  [facility]      _Referring Provider Contact Name: [facility]; Phone Number: [phone]    Reason for Transition Clinic Referral: Therapy    Next Level of Care Patient Will Be Transitioned To: [facility]  Provider(s)Amy Anne  Location [facility]  Date/Time 11/28/2023    What Would Be Helpful from the Transition Clinic: Therapy     Needs: NO    Does Patient Have Access to Technology: Yes    Patient E-mail Address: [email or web address]    Current Patient Phone Number: [phone]; N/A    Clinician Gender Preference (if applicable): unknown    Patient location preference: Virtual    Stan Hoyos

## 2023-08-25 NOTE — TELEPHONE ENCOUNTER
Writer spoke with pt and scheduled initial TC therapy appointment on 08/29/2023 @  12:30 pm. Writer sent intake documents via [employer]. Writer will reply to referral source.Tracker completed.      Albina Barakat  08/25/2023  955

## 2023-08-28 ASSESSMENT — ANXIETY QUESTIONNAIRES
1. FEELING NERVOUS, ANXIOUS, OR ON EDGE: SEVERAL DAYS
IF YOU CHECKED OFF ANY PROBLEMS ON THIS QUESTIONNAIRE, HOW DIFFICULT HAVE THESE PROBLEMS MADE IT FOR YOU TO DO YOUR WORK, TAKE CARE OF THINGS AT HOME, OR GET ALONG WITH OTHER PEOPLE: SOMEWHAT DIFFICULT
5. BEING SO RESTLESS THAT IT IS HARD TO SIT STILL: NOT AT ALL
GAD7 TOTAL SCORE: 6
GAD7 TOTAL SCORE: 6
4. TROUBLE RELAXING: SEVERAL DAYS
6. BECOMING EASILY ANNOYED OR IRRITABLE: SEVERAL DAYS
2. NOT BEING ABLE TO STOP OR CONTROL WORRYING: SEVERAL DAYS
3. WORRYING TOO MUCH ABOUT DIFFERENT THINGS: SEVERAL DAYS
7. FEELING AFRAID AS IF SOMETHING AWFUL MIGHT HAPPEN: SEVERAL DAYS

## 2023-08-28 NOTE — TELEPHONE ENCOUNTER
SPINE PATIENTS - NEW PROTOCOL PREVISIT    RECORDS RECEIVED FROM: [place]   REASON FOR VISIT: (L) shoulder pain radiating from neck    Date of Appt: 09/27/2023    NOTES (FOR ALL VISITS) STATUS DETAILS   OFFICE NOTE from referring provider Internal 08/18/2023 Dr Darby [facility]    OFFICE NOTE from other specialist Received 07/26/2023 [facility]   02/14/2023 [facility]    DISCHARGE SUMMARY from hospital N/A    DISCHARGE REPORT from ER N/A    OPERATIVE REPORT N/A    EMG REPORT N/A    MEDICATION LIST N/A    IMAGING  (FOR ALL VISITS)     MRI (HEAD, NECK, SPINE) Internal 01/18/2022 cervical spine   XRAY (SPINE) *NEUROSURGERY* N/A    CT (HEAD, NECK, SPINE) N/A

## 2023-08-29 ENCOUNTER — VIRTUAL VISIT (OUTPATIENT)
Dept: BEHAVIORAL HEALTH | Facility: CLINIC | Age: 68
End: 2023-08-29
Payer: COMMERCIAL

## 2023-08-30 ENCOUNTER — HOSPITAL ENCOUNTER (OUTPATIENT)
Dept: MAMMOGRAPHY | Facility: CLINIC | Age: 68
Discharge: HOME | End: 2023-08-30
Attending: INTERNAL MEDICINE | Admitting: INTERNAL MEDICINE
Payer: COMMERCIAL

## 2023-08-30 DIAGNOSIS — Z12.31 VISIT FOR SCREENING MAMMOGRAM: ICD-10-CM

## 2023-08-30 PROCEDURE — 77067 SCR MAMMO BI INCL CAD: CPT

## 2023-08-30 NOTE — PROGRESS NOTES
Patient was scheduled with another therapist who was having technical difficulties. This writer informed the patient and offered to resume the appointment however she choose to reschedule due to being at work and waiting on-line for 1/2 hour. She is rescheduled for Thursday with Harper Garcia.    TAL BarnesSW

## 2023-08-31 ENCOUNTER — VIRTUAL VISIT (OUTPATIENT)
Dept: BEHAVIORAL HEALTH | Facility: CLINIC | Age: 68
End: 2023-08-31
Payer: COMMERCIAL

## 2023-08-31 DIAGNOSIS — F41.1 GENERALIZED ANXIETY DISORDER: Primary | ICD-10-CM

## 2023-08-31 PROCEDURE — 99214 OFFICE O/P EST MOD 30 MIN: CPT | Mod: VID

## 2023-08-31 ASSESSMENT — COLUMBIA-SUICIDE SEVERITY RATING SCALE - C-SSRS
1. HAVE YOU WISHED YOU WERE DEAD OR WISHED YOU COULD GO TO SLEEP AND NOT WAKE UP?: NO
1. IN THE PAST MONTH, HAVE YOU WISHED YOU WERE DEAD OR WISHED YOU COULD GO TO SLEEP AND NOT WAKE UP?: NO
ATTEMPT LIFETIME: NO
2. HAVE YOU ACTUALLY HAD ANY THOUGHTS OF KILLING YOURSELF?: NO

## 2023-08-31 NOTE — PROGRESS NOTES
Transition Clinic - Initial Visit Progress Note    Patient  Name: Brissa Mayer, : 1955    Date:  2023       Service Type:  Mental Health Initial Visit     Visit Start Time: 12:15 pm Visit End Time: 12:46 pm    Session Length:   TC Session Length: 30 (16-37 Minutes)    Visit #: 1    Attendees:  TC Attendees: Client alone    Service Modality:  Service Modality: Video Visit:      Provider verified identity through the following two step process.  Patient provided:  Patient  and Patient address    Telemedicine Visit: The patient's condition can be safely assessed and treated via synchronous audio and visual telemedicine encounter.      Reason for Telemedicine Visit: Patient convenience (e.g. access to timely appointments / distance to available provider)    Originating Site (Patient Location): Patient's home    Distant Site (Provider Location): [facility]    Consent:  The patient/guardian has verbally consented to: the potential risks and benefits of telemedicine (video visit) versus in person care; bill my insurance or make self-payment for services provided; and responsibility for payment of non-covered services.     Patient would like the video invitation sent by:  My Chart    Mode of Communication:  Video Conference via [facility]    Distant Location (Provider):  Off-site    As the provider I attest to compliance with applicable laws and regulations related to telemedicine.    Informed Consent and Assessment Methods    This provider and patient discussed HIPAA, and the limits of confidentiality; including mandated reporting, the possibility of collaborative discussions with patient's primary care provider and the multidisciplinary team in the MH clinic during consultation.  Discussed the no show policy, and the benefits and possible unintended consequences of therapy.  Patient indicated understanding Transition Clinic services are short term,  "solution focused, until a referral can be made and patient can bridge to long term therapy.  Patient verbally indicated understanding the informed consent.         Presenting Concerns/  Current Stressors:  Brissa Mayer is a 68 year old identified female who was referred to the Transition Clinic by [facility] for bridging therapy until next LOC [facility] 11/28/23 [facility] long-term therapist.      Brissa Mayer reports she lives with her sister in [place].  She reports still working full time as an  and assisting with home.  She reports a hx of anxiety and reports increased anxiety sx including excessive worry, difficulty controlling the worry.  Pt denies any depression sx.  Pt PHQ and GAD7 screeners appear congruent indicating mild anxiety and minimal depression.  Pt denies SI, HI, AH/VH.  Pt reports she is hopeful looking forward to spending time with sisters.      Pt reports adequate supports including sister, family, friends.  She reports she takes lexapro and is taking meds daily as prescribed.    Pt processed regarding work stressors, co worker issues, and hybrid schedule.  Pt also processed regarding her sisters spouse and difficulties she has had, and reports she addressed her sister about this, and now things are improving. She states she feels relieved and a lot better after talking with her sister \"I am optimistic about it now\". Writer actively listened, provided empathy and validation.     Pt and writer explored coping skills including mindfulness and meditation.  Encouraged pt to insert coping skill at sx onset.  Pt appeared receptive to these interventions.      Plan:  9/7/23 12:30pm     ASSESSMENT:    The following assessments were completed by patient for this visit:  PHQ9:       3/19/2021    10:39 AM 9/18/2021     6:22 AM 8/2/2022     2:11 PM 9/26/2022     1:26 PM 11/7/2022    12:06 PM 8/18/2023     9:24 AM 8/28/2023    12:17 PM   PHQ-9 SCORE   PHQ-9 Total Score MyChart  0 9 (Mild " depression) 6 (Mild depression) 7 (Mild depression) 3 (Minimal depression) 3 (Minimal depression)   PHQ-9 Total Score 3 0 9 6 7 3 3     GAD7:       11/22/2016     8:10 AM 3/30/2017     8:00 AM 11/8/2017     3:42 PM 3/19/2021    10:39 AM 8/2/2022     2:30 PM 8/14/2023    10:16 AM 8/28/2023    12:16 PM   JOCELINE-7 SCORE   Total Score     13 (moderate anxiety) 9 (mild anxiety) 6 (mild anxiety)   Total Score 7 1 6 7 13 9 6     CAGE-AID:       8/2/2022     2:35 PM   CAGE-AID Total Score   Total Score 1   Total Score MyChart 1 (A total score of 2 or greater is considered clinically significant)     PROMIS 10-Global Health (all questions and answers displayed):       1/27/2022    10:41 AM 8/2/2022     2:34 PM 11/7/2022    12:08 PM 8/28/2023    12:18 PM   PROMIS 10   In general, would you say your health is:  Very good Good Very good   In general, would you say your quality of life is:  Good Good Very good   In general, how would you rate your physical health?  Very good Very good Very good   In general, how would you rate your mental health, including your mood and your ability to think?  Fair Good Good   In general, how would you rate your satisfaction with your social activities and relationships?  Fair Good Very good   In general, please rate how well you carry out your usual social activities and roles  Good Good Very good   To what extent are you able to carry out your everyday physical activities such as walking, climbing stairs, carrying groceries, or moving a chair?  Completely Completely Completely   In the past 7 days, how often have you been bothered by emotional problems such as feeling anxious, depressed, or irritable?  Often Sometimes Sometimes   In the past 7 days, how would you rate your fatigue on average?  Mild Mild Mild   In the past 7 days, how would you rate your pain on average, where 0 means no pain, and 10 means worst imaginable pain?  0 1 6   In general, would you say your health is: 3 4 3 4   In  general, would you say your quality of life is: 3 3 3 4   In general, how would you rate your physical health? 3 4 4 4   In general, how would you rate your mental health, including your mood and your ability to think? 3 2 3 3   In general, how would you rate your satisfaction with your social activities and relationships? 3 2 3 4   In general, please rate how well you carry out your usual social activities and roles. (This includes activities at home, at work and in your community, and responsibilities as a parent, child, spouse, employee, friend, etc.) 4 3 3 4   To what extent are you able to carry out your everyday physical activities such as walking, climbing stairs, carrying groceries, or moving a chair? 5 5 5 5   In the past 7 days, how often have you been bothered by emotional problems such as feeling anxious, depressed, or irritable? 3 4 3 3   In the past 7 days, how would you rate your fatigue on average? 3 2 2 2   In the past 7 days, how would you rate your pain on average, where 0 means no pain, and 10 means worst imaginable pain? 4 0 1 6   Global Mental Health Score 12 9 12 14   Global Physical Health Score 14 18 17 16   PROMIS TOTAL - SUBSCORES 26 27 29 30     PROMIS 10-Global Health (only subscores and total score):       1/27/2022    10:41 AM 8/2/2022     2:34 PM 11/7/2022    12:08 PM 8/28/2023    12:18 PM   PROMIS-10 Scores Only   Global Mental Health Score 12 9 12 14   Global Physical Health Score 14 18 17 16   PROMIS TOTAL - SUBSCORES 26 27 29 30       [place] Suicide Severity Rating Scale (Lifetime/Recent)      2/8/2019     8:06 AM 8/28/2023    12:17 PM 8/31/2023    12:00 PM   [place] Suicide Severity Rating (Lifetime/Recent)   Q1 Wished to be Dead (Past Month) no     Q2 Suicidal Thoughts (Past Month) no     Q1 Wish to be Dead (Lifetime)   N   1. Wish to be Dead (Past 1 Month)  N N   2. Non-Specific Active Suicidal Thoughts (Past 1 Month)  N N   Actual Attempt (Lifetime)   N   Calculated C-SSRS  Risk Score (Lifetime/Recent)  No Risk Indicated No Risk Indicated         Mental Status Assessment:  Appearance:   Appropriate   Eye Contact:   Good   Psychomotor Behavior: Normal   Attitude:   Cooperative  Interested Friendly Pleasant  Orientation:   All  Speech     Rate / Production:  Normal/ Responsive     Volume:   Normal   Mood:    Normal  Affect:    Appropriate   Thought Content:  Clear   Thought Form:  Coherent  Logical   Insight:    Good       Safety Issues and Plan for Safety and Risk Management:     Patient denies current fears or concerns for personal safety.  Patient denies current or recent suicidal ideation or behaviors.  Patient denies current or recent homicidal ideation or behaviors.  Patient denies current or recent self injurious behavior or ideation.  Patient denies other safety concerns.  Recommended that patient call 911 or go to the local ED should there be a change in any of these risk factors.  Patient reports there are no firearms in the house.     Diagnostic Criteria:  Generalized Anxiety Disorder  A. Excessive anxiety and worry about a number of events or activities (such as work or school performance).   B. The person finds it difficult to control the worry.  C. Select 3 or more symptoms (required for diagnosis). Only one item is required in children.   - Restlessness or feeling keyed up or on edge.    - Difficulty concentrating or mind going blank.    - Irritability.    - Sleep disturbance (difficulty falling or staying asleep, or restless unsatisfying sleep).   E. The anxiety, worry, or physical symptoms cause clinically significant distress or impairment in social, occupational, or other important areas of functioning.     - The aformentioned symptoms began   month(s) ago and occurs 1 days per week and is experienced as mild.    DSM5 Diagnoses: (Sustained by DSM5 Criteria Listed Above)  Diagnoses: 300.02 (F41.1) Generalized Anxiety Disorder  Psychosocial & Contextual Factors[place].  Per the pt, interpersonal issues between pt and pt sister spouse. Pt still working full time and helping around home.  Pt with some psychosocial work stressors that are situational, per the pt.       Intervention:      Brief Psychotherapy - discussed and prioritizing the most efficient treatment., Emotion Focused Therapy (EFT) - observe patterns and help to recognize how attachments impact the ability to have positive and healthy relationships with self and others., Mindfulness Therapy - Cultivation of present-oriented, non-judgmental attitude. It helps nurtures great awareness, clarity, and acceptance of reality., Motivational Interviewing - helping to find the motivation to make positive behavior changes., Person-Centered Therapy - Actualizes potential and moves towards increased awareness, spontaneity, trust in self and inner directedness., Psychodynamic - helping clients understand their emotions and unconscious patterns of behavior., and Reality Therapy - Short term approach based on choice theory and focuses on the client assuming responsibility in the present; Through the therapeutic process, the client is able to learn more effective ways of meeting their needs. We always have a choice. Helps assuming personal responsibility and dealing with the present.    Collateral Reports Completed:  TC Collateral: [facility] electronic medical records reviewed. and No Collateral obtained.    DATA:  Interactive Complexity: No  Crisis: No    PLAN: (Homework, other):  Provider will continue Diagnostic Assessment. Initial Treatment will focus on: Anxiety - situational stressors, psychosocial stressors .  3.   Information in this assessment was obtained from the medical record and provided by patient who is a good historian.   4.   Follow up scheduled:  9/7/23 at 12:30 pm        Patient was given the following to do until next session:  utilize coping skill at sx onset at least once  between sessions.  5.  Anticipated Discharge: Anticipated Discharge Time: 1 - 3 Months     Procedure Code:  Psychotherapy (with patient) - 30  [CPT 80598]    Rashad SCHROEDER, Psychotherapy Trainee    Suicide Risk and Safety Concerns were assessed for. Patient meets the following risk assessment and triage: Patient has no change in safety concerns. Committed to safety and agreed to follow previously developed safety plan.

## 2023-09-07 ENCOUNTER — VIRTUAL VISIT (OUTPATIENT)
Dept: BEHAVIORAL HEALTH | Facility: CLINIC | Age: 68
End: 2023-09-07
Payer: COMMERCIAL

## 2023-09-07 DIAGNOSIS — F41.1 GENERALIZED ANXIETY DISORDER: Primary | ICD-10-CM

## 2023-09-07 ASSESSMENT — COLUMBIA-SUICIDE SEVERITY RATING SCALE - C-SSRS
ATTEMPT SINCE LAST CONTACT: NO
1. SINCE LAST CONTACT, HAVE YOU WISHED YOU WERE DEAD OR WISHED YOU COULD GO TO SLEEP AND NOT WAKE UP?: NO

## 2023-09-07 NOTE — PROGRESS NOTES
Transition Clinic Progress Note    Patient Name:   Brissa Mayer Date: 2023          Service Type: Individual      Session Start Time: 12:30 pm  Session End Time: 1:10 pm     Session Length:  TC Session Length: 45 (38-52 Minutes)    Session #: 2    Attendees: TC Attendees: Client alone    Service Modality:  Service Modality: Video Visit:      Provider verified identity through the following two step process.  Patient provided:  Patient  and Patient address    Telemedicine Visit: The patient's condition can be safely assessed and treated via synchronous audio and visual telemedicine encounter.      Reason for Telemedicine Visit: Patient convenience (e.g. access to timely appointments / distance to available provider)    Originating Site (Patient Location): Patient's home    Distant Site (Provider Location): [facility]    Consent:  The patient/guardian has verbally consented to: the potential risks and benefits of telemedicine (video visit) versus in person care; bill my insurance or make self-payment for services provided; and responsibility for payment of non-covered services.     Patient would like the video invitation sent by:  My Chart    Mode of Communication:  Video Conference via [facility]    Distant Location (Provider):  Off-site    As the provider I attest to compliance with applicable laws and regulations related to telemedicine.    Informed Consent and Assessment Methods    This provider and patient discussed HIPAA, and the limits of confidentiality; including mandated reporting, the possibility of collaborative discussions with patient's primary care provider and the multidisciplinary team in the MH clinic during consultation.  Discussed the no show policy, and the benefits and possible unintended consequences of therapy.  Patient indicated understanding Transition Clinic services are short term, solution focused, until a referral can  "be made and patient can bridge to long term therapy.  Patient verbally indicated understanding the informed consent.        DATA  Interactive Complexity: No  Crisis: No        Progress Since Last Session (Related to Symptoms / Goals / Homework):   Symptoms: Improving pt reports her anxiety and depression sx are improving, she reports feeling less stressed regarding family issues and looking forward to an upcomming [employer] concert with her sisters.  Pt reports she started Celexa about 1 month ago, she reports she takes daily as prescribed, she reports its helping.  Homework: Achieved / completed to satisfaction      Episode of Care Goals: Satisfactory progress - ACTION (Actively working towards change); Intervened by reinforcing change plan / affirming steps taken     Current / Ongoing Stressors and Concerns:   Pt reports current situational stressors as work culture, and coworker issues, caregiving for sister, and some family issues that she reports are now slowly resolving.    Pt processed regarding work culture and anxiety sx surrounding this including excessive worry and difficulty controlling the worry.  Writer actively listened, provided empathy and validation.  Writer psychoeducated pt on the power of the pause as far as interpersonal interactions - writer role played ways this could look.  Pt and writer went over socratic questioning worksheet, examining and questioning the anxious thought, and then challenging it.  Pt processed regarding \"getting crabby about sister not choosing healthy foods after surgery\".  Pt and writer explored LOCUS of Control worksheet, focusing on internal rather than external, with control over changing self reaction.  Pt reports feeling hopeful, and looking forward to upcomming [employer] concert with her sisters and spending time with her foster animals.  Pt appeared receptive to the interventions above today.        Treatment Objective(s) Addressed in This Session:   use at least 2 " coping skills for anxiety management in the next 2 weeks  Insert coping skill upon sx onset  Socratic Questioning  LOCUS of Control  Power of the Pause     Intervention:      Cognitive Behavioral Therapy (CBT) - Discussed changing thoughts is the path to changing behaviors and feelings., Dialectical Behavioral Therapy (DBT) - Helps to increase emotional and cognitive regulations by learning about the triggers that help to cope., Emotion Focused Therapy (EFT) - observe patterns and help to recognize how attachments impact the ability to have positive and healthy relationships with self and others., Mindfulness Therapy - Cultivation of present-oriented, non-judgmental attitude. It helps nurtures great awareness, clarity, and acceptance of reality., Motivational Interviewing - helping to find the motivation to make positive behavior changes., Person-Centered Therapy - Actualizes potential and moves towards increased awareness, spontaneity, trust in self and inner directedness., Psychodynamic - helping clients understand their emotions and unconscious patterns of behavior., and Reality Therapy - Short term approach based on choice theory and focuses on the client assuming responsibility in the present; Through the therapeutic process, the client is able to learn more effective ways of meeting their needs. We always have a choice. Helps assuming personal responsibility and dealing with the present.    Assessments completed prior to visit:  The following assessments were completed by patient for this visit:    [place] Suicide Severity Rating Scale (Short Version)      2/8/2019     8:06 AM 5/27/2021    10:19 AM 9/1/2021     8:55 AM 1/2/2022     3:11 PM 3/20/2023    11:18 AM 9/7/2023     1:00 PM   [place] Suicide Severity Rating (Short Version)   Over the past 2 weeks have you felt down, depressed, or hopeless?  no no no no    Over the past 2 weeks have you had thoughts of killing yourself?  no no no no    Have you ever  attempted to kill yourself?  no no no no    Q1 Wished to be Dead (Past Month) no        Q2 Suicidal Thoughts (Past Month) no        1. Wish to be Dead (Since Last Contact)      N   Actual Attempt (Since Last Contact)      N   Calculated C-SSRS Risk Score (Since Last Contact)      No Risk Indicated         ASSESSMENT: Current Emotional / Mental Status (status of significant symptoms):   Risk status (Self / Other harm or suicidal ideation)   Patient denies current fears or concerns for personal safety.   Patient denies current or recent suicidal ideation or behaviors.   Patient denies current or recent homicidal ideation or behaviors.   Patient denies current or recent self injurious behavior or ideation.   Patient denies other safety concerns.   Patient reports there has been no change in risk factors since their last session.     Patient reports there has been no change in protective factors since their last session.     Recommended that patient call 911 or go to the local ED should there be a change in any of these risk factors.     Appearance:   Appropriate    Eye Contact:   Good    Psychomotor Behavior: Normal    Attitude:   Cooperative  Friendly Pleasant   Orientation:   All   Speech    Rate / Production: Normal/ Responsive Normal     Volume:  Normal    Mood:    Normal   Affect:    Appropriate    Thought Content:  Clear    Thought Form:  Coherent  Logical    Insight:    Good      Medication Review:   Changes to psychiatric medications, see updated Medication List in EPIC.   Pt reports she recently started Lexapro.     Medication Compliance:   Yes     Changes in Health Issues:   None reported     Chemical Use Review:   Substance Use: Chemical use reviewed, no active concerns identified      Tobacco Use: No current tobacco use.      Diagnoses:   300.02 (F41.1) Generalized Anxiety Disorder    Collateral Reports Completed:   TC Collateral: [facility] electronic medical records reviewed. and No Collateral  obtained.    PLAN: (Patient Tasks / Therapist Tasks / Other)  Pt to utilize at least 2 coping skills at sx onset between sessions, in next 2 weeks.  Next Appt:  7/21/23 at 12:30 pm    Procedure Code: Psychotherapy (with patient) - 30  [CPT 47054]      Rashad SCHROEDER, Psychotherapy Trainee  Transitions Clinic                                                         ______________________________________________________________________

## 2023-09-20 ENCOUNTER — OFFICE VISIT (OUTPATIENT)
Dept: URBAN - METROPOLITAN AREA CLINIC 72 | Facility: CLINIC | Age: 68
End: 2023-09-20

## 2023-09-21 ENCOUNTER — TELEPHONE (OUTPATIENT)
Dept: BEHAVIORAL HEALTH | Facility: CLINIC | Age: 68
End: 2023-09-21
Payer: COMMERCIAL

## 2023-09-21 NOTE — TELEPHONE ENCOUNTER
Coordinator attempted to contact patient to reschedule because clinician is out unexpectedly. Rescheduled.    Livier Rose  Transition Clinic Coordinator  09/21/23 7:58 AM

## 2023-09-26 ENCOUNTER — TELEPHONE (OUTPATIENT)
Dept: BEHAVIORAL HEALTH | Facility: CLINIC | Age: 68
End: 2023-09-26
Payer: COMMERCIAL

## 2023-09-26 NOTE — TELEPHONE ENCOUNTER
Coordinator called patient after receiving mychart notification that appt was cancelled with note stating needs to reschedule. Left voicemail asking for call back to TC.    Livier Rose  Transition Clinic Coordinator  09/26/23 7:29 AM

## 2023-09-27 ENCOUNTER — PRE VISIT (OUTPATIENT)
Dept: NEUROSURGERY | Facility: CLINIC | Age: 68
End: 2023-09-27

## 2023-10-09 NOTE — TELEPHONE ENCOUNTER
Writer received call from pt who requested to schedule return visit for TC therapy. Pt was scheduled for 10/10/2023 at 12:30pm.     Je VERMA   10.9.2023  1108

## 2023-10-10 ENCOUNTER — VIRTUAL VISIT (OUTPATIENT)
Dept: BEHAVIORAL HEALTH | Facility: CLINIC | Age: 68
End: 2023-10-10
Payer: COMMERCIAL

## 2023-10-10 DIAGNOSIS — F41.1 GENERALIZED ANXIETY DISORDER: Primary | ICD-10-CM

## 2023-10-10 ASSESSMENT — COLUMBIA-SUICIDE SEVERITY RATING SCALE - C-SSRS
ATTEMPT SINCE LAST CONTACT: NO
1. SINCE LAST CONTACT, HAVE YOU WISHED YOU WERE DEAD OR WISHED YOU COULD GO TO SLEEP AND NOT WAKE UP?: NO
2. HAVE YOU ACTUALLY HAD ANY THOUGHTS OF KILLING YOURSELF?: NO
6. HAVE YOU EVER DONE ANYTHING, STARTED TO DO ANYTHING, OR PREPARED TO DO ANYTHING TO END YOUR LIFE?: NO
TOTAL  NUMBER OF INTERRUPTED ATTEMPTS SINCE LAST CONTACT: NO
TOTAL  NUMBER OF ABORTED OR SELF INTERRUPTED ATTEMPTS SINCE LAST CONTACT: NO
SUICIDE, SINCE LAST CONTACT: NO

## 2023-10-10 NOTE — PROGRESS NOTES
Transition Clinic Progress Note    Patient Name:   Brissa Mayer Date: October 10, 2023          Service Type: Individual      Session Start Time: 12:20 pm  Session End Time: 1:04 pm     Session Length:  TC Session Length: 45 (38-52 Minutes)    Session #: 3    Attendees: TC Attendees: Client alone    Service Modality:  Service Modality: Video Visit:      Provider verified identity through the following two step process.  Patient provided:  Patient  and Patient address    Telemedicine Visit: The patient's condition can be safely assessed and treated via synchronous audio and visual telemedicine encounter.      Reason for Telemedicine Visit: Patient convenience (e.g. access to timely appointments / distance to available provider)    Originating Site (Patient Location): Patient's home    Distant Site (Provider Location): [facility]    Consent:  The patient/guardian has verbally consented to: the potential risks and benefits of telemedicine (video visit) versus in person care; bill my insurance or make self-payment for services provided; and responsibility for payment of non-covered services.     Patient would like the video invitation sent by:  My Chart    Mode of Communication:  Video Conference via [facility]    Distant Location (Provider):  Off-site    As the provider I attest to compliance with applicable laws and regulations related to telemedicine.    Informed Consent and Assessment Methods    This provider and patient discussed HIPAA, and the limits of confidentiality; including mandated reporting, the possibility of collaborative discussions with patient's primary care provider and the multidisciplinary team in the MH clinic during consultation.  Discussed the no show policy, and the benefits and possible unintended consequences of therapy.  Patient indicated understanding Transition Clinic services are short term, solution focused, until a referral can  "be made and patient can bridge to long term therapy.  Patient verbally indicated understanding the informed consent.        DATA  Interactive Complexity: No  Crisis: No        Progress Since Last Session (Related to Symptoms / Goals / Homework):   Symptoms: No change Pt reports some work stressors as well as care giver issues, she mentiones she will contacting [facility] or PCP for assistance.        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  She additionally talks about caregiving for her sister and \"manifesting anger\", she talks about getting frustrated.  Pt and writer explored coping skills including the power of the pause between emails and actions in general.  Discussed care giver duties and encouraged she reach out for services assistance, she agrees.     Pt denies any excessive depression sx. She denies SI, HI, AH/VH.  She endorses ongoing anxiety sx related to stressors as mentioned above.  She reports she has had a \"rough couple weeks at work\".  Pt with desire to process today regarding work stressors and other situation al stressors related to helping her sister.  Writer actively listened and provided validation and empathy.  Pt talked about getting frustrated, writer and pt explored power of the pause and deep breathing, writer modeled possible ways.  Pt does reports she feels work issues are as resolved as they can be right now, and she does report desire to contact PCP or [facility] for assistance.  Pt and writer explored gratitude and focusing on the " positives, pt reports she is hopeful and looking forward to an upcomming weekend lunch with her sister and friend.  She additionally reports she is looking forward to the [employer] concert at the end of the month with her sisters. Writer encouraged the use of coping skills at sx onset.  Pt appeared engaged and receptive to the interventions above.          Treatment Objective(s) Addressed in This Session:   use at least 2 coping skills for anxiety management in the next 2 weeks  Encourage implementing coping skills at sx onset.     Intervention:      Cognitive Behavioral Therapy (CBT) - Discussed changing thoughts is the path to changing behaviors and feelings., Emotion Focused Therapy (EFT) - observe patterns and help to recognize how attachments impact the ability to have positive and healthy relationships with self and others., Mindfulness Therapy - Cultivation of present-oriented, non-judgmental attitude. It helps nurtures great awareness, clarity, and acceptance of reality., Motivational Interviewing - helping to find the motivation to make positive behavior changes., Person-Centered Therapy - Actualizes potential and moves towards increased awareness, spontaneity, trust in self and inner directedness., Problem-Solving Therapy (PST) - Identify specific problems; brainstorming, evaluation, implementing and reviewing solutions., Psychoanalysis - Helping understand interpretations or internal dialogue. , and Psychodynamic - helping clients understand their emotions and unconscious patterns of behavior.    Assessments completed prior to visit:  The following assessments were completed by patient for this visit:    [place] Suicide Severity Rating Scale (Short Version)      2/8/2019     8:06 AM 5/27/2021    10:19 AM 9/1/2021     8:55 AM 1/2/2022     3:11 PM 3/20/2023    11:18 AM 9/7/2023     1:00 PM 10/10/2023     1:00 PM   [place] Suicide Severity Rating (Short Version)   Over the past 2 weeks have you felt down,  depressed, or hopeless?  no no no no     Over the past 2 weeks have you had thoughts of killing yourself?  no no no no     Have you ever attempted to kill yourself?  no no no no     Q1 Wished to be Dead (Past Month) no         Q2 Suicidal Thoughts (Past Month) no         1. Wish to be Dead (Since Last Contact)      N N   2. Non-Specific Active Suicidal Thoughts (Since Last Contact)       N   Actual Attempt (Since Last Contact)      N N   Has subject engaged in non-suicidal self-injurious behavior? (Since Last Contact)       N   Interrupted Attempts (Since Last Contact)       N   Aborted or Self-Interrupted Attempt (Since Last Contact)       N   Preparatory Acts or Behavior (Since Last Contact)       N   Suicide (Since Last Contact)       N   Calculated C-SSRS Risk Score (Since Last Contact)      No Risk Indicated No Risk Indicated         ASSESSMENT: Current Emotional / Mental Status (status of significant symptoms):   Risk status (Self / Other harm or suicidal ideation)   Patient denies current fears or concerns for personal safety.   Patient denies current or recent suicidal ideation or behaviors.   Patient denies current or recent homicidal ideation or behaviors.   Patient denies current or recent self injurious behavior or ideation.   Patient denies other safety concerns.   Patient reports there has been no change in risk factors since their last session.     Patient reports there has been no change in protective factors since their last session.     Recommended that patient call 911 or go to the local ED should there be a change in any of these risk factors.     Appearance:   Appropriate    Eye Contact:   Good    Psychomotor Behavior: Normal    Attitude:   Cooperative  Interested Friendly Pleasant   Orientation:   All   Speech    Rate / Production: Normal     Volume:  Normal    Mood:    Normal   Affect:    Appropriate  Bright    Thought Content:  Clear    Thought Form:  Coherent  Logical    Insight:    Good       Medication Review:   No changes to current psychiatric medication(s)     Medication Compliance:   Yes     Changes in Health Issues:   None reported     Chemical Use Review:   Substance Use: Chemical use reviewed, no active concerns identified      Tobacco Use: No current tobacco use.      Diagnoses:   300.02 (F41.1) Generalized Anxiety Disorder    Collateral Reports Completed:   TC Collateral: [facility] electronic medical records reviewed. and No Collateral obtained.    PLAN: (Patient Tasks / Therapist Tasks / Other)  Pt assessments next session  Encourage use of at least 2 coping skills between sessions  Plan:  10/26 12:30pm    Procedure Code: Psychotherapy (with patient) - 45 [CPT 67409]    Rashad SCHROEDER Psychotherapist Trainee

## 2023-11-01 ENCOUNTER — TELEPHONE (OUTPATIENT)
Dept: INTERNAL MEDICINE | Facility: CLINIC | Age: 68
End: 2023-11-01

## 2023-11-01 ENCOUNTER — OFFICE VISIT (OUTPATIENT)
Dept: FAMILY MEDICINE | Facility: CLINIC | Age: 68
End: 2023-11-01
Payer: COMMERCIAL

## 2023-11-01 VITALS
TEMPERATURE: 98.3 F | HEIGHT: 61 IN | DIASTOLIC BLOOD PRESSURE: 74 MMHG | WEIGHT: 129 LBS | RESPIRATION RATE: 16 BRPM | OXYGEN SATURATION: 98 % | SYSTOLIC BLOOD PRESSURE: 116 MMHG | BODY MASS INDEX: 24.35 KG/M2 | HEART RATE: 72 BPM

## 2023-11-01 DIAGNOSIS — H66.002 NON-RECURRENT ACUTE SUPPURATIVE OTITIS MEDIA OF LEFT EAR WITHOUT SPONTANEOUS RUPTURE OF TYMPANIC MEMBRANE: Primary | ICD-10-CM

## 2023-11-01 PROCEDURE — 90471 IMMUNIZATION ADMIN: CPT | Performed by: NURSE PRACTITIONER

## 2023-11-01 PROCEDURE — 90662 IIV NO PRSV INCREASED AG IM: CPT | Performed by: NURSE PRACTITIONER

## 2023-11-01 PROCEDURE — 91320 SARSCV2 VAC 30MCG TRS-SUC IM: CPT | Performed by: NURSE PRACTITIONER

## 2023-11-01 PROCEDURE — 90480 ADMN SARSCOV2 VAC 1/ONLY CMP: CPT | Performed by: NURSE PRACTITIONER

## 2023-11-01 PROCEDURE — 99213 OFFICE O/P EST LOW 20 MIN: CPT | Mod: 25 | Performed by: NURSE PRACTITIONER

## 2023-11-01 RX ORDER — AMOXICILLIN 500 MG/1
500 CAPSULE ORAL 2 TIMES DAILY
Qty: 20 CAPSULE | Refills: 0 | Status: SHIPPED | OUTPATIENT
Start: 2023-11-01 | End: 2024-01-29

## 2023-11-01 ASSESSMENT — ASTHMA QUESTIONNAIRES
QUESTION_5 LAST FOUR WEEKS HOW WOULD YOU RATE YOUR ASTHMA CONTROL: SOMEWHAT CONTROLLED
QUESTION_1 LAST FOUR WEEKS HOW MUCH OF THE TIME DID YOUR ASTHMA KEEP YOU FROM GETTING AS MUCH DONE AT WORK, SCHOOL OR AT HOME: SOME OF THE TIME
QUESTION_4 LAST FOUR WEEKS HOW OFTEN HAVE YOU USED YOUR RESCUE INHALER OR NEBULIZER MEDICATION (SUCH AS ALBUTEROL): ONE OR TWO TIMES PER DAY
QUESTION_2 LAST FOUR WEEKS HOW OFTEN HAVE YOU HAD SHORTNESS OF BREATH: THREE TO SIX TIMES A WEEK
ACT_TOTALSCORE: 13
ACT_TOTALSCORE: 13
QUESTION_3 LAST FOUR WEEKS HOW OFTEN DID YOUR ASTHMA SYMPTOMS (WHEEZING, COUGHING, SHORTNESS OF BREATH, CHEST TIGHTNESS OR PAIN) WAKE YOU UP AT NIGHT OR EARLIER THAN USUAL IN THE MORNING: TWO OR THREE NIGHTS A WEEK

## 2023-11-01 NOTE — TELEPHONE ENCOUNTER
Patient calling to state she is having left ear pain. She was using Q-Tip to clean her ears and she thinks part of the cotton tip broke off and got stuck in her ear.  Appointment scheduled with our acute provider for today at 3pm, with a 2:40 pm arrival. Leela Diaz LPN

## 2023-11-01 NOTE — PROGRESS NOTES
Assessment & Plan     Non-recurrent acute suppurative otitis media of left ear without spontaneous rupture of tympanic membrane    Discussed ear canal is clear, no presence of foreign body present, however infection does appear present. Antibiotics as prescribed. Advised warm compresses, tylenol as needed for pain. Encouraged not to use cotton swabs in the ear, discussed use of OTC ear drops to help with wax removal. Follow-up if symptoms persist or with any new or worsening symptoms, questions or concerns.     - amoxicillin (AMOXIL) 500 MG capsule; Take 1 capsule (500 mg) by mouth 2 times daily    I explained my diagnostic considerations and recommendations to the patient, who voiced understanding and agreement with the assessment and treatment plan. All questions were answered to patient's apparent satisfaction. We discussed potential side effects of any prescribed or recommended therapies, as well as expectations for response to treatments and importance of lifestyle measures that may improve symptoms. Patient was advised to contact our office if there are new symptoms or no improvement or worsening of conditions or symptoms.                 Harper Nuñez NP  [facility] is a 68 year old, presenting for the following health issues:  Ear Problem      11/1/2023     2:46 PM   Additional Questions   Roomed by Twila Barkley       History of Present Illness       Reason for visit:  Cotton stuck in left ear canal  Symptom onset:  1-3 days ago  Symptoms include:  Hearing loss & pain  Symptom intensity:  Mild  Symptom progression:  Worsening  Had these symptoms before:  No  What makes it worse:  Touching the ear canal  What makes it better:  No    She eats 2-3 servings of fruits and vegetables daily.She consumes 0 sweetened beverage(s) daily.She exercises with enough effort to increase her heart rate 30 to 60 minutes per day.  She exercises with enough effort to increase  "her heart rate 4 days per week.   She is taking medications regularly.      Lara presents with concerns of pain in her left ear for the past 3 days. She believes she may have a piece of a cotton swab that is lodged in her ear canal after cleaning her ears with a cotton swab. She has had this happen before and believes is it the anatomy of her ear canal. She has noticed some white/yellow drainage from the ear, she will use a kleenex to help with this drainage. The pain is worst when touching or moving the ear. She has not had any fever, chills, sore throat, congestion, cough, chest pain or difficulty breathing.     Past Medical History:   Diagnosis Date    Anxiety     Arthritis     Asthma, mild intermittent     Major depressive disorder, single episode     Migraines     Tension headaches     Unspecified hypothyroidism      Current Outpatient Medications   Medication    albuterol (VENTOLIN HFA) 108 (90 Base) MCG/ACT inhaler    amLODIPine (NORVASC) 2.5 MG tablet    amoxicillin (AMOXIL) 500 MG capsule    atorvastatin (LIPITOR) 20 MG tablet    ELIQUIS ANTICOAGULANT 5 MG tablet    escitalopram (LEXAPRO) 5 MG tablet    fluticasone (FLONASE) 50 MCG/ACT nasal spray    fluticasone-salmeterol (ADVAIR DISKUS) 250-50 MCG/ACT inhaler    ipratropium - albuterol 0.5 mg/2.5 mg/3 mL (DUONEB) 0.5-2.5 (3) MG/3ML neb solution    levothyroxine (SYNTHROID/LEVOTHROID) 50 MCG tablet    sildenafil (REVATIO) 20 MG tablet     No current facility-administered medications for this visit.     Review of Systems   HENT:  Positive for ear pain.             Objective    /74   Pulse 72   Temp 98.3  F (36.8  C) (Temporal)   Resp 16   Ht 1.556 m (5' 1.25\")   Wt 58.5 kg (129 lb)   LMP  (LMP Unknown)   SpO2 98%   BMI 24.18 kg/m    Body mass index is 24.18 kg/m .  Physical Exam  Vitals reviewed.   Constitutional:       General: She is not in acute distress.     Appearance: Normal appearance.   HENT:      Head: Normocephalic and atraumatic.     "  Right Ear: Tympanic membrane and ear canal normal.      Left Ear: Ear canal normal.      Ears:      Comments: Left TM bulging with edema, erythema and presence of pus behind the TM.      Nose: Nose normal. No congestion.      Mouth/Throat:      Mouth: Mucous membranes are moist.      Pharynx: Oropharynx is clear.   Eyes:      Extraocular Movements: Extraocular movements intact.      Conjunctiva/sclera: Conjunctivae normal.   Cardiovascular:      Rate and Rhythm: Normal rate and regular rhythm.      Heart sounds: Normal heart sounds.   Pulmonary:      Effort: Pulmonary effort is normal.      Breath sounds: Normal breath sounds.   Musculoskeletal:      Cervical back: Neck supple.   Skin:     General: Skin is warm and dry.   Neurological:      Mental Status: She is alert and oriented to person, place, and time.   Psychiatric:         Mood and Affect: Mood normal.         Behavior: Behavior normal.

## 2023-11-14 ENCOUNTER — VIRTUAL VISIT (OUTPATIENT)
Dept: BEHAVIORAL HEALTH | Facility: CLINIC | Age: 68
End: 2023-11-14
Payer: COMMERCIAL

## 2023-11-14 ENCOUNTER — TELEPHONE (OUTPATIENT)
Dept: BEHAVIORAL HEALTH | Facility: CLINIC | Age: 68
End: 2023-11-14
Payer: COMMERCIAL

## 2023-11-14 DIAGNOSIS — F41.1 GENERALIZED ANXIETY DISORDER: Primary | ICD-10-CM

## 2023-11-14 ASSESSMENT — COLUMBIA-SUICIDE SEVERITY RATING SCALE - C-SSRS
TOTAL  NUMBER OF INTERRUPTED ATTEMPTS SINCE LAST CONTACT: NO
1. SINCE LAST CONTACT, HAVE YOU WISHED YOU WERE DEAD OR WISHED YOU COULD GO TO SLEEP AND NOT WAKE UP?: NO
TOTAL  NUMBER OF ABORTED OR SELF INTERRUPTED ATTEMPTS SINCE LAST CONTACT: NO
SUICIDE, SINCE LAST CONTACT: NO
2. HAVE YOU ACTUALLY HAD ANY THOUGHTS OF KILLING YOURSELF?: NO
ATTEMPT SINCE LAST CONTACT: NO
6. HAVE YOU EVER DONE ANYTHING, STARTED TO DO ANYTHING, OR PREPARED TO DO ANYTHING TO END YOUR LIFE?: NO

## 2023-11-14 NOTE — TELEPHONE ENCOUNTER
Patient called TC asking to schedule return with Reshma. Same day appointment scheduled.    Livier Rose  Transition Clinic Coordinator  11/14/23 8:36 AM     3rd grade

## 2023-11-14 NOTE — PROGRESS NOTES
Transition Clinic Progress Note    Patient Name:   Brissa Mayer Date: 2023          Service Type: Individual      Session Start Time: 9:30 am  Session End Time: 10:00 am     Session Length:  TC Session Length: 30 (16-37 Minutes)    Session #: 3    Attendees: TC Attendees: Client alone    Service Modality:  Service Modality: Video Visit:      Provider verified identity through the following two step process.  Patient provided:  Patient  and Patient address    Telemedicine Visit: The patient's condition can be safely assessed and treated via synchronous audio and visual telemedicine encounter.      Reason for Telemedicine Visit: Patient convenience (e.g. access to timely appointments / distance to available provider)    Originating Site (Patient Location): Patient's home    Distant Site (Provider Location): [facility]    Consent:  The patient/guardian has verbally consented to: the potential risks and benefits of telemedicine (video visit) versus in person care; bill my insurance or make self-payment for services provided; and responsibility for payment of non-covered services.     Patient would like the video invitation sent by:  My Chart    Mode of Communication:  Video Conference via [facility]    Distant Location (Provider):  Off-site    As the provider I attest to compliance with applicable laws and regulations related to telemedicine.    Informed Consent and Assessment Methods    This provider and patient discussed HIPAA, and the limits of confidentiality; including mandated reporting, the possibility of collaborative discussions with patient's primary care provider and the multidisciplinary team in the MH clinic during consultation.  Discussed the no show policy, and the benefits and possible unintended consequences of therapy.  Patient indicated understanding Transition Clinic services are short term, solution focused, until a referral can  "be made and patient can bridge to long term therapy.  Patient verbally indicated understanding the informed consent.        DATA  Interactive Complexity: No  Crisis: No        Progress Since Last Session (Related to Symptoms / Goals / Homework):   Symptoms: No change Pt reports having stressful week with sister having medical issues.  Homework: Completed in session      Episode of Care Goals: Satisfactory progress - ACTION (Actively working towards change); Intervened by reinforcing change plan / affirming steps taken     Current / Ongoing Stressors and Concerns:   Pt reports she has been stress out this week with issues related to her sister and her sisters medical health.  Pt reports she is currently working with [facility],  sisters doctor, and [facility] on support services for her sister to assist.  Pt reports she has had to miss work and feels like she needs to get back to work.  Pt endorses anxiety sx excessive worry, difficulty controlling the worry.  Pt denies excessive depression sx.  Pt denies SI, HI, AH/VH.  Pt reports she is hopeful, reports protective factors including pets/family she cares for, hopefulness, future-oriented, willingness to engage in therapy.    Pt and writer explored mindfulness and focus on present moment, as well as structured list of what she can do to follow up on tasks.  Pt reports hope, states she knows this will get better\".  Pt reports she is going to \"reassess the situation with sister today, and bring in if need to\".  Pt reports she has calls out to [facility] for assistance\".  Pt reports she is eager to get back to work, and is desiring to focus on herself more.  Pt states she plans to engage in self-care today.  Pt and writer explored self care and relaxation techniques.  Pt appeared engaged and receptive to these interventions.      Plan:  11/21/23 1:30pm     Treatment Objective(s) Addressed in This Session:   use at least 2 coping skills for anxiety management in the " next 1 weeks       Intervention:      Cognitive Behavioral Therapy (CBT) - Discussed changing thoughts is the path to changing behaviors and feelings., Emotion Focused Therapy (EFT) - observe patterns and help to recognize how attachments impact the ability to have positive and healthy relationships with self and others., Mindfulness Therapy - Cultivation of present-oriented, non-judgmental attitude. It helps nurtures great awareness, clarity, and acceptance of reality., and Rationale Emotive Behavioral Therapy - prioritizes acceptance and emotional regulation.     Assessments completed prior to visit:  The following assessments were completed by patient for this visit:      [place] Suicide Severity Rating Scale (Short Version)      5/27/2021    10:19 AM 9/1/2021     8:55 AM 1/2/2022     3:11 PM 3/20/2023    11:18 AM 9/7/2023     1:00 PM 10/10/2023     1:00 PM 11/14/2023    10:00 AM   [place] Suicide Severity Rating (Short Version)   Over the past 2 weeks have you felt down, depressed, or hopeless? no no no no      Over the past 2 weeks have you had thoughts of killing yourself? no no no no      Have you ever attempted to kill yourself? no no no no      1. Wish to be Dead (Since Last Contact)     N N N   2. Non-Specific Active Suicidal Thoughts (Since Last Contact)      N N   Actual Attempt (Since Last Contact)     N N N   Has subject engaged in non-suicidal self-injurious behavior? (Since Last Contact)      N N   Interrupted Attempts (Since Last Contact)      N N   Aborted or Self-Interrupted Attempt (Since Last Contact)      N N   Preparatory Acts or Behavior (Since Last Contact)      N N   Suicide (Since Last Contact)      N N   Calculated C-SSRS Risk Score (Since Last Contact)     No Risk Indicated No Risk Indicated No Risk Indicated         ASSESSMENT: Current Emotional / Mental Status (status of significant symptoms):   Risk status (Self / Other harm or suicidal ideation)   Patient denies current fears or  concerns for personal safety.   Patient denies current or recent suicidal ideation or behaviors.   Patient denies current or recent homicidal ideation or behaviors.   Patient denies current or recent self injurious behavior or ideation.   Patient denies other safety concerns.   Patient reports there has been no change in risk factors since their last session.     Patient reports there has been no change in protective factors since their last session.     Recommended that patient call 911 or go to the local ED should there be a change in any of these risk factors.     Appearance:   Appropriate    Eye Contact:   Good    Psychomotor Behavior: Normal    Attitude:   Cooperative    Orientation:   All   Speech    Rate / Production: Normal     Volume:  Normal    Mood:    Normal   Affect:    Appropriate    Thought Content:  Clear    Thought Form:  Coherent  Logical    Insight:    Good      Medication Review:   No changes to current psychiatric medication(s)     Medication Compliance:   Yes     Changes in Health Issues:   None reported     Chemical Use Review:   Substance Use: Chemical use reviewed, no active concerns identified      Tobacco Use: No current tobacco use.      Diagnoses:   300.02 (F41.1) Generalized Anxiety Disorder    Collateral Reports Completed:    Collateral: [facility] electronic medical records reviewed.    PLAN: (Patient Tasks / Therapist Tasks / Other)  Follow up session 11/21/23 1:30pm    Procedure Code: Psychotherapy (with patient) - 30  [CPT 21563]    Reshma SCHROEDER Psychotherapist Trainee                                                         ______________________________________________________________________

## 2023-11-21 ENCOUNTER — VIRTUAL VISIT (OUTPATIENT)
Dept: BEHAVIORAL HEALTH | Facility: CLINIC | Age: 68
End: 2023-11-21
Payer: COMMERCIAL

## 2023-11-21 DIAGNOSIS — F41.1 GENERALIZED ANXIETY DISORDER: Primary | ICD-10-CM

## 2023-11-21 ASSESSMENT — COLUMBIA-SUICIDE SEVERITY RATING SCALE - C-SSRS
ATTEMPT SINCE LAST CONTACT: NO
TOTAL  NUMBER OF INTERRUPTED ATTEMPTS SINCE LAST CONTACT: NO
1. SINCE LAST CONTACT, HAVE YOU WISHED YOU WERE DEAD OR WISHED YOU COULD GO TO SLEEP AND NOT WAKE UP?: NO
6. HAVE YOU EVER DONE ANYTHING, STARTED TO DO ANYTHING, OR PREPARED TO DO ANYTHING TO END YOUR LIFE?: NO
TOTAL  NUMBER OF ABORTED OR SELF INTERRUPTED ATTEMPTS SINCE LAST CONTACT: NO
SUICIDE, SINCE LAST CONTACT: NO
2. HAVE YOU ACTUALLY HAD ANY THOUGHTS OF KILLING YOURSELF?: NO

## 2023-11-21 NOTE — PROGRESS NOTES
Transition Clinic Progress Note   Discharge Summary    Discharge Summary Date:  2023  Multiple Sessions    Client Name:  Brissa Mayer MRN#: [id number] YOB: 1955    Service Modality: Service Modality: Video Visit:      Provider verified identity through the following two step process.  Patient provided:  Patient  and Patient address    Telemedicine Visit: The patient's condition can be safely assessed and treated via synchronous audio and visual telemedicine encounter.      Reason for Telemedicine Visit: Patient convenience (e.g. access to timely appointments / distance to available provider)    Originating Site (Patient Location): Patient's home    Distant Site (Provider Location): [facility]    Consent:  The patient/guardian has verbally consented to: the potential risks and benefits of telemedicine (video visit) versus in person care; bill my insurance or make self-payment for services provided; and responsibility for payment of non-covered services.     Patient would like the video invitation sent by:  My Chart    Mode of Communication:  Video Conference via [facility]    Distant Location (Provider):  Off-site    As the provider I attest to compliance with applicable laws and regulations related to telemedicine.    Service Type: Individual      Session Start Time: 1:31 pm  Session End Time: 2:00 pm      Session Length: TC Session Length: 30 (16-37 Minutes)     Session #: 4     Attendees: Client attended alone    Informed Consent and Assessment Methods    This provider and patient discussed HIPAA, and the limits of confidentiality; including mandated reporting, the possibility of collaborative discussions with patient's primary care provider and the multidisciplinary team in the MH clinic during consultation.  Discussed the no show policy, and the benefits and possible unintended consequences of therapy.  Patient indicated  "understanding Transition Clinic services are short term, solution focused, until a referral can be made and patient can bridge to long term therapy.  Patient verbally indicated understanding the informed consent.        Progress Since Last Session (Related to Symptoms / Goals / Homework):   Symptoms: Improving Pt reports improved sx from last visit, and additionally reports decreased stress.  Homework: Completed in session  Patient Presentation:    Brissa Mayer is a 68 year old identified female who was referred to the Transition Clinic by [facility] for bridging therapy until next LOC [facility] 11/28/23 Amy [facility]land long-term therapist.  Pt has completed bridging therapy and will now discharge transitions clinica and transition to next LOC next week 11/28/23 [facility] - Amy Midland long-term therapist.  Pt successfully transitioned to next LOC today.    Pt processed today regarding transition to long term therapist, reports she is eager.   Writer actively listened and provided validation. Pt reports improved anxiety sx since last weeks session.  She additionally reports decreased levels of stress.  Pt denies SI, HI, AH/VH.  Pt denies depression sx.  Pt reports she is engaging in self-care and is going to an art exhibit this weekend with her sister and spending time with family at [facility].  Pt reports she is looking forward to holiday time of work.  Pt does report work is going better.  Pt reports she is remaining cautiously optimistic\".  Writer and pt explored mindfulness with focus on present moment and meditation.  Pt appeared engaged and receptive to these interventions.      Transitioned to next LOC today:    Next Level of Care Patient Will Be Transitioned To: [facility]  Provider(s)Amy Anne  Location [facility]  Date/Time 11/28/2023            Focus of Treatment Objective(s):  Client's presenting concerns included: Anxiety - coping skill psychoeducation , transfer to next loc 11/28/23 [facility].  Stage of Change at " time of Discharge: ACTION (Actively working towards change)    Intervention:      Brief Psychotherapy - discussed and prioritizing the most efficient treatment., Cognitive Behavioral Therapy (CBT) - Discussed changing thoughts is the path to changing behaviors and feelings., Mindfulness Therapy - Cultivation of present-oriented, non-judgmental attitude. It helps nurtures great awareness, clarity, and acceptance of reality., Motivational Interviewing - helping to find the motivation to make positive behavior changes., and Person-Centered Therapy - Actualizes potential and moves towards increased awareness, spontaneity, trust in self and inner directedness.    Medication Adherence:  NA    Chemical Use:  NA    ASSESSMENT:  Required Screenings: The following assessments were completed by patient for this visit:      PROMIS 10-Global Health (all questions and answers displayed):       1/27/2022    10:41 AM 8/2/2022     2:34 PM 11/7/2022    12:08 PM 8/28/2023    12:18 PM 11/21/2023     1:53 PM   PROMIS 10   In general, would you say your health is:  Very good Good Very good    In general, would you say your quality of life is:  Good Good Very good    In general, how would you rate your physical health?  Very good Very good Very good    In general, how would you rate your mental health, including your mood and your ability to think?  Fair Good Good    In general, how would you rate your satisfaction with your social activities and relationships?  Fair Good Very good    In general, please rate how well you carry out your usual social activities and roles  Good Good Very good    To what extent are you able to carry out your everyday physical activities such as walking, climbing stairs, carrying groceries, or moving a chair?  Completely Completely Completely    In the past 7 days, how often have you been bothered by emotional problems such as feeling anxious, depressed, or irritable?  Often Sometimes Sometimes    In the past  7 days, how would you rate your fatigue on average?  Mild Mild Mild    In the past 7 days, how would you rate your pain on average, where 0 means no pain, and 10 means worst imaginable pain?  0 1 6    In general, would you say your health is: 3 4 3 4 3   In general, would you say your quality of life is: 3 3 3 4 3   In general, how would you rate your physical health? 3 4 4 4 4   In general, how would you rate your mental health, including your mood and your ability to think? 3 2 3 3 3   In general, how would you rate your satisfaction with your social activities and relationships? 3 2 3 4 3   In general, please rate how well you carry out your usual social activities and roles. (This includes activities at home, at work and in your community, and responsibilities as a parent, child, spouse, employee, friend, etc.) 4 3 3 4 3   To what extent are you able to carry out your everyday physical activities such as walking, climbing stairs, carrying groceries, or moving a chair? 5 5 5 5 5   In the past 7 days, how often have you been bothered by emotional problems such as feeling anxious, depressed, or irritable? 3 4 3 3 4   In the past 7 days, how would you rate your fatigue on average? 3 2 2 2 3   In the past 7 days, how would you rate your pain on average, where 0 means no pain, and 10 means worst imaginable pain? 4 0 1 6 6   Global Mental Health Score 12 9 12 14 11   Global Physical Health Score 14 18 17 16 15   PROMIS TOTAL - SUBSCORES 26 27 29 30 26       PHQ9:       3/19/2021    10:39 AM 9/18/2021     6:22 AM 8/2/2022     2:11 PM 9/26/2022     1:26 PM 11/7/2022    12:06 PM 8/18/2023     9:24 AM 8/28/2023    12:17 PM   PHQ-9 SCORE   PHQ-9 Total Score MyChart  0 9 (Mild depression) 6 (Mild depression) 7 (Mild depression) 3 (Minimal depression) 3 (Minimal depression)   PHQ-9 Total Score 3 0 9 6 7 3 3     GAD7:       11/22/2016     8:10 AM 3/30/2017     8:00 AM 11/8/2017     3:42 PM 3/19/2021    10:39 AM 8/2/2022      2:30 PM 8/14/2023    10:16 AM 8/28/2023    12:16 PM   JOCELINE-7 SCORE   Total Score     13 (moderate anxiety) 9 (mild anxiety) 6 (mild anxiety)   Total Score 7 1 6 7 13 9 6     CAGE-AID:       8/2/2022     2:35 PM   CAGE-AID Total Score   Total Score 1   Total Score MyChart 1 (A total score of 2 or greater is considered clinically significant)     PROMIS 10-Global Health (all questions and answers displayed):       1/27/2022    10:41 AM 8/2/2022     2:34 PM 11/7/2022    12:08 PM 8/28/2023    12:18 PM 11/21/2023     1:53 PM   PROMIS 10   In general, would you say your health is:  Very good Good Very good    In general, would you say your quality of life is:  Good Good Very good    In general, how would you rate your physical health?  Very good Very good Very good    In general, how would you rate your mental health, including your mood and your ability to think?  Fair Good Good    In general, how would you rate your satisfaction with your social activities and relationships?  Fair Good Very good    In general, please rate how well you carry out your usual social activities and roles  Good Good Very good    To what extent are you able to carry out your everyday physical activities such as walking, climbing stairs, carrying groceries, or moving a chair?  Completely Completely Completely    In the past 7 days, how often have you been bothered by emotional problems such as feeling anxious, depressed, or irritable?  Often Sometimes Sometimes    In the past 7 days, how would you rate your fatigue on average?  Mild Mild Mild    In the past 7 days, how would you rate your pain on average, where 0 means no pain, and 10 means worst imaginable pain?  0 1 6    In general, would you say your health is: 3 4 3 4 3   In general, would you say your quality of life is: 3 3 3 4 3   In general, how would you rate your physical health? 3 4 4 4 4   In general, how would you rate your mental health, including your mood and your ability to  think? 3 2 3 3 3   In general, how would you rate your satisfaction with your social activities and relationships? 3 2 3 4 3   In general, please rate how well you carry out your usual social activities and roles. (This includes activities at home, at work and in your community, and responsibilities as a parent, child, spouse, employee, friend, etc.) 4 3 3 4 3   To what extent are you able to carry out your everyday physical activities such as walking, climbing stairs, carrying groceries, or moving a chair? 5 5 5 5 5   In the past 7 days, how often have you been bothered by emotional problems such as feeling anxious, depressed, or irritable? 3 4 3 3 4   In the past 7 days, how would you rate your fatigue on average? 3 2 2 2 3   In the past 7 days, how would you rate your pain on average, where 0 means no pain, and 10 means worst imaginable pain? 4 0 1 6 6   Global Mental Health Score 12 9 12 14 11   Global Physical Health Score 14 18 17 16 15   PROMIS TOTAL - SUBSCORES 26 27 29 30 26     [place] Suicide Severity Rating Scale (Lifetime/Recent)      2/8/2019     8:06 AM 8/28/2023    12:17 PM 8/31/2023    12:00 PM   [place] Suicide Severity Rating (Lifetime/Recent)   Q1 Wished to be Dead (Past Month) no     Q2 Suicidal Thoughts (Past Month) no     Q1 Wish to be Dead (Lifetime)   N   1. Wish to be Dead (Past 1 Month)  N N   2. Non-Specific Active Suicidal Thoughts (Past 1 Month)  N N   Actual Attempt (Lifetime)   N   Calculated C-SSRS Risk Score (Lifetime/Recent)  No Risk Indicated No Risk Indicated         Assessment: Current Emotional / Mental Status (status of significant symptoms):  Risk status (Self / Other harm or suicidal ideation)  Client denies current fears or concerns for personal safety.  Client denies current or recent suicidal ideation or behaviors.  Client denies current or recent homicidal ideation or behaviors.  Client denies current or recent self injurious behavior or ideation.  Client denies other  safety concerns.  A safety and risk management plan has not been developed at this time, however client was given the after-hours number should there be a change in any of these risk factors.    Appearance:   Appropriate   Eye Contact:   Good   Psychomotor Behavior: Normal   Attitude:   Cooperative   Orientation:   All  Speech   Rate / Production: Normal    Volume:  Normal   Mood:    Normal  Affect:    Appropriate   Thought Content:  Clear   Thought Form:  Coherent  Logical   Insight:   Good     DSM5 Diagnoses: (Sustained by DSM5 Criteria Listed Above)  Diagnoses: 300.02 (F41.1) Generalized Anxiety Disorder  Psychosocial & Contextual Factors: Pt with some medical issues.      Reason for Discharge:  Client transitioned to next level of care:  [facility] 11/28/23    Next Level of Care Patient Will Be Transitioned To: [facility]  Provider(s)Amy [facility]  Date/Time 11/28/2023      Aftercare Plan:  Client may resume counseling services at any time in the future by calling the Transition Clinic Intake Office, [phone].    Client will follow-up with next LOC:  [facility] appointment 11/28/23.     Next Level of Care Patient Will Be Transitioned To: [facility]  Provider(s)Amy Dayana  Location [facility]  Date/Time 11/28/2023        Procedure Code: Psychotherapy (with patient) - 30  [CPT 24277]    ANAID BETANCOURT  November 21, 2023    ___________________________________________________________________________________________________________

## 2023-11-28 ENCOUNTER — VIRTUAL VISIT (OUTPATIENT)
Dept: PSYCHOLOGY | Facility: CLINIC | Age: 68
End: 2023-11-28
Attending: INTERNAL MEDICINE
Payer: COMMERCIAL

## 2023-11-28 DIAGNOSIS — F33.0 MAJOR DEPRESSIVE DISORDER, RECURRENT EPISODE, MILD (H): ICD-10-CM

## 2023-11-28 DIAGNOSIS — F41.1 GENERALIZED ANXIETY DISORDER: ICD-10-CM

## 2023-11-28 PROCEDURE — 90834 PSYTX W PT 45 MINUTES: CPT | Mod: VID

## 2023-11-28 ASSESSMENT — ANXIETY QUESTIONNAIRES
GAD7 TOTAL SCORE: 7
GAD7 TOTAL SCORE: 7
3. WORRYING TOO MUCH ABOUT DIFFERENT THINGS: SEVERAL DAYS
4. TROUBLE RELAXING: SEVERAL DAYS
8. IF YOU CHECKED OFF ANY PROBLEMS, HOW DIFFICULT HAVE THESE MADE IT FOR YOU TO DO YOUR WORK, TAKE CARE OF THINGS AT HOME, OR GET ALONG WITH OTHER PEOPLE?: SOMEWHAT DIFFICULT
2. NOT BEING ABLE TO STOP OR CONTROL WORRYING: SEVERAL DAYS
7. FEELING AFRAID AS IF SOMETHING AWFUL MIGHT HAPPEN: SEVERAL DAYS
5. BEING SO RESTLESS THAT IT IS HARD TO SIT STILL: SEVERAL DAYS
6. BECOMING EASILY ANNOYED OR IRRITABLE: SEVERAL DAYS
IF YOU CHECKED OFF ANY PROBLEMS ON THIS QUESTIONNAIRE, HOW DIFFICULT HAVE THESE PROBLEMS MADE IT FOR YOU TO DO YOUR WORK, TAKE CARE OF THINGS AT HOME, OR GET ALONG WITH OTHER PEOPLE: SOMEWHAT DIFFICULT
7. FEELING AFRAID AS IF SOMETHING AWFUL MIGHT HAPPEN: SEVERAL DAYS
1. FEELING NERVOUS, ANXIOUS, OR ON EDGE: SEVERAL DAYS
GAD7 TOTAL SCORE: 7

## 2023-11-28 ASSESSMENT — PATIENT HEALTH QUESTIONNAIRE - PHQ9
10. IF YOU CHECKED OFF ANY PROBLEMS, HOW DIFFICULT HAVE THESE PROBLEMS MADE IT FOR YOU TO DO YOUR WORK, TAKE CARE OF THINGS AT HOME, OR GET ALONG WITH OTHER PEOPLE: SOMEWHAT DIFFICULT
SUM OF ALL RESPONSES TO PHQ QUESTIONS 1-9: 7
SUM OF ALL RESPONSES TO PHQ QUESTIONS 1-9: 7

## 2023-11-28 NOTE — PROGRESS NOTES
[facility]   Mental Health & Addiction Services     Progress Note - Initial Visit    Patient  Name:  Brissa Mayer Date: 2023         Service Type: Individual     Visit Start Time: 1:00 PM  Visit End Time: 1:48 PM    Visit #: 1    Attendees: Client attended alone    Service Modality:  Video Visit:      Provider verified identity through the following two step process.  Patient provided:  Patient  and Patient address    Telemedicine Visit: The patient's condition can be safely assessed and treated via synchronous audio and visual telemedicine encounter.      Reason for Telemedicine Visit: Patient has requested telehealth visit and Services only offered telehealth    Originating Site (Patient Location): Patient's place of employment    Distant Site (Provider Location): Provider Remote Setting- Home Office    Consent:  The patient/guardian has verbally consented to: the potential risks and benefits of telemedicine (video visit) versus in person care; bill my insurance or make self-payment for services provided; and responsibility for payment of non-covered services.     Patient would like the video invitation sent by:  My Chart    Mode of Communication:  [facility]    Distant Location (Provider):  Off-site    As the provider I attest to compliance with applicable laws and regulations related to telemedicine.       DATA:   Interactive Complexity: No   Crisis: No     Presenting Concerns/  Current Stressors:   Lara is coming to therapy to learn skills to address her anxiety and depression. 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 She reports increased stress taking more work on within her home and for her sister that has resulted in increased irritation and burnout.     ASSESSMENT:  Mental Status Assessment:  Appearance:   Appropriate   Eye Contact:   Good   Psychomotor Behavior: Normal  Fidgety    Attitude:   Cooperative   Orientation:   All  Speech   Rate / Production: Talkative   Volume:  Normal   Mood:    Anxious   Affect:    Appropriate   Thought Content:  Rumination   Thought Form:  Coherent  Logical   Insight:    Good     Assessments completed prior to this visit:  The following assessments were completed by patient for this visit:  PHQ2:       4/25/2022     5:07 PM 9/27/2017     3:52 PM 12/11/2015     8:14 AM 12/8/2015     9:12 AM 10/12/2015     8:37 AM 10/23/2012     3:53 PM   PHQ-2 ( 1999 [employer])   Q1: Little interest or pleasure in doing things 0 0 0  0 1   Q2: Feeling down, depressed or hopeless 0 0 0  0 1   PHQ-2 Score 0 0 0  0 2   Q1: Little interest or pleasure in doing things Not at all   Not at all     Q2: Feeling down, depressed or hopeless Not at all   Not at all     PHQ-2 Score 0   0       PHQ9:       9/18/2021     6:22 AM 8/2/2022     2:11 PM 9/26/2022     1:26 PM 11/7/2022    12:06 PM 8/18/2023     9:24 AM 8/28/2023    12:17 PM 11/28/2023    10:47 AM   PHQ-9 SCORE   PHQ-9 Total Score MyChart 0 9 (Mild depression) 6 (Mild depression) 7 (Mild depression) 3 (Minimal depression) 3 (Minimal depression) 7 (Mild depression)   PHQ-9 Total Score 0 9 6 7 3 3 7     GAD2:       8/2/2022     2:30 PM 9/26/2022     1:26 PM 11/7/2022    12:07 PM   JOCELINE-2   Feeling nervous, anxious, or on edge 1 1 1   Not being able to stop or control worrying 2 1 1   JOCELINE-2 Total Score 3 2 2     GAD7:       3/30/2017     8:00 AM 11/8/2017     3:42 PM 3/19/2021    10:39 AM 8/2/2022     2:30 PM 8/14/2023    10:16 AM 8/28/2023    12:16 PM 11/28/2023    10:48 AM   JOCELINE-7 SCORE   Total Score    13 (moderate anxiety) 9 (mild anxiety) 6 (mild anxiety) 7 (mild anxiety)   Total Score 1 6 7 13 9 6 7     CAGE-AID:       8/2/2022     2:35 PM   CAGE-AID Total Score   Total Score 1   Total Score MyChart 1 (A total score of 2 or greater is considered clinically significant)     [place] Suicide Severity Rating Scale  (Lifetime/Recent)      2/8/2019     8:06 AM 8/28/2023    12:17 PM 8/31/2023    12:00 PM   [place] Suicide Severity Rating (Lifetime/Recent)   Q1 Wished to be Dead (Past Month) no     Q2 Suicidal Thoughts (Past Month) no     Q1 Wish to be Dead (Lifetime)   N   1. Wish to be Dead (Past 1 Month)  N N   2. Non-Specific Active Suicidal Thoughts (Past 1 Month)  N N   Actual Attempt (Lifetime)   N   Calculated C-SSRS Risk Score (Lifetime/Recent)  No Risk Indicated No Risk Indicated         Safety Issues and Plan for Safety and Risk Management:  Patient denies current fears or concerns for personal safety.  Patient denies current or recent suicidal ideation or behaviors.  Patient denies current or recent homicidal ideation or behaviors.  Patient denies current or recent self injurious behavior or ideation.  Patient denies other safety concerns.  Recommended that patient call 911 or go to the local ED should there be a change in any of these risk factors.  Patient reports there are no firearms in the house.     Diagnostic Criteria:  Generalized Anxiety Disorder  A. Excessive anxiety and worry about a number of events or activities (such as work or school performance).   B. The person finds it difficult to control the worry.  C. Select 3 or more symptoms (required for diagnosis). Only one item is required in children.   - Restlessness or feeling keyed up or on edge.    - Being easily fatigued.    - Difficulty concentrating or mind going blank.    - Irritability.    - Sleep disturbance (difficulty falling or staying asleep, or restless unsatisfying sleep).   D. The focus of the anxiety and worry is not confined to features of an Axis I disorder.  E. The anxiety, worry, or physical symptoms cause clinically significant distress or impairment in social, occupational, or other important areas of functioning.   F. The disturbance is not due to the direct physiological effects of a substance (e.g., a drug of abuse, a medication)  or a general medical condition (e.g., hyperthyroidism) and does not occur exclusively during a Mood Disorder, a Psychotic Disorder, or a Pervasive Developmental Disorder.  Major Depressive Disorder  CRITERIA (A-C) REPRESENT A MAJOR DEPRESSIVE EPISODE - SELECT THESE CRITERIA  A) Recurrent episode(s) - symptoms have been present during the same 2-week period and represent a change from previous functioning 5 or more symptoms (required for diagnosis)   - Depressed mood. Note: In children and adolescents, can be irritable mood.     - Diminished interest or pleasure in all, or almost all, activities.    - Psychomotor activity agitation.    - Fatigue or loss of energy.    - Feelings of worthlessness or inappropriate and excessive guilt.    - Diminished ability to think or concentrate, or indecisiveness.   B) The symptoms cause clinically significant distress or impairment in social, occupational, or other important areas of functioning  C) The episode is not attributable to the physiological effects of a substance or to another medical condition  D) The occurence of major depressive episode is not better explained by other thought / psychotic disorders  E) There has never been a manic episode or hypomanic episode      DSM5 Diagnoses: (Sustained by DSM5 Criteria Listed Above)  Diagnoses: 296.31 (F33.0) Major Depressive Disorder, Recurrent Episode, Mild _  300.02 (F41.1) Generalized Anxiety Disorder  Psychosocial & Contextual Factors: Work conflict with supervisor, challenging with environment, multiple family challenges, 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   Intervention:  Information regarding confidentiality, as well as limits to confidentiality was verbally provided; client acknowledged understanding both confidentiality and the limits. Rapport building/information gathering: Therapist gathered information from patient regarding reasons for setting up the appointment. Therapist gathered information on patient's past  therapy and psychiatric services. Therapist supported patient as she processed and validated patient. Therapist utilized Motivational Interviewing: Assess and address ambivalence towards change and readiness and willingness to change, evoke change talk, challenge sustain talk, point out discrepancies, explore ambivalence towards change and roadblocks.  Collateral Reports Completed:  Not Applicable      PLAN: (Homework, other):  Lara will name, ground self and manage anxiety.    Amy Anne, [facility]  November 28, 2023

## 2023-12-05 ENCOUNTER — VIRTUAL VISIT (OUTPATIENT)
Dept: PSYCHOLOGY | Facility: CLINIC | Age: 68
End: 2023-12-05
Payer: COMMERCIAL

## 2023-12-05 DIAGNOSIS — F33.0 MAJOR DEPRESSIVE DISORDER, RECURRENT EPISODE, MILD (H): Primary | ICD-10-CM

## 2023-12-05 DIAGNOSIS — F41.1 GENERALIZED ANXIETY DISORDER: ICD-10-CM

## 2023-12-05 PROCEDURE — 90834 PSYTX W PT 45 MINUTES: CPT | Mod: VID

## 2023-12-05 NOTE — PROGRESS NOTES
[facility]                                     Progress Note    Patient Name: Brissa Mayer  Date: 12/5/2023         Service Type: Individual      Session Start Time: 10:00 AM  Session End Time: 10:52 AM     Session Length: 52 Minutes    Session #: 2    Attendees: Client attended alone    Service Modality:  Video Visit:      Provider verified identity through the following two step process.  Patient provided:  Patient is known previously to provider    Telemedicine Visit: The patient's condition can be safely assessed and treated via synchronous audio and visual telemedicine encounter.      Reason for Telemedicine Visit: Patient has requested telehealth visit    Originating Site (Patient Location): Patient's place of employment    Distant Site (Provider Location): Provider Remote Setting- Home Office    Consent:  The patient/guardian has verbally consented to: the potential risks and benefits of telemedicine (video visit) versus in person care; bill my insurance or make self-payment for services provided; and responsibility for payment of non-covered services.     Patient would like the video invitation sent by:  My Chart    Mode of Communication:  Video Conference via [employer]    Distant Location (Provider):  Off-site    As the provider I attest to compliance with applicable laws and regulations related to telemedicine.    DATA  Interactive Complexity: No  Crisis: No        Progress Since Last Session (Related to Symptoms / Goals / Homework):   Symptoms: No change continued anxiety and stress    Homework:  Newly Established      Episode of Care Goals: Minimal progress - ACTION (Actively working towards change); Intervened by reinforcing change plan / affirming steps taken     Current / Ongoing Stressors and Concerns:   Lara is coming to therapy to learn skills to address her anxiety and depression. 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 She reports increased stress taking more work on within her home and for her sister that has resulted in increased irritation and burnout.      Treatment Objective(s) Addressed in This Session:   practice deep breathing at least once a day       Intervention:  Therapist met with patient to review goals and interventions. Therapist utilized reflected listening as patient gave brief reflection of the past week. Patient reported continued anxiety and stress. Therapist supported patient as she processed and validated patient. Therapist focused on obtaining more background information as well as relationship building. Therapist introduced the window of tolerance, mindfulness and self compassion. Therapist introduced and practiced 4-7-8 breathing.     Assessments completed prior to visit:  The following assessments were completed by patient for this visit:  PHQ2:       4/25/2022     5:07 PM 9/27/2017     3:52 PM 12/11/2015     8:14 AM 12/8/2015     9:12 AM 10/12/2015     8:37 AM 10/23/2012     3:53 PM   PHQ-2 ( 1999 [employer])   Q1: Little interest or pleasure in doing things 0 0 0  0 1   Q2: Feeling down, depressed or hopeless 0 0 0  0 1   PHQ-2 Score 0 0 0  0 2   Q1: Little interest or pleasure in doing things Not at all   Not at all     Q2: Feeling down, depressed or hopeless Not at all   Not at all     PHQ-2 Score 0   0       PHQ9:       9/18/2021     6:22 AM 8/2/2022     2:11 PM 9/26/2022     1:26 PM 11/7/2022    12:06 PM 8/18/2023     9:24 AM 8/28/2023    12:17 PM 11/28/2023    10:47 AM   PHQ-9 SCORE   PHQ-9 Total Score MyChart 0 9 (Mild depression) 6 (Mild depression) 7 (Mild depression) 3 (Minimal depression) 3 (Minimal depression) 7 (Mild depression)   PHQ-9 Total Score 0 9 6 7 3 3 7     GAD2:       8/2/2022     2:30 PM 9/26/2022     1:26 PM 11/7/2022    12:07 PM 12/5/2023     8:36 AM   JOCELINE-2   Feeling nervous, anxious, or on edge 1 1 1 1   Not being able to stop or control worrying 2 1 1 1    JOCELINE-2 Total Score 3 2 2 2     GAD7:       3/30/2017     8:00 AM 11/8/2017     3:42 PM 3/19/2021    10:39 AM 8/2/2022     2:30 PM 8/14/2023    10:16 AM 8/28/2023    12:16 PM 11/28/2023    10:48 AM   JOCELINE-7 SCORE   Total Score    13 (moderate anxiety) 9 (mild anxiety) 6 (mild anxiety) 7 (mild anxiety)   Total Score 1 6 7 13 9 6 7     PROMIS 10-Global Health (all questions and answers displayed):       1/27/2022    10:41 AM 8/2/2022     2:34 PM 11/7/2022    12:08 PM 8/28/2023    12:18 PM 11/21/2023     1:53 PM 11/28/2023    10:49 AM   PROMIS 10   In general, would you say your health is:  Very good Good   Very good   In general, would you say your quality of life is:  Good Good   Good   In general, how would you rate your physical health?  Very good Very good   Very good   In general, how would you rate your mental health, including your mood and your ability to think?  Fair Good   Fair   In general, how would you rate your satisfaction with your social activities and relationships?  Fair Good   Good   In general, please rate how well you carry out your usual social activities and roles  Good Good   Fair   To what extent are you able to carry out your everyday physical activities such as walking, climbing stairs, carrying groceries, or moving a chair?  Completely Completely   Completely   In the past 7 days, how often have you been bothered by emotional problems such as feeling anxious, depressed, or irritable?  Often Sometimes   Sometimes   In the past 7 days, how would you rate your fatigue on average?  Mild Mild   Mild   In the past 7 days, how would you rate your pain on average, where 0 means no pain, and 10 means worst imaginable pain?  0 1   5   In general, would you say your health is: 3 4 3   4    4   In general, would you say your quality of life is: 3 3 3   3    3   In general, how would you rate your physical health? 3 4 4   4    4   In general, how would you rate your mental health, including your mood  and your ability to think? 3 2 3   2    2   In general, how would you rate your satisfaction with your social activities and relationships? 3 2 3   3    3   In general, please rate how well you carry out your usual social activities and roles. (This includes activities at home, at work and in your community, and responsibilities as a parent, child, spouse, employee, friend, etc.) 4 3 3   2    2   To what extent are you able to carry out your everyday physical activities such as walking, climbing stairs, carrying groceries, or moving a chair? 5 5 5   5    5   In the past 7 days, how often have you been bothered by emotional problems such as feeling anxious, depressed, or irritable? 3 4 3   3    3   In the past 7 days, how would you rate your fatigue on average? 3 2 2   2    2   In the past 7 days, how would you rate your pain on average, where 0 means no pain, and 10 means worst imaginable pain? 4 0 1   5    5   Global Mental Health Score 12 9 12   11    11   Global Physical Health Score 14 18 17   16    16   PROMIS TOTAL - SUBSCORES 26 27 29   27    27       Information is confidential and restricted. Go to Review Flowsheets to unlock data.    Multiple values from one day are sorted in reverse-chronological order         ASSESSMENT: Current Emotional / Mental Status (status of significant symptoms):   Risk status (Self / Other harm or suicidal ideation)   Patient denies current fears or concerns for personal safety.   Patient denies current or recent suicidal ideation or behaviors.   Patient denies current or recent homicidal ideation or behaviors.   Patient denies current or recent self injurious behavior or ideation.   Patient denies other safety concerns.   Patient reports there has been no change in risk factors since their last session.     Patient reports there has been no change in protective factors since their last session.     Recommended that patient call 911 or go to the local ED should there be a change  in any of these risk factors.     Appearance:   Appropriate    Eye Contact:   Good    Psychomotor Behavior: Normal    Attitude:   Cooperative    Orientation:   All   Speech    Rate / Production: Normal/ Responsive    Volume:  Normal    Mood:    Anxious    Affect:    Appropriate    Thought Content:  Clear    Thought Form:  Coherent  Logical    Insight:    Good      Medication Review:   No changes to current psychiatric medication(s)     Medication Compliance:   Yes     Changes in Health Issues:   None reported     Chemical Use Review:   Substance Use: Chemical use reviewed, no active concerns identified      Tobacco Use: No current tobacco use.      Diagnosis:  1. Major depressive disorder, recurrent episode, mild (HCC)    2. Generalized anxiety disorder        Collateral Reports Completed:   Not Applicable    PLAN: (Patient Tasks / Therapist Tasks / Other)  Lara will practice 4-7-8 breathing daily.       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   PROMIS (reviewed every 90 days):     Referral / Collaboration:  Referral to another professional/service is not indicated at this time..    Anticipated number of session for this episode of care: 9-12 sessions  Anticipation frequency of session: Every other week  Anticipated Duration of each session: 38-52 minutes  Treatment plan will be reviewed in 90 days or when goals have been changed.  "      MeasurableTreatment Goal(s) related to diagnosis / functional impairment(s)  Goal 1: Patient will become more aware of their anxiety and depression and utilize the skills taught to decrease their anxious and depressive symptoms.    I will know I've met my goal when I feel like I can feel in control of my reactions and can be kind with myself.\"    Objective #A (Patient Action)    Patient will  use at least 4 coping skills for anxiety management in the next 12 weeks. .  Status: New - Date: 12/5/2023      Intervention(s)  Therapist will  teach coping skills and assign homework of practicing these. .    Objective #B  Patient will  use cognitive strategies identified in therapy to challenge anxious thoughts. Patient will engage in activities that are anxiety producing for them, slowly increasing their tolerance for new activities. Patient will identify and recognize past negative experiences and subsequent beliefs they hold that contribute to their anxiety. .  Status: New - Date: 12/5/2023      Intervention(s)  Therapist will  teach cognitive behavioral skills and engage client in Socratic dialogue around distorted thinking.  Therapist will provide educational materials on CBT. .    Objective #C  Patient will  implement self-care into their routine to decrease anxiety, focusing on sleep hygiene, nutrition, movement, regular engagement in mindful activity, and regular socialization.  .  Status: New - Date: 12/5/2023      Intervention(s)  Therapist will  psychoeducation around the benefit of self-care and help client identify mindfulness based activities that may work for their life. .      Patient has reviewed and agreed to the above plan.      TIO Portillo  December 5, 2023          "

## 2023-12-18 ENCOUNTER — APPOINTMENT (OUTPATIENT)
Dept: RADIOLOGY | Facility: CLINIC | Age: 68
End: 2023-12-18
Attending: FAMILY MEDICINE
Payer: COMMERCIAL

## 2023-12-18 ENCOUNTER — HOSPITAL ENCOUNTER (EMERGENCY)
Facility: CLINIC | Age: 68
Discharge: HOME | End: 2023-12-18
Attending: FAMILY MEDICINE | Admitting: FAMILY MEDICINE
Payer: COMMERCIAL

## 2023-12-18 VITALS
HEART RATE: 76 BPM | SYSTOLIC BLOOD PRESSURE: 129 MMHG | RESPIRATION RATE: 17 BRPM | WEIGHT: 129 LBS | OXYGEN SATURATION: 97 % | TEMPERATURE: 98.2 F | HEIGHT: 61 IN | DIASTOLIC BLOOD PRESSURE: 66 MMHG | BODY MASS INDEX: 24.35 KG/M2

## 2023-12-18 DIAGNOSIS — R05.1 ACUTE COUGH: ICD-10-CM

## 2023-12-18 DIAGNOSIS — R06.00 DYSPNEA, UNSPECIFIED TYPE: ICD-10-CM

## 2023-12-18 DIAGNOSIS — J45.20 MILD INTERMITTENT ASTHMA WITHOUT COMPLICATION: ICD-10-CM

## 2023-12-18 LAB
ANION GAP SERPL CALCULATED.3IONS-SCNC: 7 MMOL/L (ref 7–15)
ATRIAL RATE - MUSE: 81 BPM
BASE EXCESS BLDV CALC-SCNC: 3.5 MMOL/L
BASOPHILS # BLD AUTO: 0 10E3/UL (ref 0–0.2)
BASOPHILS NFR BLD AUTO: 1 %
BUN SERPL-MCNC: 18.2 MG/DL (ref 8–23)
CALCIUM SERPL-MCNC: 9.8 MG/DL (ref 8.8–10.2)
CHLORIDE SERPL-SCNC: 104 MMOL/L (ref 98–107)
CREAT SERPL-MCNC: 0.6 MG/DL (ref 0.51–0.95)
DEPRECATED HCO3 PLAS-SCNC: 28 MMOL/L (ref 22–29)
DIASTOLIC BLOOD PRESSURE - MUSE: 72 MMHG
EGFRCR SERPLBLD CKD-EPI 2021: >90 ML/MIN/1.73M2
EOSINOPHIL # BLD AUTO: 0.1 10E3/UL (ref 0–0.7)
EOSINOPHIL NFR BLD AUTO: 3 %
ERYTHROCYTE [DISTWIDTH] IN BLOOD BY AUTOMATED COUNT: 12.1 % (ref 10–15)
FLUAV RNA SPEC QL NAA+PROBE: NEGATIVE
FLUBV RNA RESP QL NAA+PROBE: NEGATIVE
GLUCOSE SERPL-MCNC: 115 MG/DL (ref 70–99)
HCO3 BLDV-SCNC: 29 MMOL/L (ref 24–30)
HCT VFR BLD AUTO: 39.6 % (ref 35–47)
HGB BLD-MCNC: 12.9 G/DL (ref 11.7–15.7)
IMM GRANULOCYTES # BLD: 0 10E3/UL
IMM GRANULOCYTES NFR BLD: 1 %
INTERPRETATION ECG - MUSE: NORMAL
LYMPHOCYTES # BLD AUTO: 1.4 10E3/UL (ref 0.8–5.3)
LYMPHOCYTES NFR BLD AUTO: 31 %
MAGNESIUM SERPL-MCNC: 2.4 MG/DL (ref 1.7–2.3)
MCH RBC QN AUTO: 31.3 PG (ref 26.5–33)
MCHC RBC AUTO-ENTMCNC: 32.6 G/DL (ref 31.5–36.5)
MCV RBC AUTO: 96 FL (ref 78–100)
MONOCYTES # BLD AUTO: 0.8 10E3/UL (ref 0–1.3)
MONOCYTES NFR BLD AUTO: 18 %
NEUTROPHILS # BLD AUTO: 2 10E3/UL (ref 1.6–8.3)
NEUTROPHILS NFR BLD AUTO: 46 %
NRBC # BLD AUTO: 0 10E3/UL
NRBC BLD AUTO-RTO: 0 /100
NT-PROBNP SERPL-MCNC: 60 PG/ML (ref 0–900)
OXYHGB MFR BLDV: 42.1 % (ref 70–75)
P AXIS - MUSE: 72 DEGREES
PCO2 BLDV: 50 MM HG (ref 35–50)
PH BLDV: 7.37 [PH] (ref 7.35–7.45)
PLATELET # BLD AUTO: 167 10E3/UL (ref 150–450)
PO2 BLDV: 26 MM HG (ref 25–47)
POTASSIUM SERPL-SCNC: 4.2 MMOL/L (ref 3.4–5.3)
PR INTERVAL - MUSE: 170 MS
QRS DURATION - MUSE: 80 MS
QT - MUSE: 392 MS
QTC - MUSE: 455 MS
R AXIS - MUSE: 73 DEGREES
RBC # BLD AUTO: 4.12 10E6/UL (ref 3.8–5.2)
RSV RNA SPEC NAA+PROBE: NEGATIVE
SAO2 % BLDV: 42.6 % (ref 70–75)
SARS-COV-2 RNA RESP QL NAA+PROBE: NEGATIVE
SODIUM SERPL-SCNC: 139 MMOL/L (ref 135–145)
SYSTOLIC BLOOD PRESSURE - MUSE: 153 MMHG
T AXIS - MUSE: 81 DEGREES
TROPONIN T SERPL HS-MCNC: 9 NG/L
VENTRICULAR RATE- MUSE: 81 BPM
WBC # BLD AUTO: 4.4 10E3/UL (ref 4–11)

## 2023-12-18 PROCEDURE — 36415 COLL VENOUS BLD VENIPUNCTURE: CPT | Performed by: FAMILY MEDICINE

## 2023-12-18 PROCEDURE — 83735 ASSAY OF MAGNESIUM: CPT | Performed by: FAMILY MEDICINE

## 2023-12-18 PROCEDURE — 85025 COMPLETE CBC W/AUTO DIFF WBC: CPT | Performed by: FAMILY MEDICINE

## 2023-12-18 PROCEDURE — 84484 ASSAY OF TROPONIN QUANT: CPT | Performed by: FAMILY MEDICINE

## 2023-12-18 PROCEDURE — 80048 BASIC METABOLIC PNL TOTAL CA: CPT | Performed by: FAMILY MEDICINE

## 2023-12-18 PROCEDURE — 93005 ELECTROCARDIOGRAM TRACING: CPT | Performed by: FAMILY MEDICINE

## 2023-12-18 PROCEDURE — 99285 EMERGENCY DEPT VISIT HI MDM: CPT | Mod: 25

## 2023-12-18 PROCEDURE — 82805 BLOOD GASES W/O2 SATURATION: CPT | Performed by: FAMILY MEDICINE

## 2023-12-18 PROCEDURE — 83880 ASSAY OF NATRIURETIC PEPTIDE: CPT | Performed by: FAMILY MEDICINE

## 2023-12-18 PROCEDURE — 87637 SARSCOV2&INF A&B&RSV AMP PRB: CPT | Performed by: FAMILY MEDICINE

## 2023-12-18 PROCEDURE — 71046 X-RAY EXAM CHEST 2 VIEWS: CPT

## 2023-12-18 RX ORDER — IPRATROPIUM BROMIDE AND ALBUTEROL SULFATE 2.5; .5 MG/3ML; MG/3ML
3 SOLUTION RESPIRATORY (INHALATION) ONCE
Status: COMPLETED | OUTPATIENT
Start: 2023-12-18 | End: 2023-12-18

## 2023-12-18 RX ORDER — PREDNISONE 20 MG/1
TABLET ORAL
Qty: 10 TABLET | Refills: 0 | Status: SHIPPED | OUTPATIENT
Start: 2023-12-18 | End: 2024-01-29

## 2023-12-18 ASSESSMENT — ENCOUNTER SYMPTOMS
CHILLS: 1
FEVER: 0
SHORTNESS OF BREATH: 1
COUGH: 1

## 2023-12-18 ASSESSMENT — ACTIVITIES OF DAILY LIVING (ADL)
ADLS_ACUITY_SCORE: 33
ADLS_ACUITY_SCORE: 35

## 2023-12-18 NOTE — ED TRIAGE NOTES
The patient presents to the ED with shortness of breath, cough for the past 3 weeks. She reports one week ago she developed some pressure in her chest/chest congestion. The patient denies fever. She does report history of asthma and states her nebulizer has not been helping.

## 2023-12-18 NOTE — ED PROVIDER NOTES
EMERGENCY DEPARTMENT ENCOUNTER      NAME: Brissa Mayer  AGE: 68 year old female  YOB: 1955  MRN: [id number]  EVALUATION DATE & TIME: 12/18/2023  9:00 AM    PCP: Fuentes Darby    ED PROVIDER: Kwaku Salazar M.D.    Chief Complaint   Patient presents with    Shortness of Breath    Cough       FINAL IMPRESSION:  1. Dyspnea, unspecified type    2. Acute cough    3. Mild intermittent asthma without complication        ED COURSE & MEDICAL DECISION MAKING:    Pertinent Labs & Imaging studies independently interpreted by me. (See chart for details)  9:12 AM review of most recent office visit with primary care on November 1, 2023 when patient was diagnosed with otitis media, prescribed amoxicillin.  Also reviewed prior emergency department visit from March 2023 when patient was seen with shortness of breath with normal chest x-ray and discharged with prednisone taper and DuoNeb.  Patient seen by medical student.  10:24 AM patient seen and examined.  Oxygen saturation 100%, lungs are clear.  Labs ordered and independently interpreted by me with reassuring basic metabolic panel, negative BNP negative troponin, normal venous blood gas, reassuring CBC.  Chest x-ray independently interpreted by me appears to demonstrate some infiltrate on the left although this may be related to overlying soft tissue.  Radiology interpretation is pending.  10:42 AM discussed diagnosis and plan with patient.  Patient presents with several weeks of cough, as well as several days of worsening shortness of breath and increased cough.  On exam here, no respiratory distress, oxygen saturation 100% on room air, no tachycardia, no fever, no lower extremity swelling.  Patient is on Eliquis.  On evaluation today, no evidence for acute myocardial infarction, heart failure, pneumonia, hemothorax, pneumothorax.  Clinically pulmonary embolism is unlikely and patient is already anticoagulated.  Continue nebs, patient will be started on  prednisone and is stable for discharge    At the conclusion of the encounter I discussed the results of all of the tests and the disposition. The questions were answered. The patient or family acknowledged understanding and was agreeable with the care plan.     Medical Decision Making    History:  Supplemental history from: Documented in chart, if applicable  External Record(s) reviewed: Documented in chart, if applicable.    Work Up:  Chart documentation includes differential considered and any EKGs or imaging independently interpreted by provider, where specified.  In additional to work up documented, I considered the following work up: Documented in chart, if applicable.    External consultation:  Discussion of management with another provider: Documented in chart, if applicable    Complicating factors:  Care impacted by chronic illness: Hyperlipidemia  Care affected by social determinants of health: N/A    Disposition considerations: Discharge. I prescribed additional prescription strength medication(s) as charted. I considered admission, but discharged the patient after share decision making conversation.        EKG:    Performed at: 9:08 AM  Impression: Normal EKG  Rate: 81  Rhythm: Sinus  Axis: Normal  ND Interval: 170  QRS Interval: 80  QTc Interval: 455  ST Changes: No acute ischemic changes  Comparison: 2023, no acute changes    I have independently reviewed and interpreted the EKG(s) documented above.    PROCEDURES:       MEDICATIONS GIVEN IN THE EMERGENCY:  Medications   ipratropium - albuterol 0.5 mg/2.5 mg/3 mL (DUONEB) neb solution 3 mL (has no administration in time range)       NEW PRESCRIPTIONS STARTED AT TODAY'S ER VISIT  New Prescriptions    PREDNISONE (DELTASONE) 20 MG TABLET    Take two tablets (= 40mg) each day for 5 (five) days       =================================================================    HPI    Patient information was obtained from: Patient.       Brissa Mayer is a 68 year  "old female with a pertinent history of hyperlipidemia and asthma who presents to this ED via private car for evaluation of shortness of breath and chest heaviness.    The patient reports that she has had a cold for the past 3 weeks, with a main symptom of cough. At the end of last week, she started experiencing a pressure chest pain, worsened cough, shortness of breath, and insomnia. She notes that her chest feels \"heavy, like there is someone sitting on it.\" She says that this chest pressure constant but is not exacerbated with respiration or movement. Patient mentions that she is normally cold, but endorses worsened chills that started last week. She also notes an episode of diarrhea this weekend that has since resolved.    The patient says that she has been using her nebulizer every 3-4 hours, but it doesn't help relieve her shortness of breath. She also has been using her rescue inhaler sometimes 1 or 2 times every hour. At baseline, she uses her Advair inhaler daily but has been using her albuterol inhaler much more frequently for the previous week. Patient does not use O2 at baseline. She sees her PCP for her asthma but does not regularly see a pulmonologist.    Denies fever, leg pain, leg swelling, history of blood clots or bleeding disorders, or any other concerns at this time.     REVIEW OF SYSTEMS   Review of Systems   Constitutional:  Positive for chills. Negative for fever.   Respiratory:  Positive for cough and shortness of breath.    Cardiovascular:  Negative for leg swelling.   Musculoskeletal:         Negative for leg pain.   All other systems reviewed and are negative.     PAST MEDICAL HISTORY:  Past Medical History:   Diagnosis Date    Anxiety     Arthritis     Asthma, mild intermittent     Major depressive disorder, single episode     Migraines     Tension headaches     Unspecified hypothyroidism        PAST SURGICAL HISTORY:  Past Surgical History:   Procedure Laterality Date    BUNIONECTOMY      " COLONOSCOPY      ESOPHAGOSCOPY, GASTROSCOPY, DUODENOSCOPY (EGD), COMBINED N/A 4/2/2019    Procedure: ESOPHAGOSCOPY, GASTROSCOPY, DUODENOSCOPY (EGD);  Surgeon: Ochoa Cherry DO;  Location:  GI    OTHER SURGICAL HISTORY  1980    Bunionectomy       CURRENT MEDICATIONS:    Current Facility-Administered Medications   Medication    ipratropium - albuterol 0.5 mg/2.5 mg/3 mL (DUONEB) neb solution 3 mL     Current Outpatient Medications   Medication    predniSONE (DELTASONE) 20 MG tablet    albuterol (VENTOLIN HFA) 108 (90 Base) MCG/ACT inhaler    amLODIPine (NORVASC) 2.5 MG tablet    amoxicillin (AMOXIL) 500 MG capsule    atorvastatin (LIPITOR) 20 MG tablet    ELIQUIS ANTICOAGULANT 5 MG tablet    escitalopram (LEXAPRO) 5 MG tablet    fluticasone (FLONASE) 50 MCG/ACT nasal spray    fluticasone-salmeterol (ADVAIR DISKUS) 250-50 MCG/ACT inhaler    ipratropium - albuterol 0.5 mg/2.5 mg/3 mL (DUONEB) 0.5-2.5 (3) MG/3ML neb solution    levothyroxine (SYNTHROID/LEVOTHROID) 50 MCG tablet    sildenafil (REVATIO) 20 MG tablet       ALLERGIES:  Allergies   Allergen Reactions    Compazine      Anxiety    Flagyl [Metronidazole Hcl] Nausea and Vomiting       FAMILY HISTORY:  Family History   Problem Relation Age of Onset    Asthma Mother     Diabetes Mother     Cancer - colorectal Maternal Grandmother     Heart Disease Father         MI at age 55    Prostate Cancer Father     Asthma Father     Asthma Sister     Obesity Sister     Diabetes Sister     Coronary Artery Disease Sister     Asthma Sister     Obesity Sister     Asthma Sister     Asthma Brother        SOCIAL HISTORY:   Social History     Socioeconomic History    Marital status: Single   Tobacco Use    Smoking status: Never    Smokeless tobacco: Never   Vaping Use    Vaping Use: Never used   Substance and Sexual Activity    Alcohol use: Not Currently     Comment: Can t drink wine with stomach issues    Drug use: No    Sexual activity: Not Currently     Partners: Male  "    Birth control/protection: None   Other Topics Concern    Parent/sibling w/ CABG, MI or angioplasty before 65F 55M? No     Social Determinants of Health     Financial Resource Strain: Low Risk  (11/1/2023)    Financial Resource Strain     Within the past 12 months, have you or your family members you live with been unable to get utilities (heat, electricity) when it was really needed?: No   Food Insecurity: Low Risk  (11/1/2023)    Food Insecurity     Within the past 12 months, did you worry that your food would run out before you got money to buy more?: No     Within the past 12 months, did the food you bought just not last and you didn t have money to get more?: No   Transportation Needs: Low Risk  (11/1/2023)    Transportation Needs     Within the past 12 months, has lack of transportation kept you from medical appointments, getting your medicines, non-medical meetings or appointments, work, or from getting things that you need?: No   Interpersonal Safety: Low Risk  (11/1/2023)    Interpersonal Safety     Do you feel physically and emotionally safe where you currently live?: Yes     Within the past 12 months, have you been hit, slapped, kicked or otherwise physically hurt by someone?: No     Within the past 12 months, have you been humiliated or emotionally abused in other ways by your partner or ex-partner?: No   Housing Stability: Low Risk  (11/1/2023)    Housing Stability     Do you have housing? : Yes     Are you worried about losing your housing?: No       VITALS:  BP (!) 150/72   Pulse 85   Temp 98.2  F (36.8  C)   Resp 16   Ht 1.549 m (5' 1\")   Wt 58.5 kg (129 lb)   LMP  (LMP Unknown)   SpO2 100%   BMI 24.37 kg/m      PHYSICAL EXAM:  Physical Exam     LAB:  All pertinent labs reviewed and interpreted.  Results for orders placed or performed during the hospital encounter of 12/18/23   Chest XR,  PA & LAT    Impression    IMPRESSION: Stable flattening of the diaphragm which may reflect COPD. No " acute findings. The lungs are clear and there are no pleural effusions. Normal heart size.   Basic metabolic panel   Result Value Ref Range    Sodium 139 135 - 145 mmol/L    Potassium 4.2 3.4 - 5.3 mmol/L    Chloride 104 98 - 107 mmol/L    Carbon Dioxide (CO2) 28 22 - 29 mmol/L    Anion Gap 7 7 - 15 mmol/L    Urea Nitrogen 18.2 8.0 - 23.0 mg/dL    Creatinine 0.60 0.51 - 0.95 mg/dL    GFR Estimate >90 >60 mL/min/1.73m2    Calcium 9.8 8.8 - 10.2 mg/dL    Glucose 115 (H) 70 - 99 mg/dL   Result Value Ref Range    Troponin T, High Sensitivity 9 <=14 ng/L   Result Value Ref Range    Magnesium 2.4 (H) 1.7 - 2.3 mg/dL   Nt probnp inpatient (BNP)   Result Value Ref Range    N terminal Pro BNP Inpatient 60 0 - 900 pg/mL   Symptomatic Influenza A/B, RSV, & SARS-CoV2 PCR (COVID-19) Nasopharyngeal    Specimen: Nasopharyngeal; Swab   Result Value Ref Range    Influenza A PCR Negative Negative    Influenza B PCR Negative Negative    RSV PCR Negative Negative    SARS CoV2 PCR Negative Negative   Blood gas venous   Result Value Ref Range    pH Venous 7.37 7.35 - 7.45    pCO2 Venous 50 35 - 50 mm Hg    pO2 Venous 26 25 - 47 mm Hg    Bicarbonate Venous 29 24 - 30 mmol/L    Base Excess/Deficit 3.5   mmol/L    Oxyhemoglobin Venous 42.1 (L) 70.0 - 75.0 %    O2 Sat, Venous 42.6 (L) 70.0 - 75.0 %   CBC with platelets and differential   Result Value Ref Range    WBC Count 4.4 4.0 - 11.0 10e3/uL    RBC Count 4.12 3.80 - 5.20 10e6/uL    Hemoglobin 12.9 11.7 - 15.7 g/dL    Hematocrit 39.6 35.0 - 47.0 %    MCV 96 78 - 100 fL    MCH 31.3 26.5 - 33.0 pg    MCHC 32.6 31.5 - 36.5 g/dL    RDW 12.1 10.0 - 15.0 %    Platelet Count 167 150 - 450 10e3/uL    % Neutrophils 46 %    % Lymphocytes 31 %    % Monocytes 18 %    % Eosinophils 3 %    % Basophils 1 %    % Immature Granulocytes 1 %    NRBCs per 100 WBC 0 <1 /100    Absolute Neutrophils 2.0 1.6 - 8.3 10e3/uL    Absolute Lymphocytes 1.4 0.8 - 5.3 10e3/uL    Absolute Monocytes 0.8 0.0 - 1.3 10e3/uL     Absolute Eosinophils 0.1 0.0 - 0.7 10e3/uL    Absolute Basophils 0.0 0.0 - 0.2 10e3/uL    Absolute Immature Granulocytes 0.0 <=0.4 10e3/uL    Absolute NRBCs 0.0 10e3/uL       RADIOLOGY:  Reviewed all pertinent imaging. Please see official radiology report.  Chest XR,  PA & LAT   Final Result   IMPRESSION: Stable flattening of the diaphragm which may reflect COPD. No acute findings. The lungs are clear and there are no pleural effusions. Normal heart size.          I, Evelin Barclay, am serving as a scribe to document services personally performed by Dr. Salazar based on my observation and the provider's statements to me. I, Kwaku Salazar MD attest that Evelin Barclay is acting in a scribe capacity, has observed my performance of the services and has documented them in accordance with my direction.    Kwaku Salazar M.D.  Emergency Medicine  [facility] EMERGENCY ROOM  [address] [facility] [id number] [phone]  Dept: [phone]       Kwaku Salazar MD  12/18/23 1947

## 2023-12-29 ENCOUNTER — VIRTUAL VISIT (OUTPATIENT)
Dept: PSYCHOLOGY | Facility: CLINIC | Age: 68
End: 2023-12-29
Payer: COMMERCIAL

## 2023-12-29 DIAGNOSIS — F33.0 MAJOR DEPRESSIVE DISORDER, RECURRENT EPISODE, MILD (H): Primary | ICD-10-CM

## 2023-12-29 DIAGNOSIS — F41.1 GENERALIZED ANXIETY DISORDER: ICD-10-CM

## 2023-12-29 PROCEDURE — 90834 PSYTX W PT 45 MINUTES: CPT | Mod: VID

## 2024-01-04 NOTE — PROGRESS NOTES
[facility]                                     Progress Note    Patient Name: Brissa Mayer  Date: 12/28/2023         Service Type: Individual      Session Start Time: 7:40 AM  Session End Time: 8:25  AM     Session Length: 45 Minutes    Session #: 3    Attendees: Client attended alone    Service Modality:  Video Visit:      Provider verified identity through the following two step process.  Patient provided:  Patient is known previously to provider    Telemedicine Visit: The patient's condition can be safely assessed and treated via synchronous audio and visual telemedicine encounter.      Reason for Telemedicine Visit: Patient has requested telehealth visit    Originating Site (Patient Location): Patient's home    Distant Site (Provider Location): Provider Remote Setting- Home Office    Consent:  The patient/guardian has verbally consented to: the potential risks and benefits of telemedicine (video visit) versus in person care; bill my insurance or make self-payment for services provided; and responsibility for payment of non-covered services.     Patient would like the video invitation sent by:  My Chart    Mode of Communication:  Video Conference via [facility]    Distant Location (Provider):  Off-site    As the provider I attest to compliance with applicable laws and regulations related to telemedicine.    DATA  Interactive Complexity: No  Crisis: No        Progress Since Last Session (Related to Symptoms / Goals / Homework):   Symptoms: Worsening anxiety, stress and anger    Homework: Partially completed      Episode of Care Goals: Minimal progress - ACTION (Actively working towards change); Intervened by reinforcing change plan / affirming steps taken     Current / Ongoing Stressors and Concerns:   Lara is coming to therapy to learn skills to address her anxiety and depression. 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 She reports increased stress taking more work on within her home and for her sister that has resulted in increased irritation and burnout.      Treatment Objective(s) Addressed in This Session:   practice deep breathing at least once a day       Intervention:  Therapist utilized reflected listening as patient gave brief reflection of the past week. Patient reported worsening anxiety, stress and anger. She reflected on being sick recently and having little patience for problems that arise when she is feeling sick and had little sleep. She processed how helpful the mindfulness skills have been and when she remembers to do them. Therapist supported patient as she processed and validated patient. Therapist introduced boundaries and honoring other's boundaries and not taking on responsibilities for others.     Assessments completed prior to visit:  The following assessments were completed by patient for this visit:  PHQ2:       4/25/2022     5:07 PM 9/27/2017     3:52 PM 12/11/2015     8:14 AM 12/8/2015     9:12 AM 10/12/2015     8:37 AM 10/23/2012     3:53 PM   PHQ-2 ( 1999 [employer])   Q1: Little interest or pleasure in doing things 0 0 0  0 1   Q2: Feeling down, depressed or hopeless 0 0 0  0 1   PHQ-2 Score 0 0 0  0 2   Q1: Little interest or pleasure in doing things Not at all   Not at all     Q2: Feeling down, depressed or hopeless Not at all   Not at all     PHQ-2 Score 0   0       PHQ9:       8/2/2022     2:11 PM 9/26/2022     1:26 PM 11/7/2022    12:06 PM 8/18/2023     9:24 AM 8/28/2023    12:17 PM 11/28/2023    10:47 AM 12/28/2023     9:20 AM   PHQ-9 SCORE   PHQ-9 Total Score MyChart 9 (Mild depression) 6 (Mild depression) 7 (Mild depression) 3 (Minimal depression) 3 (Minimal depression) 7 (Mild depression) 6 (Mild depression)   PHQ-9 Total Score 9 6 7 3 3 7 6     GAD2:       8/2/2022     2:30 PM 9/26/2022     1:26 PM 11/7/2022    12:07 PM 12/5/2023     8:36 AM 12/28/2023     9:20 AM   JOCELINE-2   Feeling  nervous, anxious, or on edge 1 1 1 1 1   Not being able to stop or control worrying 2 1 1 1 1   JOCELINE-2 Total Score 3 2 2 2 2     GAD7:       3/30/2017     8:00 AM 11/8/2017     3:42 PM 3/19/2021    10:39 AM 8/2/2022     2:30 PM 8/14/2023    10:16 AM 8/28/2023    12:16 PM 11/28/2023    10:48 AM   JOCELINE-7 SCORE   Total Score    13 (moderate anxiety) 9 (mild anxiety) 6 (mild anxiety) 7 (mild anxiety)   Total Score 1 6 7 13 9 6 7     PROMIS 10-Global Health (all questions and answers displayed):       1/27/2022    10:41 AM 8/2/2022     2:34 PM 11/7/2022    12:08 PM 8/28/2023    12:18 PM 11/21/2023     1:53 PM 11/28/2023    10:49 AM   PROMIS 10   In general, would you say your health is:  Very good Good   Very good   In general, would you say your quality of life is:  Good Good   Good   In general, how would you rate your physical health?  Very good Very good   Very good   In general, how would you rate your mental health, including your mood and your ability to think?  Fair Good   Fair   In general, how would you rate your satisfaction with your social activities and relationships?  Fair Good   Good   In general, please rate how well you carry out your usual social activities and roles  Good Good   Fair   To what extent are you able to carry out your everyday physical activities such as walking, climbing stairs, carrying groceries, or moving a chair?  Completely Completely   Completely   In the past 7 days, how often have you been bothered by emotional problems such as feeling anxious, depressed, or irritable?  Often Sometimes   Sometimes   In the past 7 days, how would you rate your fatigue on average?  Mild Mild   Mild   In the past 7 days, how would you rate your pain on average, where 0 means no pain, and 10 means worst imaginable pain?  0 1   5   In general, would you say your health is: 3 4 3   4   In general, would you say your quality of life is: 3 3 3   3   In general, how would you rate your physical health? 3 4  4   4   In general, how would you rate your mental health, including your mood and your ability to think? 3 2 3   2   In general, how would you rate your satisfaction with your social activities and relationships? 3 2 3   3   In general, please rate how well you carry out your usual social activities and roles. (This includes activities at home, at work and in your community, and responsibilities as a parent, child, spouse, employee, friend, etc.) 4 3 3   2   To what extent are you able to carry out your everyday physical activities such as walking, climbing stairs, carrying groceries, or moving a chair? 5 5 5   5   In the past 7 days, how often have you been bothered by emotional problems such as feeling anxious, depressed, or irritable? 3 4 3   3   In the past 7 days, how would you rate your fatigue on average? 3 2 2   2   In the past 7 days, how would you rate your pain on average, where 0 means no pain, and 10 means worst imaginable pain? 4 0 1   5   Global Mental Health Score 12 9 12   11    11   Global Physical Health Score 14 18 17   16    16   PROMIS TOTAL - SUBSCORES 26 27 29   27    27       Information is confidential and restricted. Go to Review Flowsheets to unlock data.    Multiple values from one day are sorted in reverse-chronological order         ASSESSMENT: Current Emotional / Mental Status (status of significant symptoms):   Risk status (Self / Other harm or suicidal ideation)   Patient denies current fears or concerns for personal safety.   Patient denies current or recent suicidal ideation or behaviors.   Patient denies current or recent homicidal ideation or behaviors.   Patient denies current or recent self injurious behavior or ideation.   Patient denies other safety concerns.   Patient reports there has been no change in risk factors since their last session.     Patient reports there has been no change in protective factors since their last session.     Recommended that patient call 911 or  go to the local ED should there be a change in any of these risk factors.     Appearance:   Appropriate    Eye Contact:   Good    Psychomotor Behavior: Normal    Attitude:   Cooperative    Orientation:   All   Speech    Rate / Production: Normal/ Responsive    Volume:  Normal    Mood:    Anxious    Affect:    Appropriate    Thought Content:  Clear    Thought Form:  Coherent  Logical    Insight:    Good      Medication Review:   No changes to current psychiatric medication(s)     Medication Compliance:   Yes     Changes in Health Issues:   None reported     Chemical Use Review:   Substance Use: Chemical use reviewed, no active concerns identified      Tobacco Use: No current tobacco use.      Diagnosis:  1. Major depressive disorder, recurrent episode, mild (HCC)    2. Generalized anxiety disorder        Collateral Reports Completed:   Not Applicable    PLAN: (Patient Tasks / Therapist Tasks / Other)  Lara will practice mindfulness exercises daily and observe other's boundaries.        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   PROMIS (reviewed every 90 days):     Referral / Collaboration:  Referral to another professional/service is not indicated at this time..    Anticipated number of session for this episode of care: 9-12 sessions  Anticipation frequency of session: Every other week  Anticipated Duration of each session: 38-52 minutes  Treatment plan  "will be reviewed in 90 days or when goals have been changed.       MeasurableTreatment Goal(s) related to diagnosis / functional impairment(s)  Goal 1: Patient will become more aware of their anxiety and depression and utilize the skills taught to decrease their anxious and depressive symptoms.    I will know I've met my goal when I feel like I can feel in control of my reactions and can be kind with myself.\"    Objective #A (Patient Action)    Patient will  use at least 4 coping skills for anxiety management in the next 12 weeks. .  Status: New - Date: 12/5/2023      Intervention(s)  Therapist will  teach coping skills and assign homework of practicing these. .    Objective #B  Patient will  use cognitive strategies identified in therapy to challenge anxious thoughts. Patient will engage in activities that are anxiety producing for them, slowly increasing their tolerance for new activities. Patient will identify and recognize past negative experiences and subsequent beliefs they hold that contribute to their anxiety. .  Status: New - Date: 12/5/2023      Intervention(s)  Therapist will  teach cognitive behavioral skills and engage client in Socratic dialogue around distorted thinking.  Therapist will provide educational materials on CBT. .    Objective #C  Patient will  implement self-care into their routine to decrease anxiety, focusing on sleep hygiene, nutrition, movement, regular engagement in mindful activity, and regular socialization.  .  Status: New - Date: 12/5/2023      Intervention(s)  Therapist will  psychoeducation around the benefit of self-care and help client identify mindfulness based activities that may work for their life. .      Patient has reviewed and agreed to the above plan.      TIO Portillo  December 5, 2023          "

## 2024-01-12 ENCOUNTER — HOSPITAL ENCOUNTER (EMERGENCY)
Facility: CLINIC | Age: 69
Discharge: HOME | End: 2024-01-12
Attending: EMERGENCY MEDICINE | Admitting: EMERGENCY MEDICINE
Payer: COMMERCIAL

## 2024-01-12 VITALS
OXYGEN SATURATION: 96 % | RESPIRATION RATE: 24 BRPM | HEART RATE: 76 BPM | TEMPERATURE: 97.9 F | HEIGHT: 61 IN | SYSTOLIC BLOOD PRESSURE: 106 MMHG | BODY MASS INDEX: 24.55 KG/M2 | DIASTOLIC BLOOD PRESSURE: 58 MMHG | WEIGHT: 130 LBS

## 2024-01-12 DIAGNOSIS — T65.91XA INGESTION OF SUBSTANCE, ACCIDENTAL OR UNINTENTIONAL, INITIAL ENCOUNTER: ICD-10-CM

## 2024-01-12 PROCEDURE — 99283 EMERGENCY DEPT VISIT LOW MDM: CPT | Performed by: EMERGENCY MEDICINE

## 2024-01-12 PROCEDURE — 99283 EMERGENCY DEPT VISIT LOW MDM: CPT

## 2024-01-12 RX ORDER — SODIUM CHLORIDE 9 MG/ML
INJECTION, SOLUTION INTRAVENOUS CONTINUOUS
Status: DISCONTINUED | OUTPATIENT
Start: 2024-01-12 | End: 2024-01-12 | Stop reason: HOSPADM

## 2024-01-12 RX ORDER — ONDANSETRON 2 MG/ML
4 INJECTION INTRAMUSCULAR; INTRAVENOUS EVERY 30 MIN PRN
Status: DISCONTINUED | OUTPATIENT
Start: 2024-01-12 | End: 2024-01-12 | Stop reason: HOSPADM

## 2024-01-12 ASSESSMENT — ACTIVITIES OF DAILY LIVING (ADL)
ADLS_ACUITY_SCORE: 35
ADLS_ACUITY_SCORE: 35

## 2024-01-13 NOTE — ED TRIAGE NOTES
Patient ate 150 mg of a THC candybar at about 1500 today. She became nauseated and dizzy about 40 minutes later . She vomited x2 after movement with EMS.      Triage Assessment (Adult)       Row Name 01/12/24 7392          Triage Assessment    Airway WDL WDL        Respiratory WDL    Respiratory WDL WDL        Skin Circulation/Temperature WDL    Skin Circulation/Temperature WDL WDL

## 2024-01-13 NOTE — ED PROVIDER NOTES
History     Chief Complaint   Patient presents with    Nausea & Vomiting     HPI  Brissa Mayer is a 68 year old female who presents after taking 150 mg of THC delta 8 chocolate bar by report.  She felt  dizzy  and nauseated.  No fever.  No chest pain.  She does not normally take these.  She thought it was a piece of candy she tells me.    Allergies:  Allergies   Allergen Reactions    Compazine      Anxiety    Flagyl [Metronidazole Hcl] Nausea and Vomiting       Problem List:    Patient Active Problem List    Diagnosis Date Noted    Adjustment disorder with mixed anxiety and depressed mood 04/03/2018     Priority: Medium    Gastroesophageal reflux disease without esophagitis 10/10/2016     Priority: Medium    Hypothyroidism 08/01/2016     Priority: Medium    Major depressive disorder, recurrent episode, mild (HCC) 01/22/2016     Priority: Medium    Generalized anxiety disorder 10/07/2013     Priority: Medium    Hyperlipidemia LDL goal <130 10/23/2012     Priority: Medium    Anxiety state 08/31/2012     Priority: Medium     Problem list name updated by automated process. Provider to review      Advanced directives, counseling/discussion 07/26/2011     Priority: Medium     Parent voices understanding and acceptance of this advice and will call back if any further questions or concerns.        CARDIOVASCULAR SCREENING; LDL GOAL LESS THAN 160 10/31/2010     Priority: Medium    Asthma, mild intermittent      Priority: Medium    Anorexia nervosa (H28) 06/04/2007     Priority: Medium    Other bipolar disorder (H) 03/30/2006     Priority: Medium    Asthma 05/17/2005     Priority: Medium    Neutropenia (H24) 09/22/2004     Priority: Medium     Formatting of this note might be different from the original. HEMATOLOGY CONSULT - CHECK SERIAL COMPLETE BLOOD COUNTS      Allergic rhinitis 03/31/2004     Priority: Medium        Past Medical History:    Past Medical History:   Diagnosis Date    Anxiety     Arthritis     Asthma,  mild intermittent     Major depressive disorder, single episode     Migraines     Tension headaches     Unspecified hypothyroidism        Past Surgical History:    Past Surgical History:   Procedure Laterality Date    BUNIONECTOMY      COLONOSCOPY      ESOPHAGOSCOPY, GASTROSCOPY, DUODENOSCOPY (EGD), COMBINED N/A 4/2/2019    Procedure: ESOPHAGOSCOPY, GASTROSCOPY, DUODENOSCOPY (EGD);  Surgeon: Ochoa Cherry DO;  Location: [facility]    OTHER SURGICAL HISTORY  1980    Bunionectomy       Family History:    Family History   Problem Relation Age of Onset    Asthma Mother     Diabetes Mother     Cancer - colorectal Maternal Grandmother     Heart Disease Father         MI at age 55    Prostate Cancer Father     Asthma Father     Asthma Sister     Obesity Sister     Diabetes Sister     Coronary Artery Disease Sister     Asthma Sister     Obesity Sister     Asthma Sister     Asthma Brother        Social History:  Marital Status:  Single [1]  Social History     Tobacco Use    Smoking status: Never    Smokeless tobacco: Never   Vaping Use    Vaping Use: Never used   Substance Use Topics    Alcohol use: Not Currently     Comment: Can t drink wine with stomach issues    Drug use: No        Medications:    albuterol (VENTOLIN HFA) 108 (90 Base) MCG/ACT inhaler  amLODIPine (NORVASC) 2.5 MG tablet  amoxicillin (AMOXIL) 500 MG capsule  atorvastatin (LIPITOR) 20 MG tablet  ELIQUIS ANTICOAGULANT 5 MG tablet  escitalopram (LEXAPRO) 5 MG tablet  fluticasone (FLONASE) 50 MCG/ACT nasal spray  fluticasone-salmeterol (ADVAIR DISKUS) 250-50 MCG/ACT inhaler  ipratropium - albuterol 0.5 mg/2.5 mg/3 mL (DUONEB) 0.5-2.5 (3) MG/3ML neb solution  levothyroxine (SYNTHROID/LEVOTHROID) 50 MCG tablet  predniSONE (DELTASONE) 20 MG tablet  sildenafil (REVATIO) 20 MG tablet          Review of Systems  All other systems are reviewed and are negative    Physical Exam   BP: 121/72  Pulse: 69  Temp: 97.9  F (36.6  C)  Resp: 24  Height: 154.9 cm (5'  "1\")  Weight: 59 kg (130 lb)  SpO2: 100 %      Physical Exam  Vitals and nursing note reviewed.   Constitutional:       General: She is not in acute distress.     Appearance: She is well-developed. She is not diaphoretic.   HENT:      Head: Normocephalic and atraumatic.      Nose: Nose normal.      Mouth/Throat:      Mouth: Mucous membranes are moist.      Pharynx: Oropharynx is clear.   Eyes:      General: No scleral icterus.     Conjunctiva/sclera: Conjunctivae normal.   Cardiovascular:      Rate and Rhythm: Normal rate and regular rhythm.      Heart sounds: Normal heart sounds. No murmur heard.  Pulmonary:      Effort: Pulmonary effort is normal. No respiratory distress.      Breath sounds: No stridor. No wheezing or rales.   Abdominal:      General: Abdomen is flat. There is no distension.      Palpations: Abdomen is soft. There is no mass.      Tenderness: There is no abdominal tenderness. There is no guarding or rebound.   Musculoskeletal:         General: No tenderness.      Cervical back: Normal range of motion and neck supple.   Skin:     General: Skin is warm and dry.      Coloration: Skin is pale.      Findings: No erythema or rash.   Neurological:      General: No focal deficit present.      Mental Status: She is alert.   Psychiatric:         Mood and Affect: Mood normal.         ED Course                 Procedures                  No results found for this or any previous visit (from the past 24 hour(s)).    Medications   sodium chloride 0.9 % infusion (has no administration in time range)   ondansetron (ZOFRAN) injection 4 mg (has no administration in time range)       Assessments & Plan (with Medical Decision Making)  68-year-old female presented after a THC ingestion of a quantity that was unintended.  She had some dizziness and nausea.  She was observed here for 3 hours and slept a soft.  She was up wanting to return home.  Ambulating without difficulty and mentating well.     I have reviewed the " nursing notes.    I have reviewed the findings, diagnosis, plan and need for follow up with the patient.          New Prescriptions    No medications on file       Final diagnoses:   Ingestion of substance, accidental or unintentional, initial encounter       1/12/2024   [facility] EMERGENCY DEPT       Griffin Ceja MD  01/12/24 [id number]

## 2024-01-24 ENCOUNTER — TELEPHONE (OUTPATIENT)
Dept: OTHER | Facility: CLINIC | Age: 69
End: 2024-01-24
Payer: COMMERCIAL

## 2024-01-24 DIAGNOSIS — E78.5 HYPERLIPIDEMIA LDL GOAL <130: ICD-10-CM

## 2024-01-24 DIAGNOSIS — I75.023 BLUE TOE SYNDROME OF BOTH LOWER EXTREMITIES (H): Primary | ICD-10-CM

## 2024-01-24 DIAGNOSIS — I73.00 RAYNAUD'S DISEASE WITHOUT GANGRENE: ICD-10-CM

## 2024-01-24 NOTE — TELEPHONE ENCOUNTER
[facility]    Who is the name of the provider?:  BRITTNEY DAVE   What is the location you see this provider at/preferred location?: [place]  Person calling / Facility: Brissa SULLIVAN Mayer  Phone number:  [phone] (home)  Nurse call back needed:  n/a     Reason for call:  Patient called to schedule an appointment with KAI Cooper 03/08/23    Pharmacy location:   n/a  Outside Imaging: n/a   Can we leave a detailed message on this number?  YES     1/24/2024, 10:55 AM

## 2024-01-24 NOTE — TELEPHONE ENCOUNTER
Cancelled 4/19/23 appointment with Dr. Garcia for follow up to labs drawn 3/10/23.        Please schedule for in clinic visit with Dr. Garcia at next available.\\      Appt note:  Follow-up to 3/8/23, cancelled visit after labs drawn 3/10/23.

## 2024-01-29 ENCOUNTER — OFFICE VISIT (OUTPATIENT)
Dept: OTHER | Facility: CLINIC | Age: 69
End: 2024-01-29
Attending: INTERNAL MEDICINE
Payer: COMMERCIAL

## 2024-01-29 VITALS
OXYGEN SATURATION: 99 % | HEIGHT: 61 IN | WEIGHT: 127.8 LBS | BODY MASS INDEX: 24.13 KG/M2 | SYSTOLIC BLOOD PRESSURE: 132 MMHG | DIASTOLIC BLOOD PRESSURE: 84 MMHG | HEART RATE: 68 BPM

## 2024-01-29 DIAGNOSIS — I73.00 RAYNAUD'S DISEASE WITHOUT GANGRENE: ICD-10-CM

## 2024-01-29 DIAGNOSIS — E78.5 HYPERLIPIDEMIA LDL GOAL <130: ICD-10-CM

## 2024-01-29 DIAGNOSIS — I75.023 BLUE TOE SYNDROME OF BOTH LOWER EXTREMITIES (H): ICD-10-CM

## 2024-01-29 PROCEDURE — 99213 OFFICE O/P EST LOW 20 MIN: CPT | Performed by: INTERNAL MEDICINE

## 2024-01-29 PROCEDURE — G2211 COMPLEX E/M VISIT ADD ON: HCPCS | Performed by: INTERNAL MEDICINE

## 2024-01-29 PROCEDURE — 99214 OFFICE O/P EST MOD 30 MIN: CPT | Performed by: INTERNAL MEDICINE

## 2024-01-29 RX ORDER — ATORVASTATIN CALCIUM 20 MG/1
20 TABLET, FILM COATED ORAL DAILY
Qty: 90 TABLET | Refills: 3 | Status: SHIPPED | OUTPATIENT
Start: 2024-01-29 | End: 2024-07-22

## 2024-01-29 RX ORDER — AMLODIPINE BESYLATE 2.5 MG/1
2.5 TABLET ORAL DAILY
Qty: 90 TABLET | Refills: 3 | Status: SHIPPED | OUTPATIENT
Start: 2024-01-29

## 2024-01-29 NOTE — PROGRESS NOTES
VASCULAR MEDICINE FOLLOW UP VISIT      A/P:            (I73.00) Raynaud's disease without gangrene  (primary encounter diagnosis)  Comment: We started amlodipine, told her to undertake thermal protection strategies on 3/8/23, and checked autoimmune (JAUN, RF, CRP, C3,C4, ESR, RF, CMP)  labs (all of which were normal other than a borderline elevated JAUN at 1:80 in a speckled pattern, which was decreased form a prior elevation of 1:160)  on 3/10/23, RTC ten months later now to go over lab results and discuss response to amlodipine. She notes she is doing well on Eliquis but off of Revatio which she stopped last summer due to noncompliance and more favorable weather. When the weather becalm cold again, there was no particular worsening. However,  around Thanksgiving 2023 she developed a respiratory illness which has not gone away. She has fatigue, myalgias, and leg burning. She has been tested for COVID and influenza and tested negative.   Plan: Continue amlodipine (NORVASC) 2.5 MG tablet and Eliquis.              (I75.023) Blue toe syndrome of both lower extremities (H)  Comment: She did have COVID in 3/2020 and sxs worsened after this. Her sxs improved with Revatio and Eliquis, so we did continue those on 03/8/2023. During warmer months, and with her now being three years remote form COVID, we will consider cessation of these agents during upcoming warmer weather. She is told to minimize NSAID and alcohol use with any AC.  Plan: Continueapixaban ANTICOAGULANT (ELIQUIS ANTICOAGULANT)         5 MG tablet for three more months as we do not know if she had COVID or not. RTC 04/29/24 to consider cessation. Check d dimer and JAUN tow weeks before. Continue   amLODIPine (NORVASC) 2.5 MG         tablet, stay off of Revatio.         (E78.5) Hyperlipidemia LDL goal <130  Comment:Advanced lipid testing of 3/10/2023 while on Lipitor 20 mg daily revealed LDL-C of 64, LDL-P of 725, cardiac CRP of <0.15, and Lp(a) of <6. However, she  is having myalgias (which is more likely due to her respiratory illness)   Plan: Temporarily discontinue atorvastatin (LIPITOR) 20 MG tablet for three weeks, then rechallenge with the drug. If sxs of myalgias recur when she rechallenges, RTC to see me. Otherwise, further lipid management per PCP. She was told if she would like to know that she has had recent EBV or CMV to pursue testing through her PCP as these are not vascular disorders.     The longitudinal care of plan for Lara and her Raynaud's and HLD was addressed during this visit. Due to added complexity of care, we will continue to support Lara and the subsequent management of these conditions and with ongoing continuity of care for these conditions.                  32 minutes total care on today's date.               Brissa Mayer is a 68 year old female who is presenting at the current time to discuss her diagnosi(es) of            Blue toe syndrome of both lower extremities (H)  Hyperlipidemia LDL goal <130  Raynaud's disease without gangrene     .              HPI: Brissa Mayer  is a 68-year-old lifetime nonsmoking white female who in 2/2021 developed blue toes on her right foot then followed by the left.  They were slow to heal but have in fact healed since then. She said some of the tips turned black but then healed.  They were also quite painful at the time.  She denies any irregular heartbeat, frostbite or cold exposure.  She did have a cardiac echo when she was in her 40s and was told she had a murmur. She denies any rest pain, claudication, stroke or TIAs.  She now presents to me for her toe pain and possible ischemia. She has previously been seen by Sree Henry and [facility] with imaging to date revealing no atheroembolic focus on CTA C/A/P, no cardioembolic focus on TTE, and no significant ectopy on a ZioPatch. There was a single four beat run of VT, and runs of PSVT, with the longest run lasting 8 beats. She had been empirically treated with  Eliquis AC, and Revatio to act as a vasodilator. She states those agents have helped her sxs. She has had worsening flow noted in her toes on duplex, and she was therefore sent to me to address a possible autoimmune mediated vasospastic component. She thinks she may have had COVID last year. She hasn't felt well since then. Breathing is with ongoing difficulty and she is using inhaler more often. Weight loss of 35 lbs unintentional, poor appetite, despite being normal weight at baseline. Having a lot of night sweats. No ongoing fevers/chills. Her mother  of Wegener's.     When last seen, I stated we should attempt to rule out an autoimmune mediated component. We checked labs revealing that her JAUN is positive at 1:160 in a dense fine speckled pattern. However, negative or normal studies included ESR and CRP, Ro, La, Scleroderma, RF, CCP, histone, centromere, DS DNA Abs.     Since last having been seen, she stopped Eliquis and Revatio as she did not like how they made her feel. She has had complete resolution of sxs. She has no arthralgias or myalgias. She did stub her toe and noted that her nail on that toe became black and blue but that the toe itself is otherwise normal.     At her last visit prior to 3/8/23, no atheroembolic or cardioembolic focus was notable on imaging as delineated below. Additionally all autoimmune labs other than JAUN were normal. Off of Eliquis and Revatio,  she on 2023 c/o recurrent sxs with painful toes which were dusky in appearance and developed purple to black splotches at the tips of the toes. These improved witht he reinstitution of those meds. She is now only on Eliquis however, having stopped Revatio last summer  due to noncompliance and warm weather. With this she notes no worsening in sxs.      She has not recently in the cooler weather while on Eliquis experienced recurrent discoloration, numbness and tingling in her toes.     She has recently had a prolonged respiratory  illness but is finally getting better. I have reviewed recent ED visits.                  Review Of Systems  Skin: negative  Eyes: negative  Ears/Nose/Throat: negative  Respiratory: No shortness of breath, dyspnea on exertion, cough, or hemoptysis  Cardiovascular: as above  Gastrointestinal: negative  Genitourinary: negative  Musculoskeletal: as above, also myalgias with recent respiratory illness.  Neurologic: negative  Psychiatric: negative  Hematologic/Lymphatic/Immunologic: negative  Endocrine: negative           PAST MEDICAL HISTORY:                  Past Medical History           Past Medical History:   Diagnosis Date    Anxiety      Arthritis      Asthma, mild intermittent      Migraines      Tension headaches      Unspecified hypothyroidism              PAST SURGICAL HISTORY:                  Past Surgical History             Past Surgical History:   Procedure Laterality Date    BUNIONECTOMY        COLONOSCOPY        ESOPHAGOSCOPY, GASTROSCOPY, DUODENOSCOPY (EGD), COMBINED N/A 4/2/2019     Procedure: ESOPHAGOSCOPY, GASTROSCOPY, DUODENOSCOPY (EGD);  Surgeon: Ochoa Cherry DO;  [facility]    OTHER SURGICAL HISTORY   1980     Bunionectomy            CURRENT MEDICATIONS:                  Current Outpatient Prescriptions               Current Outpatient Medications   Medication Sig Dispense Refill    albuterol (VENTOLIN HFA) 108 (90 Base) MCG/ACT inhaler INHALE TWO PUFFS BY MOUTH EVERY 6 HOURS AS NEEDED FOR SHORTNESS OF BREATH/DYSPNEA 18 g 3    atorvastatin (LIPITOR) 20 MG tablet Take 1 tablet (20 mg) by mouth daily 90 tablet 3    fluticasone (FLONASE) 50 MCG/ACT nasal spray Spray 2 sprays into both nostrils daily 1 Package 1    fluticasone-salmeterol (ADVAIR DISKUS) 250-50 MCG/DOSE inhaler INHALE ONE PUFF BY MOUTH TWICE A  each 3    levothyroxine (SYNTHROID/LEVOTHROID) 50 MCG tablet TAKE ONE TABLET BY MOUTH ONCE DAILY 90 tablet 3    PARoxetine (PAXIL-CR) 37.5 MG 24 hr tablet TAKE ONE  TABLET BY MOUTH EVERY MORNING 90 tablet 0    meclizine (ANTIVERT) 25 MG tablet Take 1 tablet (25 mg) by mouth every 6 hours as needed for dizziness 15 tablet 1            ALLERGIES:                            Allergies   Allergen Reactions    Compazine         Anxiety    Flagyl [Metronidazole Hcl] Nausea and Vomiting         SOCIAL HISTORY:                  Social History   Social History                Socioeconomic History    Marital status: Single       Spouse name: Not on file    Number of children: Not on file    Years of education: Not on file    Highest education level: Not on file   Occupational History    Not on file   Tobacco Use    Smoking status: Never Smoker    Smokeless tobacco: Never Used   Vaping Use    Vaping Use: Never used   Substance and Sexual Activity    Alcohol use: Yes       Comment: wine - 2-3 times per week (1 glass)    Drug use: No    Sexual activity: Not Currently       Partners: Male       Birth control/protection: None   Other Topics Concern    Parent/sibling w/ CABG, MI or angioplasty before 65F 55M? No   Social History Narrative    Not on file      Social Determinants of Health      Financial Resource Strain: Not on file   Food Insecurity: Not on file   Transportation Needs: Not on file   Physical Activity: Not on file   Stress: Not on file   Social Connections: Not on file   Intimate Partner Violence: Not on file   Housing Stability: Not on file            FAMILY HISTORY:                   Family History             Family History   Problem Relation Age of Onset    Asthma Mother      Diabetes Mother      Cancer - colorectal Maternal Grandmother      Heart Disease Father           MI at age 55    Prostate Cancer Father      Asthma Father      Asthma Sister      Obesity Sister      Diabetes Sister      Coronary Artery Disease Sister      Asthma Sister      Obesity Sister      Asthma Sister      Asthma Brother                       Physical exam Reveals:     O/P: WNL  HEENT: WNL  NECK:  No JVD, thyromegaly, or lymphadenopathy  HEART: RRR, no murmurs, gallops, or rubs  LUNGS: CTA bilaterally without rales, wheezes, or rhonchi  GI: NABS, nondistended, nontender, soft  EXT:without cyanosis, clubbing, or edema; three plus DP and PT pulses; multiple blue toes , better than previously noted.  NEURO: nonfocal  : no flank tenderness                 PPGs of bilateral lower extremity digits dated 9/30/21     Clinical history: 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 Canas     Technique: PPG waveforms were obtained with a sensor and infrared  light.     Findings:     Right foot:  First toe: Present, Normal  Second toe: Present, Severely abnormal  Third toe: Present, Severely abnormal  Fourth toe: Present, Moderately abnormal  Fifth toe: Present, Moderately abnormal     Left foot:  First toe: Present,Severely abnormal  Second toe: Present, Moderately abnormal  Third toe: Present, Moderately abnormal  Fourth toe: Present, Severely abnormal  Fifth toe: Present, Normal                                                                       Impression:  1. Abnormal PPG waveforms again noted. On side-by side comparison, the  biggest changes seen are in the left first digit, which is now  severely abnormal, compared to moderately on prior study, and the left  5th digit, which is now normal and was moderately abnormal.      HOA WESTON MD            CT ABDOMEN PELVIS WITH CONTRAST 9/2/2021 1:41 PM     CLINICAL HISTORY: Abdominal pain, acute, nonlocalized. Had noncontrast  CT yesterday, pain worse today. Abdominal pain, unspecified abdominal  location.     TECHNIQUE: CT scan of the abdomen and pelvis was performed following  injection of IV contrast. Multiplanar reformats were obtained. Dose  reduction techniques were used.  CONTRAST: 50mL, Isovue 370     COMPARISON: CT abdomen and pelvis without IV contrast dated 9/1/2021  and 4/26/2018, CT  abdomen and pelvis with IV contrast dated  12/16/2015.     FINDINGS:   LOWER CHEST: Visualized portions of the lung bases and mediastinal  contents are grossly unremarkable. There are thoracic aortic  calcifications.     HEPATOBILIARY: Questionable subtle bubble of gas is seen in the common  bile duct in the head of the pancreas indicating possible partially  sphincterotomy. Recommend clinical correlation. No definite mass in  the head of the pancreas or choledocholithiasis is seen. There may be  minimal gallbladder wall thickening. No definite cholelithiasis is  identified. No pericholecystic fluid is seen. Liver enhances normally  without focal lesion, intrahepatic biliary ductal dilatation or  choledocholithiasis.     PANCREAS: Normal.     SPLEEN: Normal.     ADRENAL GLANDS: Normal.     KIDNEYS/BLADDER: Fat-containing lesion inferior pole left kidney is  most consistent with adrenal angiomyolipoma, a benign lesion. This is  stable as compared to the prior study. The kidneys otherwise enhance  normally. No hydronephrosis, nephrolithiasis, hydroureter, or ureteral  calculus is identified. Ureters are difficult to follow due to the  relative lack of intraperitoneal adipose tissue. Urinary bladder is  grossly unremarkable.     BOWEL: The colon is difficult to follow given the lack of  intraperitoneal adipose tissue. No definite pericolonic inflammatory  changes to suggest acute diverticulitis. Appendix is not well seen. No  pericecal inflammatory change to suggest acute appendicitis. There is  stranding slightly increased density in the peritoneal adipose tissues  around the anterior aspect of the mid to lower abdomen, more so on the  right. This is of uncertain clinical significance and etiology but  could represent mesenteritis. Small bowel appears grossly of normal  caliber. There is abnormal thickening of the wall of the gastric  antrum/pyloric/proximal duodenal region which could represent an  inflammatory  process such as gastritis or ulcer disease. No obvious  perforated ulcer is identified. There is no free air or free fluid  around this region. The stomach otherwise contains a large amount of  ingested material and moderate amount of air.     PELVIC ORGANS: Enhancing structure just posterior to the urinary  bladder in the deep pelvis likely represents an atrophic uterus and is  not appreciably changed since the prior study dated 12/16/2015. The  ovaries are not well seen. No adnexal masses are identified.     ADDITIONAL FINDINGS: Incidental note of circumaortic left renal vein,  a normal variant. There is nonaneurysmal atherosclerosis. No definite  adenopathy, free air is identified. There is trace free fluid in the  pelvis.     MUSCULOSKELETAL: No aggressive osseous lesions or acute osseous  fractures are identified. There are degenerative changes in the spine,  mostly in the facet joints of the lower lumbar spine. There is also  disc height loss at L4-L5 and L5-S1. Mild anterolisthesis of L5 on S1  is degenerative in etiology.                                                                      IMPRESSION:   1.  Abnormal thickening of the wall the gastric  antrum/pylorus/proximal duodenum could represent an inflammatory or  infectious process (gastritis and possible ulcer disease) versus less  likely neoplastic infiltration. Recommend clinical correlation.  2.  Slightly increased density in the peritoneal adipose tissues  throughout the mid to lower abdomen and pelvis and slightly more so on  the right could represent mesenteritis. There is a relative lack of  intraperitoneal adipose tissue limiting evaluation of the hollow and  solid organs of the abdomen and pelvis. The amount of intraperitoneal  adipose has decreased since the prior studies from 2018 and 2015.  3.  Appendix is not definitely seen. No obvious evidence for acute  appendicitis is identified. Appendicitis cannot be excluded on the  basis of this  study.  4.  Stable angiomyolipoma inferior left kidney is again noted.  5.  Small bubble of gas in the distal common bile duct could be from  prior sphincterotomy. Recommend clinical correlation.  6.  Relative lack of intraperitoneal adipose tissue limits evaluation  of the bowel and other structures of the abdomen and pelvis.     I discussed the thickening of the wall of the gastric  antrum/pylorus/proximal duodenum, slightly increased density in the  peroneal adipose tissues, and lack of other obvious etiology of  patient's symptoms with Dr. Cherry on 9/2/2021 at 1:52 PM. We also  discussed the limitations of this study due to the relative lack of  intraperitoneal adipose tissue.     DINORA MUSTAFA MD            CT ABDOMEN/PELVIS WITHOUT CONTRAST September 1, 2021 9:55 AM     CLINICAL HISTORY: Right lower quadrant abdominal pain. No history of  surgery or cancer.     TECHNIQUE: CT scan of the abdomen and pelvis was performed without IV  contrast. Multiplanar reformats were obtained. Dose reduction  techniques were used.  CONTRAST: None.     COMPARISON: CT abdomen/pelvis dated 12/16/2015. CT chest dated  3/22/2021. More recent CT abdomen/pelvis from 4/26/2008 is not  available.     FINDINGS: Relative lack of intraperitoneal adipose tissue limits this  evaluation. Lack of IV contrast limits evaluation of the solid organs  of the abdomen and pelvis.     LOWER CHEST: Single bulla is seen in the right lower lung lobe.  Visualized portions of the lung bases are otherwise unremarkable.  There are aortic calcifications. Visualized mediastinal contents are  otherwise unremarkable.     HEPATOBILIARY: Normal.     PANCREAS: Normal.     SPLEEN: Normal.     ADRENAL GLANDS: Normal.     KIDNEYS/BLADDER: Fatty lesion in the inferior pole of the left kidney  measures up to 1.2 cm in diameter and is most consistent with a benign  renal angiomyolipoma. The kidneys are otherwise normal in appearance  for noncontrast CT. No  hydronephrosis, nephrolithiasis, hydroureter or  ureteral calculus is identified. Ureters are very difficult to follow  due to relative lack of intraperitoneal adipose tissues. Urinary  bladder is grossly unremarkable.     BOWEL: The colon is grossly of normal appearance. Moderate amount of  stool is seen in the colon. Appendix is not well seen. No pericecal  inflammatory change to suggest acute appendicitis. Evaluation of the  bowel is limited due to lack of intraperitoneal adipose tissue and  lack of IV contrast. Small bowel is grossly of normal caliber. Stomach  contains a small amount of fluid and air. Gastric wall appears mildly  thickened which is likely due to incomplete distention.     LYMPH NODES: No lymphadenopathy is identified.     VASCULATURE: There is nonaneurysmal atherosclerosis.     PELVIC ORGANS: Structure which could represent an atrophic uterus is  seen just posterior to the urinary bladder. Ovaries are not well seen.  No obvious adnexal mass is identified.     OTHER: There appears to be trace free fluid in the pelvis. No free air  is identified in the peritoneal cavity.     MUSCULOSKELETAL: Degenerative changes are noted in the spine. No  aggressive osseous lesions or acute osseous fractures.                                                                      IMPRESSION:   1.  This study is significantly limited due to the relative lack of  intraperitoneal adipose tissue and also due to the lack of IV  contrast.  2.  No diverticulitis, urinary system calculus, urinary system  obstruction, or bowel obstruction is seen. Appendix is not definitely  identified and no definite evidence for appendicitis is seen.  Appendicitis cannot be excluded due to the limitations of this study.     I discussed the significant limitations of this study as well as the  lack of obvious etiology for patient's symptoms with Dr. Lentz on  9/1/2021 at approximately 11:30 AM, when he became available.     DINORA MUSTAFA,  MD         PPGs of bilateral lower extremity digits     Clinical history: Blue toe syndrome of both lower extremities (H)     Comparison Study: none .     Ordering Physician: Dr. Canas     Technique: PPG waveforms were obtained with a sensor and infrared  light.     Findings:     Right foot:  First toe: Present, Normal  Second toe: Present, Moderately abnormal  Third toe: Present, Severely abnormal  Fourth toe: Present, Mildly abnormal  Fifth toe: Present, Moderately abnormal     Left foot:  First toe: Present, Moderately abnormal  Second toe: Present, Moderately abnormal  Third toe: Present, Severely abnormal  Fourth toe: Present, Severely abnormal  Fifth toe: Present, Moderate-severely abnormal                                                                      Impression:  1. Abnormal PPG waveforms in Right lower extremity digits 2-5, and  Left lower extremity digits 1-5.       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 Right lower extremity: Normal arterial velocities suggesting no  hemodynamically significant stenosis.   2. Left lower extremity: Normal arterial velocities suggesting no  hemodynamically significant stenosis.      Findings:      Right lower extremity:      Common femoral artery: 99/8 cm/sec.  Deep femoral artery: 91/11 cm/sec.  Proximal SFA: 77/1 cm/sec.  Mid SFA: 76/1 cm/sec.  Distal SFA: 61/1 cm/sec.  Popliteal artery: 32/1 cm/sec.  Anterior tibial artery: 40/1 cm/sec.  Peroneal artery: 44/3 cm/sec.  PTA ankle: 65/1 cm/sec.  Waveforms are triphasic in the CFA to the proximal SFA, and are  biphasic from the mid SFA to the distal peroneal artery.      Left lower extremity:     Common femoral artery: 105/4 cm/sec.  Deep femoral artery: 89/7  cm/sec.  Proximal SFA: 79/2 cm/sec.  Mid SFA: 83/1 cm/sec.  Distal SFA: 74/1 cm/sec.  Popliteal artery: 40/4 cm/sec.  Anterior tibial: 53/1 cm/sec.  Peroneal artery: 28/1 cm/sec.  PTA ankle: 81/1 cm/sec.  Waveforms are triphasic in the CFA to the mid SFA, and are biphasic  from the distal SFA to the distal peroneal artery.      MICHAELA REEDER MD         [id number]  [id number]  [id number]  [id number]^TIERNEY^STEFANO     [facility]  Echocardiography Laboratory  [address] [place] [address]     Name: NISHA NIEVES  MRN: [id number]  : 1955  Study Date: 05/10/2021 10:07 AM  Age: 66 yrs  Gender: Female  Patient Location: [facility]  Reason For Study: Embolic disease of toe (H)  History: Hyperlipidemia,Asthma  Ordering Physician: STEFANO MONET  Referring Physician: Fuentes Darby  Performed By: Sharifa Hernandez     BSA: 1.4 m2  Height: 61 in  Weight: 100 lb  HR: 60  BP: 146/80 mmHg  ______________________________________________________________________________  Procedure  Complete Echo Adult.  ______________________________________________________________________________  Interpretation Summary     1. The left ventricle is normal in structure, function and size. The visual  ejection fraction is estimated at 65%.  2. The right ventricle is normal in structure, function and size.  3. No valve disease.     No previous echo for comparison.  ______________________________________________________________________________  Left Ventricle  The left ventricle is normal in structure, function and size. There is normal  left ventricular wall thickness. The visual ejection fraction is estimated at  65%. Left ventricular diastolic function is normal. Normal left ventricular  wall motion.     Right Ventricle  The right ventricle is normal in structure, function and size.     Atria  Normal left atrial size. Right atrial size is normal. There is no atrial shunt  seen.     Mitral Valve  There is mild (1+)  mitral regurgitation.     Tricuspid Valve  There is mild (1+) tricuspid regurgitation. The right ventricular systolic  pressure is approximated at 20.4 mmHg plus the right atrial pressure.     Aortic Valve  There is trace aortic regurgitation.     Pulmonic Valve  The pulmonic valve is normal in structure and function.     Vessels  Normal ascending, transverse (arch), and descending aorta. The inferior vena  cava was normal in size with preserved respiratory variability.     Pericardium  There is no pericardial effusion.     Rhythm  Sinus rhythm was noted.  ______________________________________________________________________________  MMode/2D Measurements & Calculations  IVSd: 0.70 cm     LVIDd: 3.8 cm  LVIDs: 2.8 cm  LVPWd: 0.80 cm  FS: 26.3 %  LV mass(C)d: 78.8 grams  LV mass(C)dI: 56.0 grams/m2  Ao root diam: 3.1 cm  LA dimension: 3.0 cm  asc Aorta Diam: 2.9 cm  LA/Ao: 0.97  RWT: 0.42     Doppler Measurements & Calculations  MV E max nathan: 72.0 cm/sec  MV A max nathan: 57.8 cm/sec  MV E/A: 1.2  MV dec slope: 273.0 cm/sec2  MV dec time: 0.26 sec  TR max nathan: 226.0 cm/sec  TR max P.4 mmHg  E/E' av.7  Lateral E/e': 8.9  Medial E/e': 8.5     ______________________________________________________________________________  Report approved by: Heena Isaac 05/10/2021 02:00 PM                 CTA CHEST, ABDOMEN, PELVIS RUNOFF WITH CONTRAST  3/22/2021 10:26 AM      HISTORY:  Blue toe syndrome. Concern for distal emboli. Evaluate for  embolic source.     COMPARISON: CT of the chest dated 2011     TECHNIQUE: CT angiogram of the chest, abdomen and pelvis with  bilateral lower extremity runoff was performed following the  administration of 100 cc intravenous contrast. Images are reviewed in  multiple planes and 3-D reconstructions were also performed.    Radiation dose for this scan was reduced using automated exposure  control, adjustment of the mA and/or kV according to patient size, or  iterative  reconstruction technique.     FINDINGS:      Chest: The thoracic aorta at the level of the sinuses of Valsalva has  a maximum diameter of approximately 3.1 cm. The ascending thoracic  aorta has a maximum diameter of approximately 3.6 cm. The aorta then  tapers at the level of the thoracic aortic arch. The descending  thoracic aorta is of normal caliber without aneurysmal dilatation or  significant stenosis. There is no significant soft plaque in the  thoracic aorta. The great vessels arising from the thoracic aortic  arch are patent without significant stenoses.     Abdomen/pelvis: There is minimal scattered calcified plaque in the  abdominal aorta. No evidence for an aneurysmal dilatation or  significant stenosis. The celiac trunk, superior mesenteric artery,  renal arteries, and inferior mesenteric artery are patent without  significant stenoses.     The iliac arteries are patent without significant stenoses.     Right lower extremity angiogram:  Common femoral artery: Ectatic with a maximum diameter of 10 mm. No  significant stenosis.  Profunda femoris artery: No significant stenosis.  Superficial femoral artery: No significant stenosis.  Popliteal artery: No significant stenosis.  Tibial arteries: Three-vessel runoff. No significant stenosis.     Left lower extremity angiogram:  Common femoral artery: No significant stenosis.  Profunda femoris artery: No significant stenosis.  Superficial femoral artery: No significant stenosis.  Popliteal artery: No significant stenosis.  Tibial arteries: Three-vessel runoff. No significant stenosis.     Soft tissues:     Chest: Right apical scarring. 7 mm nodule in the right upper lobe.     Abdomen/pelvis: There is a 2.5 x 1.8 cm angiomyolipoma at the lower  pole of the left kidney. Remaining solid organs in the abdomen appear  grossly unremarkable.     The bowel appears grossly unremarkable.                                                                      IMPRESSION:  1.  Mild aneurysmal dilatation of the ascending thoracic aorta which  has a maximum diameter of approximately 3.6 cm.  2. No significant soft plaque in the arteries of the chest, abdomen,  pelvis and lower extremities that could serve as a source for distal  emboli.  3. 2.7 cm angiomyolipoma at the lower pole of the left kidney.  Consider urological follow-up and follow-up CT scan in one year to  monitor for any increase in size.     JEANNE HEARN MD                    Component      Latest Ref Rng & Units 11/3/2021   JAUN interpretation      Negative Positive (A)   JAUN pattern 1       Dense fine speckled   JAUN titer 1       1:160   Neutrophil Cytoplasmic Antibody      <1:10 <1:10   Neutrophil Cytoplasmic Antibody Pattern       The ANCA IFA is <1:10.  No further testing will be performed.   SSA Cassie IgG Instrument Value      <7.0 U/mL <0.5   SSA (Ro) Antibody IgG      Negative Negative   SSB Cassie IgG Instrument Value      <7.0 U/mL <0.6   SSB (La) Antibody IgG      Negative Negative   Scl-70 Cassie IgG Instrument Value      <7.0 U/mL 0.8   Scleroderma Antibody Scl-70 EZEQUIEL IgG      Negative Negative   Centromere Cassie IgG Instrument Value      <7.0 U/mL <0.7   Centromere Antibody IgG      Negative Negative   Mayer EZEQUIEL Cassie IgG Instrument Value      <7.0 U/mL 2.5   Smith EZEQUIEL Antibody IgG      Negative Negative   Sed Rate      0 - 30 mm/hr 8   CRP Inflammation      0.0 - 8.0 mg/L <2.9   Rheumatoid Factor      <20 IU/mL 8   Cyclic Citrullinated Peptide Antibody, IgG      <7.0 U/mL 2.4   Histone Antibody,IgG      0.0 - 0.9 Units 0.3   DNA-ds      <10.0 IU/mL 1.6   Complement, Total, S      38.7 - 89.9 U/mL     Complement C3      81 - 157 mg/dL     Complement C4      13 - 39 mg/dL        Component      Latest Ref Rng & Units 12/9/2021   JAUN interpretation      Negative     JAUN pattern 1           JAUN titer 1           Neutrophil Cytoplasmic Antibody      <1:10     Neutrophil Cytoplasmic Antibody Pattern           SSA Cassie IgG  Instrument Value      <7.0 U/mL     SSA (Ro) Antibody IgG      Negative     SSB Cassie IgG Instrument Value      <7.0 U/mL     SSB (La) Antibody IgG      Negative     Scl-70 Cassie IgG Instrument Value      <7.0 U/mL     Scleroderma Antibody Scl-70 EZEQUIEL IgG      Negative     Centromere Cassie IgG Instrument Value      <7.0 U/mL     Centromere Antibody IgG      Negative     Mayer EZEQUIEL Cassie IgG Instrument Value      <7.0 U/mL     Smith EZEQUIEL Antibody IgG      Negative     Sed Rate      0 - 30 mm/hr     CRP Inflammation      0.0 - 8.0 mg/L     Rheumatoid Factor      <20 IU/mL     Cyclic Citrullinated Peptide Antibody, IgG      <7.0 U/mL     Histone Antibody,IgG      0.0 - 0.9 Units     DNA-ds      <10.0 IU/mL     Complement, Total, S      38.7 - 89.9 U/mL 53.1   Complement C3      81 - 157 mg/dL 92   Complement C4      13 - 39 mg/dL 18                         Component      Latest Ref Rng 3/10/2023  8:04 AM 8/18/2023  10:35 AM   Sodium      136 - 145 mmol/L 140     Potassium      3.4 - 5.3 mmol/L 3.7     Chloride      98 - 107 mmol/L 104     Carbon Dioxide (CO2)      22 - 29 mmol/L 29     Anion Gap      7 - 15 mmol/L 7     Urea Nitrogen      8.0 - 23.0 mg/dL 13.1     Creatinine      0.51 - 0.95 mg/dL 0.57     Calcium      8.8 - 10.2 mg/dL 9.7     Glucose      70 - 99 mg/dL 100 (H)     Alkaline Phosphatase      35 - 104 U/L 68     AST      10 - 35 U/L 25     ALT      10 - 35 U/L 18     Protein Total      6.4 - 8.3 g/dL 7.3     Albumin      3.5 - 5.2 g/dL 4.2     Bilirubin Total      <=1.2 mg/dL 0.3     GFR Estimate      >60 mL/min/1.73m2 >90     Total Cholesterol      <=199 mg/dL 179     Triglycerides      <150 mg/dL 44  42    HDL Cholesterol      40 - 59 mg/dL 106 (H)     LDL Cholesterol      <=129 mg/dL 64     HDL Size      >=8.9 nm 10.7     VLDL Size      <=46.7 nm 49.8 (H)     LDL Particle Size      >=20.7 nm 21.1     Lge HDL Particle number      >=4.2 umol/L >17.0     HDL Particle Number NMR      >=33.0 umol/L 29.4 (L)     Lge  VLDL Part number      <=2.7 nmol/L <1.5     Small LDL Particle number      <=634 nmol/L <165     LDL Particle Number      <=1135 nmol/L 725     EER LipoFit by NMR See Note     Cholesterol      <200 mg/dL  190    HDL Cholesterol      >=50 mg/dL  123    LDL Cholesterol Calculated      <=100 mg/dL  59    Non HDL Cholesterol      <130 mg/dL  67    JAUN interpretation      Negative  Borderline Positive !     JAUN pattern 1 Speckled     JAUN titer 1 1:80     Complement C3      81 - 157 mg/dL 108     Complement C4      13 - 39 mg/dL 24     CRP Inflammation      <5.00 mg/L <3.00     Sed Rate      0 - 30 mm/hr 13     Rheumatoid Factor      <12 IU/mL 8     C-Reactive Protein High Sensitivity <0.15     Lipoprotein (a)      <30 mg/dL 25     TSH      0.30 - 4.20 uIU/mL  0.94       Legend:  (H) High  (L) Low  ! Abnormal

## 2024-01-29 NOTE — PROGRESS NOTES
"Patient is here to discuss follow up    /84 (BP Location: Right arm, Patient Position: Chair, Cuff Size: Adult Regular)   Pulse 68   Ht 5' 1\" (1.549 m)   Wt 127 lb 12.8 oz (58 kg)   LMP  (LMP Unknown)   SpO2 99%   BMI 24.15 kg/m      Questions patient would like addressed today are: N/A.    Refills are needed: No    Has homecare services and agency name:  Myra GRABER    "

## 2024-01-30 ENCOUNTER — VIRTUAL VISIT (OUTPATIENT)
Dept: PSYCHOLOGY | Facility: CLINIC | Age: 69
End: 2024-01-30
Payer: COMMERCIAL

## 2024-01-30 DIAGNOSIS — F33.0 MAJOR DEPRESSIVE DISORDER, RECURRENT EPISODE, MILD (H): ICD-10-CM

## 2024-01-30 DIAGNOSIS — F41.1 GENERALIZED ANXIETY DISORDER: Primary | ICD-10-CM

## 2024-01-30 PROCEDURE — 90834 PSYTX W PT 45 MINUTES: CPT | Mod: 95

## 2024-01-30 ASSESSMENT — PATIENT HEALTH QUESTIONNAIRE - PHQ9
SUM OF ALL RESPONSES TO PHQ QUESTIONS 1-9: 6
10. IF YOU CHECKED OFF ANY PROBLEMS, HOW DIFFICULT HAVE THESE PROBLEMS MADE IT FOR YOU TO DO YOUR WORK, TAKE CARE OF THINGS AT HOME, OR GET ALONG WITH OTHER PEOPLE: SOMEWHAT DIFFICULT
SUM OF ALL RESPONSES TO PHQ QUESTIONS 1-9: 6

## 2024-01-30 NOTE — PROGRESS NOTES
[facility]                                     Progress Note    Patient Name: Brissa Mayer  Date: 1/30/2024         Service Type: Individual      Session Start Time: 10:00 AM  Session End Time: 10:50 AM     Session Length: 50 Minutes    Session #: 4    Attendees: Client attended alone    Service Modality:  Video Visit:      Provider verified identity through the following two step process.  Patient provided:  Patient is known previously to provider    Telemedicine Visit: The patient's condition can be safely assessed and treated via synchronous audio and visual telemedicine encounter.      Reason for Telemedicine Visit: Patient has requested telehealth visit and Services only offered telehealth    Originating Site (Patient Location): Patient's place of employment    Distant Site (Provider Location): Provider Remote Setting- Home Office    Consent:  The patient/guardian has verbally consented to: the potential risks and benefits of telemedicine (video visit) versus in person care; bill my insurance or make self-payment for services provided; and responsibility for payment of non-covered services.     Patient would like the video invitation sent by:  My Chart    Mode of Communication:  [facility]    Distant Location (Provider):  Off-site    As the provider I attest to compliance with applicable laws and regulations related to telemedicine.    DATA  Interactive Complexity: No  Crisis: No        Progress Since Last Session (Related to Symptoms / Goals / Homework):   Symptoms: No change anxiety, stress and irritation    Homework: Partially completed      Episode of Care Goals: Minimal progress - ACTION (Actively working towards change); Intervened by reinforcing change plan / affirming steps taken     Current / Ongoing Stressors and Concerns:   Lara is coming to therapy to learn skills to address her anxiety and depression. 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 She reports increased stress taking more work on within her home and for her sister that has resulted in increased irritation and burnout.      Treatment Objective(s) Addressed in This Session:   practice deep breathing at least once a day       Intervention:  Therapist utilized reflected listening as patient gave brief reflection of the past few weeks. Patient reported continued anxiety, stress and irritation. She reflected on warning signs that her sister's health and cognitive abilities are declining. She processed letting go of tasks and taking space from friendships that are not supportive. Therapist supported patient as she processed and validated patient. Therapist continued to introduce mindfulness and practiced another grounding technique.     Assessments completed prior to visit:  The following assessments were completed by patient for this visit:  PHQ2:       4/25/2022     5:07 PM 9/27/2017     3:52 PM 12/11/2015     8:14 AM 12/8/2015     9:12 AM 10/12/2015     8:37 AM 10/23/2012     3:53 PM   PHQ-2 ( 1999 [employer])   Q1: Little interest or pleasure in doing things 0 0 0  0 1   Q2: Feeling down, depressed or hopeless 0 0 0  0 1   PHQ-2 Score 0 0 0  0 2   Q1: Little interest or pleasure in doing things Not at all   Not at all     Q2: Feeling down, depressed or hopeless Not at all   Not at all     PHQ-2 Score 0   0       PHQ9:       9/26/2022     1:26 PM 11/7/2022    12:06 PM 8/18/2023     9:24 AM 8/28/2023    12:17 PM 11/28/2023    10:47 AM 12/28/2023     9:20 AM 1/30/2024     9:49 AM   PHQ-9 SCORE   PHQ-9 Total Score MyChart 6 (Mild depression) 7 (Mild depression) 3 (Minimal depression) 3 (Minimal depression) 7 (Mild depression) 6 (Mild depression) 6 (Mild depression)   PHQ-9 Total Score 6 7 3 3 7 6 6     GAD2:       8/2/2022     2:30 PM 9/26/2022     1:26 PM 11/7/2022    12:07 PM 12/5/2023     8:36 AM 12/28/2023     9:20 AM 1/26/2024    10:01 AM    JOCELINE-2   Feeling nervous, anxious, or on edge 1 1 1 1 1 1   Not being able to stop or control worrying 2 1 1 1 1 1   JOCELINE-2 Total Score 3 2 2 2 2 2     GAD7:       3/30/2017     8:00 AM 11/8/2017     3:42 PM 3/19/2021    10:39 AM 8/2/2022     2:30 PM 8/14/2023    10:16 AM 8/28/2023    12:16 PM 11/28/2023    10:48 AM   JOCELINE-7 SCORE   Total Score    13 (moderate anxiety) 9 (mild anxiety) 6 (mild anxiety) 7 (mild anxiety)   Total Score 1 6 7 13 9 6 7     PROMIS 10-Global Health (all questions and answers displayed):       1/27/2022    10:41 AM 8/2/2022     2:34 PM 11/7/2022    12:08 PM 8/28/2023    12:18 PM 11/21/2023     1:53 PM 11/28/2023    10:49 AM   PROMIS 10   In general, would you say your health is:  Very good Good   Very good   In general, would you say your quality of life is:  Good Good   Good   In general, how would you rate your physical health?  Very good Very good   Very good   In general, how would you rate your mental health, including your mood and your ability to think?  Fair Good   Fair   In general, how would you rate your satisfaction with your social activities and relationships?  Fair Good   Good   In general, please rate how well you carry out your usual social activities and roles  Good Good   Fair   To what extent are you able to carry out your everyday physical activities such as walking, climbing stairs, carrying groceries, or moving a chair?  Completely Completely   Completely   In the past 7 days, how often have you been bothered by emotional problems such as feeling anxious, depressed, or irritable?  Often Sometimes   Sometimes   In the past 7 days, how would you rate your fatigue on average?  Mild Mild   Mild   In the past 7 days, how would you rate your pain on average, where 0 means no pain, and 10 means worst imaginable pain?  0 1   5   In general, would you say your health is: 3 4 3   4   In general, would you say your quality of life is: 3 3 3   3   In general, how would you rate  your physical health? 3 4 4   4   In general, how would you rate your mental health, including your mood and your ability to think? 3 2 3   2   In general, how would you rate your satisfaction with your social activities and relationships? 3 2 3   3   In general, please rate how well you carry out your usual social activities and roles. (This includes activities at home, at work and in your community, and responsibilities as a parent, child, spouse, employee, friend, etc.) 4 3 3   2   To what extent are you able to carry out your everyday physical activities such as walking, climbing stairs, carrying groceries, or moving a chair? 5 5 5   5   In the past 7 days, how often have you been bothered by emotional problems such as feeling anxious, depressed, or irritable? 3 4 3   3   In the past 7 days, how would you rate your fatigue on average? 3 2 2   2   In the past 7 days, how would you rate your pain on average, where 0 means no pain, and 10 means worst imaginable pain? 4 0 1   5   Global Mental Health Score 12 9 12   11    11   Global Physical Health Score 14 18 17   16    16   PROMIS TOTAL - SUBSCORES 26 27 29   27    27       Information is confidential and restricted. Go to Review Flowsheets to unlock data.    Multiple values from one day are sorted in reverse-chronological order         ASSESSMENT: Current Emotional / Mental Status (status of significant symptoms):   Risk status (Self / Other harm or suicidal ideation)   Patient denies current fears or concerns for personal safety.   Patient denies current or recent suicidal ideation or behaviors.   Patient denies current or recent homicidal ideation or behaviors.   Patient denies current or recent self injurious behavior or ideation.   Patient denies other safety concerns.   Patient reports there has been no change in risk factors since their last session.     Patient reports there has been no change in protective factors since their last session.      Recommended that patient call 911 or go to the local ED should there be a change in any of these risk factors.     Appearance:   Appropriate    Eye Contact:   Good    Psychomotor Behavior: Normal    Attitude:   Cooperative    Orientation:   All   Speech    Rate / Production: Talkative    Volume:  Normal    Mood:    Anxious  Irritable    Affect:    Appropriate    Thought Content:  Clear    Thought Form:  Coherent  Logical    Insight:    Good      Medication Review:   No changes to current psychiatric medication(s)     Medication Compliance:   Yes     Changes in Health Issues:   None reported     Chemical Use Review:   Substance Use: Chemical use reviewed, no active concerns identified      Tobacco Use: No current tobacco use.      Diagnosis:  1. Generalized anxiety disorder    2. Major depressive disorder, recurrent episode, mild (HCC)        Collateral Reports Completed:   Not Applicable    PLAN: (Patient Tasks / Therapist Tasks / Other)  Lara will practice 5-4-3-2-1 grounding technique  -5 things you can see  -4 things you can feel  -3 things you can hear  -2 things you can smell  -1 thing you can taste       Amy Dayana, LICSW                                                         ______________________________________________________________________    Individual Treatment Plan    Patient's Name: Brissa Mayer  YOB: 1955    Date of Creation: 12/5/2023  Date Treatment Plan Last Reviewed/Revised: n/a    DSM5 Diagnoses: 296.31 (F33.0) Major Depressive Disorder, Recurrent Episode, Mild _ or 300.02 (F41.1) Generalized Anxiety Disorder  Psychosocial / Contextual Factors: Work conflict with supervisor, challenging with environment, multiple family challenges, 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   PROMIS (reviewed every 90 days):     Referral / Collaboration:  Referral to another professional/service is not indicated at this time..    Anticipated number of session for this episode of care: 9-12  "sessions  Anticipation frequency of session: Every other week  Anticipated Duration of each session: 38-52 minutes  Treatment plan will be reviewed in 90 days or when goals have been changed.       MeasurableTreatment Goal(s) related to diagnosis / functional impairment(s)  Goal 1: Patient will become more aware of their anxiety and depression and utilize the skills taught to decrease their anxious and depressive symptoms.    I will know I've met my goal when I feel like I can feel in control of my reactions and can be kind with myself.\"    Objective #A (Patient Action)    Patient will  use at least 4 coping skills for anxiety management in the next 12 weeks. .  Status: New - Date: 12/5/2023      Intervention(s)  Therapist will  teach coping skills and assign homework of practicing these. .    Objective #B  Patient will  use cognitive strategies identified in therapy to challenge anxious thoughts. Patient will engage in activities that are anxiety producing for them, slowly increasing their tolerance for new activities. Patient will identify and recognize past negative experiences and subsequent beliefs they hold that contribute to their anxiety. .  Status: New - Date: 12/5/2023      Intervention(s)  Therapist will  teach cognitive behavioral skills and engage client in Socratic dialogue around distorted thinking.  Therapist will provide educational materials on CBT. .    Objective #C  Patient will  implement self-care into their routine to decrease anxiety, focusing on sleep hygiene, nutrition, movement, regular engagement in mindful activity, and regular socialization.  .  Status: New - Date: 12/5/2023      Intervention(s)  Therapist will  psychoeducation around the benefit of self-care and help client identify mindfulness based activities that may work for their life. .      Patient has reviewed and agreed to the above plan.      TIO Portillo  December 5, 2023          "

## 2024-02-15 ENCOUNTER — TELEPHONE (OUTPATIENT)
Dept: OTHER | Facility: CLINIC | Age: 69
End: 2024-02-15
Payer: COMMERCIAL

## 2024-02-15 DIAGNOSIS — M79.10 MYALGIA: Primary | ICD-10-CM

## 2024-02-15 DIAGNOSIS — E78.5 HYPERLIPIDEMIA LDL GOAL <130: ICD-10-CM

## 2024-02-15 NOTE — TELEPHONE ENCOUNTER
[facility]    Who is the name of the provider?:  BRITTNEY DAVE   What is the location you see this provider at/preferred location?: [place]  Person calling / Facility: Brissa Mayer  Phone number:  [phone] (home)  Nurse call back needed:  ?     Reason for call:  Discontinued atorvastatin (LIPITOR) 20 MG tablet [09075] on 1/30/24.    Patient describes no improvement to concerns related to pain in legs and general body pain.     Asking to be seen at next available.     Pharmacy location:     [facility] [place]  [place] [address] [facility] [place] [address] [facility]  [facility] [place] [id number] [facility]   Outside Imaging: n/a   Can we leave a detailed message on this number?  YES     2/15/2024, 12:26 PM

## 2024-02-15 NOTE — TELEPHONE ENCOUNTER
Future Appointments   Date Time Provider Department Center   2/19/2024  8:10 AM Wilner Garcia MD [facility]

## 2024-02-15 NOTE — TELEPHONE ENCOUNTER
Routing to scheduling to coordinate the following:    In-person or virtual follow up to discuss patient concerns at next available with Dr Garcia.       Appt note: Follow up to stopping Lipitor- patient has ongoing concerns of myalgias.     Isabella KAY, RN    [facility]  [facility]  Office: [phone]  Fax: [phone]

## 2024-02-27 ENCOUNTER — VIRTUAL VISIT (OUTPATIENT)
Dept: OTHER | Facility: CLINIC | Age: 69
End: 2024-02-27
Attending: INTERNAL MEDICINE
Payer: COMMERCIAL

## 2024-02-27 DIAGNOSIS — E78.5 HYPERLIPIDEMIA LDL GOAL <130: Primary | ICD-10-CM

## 2024-02-27 PROCEDURE — 99443 PR PHYSICIAN TELEPHONE EVALUATION 21-30 MIN: CPT | Mod: 93 | Performed by: INTERNAL MEDICINE

## 2024-02-27 NOTE — PROGRESS NOTES
VASCULAR MEDICINE FOLLOW UP VISIT        A/P:            (I73.00) Raynaud's disease without gangrene  (primary encounter diagnosis)  Comment: We started amlodipine, told her to undertake thermal protection strategies on 3/8/23, and checked autoimmune (JAUN, RF, CRP, C3,C4, ESR, RF, CMP)  labs (all of which were normal other than a borderline elevated JAUN at 1:80 in a speckled pattern, which was decreased form a prior elevation of 1:160)  on 3/10/23, RTC ten months later now to go over lab results and discuss response to amlodipine. She notes she is doing well on Eliquis but off of Revatio which she stopped last summer due to noncompliance and more favorable weather. When the weather becalm cold again, there was no particular worsening. However,  around Thanksgiving 2023 she developed a respiratory illness which has not gone away. She has fatigue, myalgias, and leg burning. She has been tested for COVID and influenza and tested negative.   Plan: Continue amlodipine (NORVASC) 2.5 MG tablet and Eliquis as below.              (I75.023) Blue toe syndrome of both lower extremities (H)  Comment: She did have COVID in 3/2020 and sxs worsened after this. Her sxs improved with Revatio and Eliquis, so we did continue those on 03/8/2023. During warmer months, and with her now being three years remote form COVID, we will consider cessation of these agents during upcoming warmer weather. She is told to minimize NSAID and alcohol use with any AC.  Plan: Continueapixaban ANTICOAGULANT (ELIQUIS ANTICOAGULANT) 5 MG tablet for three more months as we did not know on 1/29/24 if she had COVID or not. RTC 04/29/24 to consider cessation. Check d dimer and JAUN two weeks before. Continue amLODIPine (NORVASC) 2.5 MG         tablet, stay off of Revatio.         (E78.5) Hyperlipidemia LDL goal <130  Comment:Advanced lipid testing of 3/10/2023 while on Lipitor 20 mg daily revealed LDL-C of 64, LDL-P of 725, cardiac CRP of <0.15, and Lp(a) of  <6. However, she was having myalgias (which I thought was more likely due to her respiratory illness) so we had her temporarily discontinue atorvastatin (LIPITOR) 20 MG tablet for three weeks,  and she did not have myalgias chaparro by stopping the drug.   Plan: Resume Lipitor, see her PCP regarding the myalgias. These are not due to Lipitor.          22 minutes total medical care on today's date.    MD location: office  Pt location: home    Phone call start: 15:05  Phone call end: 15:27                   Brissa Mayer is a 68 year old female who is presenting at the current time to discuss her diagnosi(es) of            Blue toe syndrome of both lower extremities (H)  Hyperlipidemia LDL goal <130  Raynaud's disease without gangrene     .              HPI: Brissa Mayer  is a 68-year-old lifetime nonsmoking white female who in 2/2021 developed blue toes on her right foot then followed by the left.  They were slow to heal but have in fact healed since then. She said some of the tips turned black but then healed.  They were also quite painful at the time.  She denies any irregular heartbeat, frostbite or cold exposure.  She did have a cardiac echo when she was in her 40s and was told she had a murmur. She denies any rest pain, claudication, stroke or TIAs.  She now presents to me for her toe pain and possible ischemia. She has previously been seen by Sree Henry and [facility] with imaging to date revealing no atheroembolic focus on CTA C/A/P, no cardioembolic focus on TTE, and no significant ectopy on a ZioPatch. There was a single four beat run of VT, and runs of PSVT, with the longest run lasting 8 beats. She had been empirically treated with Eliquis AC, and Revatio to act as a vasodilator. She states those agents have helped her sxs. She has had worsening flow noted in her toes on duplex, and she was therefore sent to me to address a possible autoimmune mediated vasospastic component. She thinks she may have had  COVID last year. She hasn't felt well since then. Breathing is with ongoing difficulty and she is using inhaler more often. Weight loss of 35 lbs unintentional, poor appetite, despite being normal weight at baseline. Having a lot of night sweats. No ongoing fevers/chills. Her mother  of Wegener's.     When last seen, I stated we should attempt to rule out an autoimmune mediated component. We checked labs revealing that her JAUN is positive at 1:160 in a dense fine speckled pattern. However, negative or normal studies included ESR and CRP, Ro, La, Scleroderma, RF, CCP, histone, centromere, DS DNA Abs.     Since last having been seen, she stopped Eliquis and Revatio as she did not like how they made her feel. She has had complete resolution of sxs. She has no arthralgias or myalgias. She did stub her toe and noted that her nail on that toe became black and blue but that the toe itself is otherwise normal.     At her last visit prior to 3/8/23, no atheroembolic or cardioembolic focus was notable on imaging as delineated below. Additionally all autoimmune labs other than JAUN were normal. Off of Eliquis and Revatio,  she on 2023 c/o recurrent sxs with painful toes which were dusky in appearance and developed purple to black splotches at the tips of the toes. These improved witht he reinstitution of those meds. She is now only on Eliquis however, having stopped Revatio last summer  due to noncompliance and warm weather. With this she notes no worsening in sxs.      She has not recently in the cooler weather while on Eliquis experienced recurrent discoloration, numbness and tingling in her toes.      She has recently had a prolonged respiratory illness but is finally getting better. I have reviewed recent ED visits.                  Review Of Systems  Skin: negative  Eyes: negative  Ears/Nose/Throat: negative  Respiratory: No shortness of breath, dyspnea on exertion, cough, or hemoptysis  Cardiovascular: as  above  Gastrointestinal: negative  Genitourinary: negative  Musculoskeletal: as above, also myalgias with recent respiratory illness.  Neurologic: negative  Psychiatric: negative  Hematologic/Lymphatic/Immunologic: negative  Endocrine: negative           PAST MEDICAL HISTORY:                  Past Medical History           Past Medical History:   Diagnosis Date    Anxiety      Arthritis      Asthma, mild intermittent      Migraines      Tension headaches      Unspecified hypothyroidism              PAST SURGICAL HISTORY:                  Past Surgical History             Past Surgical History:   Procedure Laterality Date    BUNIONECTOMY        COLONOSCOPY        ESOPHAGOSCOPY, GASTROSCOPY, DUODENOSCOPY (EGD), COMBINED N/A 4/2/2019     Procedure: ESOPHAGOSCOPY, GASTROSCOPY, DUODENOSCOPY (EGD);  Surgeon: Ochoa Cherry DO;  Location:  GI    OTHER SURGICAL HISTORY   1980     Bunionectomy            CURRENT MEDICATIONS:                  Current Outpatient Prescriptions               Current Outpatient Medications   Medication Sig Dispense Refill    albuterol (VENTOLIN HFA) 108 (90 Base) MCG/ACT inhaler INHALE TWO PUFFS BY MOUTH EVERY 6 HOURS AS NEEDED FOR SHORTNESS OF BREATH/DYSPNEA 18 g 3    atorvastatin (LIPITOR) 20 MG tablet Take 1 tablet (20 mg) by mouth daily 90 tablet 3    fluticasone (FLONASE) 50 MCG/ACT nasal spray Spray 2 sprays into both nostrils daily 1 Package 1    fluticasone-salmeterol (ADVAIR DISKUS) 250-50 MCG/DOSE inhaler INHALE ONE PUFF BY MOUTH TWICE A  each 3    levothyroxine (SYNTHROID/LEVOTHROID) 50 MCG tablet TAKE ONE TABLET BY MOUTH ONCE DAILY 90 tablet 3    PARoxetine (PAXIL-CR) 37.5 MG 24 hr tablet TAKE ONE TABLET BY MOUTH EVERY MORNING 90 tablet 0    meclizine (ANTIVERT) 25 MG tablet Take 1 tablet (25 mg) by mouth every 6 hours as needed for dizziness 15 tablet 1            ALLERGIES:                            Allergies   Allergen Reactions    Compazine         Anxiety     Flagyl [Metronidazole Hcl] Nausea and Vomiting         SOCIAL HISTORY:                  Social History   Social History                Socioeconomic History    Marital status: Single       Spouse name: Not on file    Number of children: Not on file    Years of education: Not on file    Highest education level: Not on file   Occupational History    Not on file   Tobacco Use    Smoking status: Never Smoker    Smokeless tobacco: Never Used   Vaping Use    Vaping Use: Never used   Substance and Sexual Activity    Alcohol use: Yes       Comment: wine - 2-3 times per week (1 glass)    Drug use: No    Sexual activity: Not Currently       Partners: Male       Birth control/protection: None   Other Topics Concern    Parent/sibling w/ CABG, MI or angioplasty before 65F 55M? No   Social History Narrative    Not on file      Social Determinants of Health      Financial Resource Strain: Not on file   Food Insecurity: Not on file   Transportation Needs: Not on file   Physical Activity: Not on file   Stress: Not on file   Social Connections: Not on file   Intimate Partner Violence: Not on file   Housing Stability: Not on file            FAMILY HISTORY:                   Family History             Family History   Problem Relation Age of Onset    Asthma Mother      Diabetes Mother      Cancer - colorectal Maternal Grandmother      Heart Disease Father           MI at age 55    Prostate Cancer Father      Asthma Father      Asthma Sister      Obesity Sister      Diabetes Sister      Coronary Artery Disease Sister      Asthma Sister      Obesity Sister      Asthma Sister      Asthma Brother                       Physical exam was not undertaken as this was a billable telephone encounter.                  PPGs of bilateral lower extremity digits dated 9/30/21     Clinical history: 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 Canas     Technique: PPG waveforms were obtained  with a sensor and infrared  light.     Findings:     Right foot:  First toe: Present, Normal  Second toe: Present, Severely abnormal  Third toe: Present, Severely abnormal  Fourth toe: Present, Moderately abnormal  Fifth toe: Present, Moderately abnormal     Left foot:  First toe: Present,Severely abnormal  Second toe: Present, Moderately abnormal  Third toe: Present, Moderately abnormal  Fourth toe: Present, Severely abnormal  Fifth toe: Present, Normal                                                                       Impression:  1. Abnormal PPG waveforms again noted. On side-by side comparison, the  biggest changes seen are in the left first digit, which is now  severely abnormal, compared to moderately on prior study, and the left  5th digit, which is now normal and was moderately abnormal.      HOA WESTON MD            CT ABDOMEN PELVIS WITH CONTRAST 9/2/2021 1:41 PM     CLINICAL HISTORY: Abdominal pain, acute, nonlocalized. Had noncontrast  CT yesterday, pain worse today. Abdominal pain, unspecified abdominal  location.     TECHNIQUE: CT scan of the abdomen and pelvis was performed following  injection of IV contrast. Multiplanar reformats were obtained. Dose  reduction techniques were used.  CONTRAST: 50mL, Isovue 370     COMPARISON: CT abdomen and pelvis without IV contrast dated 9/1/2021  and 4/26/2018, CT abdomen and pelvis with IV contrast dated  12/16/2015.     FINDINGS:   LOWER CHEST: Visualized portions of the lung bases and mediastinal  contents are grossly unremarkable. There are thoracic aortic  calcifications.     HEPATOBILIARY: Questionable subtle bubble of gas is seen in the common  bile duct in the head of the pancreas indicating possible partially  sphincterotomy. Recommend clinical correlation. No definite mass in  the head of the pancreas or choledocholithiasis is seen. There may be  minimal gallbladder wall thickening. No definite cholelithiasis is  identified. No  pericholecystic fluid is seen. Liver enhances normally  without focal lesion, intrahepatic biliary ductal dilatation or  choledocholithiasis.     PANCREAS: Normal.     SPLEEN: Normal.     ADRENAL GLANDS: Normal.     KIDNEYS/BLADDER: Fat-containing lesion inferior pole left kidney is  most consistent with adrenal angiomyolipoma, a benign lesion. This is  stable as compared to the prior study. The kidneys otherwise enhance  normally. No hydronephrosis, nephrolithiasis, hydroureter, or ureteral  calculus is identified. Ureters are difficult to follow due to the  relative lack of intraperitoneal adipose tissue. Urinary bladder is  grossly unremarkable.     BOWEL: The colon is difficult to follow given the lack of  intraperitoneal adipose tissue. No definite pericolonic inflammatory  changes to suggest acute diverticulitis. Appendix is not well seen. No  pericecal inflammatory change to suggest acute appendicitis. There is  stranding slightly increased density in the peritoneal adipose tissues  around the anterior aspect of the mid to lower abdomen, more so on the  right. This is of uncertain clinical significance and etiology but  could represent mesenteritis. Small bowel appears grossly of normal  caliber. There is abnormal thickening of the wall of the gastric  antrum/pyloric/proximal duodenal region which could represent an  inflammatory process such as gastritis or ulcer disease. No obvious  perforated ulcer is identified. There is no free air or free fluid  around this region. The stomach otherwise contains a large amount of  ingested material and moderate amount of air.     PELVIC ORGANS: Enhancing structure just posterior to the urinary  bladder in the deep pelvis likely represents an atrophic uterus and is  not appreciably changed since the prior study dated 12/16/2015. The  ovaries are not well seen. No adnexal masses are identified.     ADDITIONAL FINDINGS: Incidental note of circumaortic left renal vein,  a  normal variant. There is nonaneurysmal atherosclerosis. No definite  adenopathy, free air is identified. There is trace free fluid in the  pelvis.     MUSCULOSKELETAL: No aggressive osseous lesions or acute osseous  fractures are identified. There are degenerative changes in the spine,  mostly in the facet joints of the lower lumbar spine. There is also  disc height loss at L4-L5 and L5-S1. Mild anterolisthesis of L5 on S1  is degenerative in etiology.                                                                      IMPRESSION:   1.  Abnormal thickening of the wall the gastric  antrum/pylorus/proximal duodenum could represent an inflammatory or  infectious process (gastritis and possible ulcer disease) versus less  likely neoplastic infiltration. Recommend clinical correlation.  2.  Slightly increased density in the peritoneal adipose tissues  throughout the mid to lower abdomen and pelvis and slightly more so on  the right could represent mesenteritis. There is a relative lack of  intraperitoneal adipose tissue limiting evaluation of the hollow and  solid organs of the abdomen and pelvis. The amount of intraperitoneal  adipose has decreased since the prior studies from 2018 and 2015.  3.  Appendix is not definitely seen. No obvious evidence for acute  appendicitis is identified. Appendicitis cannot be excluded on the  basis of this study.  4.  Stable angiomyolipoma inferior left kidney is again noted.  5.  Small bubble of gas in the distal common bile duct could be from  prior sphincterotomy. Recommend clinical correlation.  6.  Relative lack of intraperitoneal adipose tissue limits evaluation  of the bowel and other structures of the abdomen and pelvis.     I discussed the thickening of the wall of the gastric  antrum/pylorus/proximal duodenum, slightly increased density in the  peroneal adipose tissues, and lack of other obvious etiology of  patient's symptoms with Dr. Cherry on 9/2/2021 at 1:52 PM. We  also  discussed the limitations of this study due to the relative lack of  intraperitoneal adipose tissue.     DINORA MUSTAFA MD            CT ABDOMEN/PELVIS WITHOUT CONTRAST September 1, 2021 9:55 AM     CLINICAL HISTORY: Right lower quadrant abdominal pain. No history of  surgery or cancer.     TECHNIQUE: CT scan of the abdomen and pelvis was performed without IV  contrast. Multiplanar reformats were obtained. Dose reduction  techniques were used.  CONTRAST: None.     COMPARISON: CT abdomen/pelvis dated 12/16/2015. CT chest dated  3/22/2021. More recent CT abdomen/pelvis from 4/26/2008 is not  available.     FINDINGS: Relative lack of intraperitoneal adipose tissue limits this  evaluation. Lack of IV contrast limits evaluation of the solid organs  of the abdomen and pelvis.     LOWER CHEST: Single bulla is seen in the right lower lung lobe.  Visualized portions of the lung bases are otherwise unremarkable.  There are aortic calcifications. Visualized mediastinal contents are  otherwise unremarkable.     HEPATOBILIARY: Normal.     PANCREAS: Normal.     SPLEEN: Normal.     ADRENAL GLANDS: Normal.     KIDNEYS/BLADDER: Fatty lesion in the inferior pole of the left kidney  measures up to 1.2 cm in diameter and is most consistent with a benign  renal angiomyolipoma. The kidneys are otherwise normal in appearance  for noncontrast CT. No hydronephrosis, nephrolithiasis, hydroureter or  ureteral calculus is identified. Ureters are very difficult to follow  due to relative lack of intraperitoneal adipose tissues. Urinary  bladder is grossly unremarkable.     BOWEL: The colon is grossly of normal appearance. Moderate amount of  stool is seen in the colon. Appendix is not well seen. No pericecal  inflammatory change to suggest acute appendicitis. Evaluation of the  bowel is limited due to lack of intraperitoneal adipose tissue and  lack of IV contrast. Small bowel is grossly of normal caliber. Stomach  contains a small amount  of fluid and air. Gastric wall appears mildly  thickened which is likely due to incomplete distention.     LYMPH NODES: No lymphadenopathy is identified.     VASCULATURE: There is nonaneurysmal atherosclerosis.     PELVIC ORGANS: Structure which could represent an atrophic uterus is  seen just posterior to the urinary bladder. Ovaries are not well seen.  No obvious adnexal mass is identified.     OTHER: There appears to be trace free fluid in the pelvis. No free air  is identified in the peritoneal cavity.     MUSCULOSKELETAL: Degenerative changes are noted in the spine. No  aggressive osseous lesions or acute osseous fractures.                                                                      IMPRESSION:   1.  This study is significantly limited due to the relative lack of  intraperitoneal adipose tissue and also due to the lack of IV  contrast.  2.  No diverticulitis, urinary system calculus, urinary system  obstruction, or bowel obstruction is seen. Appendix is not definitely  identified and no definite evidence for appendicitis is seen.  Appendicitis cannot be excluded due to the limitations of this study.     I discussed the significant limitations of this study as well as the  lack of obvious etiology for patient's symptoms with Dr. Lentz on  9/1/2021 at approximately 11:30 AM, when he became available.     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     Ordering Physician: Dr. Canas     Technique: PPG waveforms were obtained with a sensor and infrared  light.     Findings:     Right foot:  First toe: Present, Normal  Second toe: Present, Moderately abnormal  Third toe: Present, Severely abnormal  Fourth toe: Present, Mildly abnormal  Fifth toe: Present, Moderately abnormal     Left foot:  First toe: Present, Moderately abnormal  Second toe: Present, Moderately abnormal  Third toe:  Present, Severely abnormal  Fourth toe: Present, Severely abnormal  Fifth toe: Present, Moderate-severely abnormal                                                                      Impression:  1. Abnormal PPG waveforms in Right lower extremity digits 2-5, and  Left lower extremity digits 1-5.       MICHAELA REEDER MD         Duplex ultrasound of bilateral lower extremity arteries dated  2021 9:52 AM      Clinical information : Blue toe syndrome of both lower extremities (H)        Comparison: none                                                                      Impression:   1. Right lower extremity: Normal arterial velocities suggesting no  hemodynamically significant stenosis.   2. Left lower extremity: Normal arterial velocities suggesting no  hemodynamically significant stenosis.      Findings:      Right lower extremity:      Common femoral artery: 99/8 cm/sec.  Deep femoral artery: 91/11 cm/sec.  Proximal SFA: 77/1 cm/sec.  Mid SFA: 76/1 cm/sec.  Distal SFA: 61/1 cm/sec.  Popliteal artery: 32/1 cm/sec.  Anterior tibial artery: 40/1 cm/sec.  Peroneal artery: 44/3 cm/sec.  PTA ankle: 65/1 cm/sec.  Waveforms are triphasic in the CFA to the proximal SFA, and are  biphasic from the mid SFA to the distal peroneal artery.      Left lower extremity:     Common femoral artery: 105/4 cm/sec.  Deep femoral artery: 89/7 cm/sec.  Proximal SFA: 79/2 cm/sec.  Mid SFA: 83/1 cm/sec.  Distal SFA: 74/1 cm/sec.  Popliteal artery: 40/4 cm/sec.  Anterior tibial: 53/1 cm/sec.  Peroneal artery: 28/1 cm/sec.  PTA ankle: 81/1 cm/sec.  Waveforms are triphasic in the CFA to the mid SFA, and are biphasic  from the distal SFA to the distal peroneal artery.      MICHAELA REEDER MD         [id number]  [id number]  [id number]  [id number]^TIERNEY^[facility]  Echocardiography Laboratory  [address] [place] [address]     Name: NISHA NIEVES  MRN: [id number]  : 1955  Study Date: 05/10/2021 10:07  AM  Age: 66 yrs  Gender: Female  Patient Location: [facility]  Reason For Study: Embolic disease of toe (H)  History: Hyperlipidemia,Asthma  Ordering Physician: STEFANO MONET  Referring Physician: Fuentes Darby  Performed By: Sharifa Hernandez     BSA: 1.4 m2  Height: 61 in  Weight: 100 lb  HR: 60  BP: 146/80 mmHg  ______________________________________________________________________________  Procedure  Complete Echo Adult.  ______________________________________________________________________________  Interpretation Summary     1. The left ventricle is normal in structure, function and size. The visual  ejection fraction is estimated at 65%.  2. The right ventricle is normal in structure, function and size.  3. No valve disease.     No previous echo for comparison.  ______________________________________________________________________________  Left Ventricle  The left ventricle is normal in structure, function and size. There is normal  left ventricular wall thickness. The visual ejection fraction is estimated at  65%. Left ventricular diastolic function is normal. Normal left ventricular  wall motion.     Right Ventricle  The right ventricle is normal in structure, function and size.     Atria  Normal left atrial size. Right atrial size is normal. There is no atrial shunt  seen.     Mitral Valve  There is mild (1+) mitral regurgitation.     Tricuspid Valve  There is mild (1+) tricuspid regurgitation. The right ventricular systolic  pressure is approximated at 20.4 mmHg plus the right atrial pressure.     Aortic Valve  There is trace aortic regurgitation.     Pulmonic Valve  The pulmonic valve is normal in structure and function.     Vessels  Normal ascending, transverse (arch), and descending aorta. The inferior vena  cava was normal in size with preserved respiratory variability.     Pericardium  There is no pericardial effusion.     Rhythm  Sinus rhythm was  noted.  ______________________________________________________________________________  MMode/2D Measurements & Calculations  IVSd: 0.70 cm     LVIDd: 3.8 cm  LVIDs: 2.8 cm  LVPWd: 0.80 cm  FS: 26.3 %  LV mass(C)d: 78.8 grams  LV mass(C)dI: 56.0 grams/m2  Ao root diam: 3.1 cm  LA dimension: 3.0 cm  asc Aorta Diam: 2.9 cm  LA/Ao: 0.97  RWT: 0.42     Doppler Measurements & Calculations  MV E max nathan: 72.0 cm/sec  MV A max nathan: 57.8 cm/sec  MV E/A: 1.2  MV dec slope: 273.0 cm/sec2  MV dec time: 0.26 sec  TR max nathan: 226.0 cm/sec  TR max P.4 mmHg  E/E' av.7  Lateral E/e': 8.9  Medial E/e': 8.5     ______________________________________________________________________________  Report approved by: Heena Isaac 05/10/2021 02:00 PM                 CTA CHEST, ABDOMEN, PELVIS RUNOFF WITH CONTRAST  3/22/2021 10:26 AM      HISTORY:  Blue toe syndrome. Concern for distal emboli. Evaluate for  embolic source.     COMPARISON: CT of the chest dated 2011     TECHNIQUE: CT angiogram of the chest, abdomen and pelvis with  bilateral lower extremity runoff was performed following the  administration of 100 cc intravenous contrast. Images are reviewed in  multiple planes and 3-D reconstructions were also performed.    Radiation dose for this scan was reduced using automated exposure  control, adjustment of the mA and/or kV according to patient size, or  iterative reconstruction technique.     FINDINGS:      Chest: The thoracic aorta at the level of the sinuses of Valsalva has  a maximum diameter of approximately 3.1 cm. The ascending thoracic  aorta has a maximum diameter of approximately 3.6 cm. The aorta then  tapers at the level of the thoracic aortic arch. The descending  thoracic aorta is of normal caliber without aneurysmal dilatation or  significant stenosis. There is no significant soft plaque in the  thoracic aorta. The great vessels arising from the thoracic aortic  arch are patent without significant  stenoses.     Abdomen/pelvis: There is minimal scattered calcified plaque in the  abdominal aorta. No evidence for an aneurysmal dilatation or  significant stenosis. The celiac trunk, superior mesenteric artery,  renal arteries, and inferior mesenteric artery are patent without  significant stenoses.     The iliac arteries are patent without significant stenoses.     Right lower extremity angiogram:  Common femoral artery: Ectatic with a maximum diameter of 10 mm. No  significant stenosis.  Profunda femoris artery: No significant stenosis.  Superficial femoral artery: No significant stenosis.  Popliteal artery: No significant stenosis.  Tibial arteries: Three-vessel runoff. No significant stenosis.     Left lower extremity angiogram:  Common femoral artery: No significant stenosis.  Profunda femoris artery: No significant stenosis.  Superficial femoral artery: No significant stenosis.  Popliteal artery: No significant stenosis.  Tibial arteries: Three-vessel runoff. No significant stenosis.     Soft tissues:     Chest: Right apical scarring. 7 mm nodule in the right upper lobe.     Abdomen/pelvis: There is a 2.5 x 1.8 cm angiomyolipoma at the lower  pole of the left kidney. Remaining solid organs in the abdomen appear  grossly unremarkable.     The bowel appears grossly unremarkable.                                                                      IMPRESSION:  1. Mild aneurysmal dilatation of the ascending thoracic aorta which  has a maximum diameter of approximately 3.6 cm.  2. No significant soft plaque in the arteries of the chest, abdomen,  pelvis and lower extremities that could serve as a source for distal  emboli.  3. 2.7 cm angiomyolipoma at the lower pole of the left kidney.  Consider urological follow-up and follow-up CT scan in one year to  monitor for any increase in size.     JEANNE HEARN MD                    Component      Latest Ref Rng & Units 11/3/2021   JAUN interpretation      Negative  Positive (A)   JAUN pattern 1       Dense fine speckled   JAUN titer 1       1:160   Neutrophil Cytoplasmic Antibody      <1:10 <1:10   Neutrophil Cytoplasmic Antibody Pattern       The ANCA IFA is <1:10.  No further testing will be performed.   SSA Cassie IgG Instrument Value      <7.0 U/mL <0.5   SSA (Ro) Antibody IgG      Negative Negative   SSB Cassie IgG Instrument Value      <7.0 U/mL <0.6   SSB (La) Antibody IgG      Negative Negative   Scl-70 Cassie IgG Instrument Value      <7.0 U/mL 0.8   Scleroderma Antibody Scl-70 EZEQUIEL IgG      Negative Negative   Centromere Cassie IgG Instrument Value      <7.0 U/mL <0.7   Centromere Antibody IgG      Negative Negative   Mayer EZEQUIEL Cassie IgG Instrument Value      <7.0 U/mL 2.5   Smith EZEQUIEL Antibody IgG      Negative Negative   Sed Rate      0 - 30 mm/hr 8   CRP Inflammation      0.0 - 8.0 mg/L <2.9   Rheumatoid Factor      <20 IU/mL 8   Cyclic Citrullinated Peptide Antibody, IgG      <7.0 U/mL 2.4   Histone Antibody,IgG      0.0 - 0.9 Units 0.3   DNA-ds      <10.0 IU/mL 1.6   Complement, Total, S      38.7 - 89.9 U/mL     Complement C3      81 - 157 mg/dL     Complement C4      13 - 39 mg/dL        Component      Latest Ref Rng & Units 12/9/2021   JAUN interpretation      Negative     JAUN pattern 1           JAUN titer 1           Neutrophil Cytoplasmic Antibody      <1:10     Neutrophil Cytoplasmic Antibody Pattern           SSA Cassie IgG Instrument Value      <7.0 U/mL     SSA (Ro) Antibody IgG      Negative     SSB Cassie IgG Instrument Value      <7.0 U/mL     SSB (La) Antibody IgG      Negative     Scl-70 Cassie IgG Instrument Value      <7.0 U/mL     Scleroderma Antibody Scl-70 EZEQUIEL IgG      Negative     Centromere Cassie IgG Instrument Value      <7.0 U/mL     Centromere Antibody IgG      Negative     Mayer EZEQUIEL Cassie IgG Instrument Value      <7.0 U/mL     Smith EZEQUIEL Antibody IgG      Negative     Sed Rate      0 - 30 mm/hr     CRP Inflammation      0.0 - 8.0 mg/L     Rheumatoid Factor      <20  IU/mL     Cyclic Citrullinated Peptide Antibody, IgG      <7.0 U/mL     Histone Antibody,IgG      0.0 - 0.9 Units     DNA-ds      <10.0 IU/mL     Complement, Total, S      38.7 - 89.9 U/mL 53.1   Complement C3      81 - 157 mg/dL 92   Complement C4      13 - 39 mg/dL 18                         Component      Latest Ref Rng 3/10/2023  8:04 AM 8/18/2023  10:35 AM   Sodium      136 - 145 mmol/L 140      Potassium      3.4 - 5.3 mmol/L 3.7      Chloride      98 - 107 mmol/L 104      Carbon Dioxide (CO2)      22 - 29 mmol/L 29      Anion Gap      7 - 15 mmol/L 7      Urea Nitrogen      8.0 - 23.0 mg/dL 13.1      Creatinine      0.51 - 0.95 mg/dL 0.57      Calcium      8.8 - 10.2 mg/dL 9.7      Glucose      70 - 99 mg/dL 100 (H)      Alkaline Phosphatase      35 - 104 U/L 68      AST      10 - 35 U/L 25      ALT      10 - 35 U/L 18      Protein Total      6.4 - 8.3 g/dL 7.3      Albumin      3.5 - 5.2 g/dL 4.2      Bilirubin Total      <=1.2 mg/dL 0.3      GFR Estimate      >60 mL/min/1.73m2 >90      Total Cholesterol      <=199 mg/dL 179      Triglycerides      <150 mg/dL 44  42    HDL Cholesterol      40 - 59 mg/dL 106 (H)      LDL Cholesterol      <=129 mg/dL 64      HDL Size      >=8.9 nm 10.7      VLDL Size      <=46.7 nm 49.8 (H)      LDL Particle Size      >=20.7 nm 21.1      Lge HDL Particle number      >=4.2 umol/L >17.0      HDL Particle Number NMR      >=33.0 umol/L 29.4 (L)      Lge VLDL Part number      <=2.7 nmol/L <1.5      Small LDL Particle number      <=634 nmol/L <165      LDL Particle Number      <=1135 nmol/L 725      EER LipoFit by NMR See Note      Cholesterol      <200 mg/dL   190    HDL Cholesterol      >=50 mg/dL   123    LDL Cholesterol Calculated      <=100 mg/dL   59    Non HDL Cholesterol      <130 mg/dL   67    JAUN interpretation      Negative  Borderline Positive !      JAUN pattern 1 Speckled      JAUN titer 1 1:80      Complement C3      81 - 157 mg/dL 108      Complement C4      13 - 39  mg/dL 24      CRP Inflammation      <5.00 mg/L <3.00      Sed Rate      0 - 30 mm/hr 13      Rheumatoid Factor      <12 IU/mL 8      C-Reactive Protein High Sensitivity <0.15      Lipoprotein (a)      <30 mg/dL 25      TSH      0.30 - 4.20 uIU/mL   0.94       Legend:  (H) High  (L) Low  ! Abnormal

## 2024-02-27 NOTE — PROGRESS NOTES
Lara is a 68 year old who is being evaluated via a billable telephone visit.      What phone number would you like to be contacted at? [phone]  How would you like to obtain your AVS? Marycruzhart  Phone call duration: See MD note for  minutes   Originating Location (pt. Location): Home    Distant Location (provider location):  On-site    Subjective   Lara is a 68 year old, presenting for the following health issues:  Telephone ([phone]/Follow up to stopping Lipitor- patient has ongoing concerns of myalgias. /)        Objective           Vitals:  No vitals were obtained today due to virtual visit.      AZRA GARBER

## 2024-03-07 ENCOUNTER — TELEPHONE (OUTPATIENT)
Dept: OTHER | Facility: CLINIC | Age: 69
End: 2024-03-07
Payer: COMMERCIAL

## 2024-03-14 ASSESSMENT — ASTHMA QUESTIONNAIRES
QUESTION_2 LAST FOUR WEEKS HOW OFTEN HAVE YOU HAD SHORTNESS OF BREATH: ONCE OR TWICE A WEEK
QUESTION_4 LAST FOUR WEEKS HOW OFTEN HAVE YOU USED YOUR RESCUE INHALER OR NEBULIZER MEDICATION (SUCH AS ALBUTEROL): ONCE A WEEK OR LESS
QUESTION_1 LAST FOUR WEEKS HOW MUCH OF THE TIME DID YOUR ASTHMA KEEP YOU FROM GETTING AS MUCH DONE AT WORK, SCHOOL OR AT HOME: A LITTLE OF THE TIME
ACT_TOTALSCORE: 20
QUESTION_3 LAST FOUR WEEKS HOW OFTEN DID YOUR ASTHMA SYMPTOMS (WHEEZING, COUGHING, SHORTNESS OF BREATH, CHEST TIGHTNESS OR PAIN) WAKE YOU UP AT NIGHT OR EARLIER THAN USUAL IN THE MORNING: ONCE OR TWICE
QUESTION_5 LAST FOUR WEEKS HOW WOULD YOU RATE YOUR ASTHMA CONTROL: WELL CONTROLLED
EMERGENCY_ROOM_LAST_YEAR_TOTAL: TWO
ACT_TOTALSCORE: 20

## 2024-03-15 ENCOUNTER — OFFICE VISIT (OUTPATIENT)
Dept: PHARMACY | Facility: CLINIC | Age: 69
End: 2024-03-15
Payer: COMMERCIAL

## 2024-03-15 ENCOUNTER — OFFICE VISIT (OUTPATIENT)
Dept: INTERNAL MEDICINE | Facility: CLINIC | Age: 69
End: 2024-03-15
Payer: COMMERCIAL

## 2024-03-15 VITALS
HEIGHT: 61 IN | RESPIRATION RATE: 16 BRPM | HEART RATE: 76 BPM | WEIGHT: 126.2 LBS | TEMPERATURE: 97.1 F | DIASTOLIC BLOOD PRESSURE: 80 MMHG | OXYGEN SATURATION: 99 % | BODY MASS INDEX: 23.83 KG/M2 | SYSTOLIC BLOOD PRESSURE: 128 MMHG

## 2024-03-15 VITALS — WEIGHT: 126.4 LBS | BODY MASS INDEX: 23.88 KG/M2

## 2024-03-15 DIAGNOSIS — I75.029 BLUE TOE SYNDROME, UNSPECIFIED LATERALITY (H): ICD-10-CM

## 2024-03-15 DIAGNOSIS — F33.0 MAJOR DEPRESSIVE DISORDER, RECURRENT EPISODE, MILD (H): Primary | ICD-10-CM

## 2024-03-15 DIAGNOSIS — J30.2 SEASONAL ALLERGIC RHINITIS, UNSPECIFIED TRIGGER: ICD-10-CM

## 2024-03-15 DIAGNOSIS — E03.4 HYPOTHYROIDISM DUE TO ACQUIRED ATROPHY OF THYROID: ICD-10-CM

## 2024-03-15 DIAGNOSIS — F41.1 GENERALIZED ANXIETY DISORDER: ICD-10-CM

## 2024-03-15 DIAGNOSIS — J45.20 MILD INTERMITTENT ASTHMA WITHOUT COMPLICATION: ICD-10-CM

## 2024-03-15 DIAGNOSIS — R52 PAIN: ICD-10-CM

## 2024-03-15 DIAGNOSIS — E78.5 HYPERLIPIDEMIA LDL GOAL <130: ICD-10-CM

## 2024-03-15 DIAGNOSIS — F33.0 MAJOR DEPRESSIVE DISORDER, RECURRENT EPISODE, MILD (H): ICD-10-CM

## 2024-03-15 DIAGNOSIS — Z79.899 MEDICATION MANAGEMENT: Primary | ICD-10-CM

## 2024-03-15 DIAGNOSIS — E03.9 HYPOTHYROIDISM, UNSPECIFIED TYPE: ICD-10-CM

## 2024-03-15 PROCEDURE — 99607 MTMS BY PHARM ADDL 15 MIN: CPT | Performed by: PHARMACIST

## 2024-03-15 PROCEDURE — 99605 MTMS BY PHARM NP 15 MIN: CPT | Performed by: PHARMACIST

## 2024-03-15 PROCEDURE — 99213 OFFICE O/P EST LOW 20 MIN: CPT | Performed by: INTERNAL MEDICINE

## 2024-03-15 RX ORDER — MAGNESIUM OXIDE 400 MG/1
400 TABLET ORAL DAILY
COMMUNITY

## 2024-03-15 RX ORDER — LORATADINE 10 MG/1
10 TABLET ORAL DAILY
COMMUNITY
End: 2024-08-26

## 2024-03-15 RX ORDER — ESCITALOPRAM OXALATE 10 MG/1
10 TABLET ORAL DAILY
Qty: 90 TABLET | Refills: 3 | Status: SHIPPED | OUTPATIENT
Start: 2024-03-15 | End: 2024-06-10

## 2024-03-15 ASSESSMENT — PATIENT HEALTH QUESTIONNAIRE - PHQ9: SUM OF ALL RESPONSES TO PHQ QUESTIONS 1-9: 2

## 2024-03-15 ASSESSMENT — PAIN SCALES - GENERAL: PAINLEVEL: EXTREME PAIN (8)

## 2024-03-15 NOTE — PROGRESS NOTES
Assessment & Plan   Problem List Items Addressed This Visit       Major depressive disorder, recurrent episode, mild (HCC)    Relevant Medications    escitalopram (LEXAPRO) 10 MG tablet    Hypothyroidism     Other Visit Diagnoses       Medication management    -  Primary             Patient with a history of depression and hypothyroidism.  She continues to have some other issues questions fibromyalgia which is rampant in her family.  She also has blue toe syndrome for which she saw vascular and she is better on amlodipine and Eliquis.  She has had negative workup with rheumatology labs.  She has been noticed that she does do better when she is moving when she is more active she is trying to get more exercise and eat healthy.  We discussed EBV and will not test since she does not think it would change anything.  We discussed possible fibromyalgia treatment of being exercise SSRI and she is happy with this plan.  Will increase her Lexapro from 5 to 10 mg a day.  She is overall doing well seems to be a good place mentally and getting more activity and healthy living.              Subjective   Lara is a 69 year old, presenting for the following health issues:  LAB REQUEST      3/15/2024    10:04 AM   Additional Questions   Roomed by Debi MOSCOSO         3/15/2024    10:04 AM   Patient Reported Additional Medications   Patient reports taking the following new medications Tumeric, vitamin d     History of Present Illness       Reason for visit:  Dr. Garcia suggested getting tatyana-barr test.    She eats 2-3 servings of fruits and vegetables daily.She consumes 0 sweetened beverage(s) daily.She exercises with enough effort to increase her heart rate 30 to 60 minutes per day.  She exercises with enough effort to increase her heart rate 4 days per week.   She is taking medications regularly.     Saw Dr Garcia for Raynauds and on amlodipine and eliquis.   Working well but still some pains.     Tried off atorvastatin and no  "change so back on it.     She had leg pains and gets sore with standing longer.  Not from her statin therapy.      Does better when she is moving and doing things at work.     Lots of covid at her work, no symptoms of runny nose, fever.      Family with fibromyalgia she wonders about this.         Objective    /80 (BP Location: Right arm, Patient Position: Chair)   Pulse 76   Temp 97.1  F (36.2  C) (Temporal)   Resp 16   Ht 1.549 m (5' 1\")   Wt 57.2 kg (126 lb 3.2 oz)   LMP  (LMP Unknown)   SpO2 99%   BMI 23.85 kg/m    Body mass index is 23.85 kg/m .  Physical Exam   NAD,   Normal appearing hands and slightly reddened ankles.             Signed Electronically by: Fuentes aDrby MD    "

## 2024-03-15 NOTE — PROGRESS NOTES
Medication Therapy Management (MTM) Encounter    ASSESSMENT:                            Medication Adherence/Access: No issues identified    Depression/Anxiety: Somewhat controlled per patient. If medication adjustments are considered in the future maybe could consider duloxetine for potential pain benefits vs increasing escitalopram.      Blue Toe Syndrome/Pain: Vascular concerns are stable. Pain does not seem related per patient. Would likely benefit from further work-up/testing.     Hyperlipidemia: Stable.    Hypothyroidism: Stable. Last TSH is within normal limits.     Asthma/Allergies: Somewhat stable per patient. Plan in place to start intranasal corticosteroid.     PLAN:                            Consider discussing a referral to rheumatology with Dr. Darby.  We could look at changing your escitalopram to duloxetine (Cymbalta) in the future.    Here is the number for the [facility] service line for the [facility] [phone]    Follow-up: 6 months or sooner if needed    SUBJECTIVE/OBJECTIVE:                          Lara Mayer is a 69 year old female coming in for a follow-up visit.       Reason for visit: Comprehensive medication review.    Allergies/ADRs: Reviewed in chart  Past Medical History: Reviewed in chart  Tobacco: She reports that she has never smoked. She has never used smokeless tobacco.  Alcohol: 7 beverages / week    Medication Adherence/Access: Patient takes medications directly from bottles.  Patient takes medications 2 time(s) per day.   Per patient, misses medication 0 times per week.   Medication barriers: affording medications  The patient fills medications at [place]: YES.      Depression/Anxiety:  Escitalopram 5 mg daily  Patient reports no current medication side effects.  Patient reports symptoms are somewhat controlled. Several stressors in her life including her sister's memory loss (lives with patient) and amount of responsibility at work without  additional help/support. Now back in person for work which has helped her mental health and getting her moving.      11/28/2023    10:47 AM 12/28/2023     9:20 AM 1/30/2024     9:49 AM   PHQ-9 SCORE   PHQ-9 Total Score MyChart 7 (Mild depression) 6 (Mild depression) 6 (Mild depression)   PHQ-9 Total Score 7 6 6         8/14/2023    10:16 AM 8/28/2023    12:16 PM 11/28/2023    10:48 AM   JOCELINE-7 SCORE   Total Score 9 (mild anxiety) 6 (mild anxiety) 7 (mild anxiety)   Total Score 9 6 7       Hypertension   Blue Toe Syndrome/Pain:   Eliquis 5 mg twice daily  Amlodipine 2.5 mg daiyl  Patient reports no current medication side effects  Patient does not self-monitor blood pressure.    Follows with Dr. Garcia in vascular surgery. Feels that things have improved. Does have pain in her body/legs that has gotten worse - tries to limit NSAIDs. Some relief with magnesium supplement. Tried off of atorvastatin without relief and underwent testing.  Previously referred to rheumatology, but they would not see her. She has some family history of fibromyalgia and lupus so wonders about these.      BP Readings from Last 3 Encounters:   01/29/24 132/84   01/12/24 106/58   12/18/23 129/66     Pulse Readings from Last 3 Encounters:   01/29/24 68   01/12/24 76   12/18/23 76       Hyperlipidemia   Atorvastatin 20 mg daily  Patient reports myalgias since on medication, but not improved with trials off.  The ASCVD Risk score (Eolia DK, et al., 2019) failed to calculate for the following reasons:    The valid HDL cholesterol range is 20 to 100 mg/dL     Recent Labs   Lab Test 08/18/23  1035 03/19/21  1141   CHOL 190 256*    108   LDL 59 137*   TRIG 42 56       Hypothyroidism   Levothyroxine 50 mcg daily.   Patient is having the following symptoms: none.      TSH   Date Value Ref Range Status   08/18/2023 0.94 0.30 - 4.20 uIU/mL Final   01/02/2022 1.80 0.40 - 4.00 mU/L Final   03/19/2021 1.31 0.40 - 4.00 mU/L Final     Asthma/Allergies:    Fluticasone-salmeterol 250-50 mcg - one puff twice daily  Albuterol HFA every 6 hours as needed  Duonebs as needed (rare)  Loratadine 10 mg daily  Fluticasone nasal spray daily as needed - plans on restartin  Patient rinses their mouth after using steroid inhaler.   Patient reports no current medication side effects.      Triggers include:  allergies, respiratory infections .  Patient reports the following symptoms: increase in allergies    Today's Vitals: LMP  (LMP Unknown)     Wt Readings from Last 5 Encounters:   03/15/24 126 lb 6.4 oz (57.3 kg)   01/29/24 127 lb 12.8 oz (58 kg)   01/12/24 130 lb (59 kg)   12/18/23 129 lb (58.5 kg)   11/01/23 129 lb (58.5 kg)     ----------------      I spent 30 minutes with this patient today. I offer these suggestions for consideration by Dr. Darby. A copy of the visit note was provided to the patient's provider(s).    A summary of these recommendations was given to the patient.    Damien Douglas, PharmD, BCACP  Medication Therapy Management Pharmacist  Voicemail: [phone]       Medication Therapy Recommendations  No medication therapy recommendations to display

## 2024-03-15 NOTE — PATIENT INSTRUCTIONS
"Recommendations from today's MTM visit:                                                    MTM (medication therapy management) is a service provided by a clinical pharmacist designed to help you get the most of out of your medicines.      Consider discussing a referral to rheumatology with Dr. Darby.  We could look at changing your escitalopram to duloxetine (Cymbalta) in the future.    Here is the number for the [facility] service line for the [facility] [phone]    Follow-up: 6 months or sooner if needed    It was great speaking with you today.  I value your experience and would be very thankful for your time in providing feedback in our clinic survey. In the next few days, you may receive an email or text message from [employer] with a link to a survey related to your  clinical pharmacist.\"     To schedule another MTM appointment, please call the clinic directly or you may call the MTM scheduling line at [phone] or toll-free at [phone].     My Clinical Pharmacist's contact information:                                                      Please feel free to contact me with any questions or concerns you have.      Damien Douglas, PharmD, [facility]  Medication Therapy Management Pharmacist  Voicemail: [phone]  "

## 2024-04-23 ENCOUNTER — MYC MEDICAL ADVICE (OUTPATIENT)
Dept: INTERNAL MEDICINE | Facility: CLINIC | Age: 69
End: 2024-04-23
Payer: COMMERCIAL

## 2024-04-24 NOTE — TELEPHONE ENCOUNTER
Medication question    Sent message to provider to continue with conversation.   Sent to provider for review and approval/denial.     Sushila Callahan RN on 4/24/2024 at 7:46 AM

## 2024-05-20 ENCOUNTER — MYC MEDICAL ADVICE (OUTPATIENT)
Dept: INTERNAL MEDICINE | Facility: CLINIC | Age: 69
End: 2024-05-20
Payer: COMMERCIAL

## 2024-05-21 ENCOUNTER — NURSE TRIAGE (OUTPATIENT)
Dept: INTERNAL MEDICINE | Facility: CLINIC | Age: 69
End: 2024-05-21
Payer: COMMERCIAL

## 2024-05-21 NOTE — TELEPHONE ENCOUNTER
Nurse Triage SBAR    Is this a 2nd Level Triage? NO    Situation: Patient reports she has had bad leg pain since starting Amlodipine, Eliquis and Atorvastatin. She is unsure which medication is causing her leg pain but she reports both legs hurt for the last few months since starting medications.     See [employer] message:    I m experiencing severe muscle aches in my legs from the knees up since being on the amlodipene. Could I discontinue this medication?  Lara Mayer      Assessment:  No other symptoms, no redness, swelling or warmth to lower extremities. No fevers.     Protocol Recommended Disposition:   Callback by PCP Today    Recommendation: Patient asking if she can stop medication that could be causing this symptom. Did discuss with her that sometimes statins can cause leg cramps.      Routed to provider    Does the patient meet one of the following criteria for ADS visit consideration? No    CASTRO RoblesN, RN        Reason for Disposition   Caller has NON-URGENT medicine question about med that PCP or specialist prescribed and triager unable to answer question    Additional Information   Negative: Intentional drug overdose and suicidal thoughts or ideas   Negative: Drug overdose and triager unable to answer question   Negative: Caller requesting a renewal or refill of a medicine patient is currently taking   Negative: Caller requesting information unrelated to medicine   Negative: Caller requesting information about COVID-19 Vaccine   Negative: Caller requesting information about Emergency Contraception   Negative: Caller requesting information about Combined Birth Control Pills   Negative: Caller requesting information about Progestin Birth Control Pills   Negative: Caller requesting information about Post-Op pain or medicines   Negative: Caller requesting a prescription antibiotic (such as penicillin) for Strep throat and has a positive culture result   Negative: Caller requesting a prescription  anti-viral med (such as Tamiflu) and has influenza (flu) symptoms   Negative: Immunization reaction suspected   Negative: Rash while taking a medicine or within 3 days of stopping it   Negative: Asthma and having symptoms of asthma (cough, wheezing, etc.)   Negative: Symptom of illness (e.g., headache, abdominal pain, earache, vomiting) that are more than mild   Negative: Breastfeeding questions about mother's medicines and diet   Negative: MORE THAN A DOUBLE DOSE of a prescription or over-the-counter (OTC) drug   Negative: DOUBLE DOSE (an extra dose or lesser amount) of prescription drug and any symptoms (e.g., dizziness, nausea, pain, sleepiness)   Negative: DOUBLE DOSE (an extra dose or lesser amount) of over-the-counter (OTC) drug and any symptoms (e.g., dizziness, nausea, pain, sleepiness)   Negative: Took another person's prescription drug   Negative: DOUBLE DOSE (an extra dose or lesser amount) of prescription drug and NO symptoms  (Exception: A double dose of antibiotics.)   Negative: Diabetes drug error or overdose (e.g., took wrong type of insulin or took extra dose)   Negative: Caller has medication question about med NOT prescribed by PCP and triager unable to answer question (e.g., compatibility with other med, storage)   Negative: Prescription not at pharmacy and was prescribed by PCP recently  (Exception: triager has access to EMR and prescription is recorded there. Go to Home Care and confirm for pharmacy.)   Negative: Pharmacy calling with prescription question and triager unable to answer question   Negative: Caller has URGENT medicine question about med that PCP or specialist prescribed and triager unable to answer question    Protocols used: Medication Question Call-A-OH

## 2024-05-21 NOTE — TELEPHONE ENCOUNTER
RN TRIAGE CALL:    Patient Contact    Attempt # 1    Was call answered?  No.  Left message on voicemail with information to call me back. Also sent [email or web address] response.    ELIZA Thomas, RN          Lara [facility]VER[facility] [place] Nurse Pool (supporting Fuentes Darby MD)Yesterday (11:40 AM)     I m experiencing severe muscle aches in my legs from the knees up since being on the amlodipene. Could I discontinue this medication?  Lara Mayer

## 2024-05-22 NOTE — TELEPHONE ENCOUNTER
Please have her hold the atorvastatin and see how her legs do.  This would be the most likely cause of leg pain.

## 2024-05-23 NOTE — TELEPHONE ENCOUNTER
Updated patient on 's recommendations. She will try to hold it and update us in a few weeks.    CASTRO RoblesN, RN

## 2024-05-24 ENCOUNTER — FCC EXTENDED DOCUMENTATION (OUTPATIENT)
Dept: PSYCHOLOGY | Facility: CLINIC | Age: 69
End: 2024-05-24
Payer: COMMERCIAL

## 2024-05-24 NOTE — PROGRESS NOTES
"                    Discharge Summary  Multiple Sessions    Client Name: Brissa Mayer MRN#: [id number] YOB: 1955      Intake / Discharge Date: 11/28/2023 to 5/24/2024      DSM5 Diagnoses: (Sustained by DSM5 Criteria Listed Above)  Diagnoses: 296.31 (F33.0) Major Depressive Disorder, Recurrent Episode, Mild _  300.02 (F41.1) Generalized Anxiety Disorder  Psychosocial & Contextual Factors: Work conflict with supervisor, challenging with environment, multiple family challenges, 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   PROMIS 10 Score: 27          Presenting Concern:  \"Help with acute anxiety\" Care taking stress and agitation.      Reason for Discharge:  Client did not return      Disposition at Time of Last Encounter:   Comments:   No change in symptoms.     Risk Management:   Client denies a history of suicidal ideation, suicide attempts, self-injurious behavior, homicidal ideation, homicidal behavior, and and other safety concerns  Recommended that patient call 911 or go to the local ED should there be a change in any of these risk factors.      Referred To:  No Referral made        TIO Portillo   5/24/2024    "

## 2024-06-03 DIAGNOSIS — E03.4 HYPOTHYROIDISM DUE TO ACQUIRED ATROPHY OF THYROID: ICD-10-CM

## 2024-06-03 RX ORDER — LEVOTHYROXINE SODIUM 50 UG/1
TABLET ORAL
Qty: 90 TABLET | Refills: 3 | Status: SHIPPED | OUTPATIENT
Start: 2024-06-03

## 2024-06-10 ENCOUNTER — VIRTUAL VISIT (OUTPATIENT)
Dept: PHARMACY | Facility: CLINIC | Age: 69
End: 2024-06-10
Payer: COMMERCIAL

## 2024-06-10 DIAGNOSIS — F33.0 MAJOR DEPRESSIVE DISORDER, RECURRENT EPISODE, MILD (H): Primary | ICD-10-CM

## 2024-06-10 DIAGNOSIS — F41.1 GENERALIZED ANXIETY DISORDER: ICD-10-CM

## 2024-06-10 PROCEDURE — 99606 MTMS BY PHARM EST 15 MIN: CPT | Mod: 95 | Performed by: PHARMACIST

## 2024-06-10 RX ORDER — ESCITALOPRAM OXALATE 10 MG/1
15 TABLET ORAL DAILY
Qty: 135 TABLET | Refills: 1 | Status: SHIPPED | OUTPATIENT
Start: 2024-06-10 | End: 2024-07-08

## 2024-06-10 NOTE — PATIENT INSTRUCTIONS
"Recommendations from today's MTM visit:                                                    MTM (medication therapy management) is a service provided by a clinical pharmacist designed to help you get the most of out of your medicines.        Increase escitalopram to 15 mg by mouth daily    Follow-up: I will call you on Monday, July 8th at 10 AM for phone follow-up      It was great speaking with you today.  I value your experience and would be very thankful for your time in providing feedback in our clinic survey. In the next few days, you may receive an email or text message from [employer] with a link to a survey related to your  clinical pharmacist.\"     To schedule another MTM appointment, please call the clinic directly or you may call the MTM scheduling line at [phone] or toll-free at [phone].     My Clinical Pharmacist's contact information:                                                      Please feel free to contact me with any questions or concerns you have.      Damien Douglas, PharmD, [facility]  Medication Therapy Management Pharmacist  Voicemail: [phone]  "

## 2024-06-10 NOTE — PROGRESS NOTES
Medication Therapy Management (MTM) Encounter    ASSESSMENT:                            Medication Adherence/Access: No issues identified    Depression/Anxiety: Not significantly changed from 5 mg to 10 mg per patient, but initially helpful. May benefit from further dose optimization to see if this helps with anxiety concerns.     PLAN:                            Increase escitalopram to 15 mg by mouth daily    Follow-up: I will call you on Monday, July 8th at 10 AM for phone follow-up    SUBJECTIVE/OBJECTIVE:                          Lara Mayer is a 69 year old female called for a follow-up visit.       Reason for visit: Depression/Anxiety Follow-Up.    Allergies/ADRs: Reviewed in chart  Past Medical History: Reviewed in chart  Tobacco: She reports that she has never smoked. She has never used smokeless tobacco.  Alcohol: 7 beverages / week    Medication Adherence/Access: Patient takes medications directly from bottles.  Patient takes medications 2 time(s) per day.   Per patient, misses medication 0 times per week.   Medication barriers: affording medications  The patient fills medications at [facility]: YES.      Depression/Anxiety:  Escitalopram 10 mg daily  Patient reports no current medication side effects.  Patient reports symptoms are somewhat controlled, but anxiety is high. Several stressors in her life including her sister's memory loss (lives with patient) and amount of responsibility at work without additional help/support. Stress gets to her very easily and recent dose increase of escitalopram did not provide noticeable benefit.       12/28/2023     9:20 AM 1/30/2024     9:49 AM 3/15/2024    10:08 AM   PHQ   PHQ-9 Total Score 6 6 2   Q9: Thoughts of better off dead/self-harm past 2 weeks Not at all Not at all Not at all         8/14/2023    10:16 AM 8/28/2023    12:16 PM 11/28/2023    10:48 AM   JOCELINE-7 SCORE   Total Score 9 (mild anxiety) 6 (mild anxiety) 7 (mild anxiety)   Total Score 9 6 7       Today's  Vitals: LMP  (LMP Unknown)     BP Readings from Last 3 Encounters:   03/15/24 128/80   01/29/24 132/84   01/12/24 106/58     Wt Readings from Last 5 Encounters:   03/15/24 126 lb 3.2 oz (57.2 kg)   03/15/24 126 lb 6.4 oz (57.3 kg)   01/29/24 127 lb 12.8 oz (58 kg)   01/12/24 130 lb (59 kg)   12/18/23 129 lb (58.5 kg)     ----------------      I spent 10 minutes with this patient today. All changes were made via collaborative practice agreement with Fuentes Darby MD. A copy of the visit note was provided to the patient's provider(s).    A summary of these recommendations was sent via [employer].    Damien Douglas, PharmD, BCACP  Medication Therapy Management Pharmacist  Voicemail: [phone]    Telemedicine Visit Details  Type of service:  Telephone visit  Start Time:  1:24 PM  End Time:  1:34 PM     Medication Therapy Recommendations  Generalized anxiety disorder    Current Medication: escitalopram (LEXAPRO) 10 MG tablet (Discontinued)   Rationale: Dose too low - Dosage too low - Effectiveness   Recommendation: Increase Dose - escitalopram 10 MG tablet - Increase escitalopram to 15 mg by mouth daily   Status: Accepted per CPA

## 2024-07-05 ENCOUNTER — OFFICE VISIT (OUTPATIENT)
Dept: PRIMARY CARE CLINIC | Age: 69
End: 2024-07-05
Payer: MEDICARE

## 2024-07-05 VITALS
WEIGHT: 126 LBS | SYSTOLIC BLOOD PRESSURE: 104 MMHG | RESPIRATION RATE: 16 BRPM | HEART RATE: 100 BPM | OXYGEN SATURATION: 95 % | HEIGHT: 63 IN | BODY MASS INDEX: 22.32 KG/M2 | TEMPERATURE: 99.5 F | DIASTOLIC BLOOD PRESSURE: 62 MMHG

## 2024-07-05 DIAGNOSIS — R19.7 DIARRHEA, UNSPECIFIED TYPE: ICD-10-CM

## 2024-07-05 DIAGNOSIS — R11.2 NAUSEA AND VOMITING, UNSPECIFIED VOMITING TYPE: Primary | ICD-10-CM

## 2024-07-05 PROCEDURE — 99213 OFFICE O/P EST LOW 20 MIN: CPT

## 2024-07-05 PROCEDURE — 1090F PRES/ABSN URINE INCON ASSESS: CPT

## 2024-07-05 PROCEDURE — 1123F ACP DISCUSS/DSCN MKR DOCD: CPT

## 2024-07-05 PROCEDURE — G8400 PT W/DXA NO RESULTS DOC: HCPCS

## 2024-07-05 PROCEDURE — 1036F TOBACCO NON-USER: CPT

## 2024-07-05 PROCEDURE — 3017F COLORECTAL CA SCREEN DOC REV: CPT

## 2024-07-05 PROCEDURE — G8420 CALC BMI NORM PARAMETERS: HCPCS

## 2024-07-05 PROCEDURE — G8427 DOCREV CUR MEDS BY ELIG CLIN: HCPCS

## 2024-07-05 RX ORDER — SIMVASTATIN 40 MG
40 TABLET ORAL
COMMUNITY
Start: 2024-05-28

## 2024-07-05 RX ORDER — ESZOPICLONE 2 MG/1
2 TABLET, FILM COATED ORAL
COMMUNITY
Start: 2024-05-28

## 2024-07-05 RX ORDER — OMEPRAZOLE 20 MG/1
CAPSULE, DELAYED RELEASE ORAL
COMMUNITY
Start: 2024-07-02

## 2024-07-05 RX ORDER — OXYBUTYNIN CHLORIDE 5 MG/1
TABLET, EXTENDED RELEASE ORAL
COMMUNITY
Start: 2024-05-28

## 2024-07-05 RX ORDER — ALENDRONATE SODIUM 70 MG/1
TABLET ORAL
COMMUNITY
Start: 2023-11-17

## 2024-07-05 RX ORDER — TRIAMCINOLONE ACETONIDE 0.25 MG/G
CREAM TOPICAL
COMMUNITY
Start: 2024-02-14

## 2024-07-05 RX ORDER — ONDANSETRON 4 MG/1
4 TABLET, FILM COATED ORAL 3 TIMES DAILY PRN
Qty: 15 TABLET | Refills: 0 | Status: SHIPPED | OUTPATIENT
Start: 2024-07-05

## 2024-07-05 RX ORDER — ANASTROZOLE 1 MG/1
TABLET ORAL
COMMUNITY
Start: 2023-05-11

## 2024-07-05 RX ORDER — PHENOL 1.4 %
1 AEROSOL, SPRAY (ML) MUCOUS MEMBRANE NIGHTLY
COMMUNITY

## 2024-07-05 SDOH — ECONOMIC STABILITY: FOOD INSECURITY: WITHIN THE PAST 12 MONTHS, YOU WORRIED THAT YOUR FOOD WOULD RUN OUT BEFORE YOU GOT MONEY TO BUY MORE.: NEVER TRUE

## 2024-07-05 SDOH — ECONOMIC STABILITY: FOOD INSECURITY: WITHIN THE PAST 12 MONTHS, THE FOOD YOU BOUGHT JUST DIDN'T LAST AND YOU DIDN'T HAVE MONEY TO GET MORE.: NEVER TRUE

## 2024-07-05 SDOH — ECONOMIC STABILITY: HOUSING INSECURITY
IN THE LAST 12 MONTHS, WAS THERE A TIME WHEN YOU DID NOT HAVE A STEADY PLACE TO SLEEP OR SLEPT IN A SHELTER (INCLUDING NOW)?: NO

## 2024-07-05 SDOH — ECONOMIC STABILITY: INCOME INSECURITY: HOW HARD IS IT FOR YOU TO PAY FOR THE VERY BASICS LIKE FOOD, HOUSING, MEDICAL CARE, AND HEATING?: NOT HARD AT ALL

## 2024-07-05 ASSESSMENT — ENCOUNTER SYMPTOMS
ABDOMINAL PAIN: 1
FLATUS: 0
NAUSEA: 1
BELCHING: 0
HEMATOCHEZIA: 0
BLOOD IN STOOL: 0
EYE PAIN: 0
VOMITING: 1
DIARRHEA: 1
CONSTIPATION: 0

## 2024-07-05 ASSESSMENT — PATIENT HEALTH QUESTIONNAIRE - PHQ9
SUM OF ALL RESPONSES TO PHQ QUESTIONS 1-9: 0
SUM OF ALL RESPONSES TO PHQ QUESTIONS 1-9: 0
SUM OF ALL RESPONSES TO PHQ9 QUESTIONS 1 & 2: 0
1. LITTLE INTEREST OR PLEASURE IN DOING THINGS: NOT AT ALL
SUM OF ALL RESPONSES TO PHQ QUESTIONS 1-9: 0
2. FEELING DOWN, DEPRESSED OR HOPELESS: NOT AT ALL
SUM OF ALL RESPONSES TO PHQ QUESTIONS 1-9: 0

## 2024-07-05 NOTE — PROGRESS NOTES
[facility] Walk In department of [facility]  [address] [facility] [address]  Phone: [phone]  Fax: [phone]      Dalila Pringle  1955  MRN: [id number]  Date of visit: 7/5/2024    Chief Complaint:     Dalila Pringle is here for c/o of Abdominal Pain (Bilat lower abd pain starting around 1200 before lunch followed by diarrhea x 5 and vomiting x 2. Pt is sipping on water and 7-up. Pt took pepcid. Pt with some weakness and anxiety as well. )      HPI:     Dalila Pringle is a 69 y.o. female who [facility] today for her medical conditions/complaints as noted below.    Abdominal Pain  This is a new problem. The current episode started today. The onset quality is sudden. The problem occurs constantly. The problem has been unchanged. The pain is located in the RLQ and LLQ. The pain is at a severity of 8/10. Quality: rolling. The abdominal pain does not radiate. Associated symptoms include anorexia, diarrhea (x5), headaches, nausea and vomiting (x2). Pertinent negatives include no belching, constipation, fever, flatus, frequency, hematochezia, hematuria or melena. Associated symptoms comments: Left ear clogged, heartburn.   Patient states she only ate a bagel with peanut butter today. She went to work today, symptoms started at work. No sick contacts. Patient is feeling nervous due to having these symptoms but she does not otherwise have anxiety typically.    Past Medical History:   Diagnosis Date    Cholecystitis     GERD (gastroesophageal reflux disease)     High cholesterol     Thyroid disorder         Allergies   Allergen Reactions    Pcn [Penicillins] Hives    Sulfa Antibiotics Hives         Subjective:      Review of Systems   Constitutional:  Positive for chills and fatigue. Negative for diaphoresis and fever.   HENT:  Negative for ear pain.    Eyes:  Negative for pain.   Gastrointestinal:  Positive for abdominal pain, anorexia, diarrhea

## 2024-07-08 ENCOUNTER — VIRTUAL VISIT (OUTPATIENT)
Dept: PHARMACY | Facility: CLINIC | Age: 69
End: 2024-07-08
Payer: COMMERCIAL

## 2024-07-08 DIAGNOSIS — F41.1 GENERALIZED ANXIETY DISORDER: ICD-10-CM

## 2024-07-08 DIAGNOSIS — F33.0 MAJOR DEPRESSIVE DISORDER, RECURRENT EPISODE, MILD (H): Primary | ICD-10-CM

## 2024-07-08 PROCEDURE — 99606 MTMS BY PHARM EST 15 MIN: CPT | Mod: 93 | Performed by: PHARMACIST

## 2024-07-08 RX ORDER — DULOXETIN HYDROCHLORIDE 30 MG/1
CAPSULE, DELAYED RELEASE ORAL
Qty: 60 CAPSULE | Refills: 1 | Status: SHIPPED | OUTPATIENT
Start: 2024-07-08 | End: 2024-08-26

## 2024-07-08 NOTE — PROGRESS NOTES
Medication Therapy Management (MTM) Encounter    ASSESSMENT:                            Medication Adherence/Access: No issues identified    Anxiety and Depression: Not well controlled.  Would benefit from change from escitalopram to alternative therapy vs addition of something like buspirone. Has tried/failed several SSRIs so will consider SNRI alternative.     PLAN:                            Week 1: Decrease escitalopram to 5 mg (half-tablet) by mouth for one week. Start duloxetine 30 mg (1 capsule) by mouth daily.  Week 2: Discontinue escitalopram. Increase duloxetine to 60 mg by mouth daily.   Let me know if you have any side effects/issues sooner than later.     Follow-up: I will call you on Monday, August 5th at 10 AM for phone follow-up    SUBJECTIVE/OBJECTIVE:                          Lara Mayer is a 69 year old female seen for a follow-up visit.       Reason for visit: Depression/Anxiety.    Allergies/ADRs: Reviewed in chart  Past Medical History: Reviewed in chart  Tobacco: She reports that she has never smoked. She has never used smokeless tobacco.  Alcohol: 2-3 beverages / week    Medication Adherence/Access: Patient takes medications directly from bottles.  Patient takes medications 2 time(s) per day.   Per patient, misses medication 0 times per week.   Medication barriers: affording medications  The patient fills medications at [facility]: YES.    Mental Health   Anxiety and Depression  Escitalopram 10 mg daily - could not tolerate the higher dose of escitalopram.    Patient reports no current medication side effects.  Patient reports symptoms are not well controlled - anxiety is high for patient especially at work. Has not felt that therapy has been helping - has not been seen for a few weeks now. Had big panic attack/anxiety at work that led to her threatening to quit. Has to talk to talk to someone in leadership about her outburst.  Wonders about alternatives. Has previously tried fluoxetine,  paroxetine, sertraline, citalopram, and escitalopram. One mention of Cymbalta in her chart, but she does not think she was able to take it (maybe cost?). She is open to any alternatives because current dose/medication is not helping.         12/28/2023     9:20 AM 1/30/2024     9:49 AM 3/15/2024    10:08 AM   PHQ   PHQ-9 Total Score 6 6 2   Q9: Thoughts of better off dead/self-harm past 2 weeks Not at all Not at all Not at all         8/14/2023    10:16 AM 8/28/2023    12:16 PM 11/28/2023    10:48 AM   JOCELINE-7 SCORE   Total Score 9 (mild anxiety) 6 (mild anxiety) 7 (mild anxiety)   Total Score 9 6 7       Today's Vitals: LMP  (LMP Unknown)     BP Readings from Last 3 Encounters:   03/15/24 128/80   01/29/24 132/84   01/12/24 106/58     Wt Readings from Last 5 Encounters:   03/15/24 126 lb 3.2 oz (57.2 kg)   03/15/24 126 lb 6.4 oz (57.3 kg)   01/29/24 127 lb 12.8 oz (58 kg)   01/12/24 130 lb (59 kg)   12/18/23 129 lb (58.5 kg)     ----------------      I spent 15 minutes with this patient today. All changes were made via collaborative practice agreement with Fuentes Darby MD. A copy of the visit note was provided to the patient's provider(s).    A summary of these recommendations was sent via [employer].    Damien Douglas, PharmD, BCACP  Medication Therapy Management Pharmacist  Voicemail: [phone]      Telemedicine Visit Details  Type of service:  Telephone visit  Start Time:  10:00 AM  End Time:  10:15 AM     Medication Therapy Recommendations  Generalized anxiety disorder    Current Medication: escitalopram (LEXAPRO) 10 MG tablet (Discontinued)   Rationale: More effective medication available - Ineffective medication - Effectiveness   Recommendation: Change Medication - DULoxetine 30 MG capsule - Change escitalopram to duloxetine 30 mg daily for 1 week, then increase to 60 mg daily   Status: Accepted per CPA

## 2024-07-08 NOTE — PATIENT INSTRUCTIONS
"Recommendations from today's MTM visit:                                                    MTM (medication therapy management) is a service provided by a clinical pharmacist designed to help you get the most of out of your medicines.      Week 1: Decrease escitalopram to 5 mg (half-tablet) by mouth for one week. Start duloxetine 30 mg (1 capsule) by mouth daily.    Week 2: Discontinue escitalopram. Increase duloxetine to 60 mg by mouth daily.     Let me know if you have any side effects/issues sooner than later.     Follow-up: I will call you on Monday, August 5th at 10 AM for phone follow-up    It was great speaking with you today.  I value your experience and would be very thankful for your time in providing feedback in our clinic survey. In the next few days, you may receive an email or text message from [employer] with a link to a survey related to your  clinical pharmacist.\"     To schedule another MTM appointment, please call the clinic directly or you may call the MTM scheduling line at [phone] or toll-free at [phone].     My Clinical Pharmacist's contact information:                                                      Please feel free to contact me with any questions or concerns you have.      Damien Douglas, PharmD, [facility]  Medication Therapy Management Pharmacist  Voicemail: [phone]  "

## 2024-07-13 ENCOUNTER — HOSPITAL ENCOUNTER (INPATIENT)
Age: 69
LOS: 1 days | Discharge: HOME | DRG: 390 | End: 2024-07-14
Attending: EMERGENCY MEDICINE | Admitting: FAMILY MEDICINE
Payer: MEDICARE

## 2024-07-13 ENCOUNTER — APPOINTMENT (OUTPATIENT)
Dept: CT IMAGING | Age: 69
DRG: 390 | End: 2024-07-13
Attending: EMERGENCY MEDICINE
Payer: MEDICARE

## 2024-07-13 ENCOUNTER — OFFICE VISIT (OUTPATIENT)
Dept: PRIMARY CARE CLINIC | Age: 69
End: 2024-07-13

## 2024-07-13 VITALS
TEMPERATURE: 98.3 F | BODY MASS INDEX: 22.5 KG/M2 | HEIGHT: 63 IN | OXYGEN SATURATION: 99 % | HEART RATE: 54 BPM | RESPIRATION RATE: 16 BRPM | DIASTOLIC BLOOD PRESSURE: 80 MMHG | WEIGHT: 127 LBS | SYSTOLIC BLOOD PRESSURE: 104 MMHG

## 2024-07-13 DIAGNOSIS — R10.84 GENERALIZED ABDOMINAL PAIN: Primary | ICD-10-CM

## 2024-07-13 DIAGNOSIS — R11.10 VOMITING, UNSPECIFIED VOMITING TYPE, UNSPECIFIED WHETHER NAUSEA PRESENT: ICD-10-CM

## 2024-07-13 DIAGNOSIS — K56.7 ILEUS (HCC): Primary | ICD-10-CM

## 2024-07-13 PROBLEM — R09.89 CHRONIC THROAT CLEARING: Chronic | Status: ACTIVE | Noted: 2018-03-26

## 2024-07-13 PROBLEM — Z85.3 HISTORY OF LEFT BREAST CANCER: Status: ACTIVE | Noted: 2023-05-11

## 2024-07-13 PROBLEM — M18.12 PRIMARY OSTEOARTHRITIS OF FIRST CARPOMETACARPAL JOINT OF LEFT HAND: Status: ACTIVE | Noted: 2022-05-26

## 2024-07-13 PROBLEM — C50.412 MALIGNANT NEOPLASM OF UPPER-OUTER QUADRANT OF LEFT BREAST IN FEMALE, ESTROGEN RECEPTOR POSITIVE (HCC): Status: ACTIVE | Noted: 2022-12-22

## 2024-07-13 PROBLEM — M19.211 SECONDARY OSTEOARTHRITIS OF RIGHT SHOULDER DUE TO ROTATOR CUFF ARTHROPATHY: Status: ACTIVE | Noted: 2022-12-28

## 2024-07-13 PROBLEM — Z17.0 MALIGNANT NEOPLASM OF UPPER-OUTER QUADRANT OF LEFT BREAST IN FEMALE, ESTROGEN RECEPTOR POSITIVE (HCC): Status: ACTIVE | Noted: 2022-12-22

## 2024-07-13 PROBLEM — R10.9 ABDOMINAL PAIN: Status: ACTIVE | Noted: 2024-07-13

## 2024-07-13 PROBLEM — M25.511 ARTHRALGIA OF RIGHT ACROMIOCLAVICULAR JOINT: Status: ACTIVE | Noted: 2022-12-20

## 2024-07-13 PROBLEM — M85.80 OSTEOPENIA: Status: ACTIVE | Noted: 2023-05-11

## 2024-07-13 LAB
ALBUMIN SERPL-MCNC: 4.2 G/DL (ref 3.5–5.2)
ALBUMIN/GLOB SERPL: 1.6 {RATIO} (ref 1–2.5)
ALP SERPL-CCNC: 61 U/L (ref 35–104)
ALT SERPL-CCNC: 11 U/L (ref 5–33)
AMYLASE SERPL-CCNC: 54 U/L (ref 28–100)
ANION GAP SERPL CALCULATED.3IONS-SCNC: 11 MMOL/L (ref 9–17)
AST SERPL-CCNC: 18 U/L
BACTERIA URNS QL MICRO: ABNORMAL
BASOPHILS # BLD: 0 K/UL (ref 0–0.2)
BASOPHILS NFR BLD: 0 % (ref 0–1)
BILIRUB DIRECT SERPL-MCNC: 0.2 MG/DL
BILIRUB INDIRECT SERPL-MCNC: 0.3 MG/DL (ref 0–1)
BILIRUB SERPL-MCNC: 0.5 MG/DL (ref 0.3–1.2)
BILIRUB UR QL STRIP: NEGATIVE
BUN SERPL-MCNC: 16 MG/DL (ref 8–23)
BUN/CREAT SERPL: 23 (ref 9–20)
CALCIUM SERPL-MCNC: 9.4 MG/DL (ref 8.6–10.4)
CHARACTER UR: ABNORMAL
CHLORIDE SERPL-SCNC: 102 MMOL/L (ref 98–107)
CLARITY UR: CLEAR
CO2 SERPL-SCNC: 27 MMOL/L (ref 20–31)
COLOR UR: YELLOW
CREAT SERPL-MCNC: 0.7 MG/DL (ref 0.5–0.9)
EOSINOPHIL # BLD: 0 K/UL (ref 0–0.4)
EOSINOPHILS RELATIVE PERCENT: 0 % (ref 1–7)
EPI CELLS #/AREA URNS HPF: ABNORMAL /HPF (ref 0–5)
ERYTHROCYTE [DISTWIDTH] IN BLOOD BY AUTOMATED COUNT: 12.7 % (ref 11.8–14.4)
GFR, ESTIMATED: >90 ML/MIN/1.73M2
GLOBULIN SER CALC-MCNC: 2.7 G/DL (ref 1.5–3.8)
GLUCOSE SERPL-MCNC: 102 MG/DL (ref 70–99)
GLUCOSE UR STRIP-MCNC: NEGATIVE MG/DL
HCT VFR BLD AUTO: 40.3 % (ref 36.3–47.1)
HGB BLD-MCNC: 13.8 G/DL (ref 11.9–15.1)
HGB UR QL STRIP.AUTO: ABNORMAL
IMM GRANULOCYTES # BLD AUTO: 0 K/UL (ref 0–0.3)
IMM GRANULOCYTES NFR BLD: 0 %
KETONES UR STRIP-MCNC: ABNORMAL MG/DL
LEUKOCYTE ESTERASE UR QL STRIP: NEGATIVE
LIPASE SERPL-CCNC: 21 U/L (ref 13–60)
LYMPHOCYTES NFR BLD: 0.81 K/UL (ref 1–4.8)
LYMPHOCYTES RELATIVE PERCENT: 7 % (ref 16–46)
MCH RBC QN AUTO: 31.8 PG (ref 25.2–33.5)
MCHC RBC AUTO-ENTMCNC: 34.2 G/DL (ref 25.2–33.5)
MCV RBC AUTO: 92.9 FL (ref 82.6–102.9)
MONOCYTES NFR BLD: 1.27 K/UL (ref 0.1–1.2)
MONOCYTES NFR BLD: 11 % (ref 4–11)
MORPHOLOGY: ABNORMAL
MORPHOLOGY: ABNORMAL
NEUTROPHILS NFR BLD: 82 % (ref 43–77)
NEUTS SEG NFR BLD: 9.42 K/UL (ref 1.5–8.1)
NITRITE UR QL STRIP: NEGATIVE
NRBC BLD-RTO: 0 PER 100 WBC
PH UR STRIP: 6 [PH] (ref 5–6)
PLATELET # BLD AUTO: 250 K/UL (ref 138–453)
PMV BLD AUTO: 9.5 FL (ref 8.1–13.5)
POTASSIUM SERPL-SCNC: 3.9 MMOL/L (ref 3.7–5.3)
PROT SERPL-MCNC: 6.9 G/DL (ref 6.4–8.3)
PROT UR STRIP-MCNC: NEGATIVE MG/DL
RBC # BLD AUTO: 4.34 M/UL (ref 3.95–5.11)
RBC #/AREA URNS HPF: ABNORMAL /HPF (ref 0–4)
SODIUM SERPL-SCNC: 140 MMOL/L (ref 135–144)
SP GR UR STRIP: 1.01 (ref 1.01–1.02)
UROBILINOGEN UR STRIP-ACNC: NORMAL EU/DL (ref 0–1)
WBC #/AREA URNS HPF: ABNORMAL /HPF (ref 0–4)
WBC OTHER # BLD: 11.5 K/UL (ref 3.5–11.3)

## 2024-07-13 PROCEDURE — 6360000002 HC RX W HCPCS

## 2024-07-13 PROCEDURE — 99285 EMERGENCY DEPT VISIT HI MDM: CPT

## 2024-07-13 PROCEDURE — 2060000000 HC ICU INTERMEDIATE R&B

## 2024-07-13 PROCEDURE — 96374 THER/PROPH/DIAG INJ IV PUSH: CPT

## 2024-07-13 PROCEDURE — 6360000002 HC RX W HCPCS: Performed by: EMERGENCY MEDICINE

## 2024-07-13 PROCEDURE — 80076 HEPATIC FUNCTION PANEL: CPT

## 2024-07-13 PROCEDURE — 82150 ASSAY OF AMYLASE: CPT

## 2024-07-13 PROCEDURE — 81001 URINALYSIS AUTO W/SCOPE: CPT

## 2024-07-13 PROCEDURE — 6370000000 HC RX 637 (ALT 250 FOR IP)

## 2024-07-13 PROCEDURE — 2709999900 CT ABDOMEN PELVIS W IV CONTRAST

## 2024-07-13 PROCEDURE — 85025 COMPLETE CBC W/AUTO DIFF WBC: CPT

## 2024-07-13 PROCEDURE — 80048 BASIC METABOLIC PNL TOTAL CA: CPT

## 2024-07-13 PROCEDURE — 99222 1ST HOSP IP/OBS MODERATE 55: CPT

## 2024-07-13 PROCEDURE — 83690 ASSAY OF LIPASE: CPT

## 2024-07-13 PROCEDURE — 6360000004 HC RX CONTRAST MEDICATION: Performed by: EMERGENCY MEDICINE

## 2024-07-13 PROCEDURE — 2580000003 HC RX 258

## 2024-07-13 PROCEDURE — 2500000003 HC RX 250 WO HCPCS

## 2024-07-13 RX ORDER — MAGNESIUM SULFATE IN WATER 40 MG/ML
2000 INJECTION, SOLUTION INTRAVENOUS PRN
Status: DISCONTINUED | OUTPATIENT
Start: 2024-07-13 | End: 2024-07-14 | Stop reason: HOSPADM

## 2024-07-13 RX ORDER — ENOXAPARIN SODIUM 100 MG/ML
30 INJECTION SUBCUTANEOUS DAILY
Status: DISCONTINUED | OUTPATIENT
Start: 2024-07-13 | End: 2024-07-14 | Stop reason: HOSPADM

## 2024-07-13 RX ORDER — KETOROLAC TROMETHAMINE 30 MG/ML
15 INJECTION, SOLUTION INTRAMUSCULAR; INTRAVENOUS ONCE
Status: COMPLETED | OUTPATIENT
Start: 2024-07-13 | End: 2024-07-13

## 2024-07-13 RX ORDER — ACETAMINOPHEN 650 MG/1
650 SUPPOSITORY RECTAL EVERY 6 HOURS PRN
Status: DISCONTINUED | OUTPATIENT
Start: 2024-07-13 | End: 2024-07-14 | Stop reason: HOSPADM

## 2024-07-13 RX ORDER — TOLTERODINE TARTRATE 2 MG/1
4 TABLET, EXTENDED RELEASE ORAL 2 TIMES DAILY
Status: DISCONTINUED | OUTPATIENT
Start: 2024-07-13 | End: 2024-07-13

## 2024-07-13 RX ORDER — POTASSIUM CHLORIDE 20 MEQ/1
40 TABLET, EXTENDED RELEASE ORAL PRN
Status: DISCONTINUED | OUTPATIENT
Start: 2024-07-13 | End: 2024-07-14 | Stop reason: HOSPADM

## 2024-07-13 RX ORDER — BISACODYL 10 MG
10 SUPPOSITORY, RECTAL RECTAL DAILY PRN
Status: DISCONTINUED | OUTPATIENT
Start: 2024-07-13 | End: 2024-07-14 | Stop reason: HOSPADM

## 2024-07-13 RX ORDER — SODIUM CHLORIDE 9 MG/ML
INJECTION, SOLUTION INTRAVENOUS PRN
Status: DISCONTINUED | OUTPATIENT
Start: 2024-07-13 | End: 2024-07-14 | Stop reason: HOSPADM

## 2024-07-13 RX ORDER — SODIUM CHLORIDE 0.9 % (FLUSH) 0.9 %
5-40 SYRINGE (ML) INJECTION EVERY 12 HOURS SCHEDULED
Status: DISCONTINUED | OUTPATIENT
Start: 2024-07-13 | End: 2024-07-14 | Stop reason: HOSPADM

## 2024-07-13 RX ORDER — LEVOTHYROXINE SODIUM 0.07 MG/1
75 TABLET ORAL DAILY
Status: DISCONTINUED | OUTPATIENT
Start: 2024-07-14 | End: 2024-07-14 | Stop reason: HOSPADM

## 2024-07-13 RX ORDER — ONDANSETRON 2 MG/ML
4 INJECTION INTRAMUSCULAR; INTRAVENOUS EVERY 6 HOURS PRN
Status: DISCONTINUED | OUTPATIENT
Start: 2024-07-13 | End: 2024-07-14 | Stop reason: HOSPADM

## 2024-07-13 RX ORDER — TROSPIUM CHLORIDE 20 MG/1
20 TABLET, FILM COATED ORAL
Status: DISCONTINUED | OUTPATIENT
Start: 2024-07-14 | End: 2024-07-14 | Stop reason: HOSPADM

## 2024-07-13 RX ORDER — POLYETHYLENE GLYCOL 3350 17 G/17G
17 POWDER, FOR SOLUTION ORAL DAILY PRN
Status: DISCONTINUED | OUTPATIENT
Start: 2024-07-13 | End: 2024-07-14 | Stop reason: HOSPADM

## 2024-07-13 RX ORDER — POTASSIUM CHLORIDE 7.45 MG/ML
10 INJECTION INTRAVENOUS PRN
Status: DISCONTINUED | OUTPATIENT
Start: 2024-07-13 | End: 2024-07-14 | Stop reason: HOSPADM

## 2024-07-13 RX ORDER — ANASTROZOLE 1 MG/1
1 TABLET ORAL DAILY
Status: DISCONTINUED | OUTPATIENT
Start: 2024-07-14 | End: 2024-07-14 | Stop reason: HOSPADM

## 2024-07-13 RX ORDER — ATORVASTATIN CALCIUM 10 MG/1
20 TABLET, FILM COATED ORAL DAILY
Status: DISCONTINUED | OUTPATIENT
Start: 2024-07-14 | End: 2024-07-14 | Stop reason: HOSPADM

## 2024-07-13 RX ORDER — ACETAMINOPHEN 325 MG/1
650 TABLET ORAL EVERY 6 HOURS PRN
Status: DISCONTINUED | OUTPATIENT
Start: 2024-07-13 | End: 2024-07-14 | Stop reason: HOSPADM

## 2024-07-13 RX ORDER — SODIUM CHLORIDE 9 MG/ML
INJECTION, SOLUTION INTRAVENOUS CONTINUOUS
Status: DISCONTINUED | OUTPATIENT
Start: 2024-07-13 | End: 2024-07-14 | Stop reason: HOSPADM

## 2024-07-13 RX ORDER — LANOLIN ALCOHOL/MO/W.PET/CERES
6 CREAM (GRAM) TOPICAL NIGHTLY PRN
Status: DISCONTINUED | OUTPATIENT
Start: 2024-07-13 | End: 2024-07-14 | Stop reason: HOSPADM

## 2024-07-13 RX ORDER — SODIUM CHLORIDE 0.9 % (FLUSH) 0.9 %
5-40 SYRINGE (ML) INJECTION PRN
Status: DISCONTINUED | OUTPATIENT
Start: 2024-07-13 | End: 2024-07-14 | Stop reason: HOSPADM

## 2024-07-13 RX ORDER — OXYBUTYNIN CHLORIDE 5 MG/1
5 TABLET, EXTENDED RELEASE ORAL DAILY
Status: DISCONTINUED | OUTPATIENT
Start: 2024-07-14 | End: 2024-07-14 | Stop reason: HOSPADM

## 2024-07-13 RX ORDER — DICYCLOMINE HYDROCHLORIDE 10 MG/1
10 CAPSULE ORAL EVERY 8 HOURS PRN
Status: DISCONTINUED | OUTPATIENT
Start: 2024-07-13 | End: 2024-07-13

## 2024-07-13 RX ADMIN — ENOXAPARIN SODIUM 30 MG: 100 INJECTION SUBCUTANEOUS at 21:41

## 2024-07-13 RX ADMIN — KETOROLAC TROMETHAMINE 15 MG: 30 INJECTION, SOLUTION INTRAMUSCULAR at 15:29

## 2024-07-13 RX ADMIN — SODIUM CHLORIDE, PRESERVATIVE FREE 20 MG: 5 INJECTION INTRAVENOUS at 21:51

## 2024-07-13 RX ADMIN — BISACODYL 10 MG: 10 SUPPOSITORY RECTAL at 22:39

## 2024-07-13 RX ADMIN — IOPAMIDOL 100 ML: 755 INJECTION, SOLUTION INTRAVENOUS at 16:06

## 2024-07-13 RX ADMIN — SODIUM CHLORIDE: 9 INJECTION, SOLUTION INTRAVENOUS at 21:42

## 2024-07-13 RX ADMIN — SODIUM CHLORIDE, PRESERVATIVE FREE 10 ML: 5 INJECTION INTRAVENOUS at 21:42

## 2024-07-13 ASSESSMENT — PAIN SCALES - GENERAL
PAINLEVEL_OUTOF10: 4
PAINLEVEL_OUTOF10: 8

## 2024-07-13 ASSESSMENT — PAIN DESCRIPTION - PAIN TYPE: TYPE: ACUTE PAIN

## 2024-07-13 ASSESSMENT — LIFESTYLE VARIABLES
HOW OFTEN DO YOU HAVE A DRINK CONTAINING ALCOHOL: NEVER
HOW MANY STANDARD DRINKS CONTAINING ALCOHOL DO YOU HAVE ON A TYPICAL DAY: PATIENT DOES NOT DRINK

## 2024-07-13 ASSESSMENT — PAIN - FUNCTIONAL ASSESSMENT: PAIN_FUNCTIONAL_ASSESSMENT: 0-10

## 2024-07-13 ASSESSMENT — PAIN DESCRIPTION - LOCATION: LOCATION: ABDOMEN

## 2024-07-13 NOTE — ED PROVIDER NOTES
0.00 0.00 - 0.30 k/uL    Morphology ANISOCYTOSIS PRESENT     Morphology Platelet count adequate    Basic Metabolic Panel   Result Value Ref Range    Sodium 140 135 - 144 mmol/L    Potassium 3.9 3.7 - 5.3 mmol/L    Chloride 102 98 - 107 mmol/L    CO2 27 20 - 31 mmol/L    Anion Gap 11 9 - 17 mmol/L    Glucose 102 (H) 70 - 99 mg/dL    BUN 16 8 - 23 mg/dL    Creatinine 0.7 0.5 - 0.9 mg/dL    Est, Glom Filt Rate >90 >60 mL/min/1.73m2    BUN/Creatinine Ratio 23 (H) 9 - 20    Calcium 9.4 8.6 - 10.4 mg/dL   Amylase   Result Value Ref Range    Amylase 54 28 - 100 U/L   Lipase   Result Value Ref Range    Lipase 21 13 - 60 U/L   Hepatic Function Panel   Result Value Ref Range    Albumin 4.2 3.5 - 5.2 g/dL    Alkaline Phosphatase 61 35 - 104 U/L    ALT 11 5 - 33 U/L    AST 18 <32 U/L    Total Bilirubin 0.5 0.3 - 1.2 mg/dL    Bilirubin, Direct 0.2 <0.3 mg/dL    Bilirubin, Indirect 0.3 0.0 - 1.0 mg/dL    Total Protein 6.9 6.4 - 8.3 g/dL    Globulin 2.7 1.5 - 3.8 g/dL    Albumin/Globulin Ratio 1.6 1.0 - 2.5   Urinalysis with Reflex to Culture    Specimen: Urine, clean catch   Result Value Ref Range    Color, UA Yellow Yellow    Turbidity UA Clear Clear    Glucose, Ur NEGATIVE NEGATIVE mg/dL    Bilirubin, Urine NEGATIVE NEGATIVE    Ketones, Urine 1+ (A) NEGATIVE mg/dL    Specific Gravity, UA 1.010 1.010 - 1.025    Urine Hgb TRACE (A) NEGATIVE    pH, Urine 6.0 5.0 - 6.0    Protein, UA NEGATIVE NEGATIVE mg/dL    Urobilinogen, Urine Normal 0.0 - 1.0 EU/dL    Nitrite, Urine NEGATIVE NEGATIVE    Leukocyte Esterase, Urine NEGATIVE NEGATIVE   Microscopic Urinalysis   Result Value Ref Range    WBC, UA None 0 - 4 /HPF    RBC, UA 0 TO 2 0 - 4 /HPF    Epithelial Cells, UA 0 TO 2 0 - 5 /HPF    Bacteria, UA FEW (A) None    Other Observations UA (A) NOT REQ.     Utilizing a urinalysis as the only screening method to exclude a potential uropathogen can be unreliable in many patient populations.  Rapid screening tests are less sensitive than

## 2024-07-13 NOTE — PROGRESS NOTES
[facility]             [address] [address]                        Telephone [phone]             Fax [phone]       Dalila Pringle  1955  MRN:  [id number]  Date of visit:  7/13/2024     Assessment and Plan:  1. Generalized abdominal pain  2. Vomiting, unspecified vomiting type, unspecified whether nausea present  I discussed with the patient that she would benefit from evaluation at the emergency department.  The patient was in agreement, and she was transported by [facility] staff to [facility] ED.      Subjective:  Dalila Pringle is a 69 y.o. female who presented to [facility] 7/13/2024 with concerns of:  Constipation (Unable to move bowels for last 7 days. Has had nausea and vomited yellow fluid. Was seen in Urgent Care 7/05/24 for diarrhea. Hx of IBS.)      She reports abdominal pain.  The pain is an 8 on a o-10 scale.   She reports vomiting.   She has not had a bowel movement for 7 days.   She has a history of IBS with constipation.   She takes Linzess daily.   She has taken three doses of Dulcolax since yesterday also without no improvement in her symptoms.         Objective:  Vitals:    07/13/24 1425   BP: 104/80   Pulse: 54   Resp: 16   Temp: 98.3 °F (36.8 °C)   SpO2: 99%

## 2024-07-13 NOTE — ED NOTES
Podiatry   Completed     [] yes     [] no    [] Urology   Completed     [] yes     [] no         Electronically signed by Shara Flores RN on 7/13/2024 at 6:56 PM

## 2024-07-14 ENCOUNTER — APPOINTMENT (OUTPATIENT)
Dept: GENERAL RADIOLOGY | Age: 69
DRG: 390 | End: 2024-07-14
Payer: MEDICARE

## 2024-07-14 VITALS
TEMPERATURE: 98.2 F | BODY MASS INDEX: 22.2 KG/M2 | SYSTOLIC BLOOD PRESSURE: 116 MMHG | HEIGHT: 63 IN | OXYGEN SATURATION: 95 % | HEART RATE: 54 BPM | WEIGHT: 125.3 LBS | DIASTOLIC BLOOD PRESSURE: 69 MMHG | RESPIRATION RATE: 16 BRPM

## 2024-07-14 LAB
ANION GAP SERPL CALCULATED.3IONS-SCNC: 9 MMOL/L (ref 9–17)
BASOPHILS # BLD: <0.03 K/UL (ref 0–0.2)
BASOPHILS NFR BLD: 0 % (ref 0–2)
BUN SERPL-MCNC: 13 MG/DL (ref 8–23)
BUN/CREAT SERPL: 22 (ref 9–20)
CALCIUM SERPL-MCNC: 8.5 MG/DL (ref 8.6–10.4)
CHLORIDE SERPL-SCNC: 107 MMOL/L (ref 98–107)
CO2 SERPL-SCNC: 25 MMOL/L (ref 20–31)
CREAT SERPL-MCNC: 0.6 MG/DL (ref 0.5–0.9)
EOSINOPHIL # BLD: 0.05 K/UL (ref 0–0.44)
EOSINOPHILS RELATIVE PERCENT: 1 % (ref 1–4)
ERYTHROCYTE [DISTWIDTH] IN BLOOD BY AUTOMATED COUNT: 12.9 % (ref 11.8–14.4)
GFR, ESTIMATED: >90 ML/MIN/1.73M2
GLUCOSE SERPL-MCNC: 86 MG/DL (ref 70–99)
HCT VFR BLD AUTO: 36.6 % (ref 36.3–47.1)
HGB BLD-MCNC: 12.4 G/DL (ref 11.9–15.1)
IMM GRANULOCYTES # BLD AUTO: <0.03 K/UL (ref 0–0.3)
IMM GRANULOCYTES NFR BLD: 0 %
LYMPHOCYTES NFR BLD: 1.31 K/UL (ref 1.1–3.7)
LYMPHOCYTES RELATIVE PERCENT: 18 % (ref 24–43)
MCH RBC QN AUTO: 31.7 PG (ref 25.2–33.5)
MCHC RBC AUTO-ENTMCNC: 33.9 G/DL (ref 25.2–33.5)
MCV RBC AUTO: 93.6 FL (ref 82.6–102.9)
MONOCYTES NFR BLD: 0.83 K/UL (ref 0.1–1.2)
MONOCYTES NFR BLD: 11 % (ref 3–12)
NEUTROPHILS NFR BLD: 70 % (ref 36–65)
NEUTS SEG NFR BLD: 5.16 K/UL (ref 1.5–8.1)
NRBC BLD-RTO: 0 PER 100 WBC
PLATELET # BLD AUTO: 245 K/UL (ref 138–453)
PMV BLD AUTO: 10.1 FL (ref 8.1–13.5)
POTASSIUM SERPL-SCNC: 3.7 MMOL/L (ref 3.7–5.3)
RBC # BLD AUTO: 3.91 M/UL (ref 3.95–5.11)
SODIUM SERPL-SCNC: 141 MMOL/L (ref 135–144)
WBC OTHER # BLD: 7.4 K/UL (ref 3.5–11.3)

## 2024-07-14 PROCEDURE — 6360000002 HC RX W HCPCS

## 2024-07-14 PROCEDURE — 94760 N-INVAS EAR/PLS OXIMETRY 1: CPT

## 2024-07-14 PROCEDURE — 74019 RADEX ABDOMEN 2 VIEWS: CPT

## 2024-07-14 PROCEDURE — 6370000000 HC RX 637 (ALT 250 FOR IP): Performed by: FAMILY MEDICINE

## 2024-07-14 PROCEDURE — 2580000003 HC RX 258

## 2024-07-14 PROCEDURE — 99222 1ST HOSP IP/OBS MODERATE 55: CPT | Performed by: SPECIALIST

## 2024-07-14 PROCEDURE — 85025 COMPLETE CBC W/AUTO DIFF WBC: CPT

## 2024-07-14 PROCEDURE — 6370000000 HC RX 637 (ALT 250 FOR IP)

## 2024-07-14 PROCEDURE — 2500000003 HC RX 250 WO HCPCS

## 2024-07-14 PROCEDURE — 80048 BASIC METABOLIC PNL TOTAL CA: CPT

## 2024-07-14 PROCEDURE — 36415 COLL VENOUS BLD VENIPUNCTURE: CPT

## 2024-07-14 RX ADMIN — ENOXAPARIN SODIUM 30 MG: 100 INJECTION SUBCUTANEOUS at 10:20

## 2024-07-14 RX ADMIN — TROSPIUM CHLORIDE 20 MG: 20 TABLET, FILM COATED ORAL at 17:58

## 2024-07-14 RX ADMIN — OXYBUTYNIN CHLORIDE 5 MG: 5 TABLET, EXTENDED RELEASE ORAL at 10:19

## 2024-07-14 RX ADMIN — SODIUM CHLORIDE: 9 INJECTION, SOLUTION INTRAVENOUS at 05:51

## 2024-07-14 RX ADMIN — SODIUM CHLORIDE, PRESERVATIVE FREE 20 MG: 5 INJECTION INTRAVENOUS at 10:20

## 2024-07-14 RX ADMIN — TROSPIUM CHLORIDE 20 MG: 20 TABLET, FILM COATED ORAL at 05:53

## 2024-07-14 RX ADMIN — ATORVASTATIN CALCIUM 20 MG: 10 TABLET, FILM COATED ORAL at 10:19

## 2024-07-14 RX ADMIN — LEVOTHYROXINE SODIUM 75 MCG: 0.07 TABLET ORAL at 05:53

## 2024-07-14 RX ADMIN — ACETAMINOPHEN 650 MG: 325 TABLET ORAL at 13:47

## 2024-07-14 ASSESSMENT — PAIN SCALES - GENERAL
PAINLEVEL_OUTOF10: 4
PAINLEVEL_OUTOF10: 10

## 2024-07-14 ASSESSMENT — PAIN DESCRIPTION - LOCATION: LOCATION: HEAD;NECK

## 2024-07-14 ASSESSMENT — PAIN DESCRIPTION - DESCRIPTORS: DESCRIPTORS: ACHING

## 2024-07-14 NOTE — PROGRESS NOTES
Hospital day #2  Patient feels better and had several bowel movements  Vital signs are stable she is afebrile  Chest clear  Cardiovascular regular rate and rhythm  Abdomen is soft without tenderness bowel sounds are hypoactive she has no guarding or rebound  Neurologic with no focal findings  Laboratories white count is 7.4 electrolytes are within normal limits  2 views of the abdomen so a nonspecific pattern  Assessment plan patient's ileus appears to be resolving we will start her on a clear liquid diet follow her clinically

## 2024-07-14 NOTE — DISCHARGE SUMMARY
Nausea or Vomiting     OSTEO BI-FLEX REGULAR STRENGTH PO     oxyBUTYnin 5 MG extended release tablet  Commonly known as: DITROPAN-XL     simvastatin 40 MG tablet  Commonly known as: ZOCOR     SYNTHROID PO     triamcinolone 0.025 % cream  Commonly known as: KENALOG     vitamin B-12 1000 MCG tablet  Commonly known as: CYANOCOBALAMIN     Vitamin C 500 MG Chew     vitamin E 400 UNIT capsule            STOP taking these medications      DETROL PO     eszopiclone 2 MG Tabs  Commonly known as: LUNESTA     omeprazole 20 MG delayed release capsule  Commonly known as: PRILOSEC              Consults:  IP CONSULT TO GENERAL SURGERY  IP CONSULT TO CASE MANAGEMENT    Significant Diagnostic Studies:  XR ABDOMEN (2 VIEWS)    Result Date: 7/14/2024  EXAMINATION: TWO XRAY VIEWS OF THE ABDOMEN 7/14/2024 6:07 am COMPARISON: None. HISTORY: ORDERING SYSTEM PROVIDED HISTORY: ileus TECHNOLOGIST PROVIDED HISTORY: ileus Reason for Exam: Ileus, pt states has had 4 BM's since CT scan was done yesterday. Pt states had diarrhea just prior to xray FINDINGS: Nonobstructive bowel gas pattern without small bowel dilatation.  Moderate stool and colonic bowel gas.  No free air under the diaphragm on upright imaging.  Spondylosis of the spine.  Probable vascular calcification near the left midline.     Nonobstructive bowel gas pattern.     CT ABDOMEN PELVIS W IV CONTRAST Additional Contrast? None    Result Date: 7/13/2024  EXAMINATION: CT OF THE ABDOMEN AND PELVIS WITH CONTRAST 7/13/2024 3:40 pm TECHNIQUE: CT of the abdomen and pelvis was performed with the administration of intravenous contrast. Multiplanar reformatted images are provided for review. Automated exposure control, iterative reconstruction, and/or weight based adjustment of the mA/kV was utilized to reduce the radiation dose to as low as reasonably achievable. COMPARISON: None. HISTORY: ORDERING SYSTEM PROVIDED HISTORY: Pain TECHNOLOGIST PROVIDED HISTORY: Pain Decision Support Exception -

## 2024-07-14 NOTE — H&P
HOSPITALIST ADMISSION H&P      REASON FOR ADMISSION:  Ileus, abdominal pain  ESTIMATED LENGTH OF STAY:>2 midnights, 3-4 days    ATTENDING/ADMITTING PHYSICIAN: Иван Wallace MD  PCP: Park Meza APRN - CNP    HISTORY OF PRESENT ILLNESS:      The patient is a 69 y.o. female patient of Park Meza APRN - CNP who presents from ER with c/o Hx IBS, constipation and vomiting. Patient reports she has not had a bowel movement in 7 days as of today, per chart review patient presented to walk in clinic on 7/5/24  with diarrhea x 5, and vomiting x 2. Today c/o nausea and vomiting prior to coming to the ER, states emesis was clear yellow. C/O abdominal pain 3/10, describes as squeezing that comes and goes-waves. Denies chest pain, shortness of breath, or urinary symptoms.     Hypothyroidism: TSH 0.59 on 5/24/24    IBS with constipation and diarrhea: Pending appointment 7/23/24 with gastroenterology in [place] with Dr. Chau.    In ER: Toradol.  CT A&P w/:   IMPRESSION:  1. Fluid-filled loops of small and large bowel, which can be seen with ileus  or enteritis.  No evidence of small-bowel obstruction.  2. Mild prominence of the bilateral renal pelvis, which may be related to  distended urinary bladder. No obstructing ureteral stone or calyceal  dilatation.  3. Mesenteric calcified lesion, favoring benign chronic etiologies.  Recommend attention on outpatient follow-up to exclude other pathology.     Wounds and LDAs present prior to admission: None     See below for additional PMH.    Patient [id number] records reviewed, including, but not limited to ER records, imaging results, lab results, office records, personal records, and OARRS --  Opioid risk score 3, no significant change in 6 months . 8 Prescriptions, 2 Prescribers, 2 Pharmacies in 2 years.     Past Medical History:   Diagnosis Date    Cholecystitis     GERD (gastroesophageal reflux disease)     High cholesterol     Thyroid disorder

## 2024-07-14 NOTE — CONSULTS
resting in bed without any evidence of discomfort or pain  Vital signs are stable she is afebrile  HEENT normocephalic atraumatic extract muscles intact pupils equal round reactive to light and accommodate oropharynx is clear neck supple without adenopathy  Chest clear to auscultation  Cardiovascular regular rate and rhythm  Abdomen is soft without tenderness no guarding or rebound no palpable masses no hepatosplenomegaly has no CVA tenderness no groin hernias her umbilicus is surgically absent rectal examination is deferred  Musculoskeletal strength is 5/5  Neurologically cranial nerves II through XII intact D10 reflexes are 2+ and symmetric    Laboratories white count of 11.5 otherwise within normal limits urinalysis is clear  CT scan of the abdomen pelvis shows fluid-filled loops of small and large bowel consistent with an adynamic ileus    Assessment and plan this is a patient who had developed a combination of nausea and vomiting at the direction of her medical caregiver she started Imodium since initiating the Imodium she has not had a bowel movement for well over a week with subsequent increased crampy abdominal pain and vomiting her radiographic picture is consistent with an adynamic ileus as a result of the Imodium she is admitted IV fluids started medication for pain control will obtain repeat abdominal x-rays in the a.m.

## 2024-07-14 NOTE — PLAN OF CARE
Problem: Pain  Goal: Verbalizes/displays adequate comfort level or baseline comfort level  Outcome: Progressing  Flowsheets (Taken 7/13/2024 2226)  Verbalizes/displays adequate comfort level or baseline comfort level: Encourage patient to monitor pain and request assistance     Problem: Gastrointestinal - Adult  Goal: Minimal or absence of nausea and vomiting  Outcome: Progressing  Flowsheets (Taken 7/13/2024 2305)  Minimal or absence of nausea and vomiting: Administer IV fluids as ordered to ensure adequate hydration

## 2024-07-14 NOTE — PLAN OF CARE
Problem: Discharge Planning  Goal: Discharge to home or other facility with appropriate resources  Outcome: Adequate for Discharge     Problem: Pain  Goal: Verbalizes/displays adequate comfort level or baseline comfort level  Outcome: Adequate for Discharge     Problem: Gastrointestinal - Adult  Goal: Minimal or absence of nausea and vomiting  Outcome: Adequate for Discharge  Goal: Maintains or returns to baseline bowel function  Outcome: Adequate for Discharge  Goal: Maintains adequate nutritional intake  Outcome: Adequate for Discharge  Goal: Establish and maintain optimal ostomy function  Outcome: Adequate for Discharge

## 2024-07-15 ENCOUNTER — TELEPHONE (OUTPATIENT)
Dept: FAMILY MEDICINE CLINIC | Age: 69
End: 2024-07-15

## 2024-07-17 ASSESSMENT — ASTHMA QUESTIONNAIRES
QUESTION_1 LAST FOUR WEEKS HOW MUCH OF THE TIME DID YOUR ASTHMA KEEP YOU FROM GETTING AS MUCH DONE AT WORK, SCHOOL OR AT HOME: A LITTLE OF THE TIME
QUESTION_3 LAST FOUR WEEKS HOW OFTEN DID YOUR ASTHMA SYMPTOMS (WHEEZING, COUGHING, SHORTNESS OF BREATH, CHEST TIGHTNESS OR PAIN) WAKE YOU UP AT NIGHT OR EARLIER THAN USUAL IN THE MORNING: ONCE OR TWICE
ACT_TOTALSCORE: 19
QUESTION_4 LAST FOUR WEEKS HOW OFTEN HAVE YOU USED YOUR RESCUE INHALER OR NEBULIZER MEDICATION (SUCH AS ALBUTEROL): TWO OR THREE TIMES PER WEEK
ACT_TOTALSCORE: 19
QUESTION_2 LAST FOUR WEEKS HOW OFTEN HAVE YOU HAD SHORTNESS OF BREATH: ONCE OR TWICE A WEEK
QUESTION_5 LAST FOUR WEEKS HOW WOULD YOU RATE YOUR ASTHMA CONTROL: WELL CONTROLLED

## 2024-07-19 NOTE — TELEPHONE ENCOUNTER
Left message on voicemail asking patient if she is still seeing listed PCP Park Meza NP. If no longer seeing her we would be happy to see her for a hospital follow up and to call the office to schedule.

## 2024-07-22 ENCOUNTER — OFFICE VISIT (OUTPATIENT)
Dept: INTERNAL MEDICINE | Facility: CLINIC | Age: 69
End: 2024-07-22
Payer: COMMERCIAL

## 2024-07-22 VITALS
DIASTOLIC BLOOD PRESSURE: 64 MMHG | SYSTOLIC BLOOD PRESSURE: 124 MMHG | BODY MASS INDEX: 23.56 KG/M2 | TEMPERATURE: 97.8 F | RESPIRATION RATE: 12 BRPM | HEART RATE: 80 BPM | WEIGHT: 124.7 LBS | OXYGEN SATURATION: 97 %

## 2024-07-22 DIAGNOSIS — F50.00 ANOREXIA NERVOSA (H): ICD-10-CM

## 2024-07-22 DIAGNOSIS — Z01.818 PRE-OP EXAM: Primary | ICD-10-CM

## 2024-07-22 DIAGNOSIS — H25.013 CORTICAL AGE-RELATED CATARACT OF BOTH EYES: ICD-10-CM

## 2024-07-22 DIAGNOSIS — E78.5 HYPERLIPIDEMIA LDL GOAL <130: ICD-10-CM

## 2024-07-22 DIAGNOSIS — E03.4 HYPOTHYROIDISM DUE TO ACQUIRED ATROPHY OF THYROID: ICD-10-CM

## 2024-07-22 PROCEDURE — 99214 OFFICE O/P EST MOD 30 MIN: CPT | Performed by: INTERNAL MEDICINE

## 2024-07-22 PROCEDURE — G2211 COMPLEX E/M VISIT ADD ON: HCPCS | Performed by: INTERNAL MEDICINE

## 2024-07-22 RX ORDER — ATORVASTATIN CALCIUM 20 MG/1
10 TABLET, FILM COATED ORAL EVERY OTHER DAY
COMMUNITY
Start: 2024-07-22

## 2024-07-22 NOTE — PROGRESS NOTES
Preoperative Evaluation  [address] [address]  Phone: [phone]  Primary Provider: Fuentes Darby MD  Pre-op Performing Provider: Fuentes Darby MD  Jul 22, 2024 7/17/2024   Surgical Information   What procedure is being done? Cataract surgery   Facility or Hospital where procedure/surgery will be performed: [facility]   Who is doing the procedure / surgery? Dr. Adolfo Mcguire   Date of surgery / procedure: 8/7/2024, 8/15/2024   Time of surgery / procedure: They will call me 3-5 days before with time   Where do you plan to recover after surgery? at home with family        Fax number for surgical facility: [phone]    Assessment & Plan     The proposed surgical procedure is considered LOW risk.    Problem List Items Addressed This Visit       Hyperlipidemia LDL goal <130    Relevant Medications    atorvastatin (LIPITOR) 20 MG tablet    Hypothyroidism    Anorexia nervosa (H28)     Other Visit Diagnoses       Pre-op exam    -  Primary    Cortical age-related cataract of both eyes              Cataract surgery, patient is approved for surgery.  She will take all her medications on the day of the procedure.    Hyperlipidemia she had muscle aches with atorvastatin 20 mg, she held this and has felt good for the last 3 weeks.  She will go back and try a lower dose 10 mg every other day to see if she can tolerate it.    Mood and anxiety and outburst.  She was switched from Lexapro to Cymbalta doing better with this and will continue that medication.    The longitudinal plan of care for the diagnosis(es)/condition(s) as documented were addressed during this visit. Due to the added complexity in care, I will continue to support Lara in the subsequent management and with ongoing continuity of care.          - No identified additional risk factors other than previously addressed    Take all medications on day of surgery      Recommendation  Approval given to proceed with proposed procedure, without further diagnostic evaluation.    Subjective   Lara is a 69 year old, presenting for the following:  Pre-Op Exam          7/22/2024     9:45 AM   Additional Questions   Roomed by Ayesha PORRAS related to upcoming procedure: cataract in both eyes.       Some panic attacks at work, ok at home with sister. MTM switched from lexaprol to cymbalta feels better, had outbursts at work.      Atorvastatin was giving her muscle aches, she stopped and is better, will try half dose every other day.             7/17/2024   Pre-Op Questionnaire   Have you ever had a heart attack or stroke? No   Have you ever had surgery on your heart or blood vessels, such as a stent placement, a coronary artery bypass, or surgery on an artery in your head, neck, heart, or legs? No   Do you have chest pain with activity? No   Do you have a history of heart failure? No   Do you currently have a cold, bronchitis or symptoms of other infection? No   Do you have a cough, shortness of breath, or wheezing? No   Do you or anyone in your family have previous history of blood clots? No   Do you or does anyone in your family have a serious bleeding problem such as prolonged bleeding following surgeries or cuts? No   Have you ever had problems with anemia or been told to take iron pills? No   Have you had any abnormal blood loss such as black, tarry or bloody stools, or abnormal vaginal bleeding? No   Have you ever had a blood transfusion? No   Are you willing to have a blood transfusion if it is medically needed before, during, or after your surgery? Yes   Have you or any of your relatives ever had problems with anesthesia? No   Do you have sleep apnea, excessive snoring or daytime drowsiness? No   Do you have any artifical heart valves or other implanted medical devices like a pacemaker, defibrillator, or continuous glucose monitor? No   Do you have artificial joints? No   Are  you allergic to latex? No        Health Care Directive  Patient does not have a Health Care Directive or Living Will:     Preoperative Review of    reviewed - no record of controlled substances prescribed.      Status of Chronic Conditions:  ASTHMA - Patient has a longstanding history of moderate-severe Asthma . Patient has been doing well overall noting COUGH and continues on medication regimen consisting of Advair and duoneb  without adverse reactions or side effects.     HYPERTENSION - Patient has longstanding history of HTN , currently denies any symptoms referable to elevated blood pressure. Specifically denies chest pain, palpitations, dyspnea, orthopnea, PND or peripheral edema. Blood pressure readings have been in normal range. Current medication regimen is as listed below. Patient denies any side effects of medication.     HYPOTHYROIDISM - Patient has a longstanding history of chronic Hypothyroidism. Patient has been doing well, noting no tremor, insomnia, hair loss or changes in skin texture. Continues to take medications as directed, without adverse reactions or side effects. Last TSH   Lab Results   Component Value Date    TSH 0.94 08/18/2023   .      Patient Active Problem List    Diagnosis Date Noted    Adjustment disorder with mixed anxiety and depressed mood 04/03/2018     Priority: Medium    Gastroesophageal reflux disease without esophagitis 10/10/2016     Priority: Medium    Hypothyroidism 08/01/2016     Priority: Medium    Major depressive disorder, recurrent episode, mild (HCC) 01/22/2016     Priority: Medium    Generalized anxiety disorder 10/07/2013     Priority: Medium    Hyperlipidemia LDL goal <130 10/23/2012     Priority: Medium    Anxiety state 08/31/2012     Priority: Medium     Problem list name updated by automated process. Provider to review      CARDIOVASCULAR SCREENING; LDL GOAL LESS THAN 160 10/31/2010     Priority: Medium    Asthma, mild intermittent      Priority: Medium     Anorexia nervosa (H28) 06/04/2007     Priority: Medium    Other bipolar disorder (H) 03/30/2006     Priority: Medium    Asthma 05/17/2005     Priority: Medium    Neutropenia (H24) 09/22/2004     Priority: Medium     Formatting of this note might be different from the original. HEMATOLOGY CONSULT - CHECK SERIAL COMPLETE BLOOD COUNTS      Allergic rhinitis 03/31/2004     Priority: Medium      Past Medical History:   Diagnosis Date    Anxiety     Arthritis     Asthma, mild intermittent     Major depressive disorder, single episode     Migraines     Tension headaches     Unspecified hypothyroidism      Past Surgical History:   Procedure Laterality Date    BUNIONECTOMY      COLONOSCOPY      ESOPHAGOSCOPY, GASTROSCOPY, DUODENOSCOPY (EGD), COMBINED N/A 4/2/2019    Procedure: ESOPHAGOSCOPY, GASTROSCOPY, DUODENOSCOPY (EGD);  Surgeon: Ochoa Cherry DO;  Location:  GI    OTHER SURGICAL HISTORY  1980    Bunionectomy     Current Outpatient Medications   Medication Sig Dispense Refill    albuterol (VENTOLIN HFA) 108 (90 Base) MCG/ACT inhaler INHALE TWO PUFFS BY MOUTH EVERY 6 HOURS AS NEEDED FOR SHORTNESS OF BREATH/DYSPNEA 18 g 3    amLODIPine (NORVASC) 2.5 MG tablet Take 1 tablet (2.5 mg) by mouth daily 90 tablet 3    apixaban ANTICOAGULANT (ELIQUIS ANTICOAGULANT) 5 MG tablet Take 1 tablet (5 mg) by mouth 2 times daily 180 tablet 3    atorvastatin (LIPITOR) 20 MG tablet Take 1 tablet (20 mg) by mouth daily 90 tablet 3    DULoxetine (CYMBALTA) 30 MG capsule Take 1 capsule (30 mg) by mouth daily for 1 week, then increase to 2 capsules by mouth daily thereafter 60 capsule 1    fluticasone (FLONASE) 50 MCG/ACT nasal spray Spray 2 sprays into both nostrils daily 1 Package 1    fluticasone-salmeterol (ADVAIR DISKUS) 250-50 MCG/ACT inhaler INHALE ONE PUFF BY MOUTH TWICE A  each 3    ipratropium - albuterol 0.5 mg/2.5 mg/3 mL (DUONEB) 0.5-2.5 (3) MG/3ML neb solution Take 1 vial (3 mLs) by nebulization every 6 hours as  "needed for shortness of breath, wheezing or cough 90 mL 1    levothyroxine (SYNTHROID/LEVOTHROID) 50 MCG tablet TAKE ONE TABLET BY MOUTH ONCE DAILY 90 tablet 3    loratadine (CLARITIN) 10 MG tablet Take 10 mg by mouth daily      magnesium oxide (MAG-OX) 400 MG tablet Take 400 mg by mouth daily         Allergies   Allergen Reactions    Compazine      Anxiety    Flagyl [Metronidazole Hcl] Nausea and Vomiting        Social History     Tobacco Use    Smoking status: Never    Smokeless tobacco: Never   Substance Use Topics    Alcohol use: Yes     Comment: occ       History   Drug Use No             Review of Systems  CONSTITUTIONAL: NEGATIVE for fever, chills, change in weight  ENT/MOUTH: NEGATIVE for ear, mouth and throat problems  RESP: NEGATIVE for significant cough or SOB  CV: NEGATIVE for chest pain, palpitations or peripheral edema    Objective    /64   Pulse 80   Temp 97.8  F (36.6  C)   Resp 12   Wt 56.6 kg (124 lb 11.2 oz)   LMP  (LMP Unknown)   SpO2 97%   BMI 23.56 kg/m     Estimated body mass index is 23.56 kg/m  as calculated from the following:    Height as of 3/15/24: 1.549 m (5' 1\").    Weight as of this encounter: 56.6 kg (124 lb 11.2 oz).  Physical Exam  GENERAL: alert and no distress  NECK: no adenopathy, no asymmetry, masses, or scars  RESP: lungs clear to auscultation - no rales, rhonchi or wheezes  CV: regular rate and rhythm, normal S1 S2, no S3 or S4, no murmur, click or rub, no peripheral edema  ABDOMEN: soft, nontender, no hepatosplenomegaly, no masses and bowel sounds normal  MS: no gross musculoskeletal defects noted, no edema    Recent Labs   Lab Test 12/18/23  0942   HGB 12.9         POTASSIUM 4.2   CR 0.60        Diagnostics  No labs were ordered during this visit.   No EKG required for low risk surgery (cataract, skin procedure, breast biopsy, etc).    Revised Cardiac Risk Index (RCRI)  The patient has the following serious cardiovascular risks for perioperative " complications:   - No serious cardiac risks = 0 points     RCRI Interpretation: 1 point: Class II (low risk - 0.9% complication rate)         Signed Electronically by: Fuentes Darby MD  Copy of this evaluation report is provided to requesting physician.

## 2024-08-05 ENCOUNTER — VIRTUAL VISIT (OUTPATIENT)
Dept: PHARMACY | Facility: CLINIC | Age: 69
End: 2024-08-05
Payer: COMMERCIAL

## 2024-08-05 DIAGNOSIS — F33.0 MAJOR DEPRESSIVE DISORDER, RECURRENT EPISODE, MILD (H): Primary | ICD-10-CM

## 2024-08-05 DIAGNOSIS — F41.1 GENERALIZED ANXIETY DISORDER: ICD-10-CM

## 2024-08-05 PROCEDURE — 99606 MTMS BY PHARM EST 15 MIN: CPT | Mod: 93 | Performed by: PHARMACIST

## 2024-08-05 NOTE — PROGRESS NOTES
Medication Therapy Management (MTM) Encounter    ASSESSMENT:                            Medication Adherence/Access: No issues identified    Depression/Anxiety: Improved per patient. Would benefit from continuing current therapy as she has only been on for about 3 weeks at current dose.     PLAN:                            Continue to take your current medications as prescribed.    Follow-up: 3 months or sooner if needed    SUBJECTIVE/OBJECTIVE:                          Lara Mayer is a 69 year old female seen for a follow-up visit.       Reason for visit: Depression/Anxiety Follow-up.    Allergies/ADRs: Reviewed in chart  Past Medical History: Reviewed in chart  Tobacco: She reports that she has never smoked. She has never used smokeless tobacco.  Alcohol: 2-3 beverages / week    Medication Adherence/Access: Patient takes medications directly from bottles.  Patient takes medications 2 time(s) per day.   Per patient, misses medication 0 times per week.   Medication barriers: affording medications  The patient fills medications at [facility]: YES.    Mental Health   Anxiety and Depression  Duloxetine 60 mg every morning  Patient reports no current medication side effects.  Patient reports symptoms have improved. Thinks things continue to get better with more time. Still some stressors at work and with sister, but doing okay. Has previously tried fluoxetine, paroxetine, sertraline, citalopram, and escitalopram.         12/28/2023     9:20 AM 1/30/2024     9:49 AM 3/15/2024    10:08 AM   PHQ   PHQ-9 Total Score 6 6 2   Q9: Thoughts of better off dead/self-harm past 2 weeks Not at all Not at all Not at all         8/14/2023    10:16 AM 8/28/2023    12:16 PM 11/28/2023    10:48 AM   JOCELINE-7 SCORE   Total Score 9 (mild anxiety) 6 (mild anxiety) 7 (mild anxiety)   Total Score 9 6 7     Today's Vitals: LMP  (LMP Unknown)     BP Readings from Last 3 Encounters:   07/22/24 124/64   03/15/24 128/80   01/29/24 132/84     Wt Readings  from Last 5 Encounters:   07/22/24 124 lb 11.2 oz (56.6 kg)   03/15/24 126 lb 3.2 oz (57.2 kg)   03/15/24 126 lb 6.4 oz (57.3 kg)   01/29/24 127 lb 12.8 oz (58 kg)   01/12/24 130 lb (59 kg)     ----------------      I spent 12 minutes with this patient today. A copy of the visit note was provided to the patient's provider(s).    A summary of these recommendations was sent via [employer].    Damien Douglas, PharmD, BCACP  Medication Therapy Management Pharmacist  Voicemail: [phone]      Telemedicine Visit Details  Type of service:  Telephone visit  Start Time:  10:00 AM  End Time:  10:12 AM     Medication Therapy Recommendations  No medication therapy recommendations to display

## 2024-08-21 ASSESSMENT — PATIENT HEALTH QUESTIONNAIRE - PHQ9
SUM OF ALL RESPONSES TO PHQ QUESTIONS 1-9: 7
SUM OF ALL RESPONSES TO PHQ QUESTIONS 1-9: 7
10. IF YOU CHECKED OFF ANY PROBLEMS, HOW DIFFICULT HAVE THESE PROBLEMS MADE IT FOR YOU TO DO YOUR WORK, TAKE CARE OF THINGS AT HOME, OR GET ALONG WITH OTHER PEOPLE: SOMEWHAT DIFFICULT

## 2024-08-22 ENCOUNTER — VIRTUAL VISIT (OUTPATIENT)
Dept: PSYCHOLOGY | Facility: CLINIC | Age: 69
End: 2024-08-22
Payer: COMMERCIAL

## 2024-08-22 DIAGNOSIS — F41.1 GENERALIZED ANXIETY DISORDER: Primary | ICD-10-CM

## 2024-08-22 PROCEDURE — 90791 PSYCH DIAGNOSTIC EVALUATION: CPT | Mod: 95 | Performed by: MARRIAGE & FAMILY THERAPIST

## 2024-08-22 ASSESSMENT — COLUMBIA-SUICIDE SEVERITY RATING SCALE - C-SSRS
1. IN YOUR LIFETIME, HAVE YOU WISHED YOU WERE DEAD OR WISHED YOU COULD GO TO SLEEP AND NOT WAKE UP?: LATE TEENS
1. IN THE PAST MONTH, HAVE YOU WISHED YOU WERE DEAD OR WISHED YOU COULD GO TO SLEEP AND NOT WAKE UP?: NO
2. HAVE YOU ACTUALLY HAD ANY THOUGHTS OF KILLING YOURSELF?: NO
2. HAVE YOU ACTUALLY HAD ANY THOUGHTS OF KILLING YOURSELF?: YES
3. HAVE YOU BEEN THINKING ABOUT HOW YOU MIGHT KILL YOURSELF?: NO
ATTEMPT LIFETIME: NO
TOTAL  NUMBER OF ABORTED OR SELF INTERRUPTED ATTEMPTS PAST 3 MONTHS: NO
4. HAVE YOU HAD THESE THOUGHTS AND HAD SOME INTENTION OF ACTING ON THEM?: NO
TOTAL  NUMBER OF ABORTED OR SELF INTERRUPTED ATTEMPTS LIFETIME: YES
1. HAVE YOU WISHED YOU WERE DEAD OR WISHED YOU COULD GO TO SLEEP AND NOT WAKE UP?: YES
5. HAVE YOU STARTED TO WORK OUT OR WORKED OUT THE DETAILS OF HOW TO KILL YOURSELF? DO YOU INTEND TO CARRY OUT THIS PLAN?: NO
6. HAVE YOU EVER DONE ANYTHING, STARTED TO DO ANYTHING, OR PREPARED TO DO ANYTHING TO END YOUR LIFE?: NO

## 2024-08-22 NOTE — PROGRESS NOTES
Answers submitted by the patient for this visit:  Patient Health Questionnaire (Submitted on 2024)  If you checked off any problems, how difficult have these problems made it for you to do your work, take care of things at home, or get along with other people?: Somewhat difficult  PHQ9 TOTAL SCORE: 7        [facility]         PATIENT'S NAME: Brissa Mayer  PREFERRED NAME: Lara  PRONOUNS:       MRN: [id number]  : 1955  ADDRESS: [address] [place] [id number]  [facility]  [id number]  DATE OF SERVICE: 24  START TIME: 11:31a  END TIME: 12:30p  PREFERRED PHONE: [phone]  May we leave a program related message:   EMERGENCY CONTACT:  obtained  .  SERVICE MODALITY:  Video Visit:      Provider verified identity through the following two step process.  Patient provided:  Patient     Telemedicine Visit: The patient's condition can be safely assessed and treated via synchronous audio and visual telemedicine encounter.      Reason for Telemedicine Visit: Services only offered telehealth    Originating Site (Patient Location): Patient's home    Distant Site (Provider Location): Provider Remote Setting- Home Office    Consent:  The patient/guardian has verbally consented to: the potential risks and benefits of telemedicine (video visit) versus in person care; bill my insurance or make self-payment for services provided; and responsibility for payment of non-covered services.     Patient would like the video invitation sent by:  Send to e-mail at: [email or web address]    Mode of Communication:  Video Conference via [employer]well    Distant Location (Provider):  Off-site    As the provider I attest to compliance with applicable laws and regulations related to telemedicine.    UNIVERSAL ADULT Mental Health DIAGNOSTIC ASSESSMENT    Identifying Information:  Patient is a 69 year old,  individual.  Patient was referred for an assessment by self.  Patient attended the session  "alone.    Chief Complaint:   The reason for seeking services at this time is: \"Extreme anxiety\".  The problem(s) began 01/30/24. Pt notes main sources of anxiety tend to be work related, care giving for sister. Anxiety sx lately have led to some panic sx      Social/Family History:  Patient reported they grew up in [facility]  .  They were raised by biological parents  .  Parents were always together.  Patient reported that their childhood was fair. Mother passed in 2012, father passed in 2016. Oldest sister lives in [place] but there have been some problems with her  who has made advances towards pt in the past. PT lives with sister Drew. Pt's younger brother relationship is tense due to him not helping with care for parents while they were living.     The patient describes their cultural background as .  Cultural influences and impact on patient's life structure, values, norms, and healthcare: Grew up with Orthodox mother & dad who converted but we weren t a Worship family. These factors will be addressed in the Preliminary Treatment plan. Patient identified their preferred language to be English. Patient reported they does not need the assistance of an  or other support involved in therapy.     Patient reported had no significant delays in developmental tasks.   Patient's highest education level was some college  .  Patient identified the following learning problems: none reported.  Modifications will not be used to assist communication in therapy. Patient reports they are  able to understand written materials.    Patient's current relationship status is single.   Patient identified their sexual orientation as heterosexual.  Patient reported having 1 child(shara). Patient identified siblings; pets; friends as part of their support system.  Patient identified the quality of these relationships as fair,     Patient's current living/housing situation involves living with sister.  The " immediate members of family and household include Drew Mayer, 67,Sister and they report that housing is stable. PT sister has some health issues and pt is main care giver for her. Sister can do most ADL's but pt is helping with medication administration and monitoring.     Patient is currently employed fulltime.  Patient reports their finances are obtained through employment. Patient does identify finances as a current stressor.  PT works as  for [employer], working 5x weekly. Did have a recent negative interaction with a co worker which led to a harrassment violation and suspended for 5 days without pay. Pt notes 3 people at work who do not like pt and they all talk about pt together, creates interpersonal challenges.     Patient reported that they have not been involved with the legal system.  I had an order for protection in the 1980 s from an ex-boyfriend. Patient does not report being under probation/ parole/ jurisdiction. They are not under any current court jurisdiction. .    Patient's Strengths and Limitations:  Patient identified the following strengths or resources that will help them succeed in treatment: commitment to health and well being and insight. Things that may interfere with the patient's success in treatment include: few friends, lack of social support, and unsupportive environment.     Assessments:  The following assessments were completed by patient for this visit:  PHQ9:       8/18/2023     9:24 AM 8/28/2023    12:17 PM 11/28/2023    10:47 AM 12/28/2023     9:20 AM 1/30/2024     9:49 AM 3/15/2024    10:08 AM 8/21/2024     1:18 PM   PHQ-9 SCORE   PHQ-9 Total Score [facility] 3 (Minimal depression) 3 (Minimal depression) 7 (Mild depression) 6 (Mild depression) 6 (Mild depression)  7 (Mild depression)   PHQ-9 Total Score 3 3 7 6 6 2 7     GAD7:       3/30/2017     8:00 AM 11/8/2017     3:42 PM 3/19/2021    10:39 AM 8/2/2022     2:30 PM 8/14/2023    10:16 AM 8/28/2023    12:16 PM  11/28/2023    10:48 AM   JOCELINE-7 SCORE   Total Score    13 (moderate anxiety) 9 (mild anxiety) 6 (mild anxiety) 7 (mild anxiety)   Total Score 1 6 7 13 9 6 7       Personal and Family Medical History:  Patient does report a family history of mental health concerns.  Patient reports family history includes Asthma in her brother, father, mother, sister, sister, and sister; Cancer - colorectal in her maternal grandmother; Coronary Artery Disease in her sister; Diabetes in her mother and sister; Heart Disease in her father; Obesity in her sister and sister; Prostate Cancer in her father..     Patient does report Mental Health Diagnosis and/or Treatment.  Patient Patient reported the following previous diagnoses which include(s): an Anxiety Disorder, a Bipolar Disorder, Depression, and an Eating Disorder.  Patient has received mental health services in the past:  therapy and medication .  Psychiatric Hospitalizations: None.  Patient denies a history of civil commitment.  Patient is receiving other mental health services.  These include  medication .       Patient has had a physical exam to rule out medical causes for current symptoms.  Date of last physical exam was within the past year. Client was encouraged to follow up with PCP if symptoms were to develop. The patient has a [facility] Primary Care Provider, who is named Fuentes Darby.  Patient reports the following current medical concerns: asthma and Reynaud's syndrome .  Patient denies any issues with pain. There are not significant appetite / nutritional concerns / weight changes.   Patient does not report a history of head injury / trauma / cognitive impairment.    Patient reports current meds as:   Current Outpatient Medications   Medication Sig Dispense Refill    albuterol (VENTOLIN HFA) 108 (90 Base) MCG/ACT inhaler INHALE TWO PUFFS BY MOUTH EVERY 6 HOURS AS NEEDED FOR SHORTNESS OF BREATH/DYSPNEA 18 g 3    amLODIPine (NORVASC) 2.5 MG tablet Take 1 tablet (2.5 mg)  by mouth daily 90 tablet 3    apixaban ANTICOAGULANT (ELIQUIS ANTICOAGULANT) 5 MG tablet Take 1 tablet (5 mg) by mouth 2 times daily 180 tablet 3    atorvastatin (LIPITOR) 20 MG tablet Take 0.5 tablets (10 mg) by mouth every other day      DULoxetine (CYMBALTA) 30 MG capsule Take 1 capsule (30 mg) by mouth daily for 1 week, then increase to 2 capsules by mouth daily thereafter 60 capsule 1    fluticasone (FLONASE) 50 MCG/ACT nasal spray Spray 2 sprays into both nostrils daily 1 Package 1    fluticasone-salmeterol (ADVAIR DISKUS) 250-50 MCG/ACT inhaler INHALE ONE PUFF BY MOUTH TWICE A  each 3    ipratropium - albuterol 0.5 mg/2.5 mg/3 mL (DUONEB) 0.5-2.5 (3) MG/3ML neb solution Take 1 vial (3 mLs) by nebulization every 6 hours as needed for shortness of breath, wheezing or cough 90 mL 1    levothyroxine (SYNTHROID/LEVOTHROID) 50 MCG tablet TAKE ONE TABLET BY MOUTH ONCE DAILY 90 tablet 3    loratadine (CLARITIN) 10 MG tablet Take 10 mg by mouth daily      magnesium oxide (MAG-OX) 400 MG tablet Take 400 mg by mouth daily       No current facility-administered medications for this visit.       Medication Adherence:  Patient reports taking.  taking prescribed medications as prescribed.    Patient Allergies:    Allergies   Allergen Reactions    Compazine      Anxiety    Flagyl [Metronidazole Hcl] Nausea and Vomiting       Medical History:    Past Medical History:   Diagnosis Date    Anxiety     Arthritis     Asthma, mild intermittent     Major depressive disorder, single episode     Migraines     Tension headaches     Unspecified hypothyroidism          Current Mental Status Exam:   Appearance:  Appropriate    Eye Contact:  Good   Psychomotor:  Normal       Gait / station:  no problem  Attitude / Demeanor: Cooperative   Speech      Rate / Production: Normal/ Responsive      Volume:  Normal  volume      Language:  intact  Mood:   Anxious   Affect:   Flat    Thought Content: Clear   Thought Process: Coherent        Associations: No loosening of associations  Insight:   Good   Judgment:  Intact   Orientation:  Person  Attention/concentration: Good    Substance Use:   Patient did report a family history of substance use concerns; see medical history section for details.  Patient has received chemical dependency treatment in the past at age 27, 3 weeks tx .  Patient has not ever been to detox.      Patient is not currently receiving any chemical dependency treatment. Patient reported the following problems as a result of their substance use:      Patient recently has been drinking more alcohol per week, mainly wine at night.   Patient denies using tobacco.  Patient denies using cannabis.  Patient will drink around 4 cups of coffee daily, but working to decrease  Patient reports using/abusing the following substance(s). Patient reported no other substance use.     Substance Use: No symptoms    Based on the negative CAGE score and clinical interview there    Significant Losses / Trauma / Abuse / Neglect Issues:   Patient did not serve in the .  There are indications or report of significant loss, trauma, abuse or neglect issues related to: are indications or report of significant loss, trauma, abuse or neglect issues related to. Parents passing away, work conflict,     Safety Assessment:   Patient denies current homicidal ideation and behaviors.  Patient denies current self-injurious ideation and behaviors.    Patient denied risk behaviors associated with substance use.   Patient denies any high risk behaviors associated with mental health symptoms.  Patient reports the following current concerns for their personal safety: None.  Patient reports there are not firearms in the house.       There are no firearms in the home..    History of Safety Concerns:  Patient denied a history of homicidal ideation.     Patient denied a history of personal safety concerns.    Patient denied a history of assaultive behaviors.    Patient  denied a history of sexual assault behaviors.     Patient denied a history of risk behaviors associated with substance use.  Patient denies any history of high risk behaviors associated with mental health symptoms.  Patient reports the following protective factors: forward or future oriented thinking; dedication to family or friends; effectively controls impulses; sense of belonging; purpose; secure attachment; adherence with prescribed medication; living with other people; daily obligations; structured day; effective problem solving skills; commitment to well being; sense of meaning; positive social skills; strong sense of self worth or esteem; access to a variety of clinical interventions and pets    Risk Plan:  See Recommendations for Safety and Risk Management Plan    Review of Symptoms per patient report:   Depression: Change in sleep, Lack of interest, Excessive or inappropriate guilt, Difficulties concentrating, Feelings of hopelessness, Feelings of helplessness, Low self-worth, and Feeling sad, down, or depressed  Annamarie:  No Symptoms  Psychosis: No Symptoms  Anxiety: Excessive worry, Nervousness, Physical complaints, such as headaches, stomachaches, muscle tension, Sleep disturbance, Ruminations, Poor concentration, and Irritability  Panic:  Palpitations, Shortness of breath, and Tremors  Post Traumatic Stress Disorder:  No Symptoms   Eating Disorder: Purging  ADD / ADHD:  No symptoms    Diagnostic Criteria:   Generalized Anxiety Disorder  A. Excessive anxiety and worry about a number of events or activities (such as work or school performance).   B. The person finds it difficult to control the worry.  C. Select 3 or more symptoms (required for diagnosis). Only one item is required in children.   - Restlessness or feeling keyed up or on edge.    - Being easily fatigued.    - Difficulty concentrating or mind going blank.    - Irritability.    - Muscle tension.    - Sleep disturbance (difficulty falling or  staying asleep, or restless unsatisfying sleep).     Functional Status:  Patient reports the following functional impairments:  health maintenance, relationship(s), self-care, and social interactions.         Clinical Summary:  1. Psychosocial, Cultural and Contextual Factors: Ongoing Mh issues  .  2. Principal DSM5 Diagnoses  (Sustained by DSM5 Criteria Listed Above):   300.02 (F41.1) Generalized Anxiety Disorder.    5. Prognosis: Expect Improvement.  6. Likely consequences of symptoms if not treated: .  7. Client strengths include:  has a previous history of therapy .     Recommendations:     1. Plan for Safety and Risk Management:   Safety and Risk: Recommended that patient call 911 or go to the local ED should there be a change in any of these risk factors..          Report to child / adult protection services was NA.     8. Records:   These were reviewed at time of assessment.   Information in this assessment was obtained from the medical record and  provided by patient who is a good historian.    Patient will have open access to their mental health medical record.    9.   Interactive Complexity: No    10. Safety Plan:       Provider Name/ Credentials:  DC Pickard  August 22, 2024

## 2024-08-23 SDOH — HEALTH STABILITY: PHYSICAL HEALTH: ON AVERAGE, HOW MANY DAYS PER WEEK DO YOU ENGAGE IN MODERATE TO STRENUOUS EXERCISE (LIKE A BRISK WALK)?: 2 DAYS

## 2024-08-23 SDOH — HEALTH STABILITY: PHYSICAL HEALTH: ON AVERAGE, HOW MANY MINUTES DO YOU ENGAGE IN EXERCISE AT THIS LEVEL?: 10 MIN

## 2024-08-23 ASSESSMENT — SOCIAL DETERMINANTS OF HEALTH (SDOH): HOW OFTEN DO YOU GET TOGETHER WITH FRIENDS OR RELATIVES?: ONCE A WEEK

## 2024-08-26 ENCOUNTER — OFFICE VISIT (OUTPATIENT)
Dept: INTERNAL MEDICINE | Facility: CLINIC | Age: 69
End: 2024-08-26
Attending: INTERNAL MEDICINE
Payer: COMMERCIAL

## 2024-08-26 VITALS
BODY MASS INDEX: 23.88 KG/M2 | TEMPERATURE: 97.8 F | HEART RATE: 72 BPM | HEIGHT: 61 IN | RESPIRATION RATE: 14 BRPM | OXYGEN SATURATION: 98 % | SYSTOLIC BLOOD PRESSURE: 126 MMHG | DIASTOLIC BLOOD PRESSURE: 60 MMHG | WEIGHT: 126.5 LBS

## 2024-08-26 DIAGNOSIS — Z12.11 SCREEN FOR COLON CANCER: ICD-10-CM

## 2024-08-26 DIAGNOSIS — J45.20 MILD INTERMITTENT ASTHMA WITHOUT COMPLICATION: ICD-10-CM

## 2024-08-26 DIAGNOSIS — Z12.31 VISIT FOR SCREENING MAMMOGRAM: ICD-10-CM

## 2024-08-26 DIAGNOSIS — E78.5 HYPERLIPIDEMIA LDL GOAL <130: ICD-10-CM

## 2024-08-26 DIAGNOSIS — F33.0 MAJOR DEPRESSIVE DISORDER, RECURRENT EPISODE, MILD (H): ICD-10-CM

## 2024-08-26 DIAGNOSIS — E03.4 HYPOTHYROIDISM DUE TO ACQUIRED ATROPHY OF THYROID: ICD-10-CM

## 2024-08-26 DIAGNOSIS — Z00.00 ENCOUNTER FOR ROUTINE ADULT HEALTH EXAMINATION WITHOUT ABNORMAL FINDINGS: Primary | ICD-10-CM

## 2024-08-26 LAB
ALBUMIN SERPL BCG-MCNC: 4.7 G/DL (ref 3.5–5.2)
ALP SERPL-CCNC: 64 U/L (ref 40–150)
ALT SERPL W P-5'-P-CCNC: 13 U/L (ref 0–50)
ANION GAP SERPL CALCULATED.3IONS-SCNC: 13 MMOL/L (ref 7–15)
AST SERPL W P-5'-P-CCNC: 24 U/L (ref 0–45)
BILIRUB SERPL-MCNC: 0.3 MG/DL
BUN SERPL-MCNC: 22.5 MG/DL (ref 8–23)
CALCIUM SERPL-MCNC: 9.4 MG/DL (ref 8.8–10.4)
CHLORIDE SERPL-SCNC: 102 MMOL/L (ref 98–107)
CHOLEST SERPL-MCNC: 190 MG/DL
CREAT SERPL-MCNC: 0.64 MG/DL (ref 0.51–0.95)
EGFRCR SERPLBLD CKD-EPI 2021: >90 ML/MIN/1.73M2
FASTING STATUS PATIENT QL REPORTED: YES
FASTING STATUS PATIENT QL REPORTED: YES
GLUCOSE SERPL-MCNC: 102 MG/DL (ref 70–99)
HCO3 SERPL-SCNC: 24 MMOL/L (ref 22–29)
HDLC SERPL-MCNC: 93 MG/DL
LDLC SERPL CALC-MCNC: 88 MG/DL
NONHDLC SERPL-MCNC: 97 MG/DL
POTASSIUM SERPL-SCNC: 4.2 MMOL/L (ref 3.4–5.3)
PROT SERPL-MCNC: 7.8 G/DL (ref 6.4–8.3)
SODIUM SERPL-SCNC: 139 MMOL/L (ref 135–145)
TRIGL SERPL-MCNC: 43 MG/DL
TSH SERPL DL<=0.005 MIU/L-ACNC: 1.32 UIU/ML (ref 0.3–4.2)

## 2024-08-26 PROCEDURE — 36415 COLL VENOUS BLD VENIPUNCTURE: CPT | Performed by: INTERNAL MEDICINE

## 2024-08-26 PROCEDURE — 99213 OFFICE O/P EST LOW 20 MIN: CPT | Mod: 25 | Performed by: INTERNAL MEDICINE

## 2024-08-26 PROCEDURE — 84443 ASSAY THYROID STIM HORMONE: CPT | Performed by: INTERNAL MEDICINE

## 2024-08-26 PROCEDURE — 80053 COMPREHEN METABOLIC PANEL: CPT | Performed by: INTERNAL MEDICINE

## 2024-08-26 PROCEDURE — 99397 PER PM REEVAL EST PAT 65+ YR: CPT | Performed by: INTERNAL MEDICINE

## 2024-08-26 PROCEDURE — 80061 LIPID PANEL: CPT | Performed by: INTERNAL MEDICINE

## 2024-08-26 RX ORDER — DULOXETIN HYDROCHLORIDE 30 MG/1
CAPSULE, DELAYED RELEASE ORAL
Qty: 60 CAPSULE | Refills: 1 | OUTPATIENT
Start: 2024-08-26

## 2024-08-26 RX ORDER — DULOXETIN HYDROCHLORIDE 30 MG/1
CAPSULE, DELAYED RELEASE ORAL
Qty: 180 CAPSULE | Refills: 3 | Status: SHIPPED | OUTPATIENT
Start: 2024-08-26

## 2024-08-26 RX ORDER — FLUTICASONE PROPIONATE AND SALMETEROL 250; 50 UG/1; UG/1
POWDER RESPIRATORY (INHALATION)
Refills: 3 | OUTPATIENT
Start: 2024-08-26

## 2024-08-26 RX ORDER — FLUTICASONE PROPIONATE AND SALMETEROL 250; 50 UG/1; UG/1
POWDER RESPIRATORY (INHALATION)
Qty: 180 EACH | Refills: 3 | Status: SHIPPED | OUTPATIENT
Start: 2024-08-26

## 2024-08-26 SDOH — HEALTH STABILITY: PHYSICAL HEALTH: ON AVERAGE, HOW MANY DAYS PER WEEK DO YOU ENGAGE IN MODERATE TO STRENUOUS EXERCISE (LIKE A BRISK WALK)?: 2 DAYS

## 2024-08-26 SDOH — HEALTH STABILITY: PHYSICAL HEALTH: ON AVERAGE, HOW MANY MINUTES DO YOU ENGAGE IN EXERCISE AT THIS LEVEL?: 10 MIN

## 2024-08-26 ASSESSMENT — ASTHMA QUESTIONNAIRES
QUESTION_1 LAST FOUR WEEKS HOW MUCH OF THE TIME DID YOUR ASTHMA KEEP YOU FROM GETTING AS MUCH DONE AT WORK, SCHOOL OR AT HOME: A LITTLE OF THE TIME
QUESTION_4 LAST FOUR WEEKS HOW OFTEN HAVE YOU USED YOUR RESCUE INHALER OR NEBULIZER MEDICATION (SUCH AS ALBUTEROL): ONCE A WEEK OR LESS
QUESTION_5 LAST FOUR WEEKS HOW WOULD YOU RATE YOUR ASTHMA CONTROL: WELL CONTROLLED
QUESTION_2 LAST FOUR WEEKS HOW OFTEN HAVE YOU HAD SHORTNESS OF BREATH: NOT AT ALL
ACT_TOTALSCORE: 22
ACT_TOTALSCORE: 22
QUESTION_3 LAST FOUR WEEKS HOW OFTEN DID YOUR ASTHMA SYMPTOMS (WHEEZING, COUGHING, SHORTNESS OF BREATH, CHEST TIGHTNESS OR PAIN) WAKE YOU UP AT NIGHT OR EARLIER THAN USUAL IN THE MORNING: NOT AT ALL

## 2024-08-26 ASSESSMENT — PATIENT HEALTH QUESTIONNAIRE - PHQ9
10. IF YOU CHECKED OFF ANY PROBLEMS, HOW DIFFICULT HAVE THESE PROBLEMS MADE IT FOR YOU TO DO YOUR WORK, TAKE CARE OF THINGS AT HOME, OR GET ALONG WITH OTHER PEOPLE: SOMEWHAT DIFFICULT
SUM OF ALL RESPONSES TO PHQ QUESTIONS 1-9: 6

## 2024-08-26 ASSESSMENT — SOCIAL DETERMINANTS OF HEALTH (SDOH): HOW OFTEN DO YOU GET TOGETHER WITH FRIENDS OR RELATIVES?: ONCE A WEEK

## 2024-08-26 NOTE — PROGRESS NOTES
Preventive Care Visit  formerly [facility]  Fuentes Darby MD, Internal Medicine  Aug 26, 2024      Assessment & Plan   Problem List Items Addressed This Visit       Hyperlipidemia LDL goal <130    Relevant Orders    Lipid panel reflex to direct LDL Fasting    Comprehensive metabolic panel (BMP + Alb, Alk Phos, ALT, AST, Total. Bili, TP)    Major depressive disorder, recurrent episode, mild (HCC)    Relevant Medications    DULoxetine (CYMBALTA) 30 MG capsule    Hypothyroidism    Relevant Orders    TSH WITH FREE T4 REFLEX    Asthma    Relevant Medications    fluticasone-salmeterol (ADVAIR DISKUS) 250-50 MCG/ACT inhaler     Other Visit Diagnoses       Encounter for routine adult health examination without abnormal findings    -  Primary    Screen for colon cancer        Relevant Orders    Colonoscopy Screening  Referral    Visit for screening mammogram        Relevant Orders    MA Screening Bilateral w/ Rj           Physical, patient is still working at 69 she is not on Medicare yet.  This annual physical.    Colonoscopy is due after 5 years with a polyp and that is ordered.  Mammogram is ordered  Immunizations are recommended but does not want those.    Hypothyroidism we will check her TSH and refill levothyroxine throughout the year as needed.  Asthma is well-controlled with Advair and antihistamine uses albuterol as needed    Major issue is her depression anxiety she has a lot of stress at work and some other issues she is now working with a counselor continue her Cymbalta.    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  She has no sores I do recommend she wear socks to keep her feet warm and protect her feet more than wearing sandals.  She has seen vascular before and is currently off her REvatio              Patient has been advised of split billing requirements and indicates understanding: Yes       Counseling  Appropriate preventive services  were addressed with this patient via screening, questionnaire, or discussion as appropriate for fall prevention, nutrition, physical activity, Tobacco-use cessation, social engagement, weight loss and cognition.  Checklist reviewing preventive services available has been given to the patient.  Reviewed patient's diet, addressing concerns and/or questions.   She is at risk for lack of exercise and has been provided with information to increase physical activity for the benefit of her well-being.   The patient was instructed to see the dentist every 6 months.   The patient's PHQ-9 score is consistent with mild depression. She was provided with information regarding depression.     FUTURE APPOINTMENTS:       - Follow-up for annual visit or as needed      Subjective   Lara is a 69 year old, presenting for the following:  Physical        8/26/2024     7:56 AM   Additional Questions   Roomed by Ayesha galvan         8/26/2024     8:01 AM   Patient Reported Additional Medications   Patient reports taking the following new medications Over the counter antihistamine        Health Care Directive  Patient does not have a Health Care Directive or Living Will: Discussed advance care planning with patient; information given to patient to review.    HPI  Not on medicare, regular insurance.    Had cataracts done and went well.     Feels ok, stress at work.  Short at work, Had some panic attacks and workers watching her closely, behavior was out of control. Stress there and on cymbalta. Doing some counseling and has follow up.    Needs to continue to work for money and pay bills.     Asthma and has advair, occasional albuterol.  Uses otc antihistamine.     Blue toe syndrome and still on eliquis, bp meds, and atorvastatin.           8/23/2024   General Health   How would you rate your overall physical health? Good   Feel stress (tense, anxious, or unable to sleep) To some extent      (!) STRESS CONCERN      8/23/2024   Nutrition   Diet:  Regular (no restrictions)            8/23/2024   Exercise   Days per week of moderate/strenous exercise 2 days   Average minutes spent exercising at this level 10 min      (!) EXERCISE CONCERN      8/23/2024   Social Factors   Frequency of gathering with friends or relatives Once a week   Worry food won't last until get money to buy more No   Food not last or not have enough money for food? No   Do you have housing? (Housing is defined as stable permanent housing and does not include staying ouside in a car, in a tent, in an abandoned building, in an overnight shelter, or couch-surfing.) Yes   Are you worried about losing your housing? No   Lack of transportation? No   Unable to get utilities (heat,electricity)? No            8/23/2024   Fall Risk   Fallen 2 or more times in the past year? No    No   Trouble with walking or balance? No    No       Multiple values from one day are sorted in reverse-chronological order          8/23/2024   Activities of Daily Living- Home Safety   Needs help with the following daily activites None of the above   Safety concerns in the home None of the above            8/23/2024   Dental   Dentist two times every year? (!) NO            8/23/2024   Hearing Screening   Hearing concerns? None of the above            8/23/2024   Driving Risk Screening   Patient/family members have concerns about driving No            8/23/2024   General Alertness/Fatigue Screening   Have you been more tired than usual lately? No            8/23/2024   Urinary Incontinence Screening   Bothered by leaking urine in past 6 months No            8/23/2024   TB Screening   Were you born outside of the [place]? No          Today's PHQ-9 Score:       8/26/2024     8:07 AM   PHQ-9 SCORE   PHQ-9 Total Score MyChart 6 (Mild depression)   PHQ-9 Total Score 6         8/23/2024   Substance Use   Alcohol more than 3/day or more than 7/wk No   Do you have a current opioid prescription? No   How severe/bad is pain from 1 to 10?  0/10 (No Pain)   Do you use any other substances recreationally? No        Social History     Tobacco Use    Smoking status: Never    Smokeless tobacco: Never   Vaping Use    Vaping status: Never Used   Substance Use Topics    Alcohol use: Yes     Comment: occ    Drug use: No     Alcohol use has gotten worse drinking more wine on the weekends,         8/30/2023   LAST FHS-7 RESULTS   1st degree relative breast or ovarian cancer No   Any relative bilateral breast cancer No   Any male have breast cancer No   Any ONE woman have BOTH breast AND ovarian cancer No   Any woman with breast cancer before 50yrs No   2 or more relatives with breast AND/OR ovarian cancer No   2 or more relatives with breast AND/OR bowel cancer No      Mammogram Screening - Mammogram every 1-2 years updated in Health Maintenance based on mutual decision making    ASCVD Risk   The ASCVD Risk score (Gracia YOON, et al., 2019) failed to calculate for the following reasons:    The valid HDL cholesterol range is 20 to 100 mg/dL  Reviewed and updated as needed this visit by Provider                    Lab work is in process  Current providers sharing in care for this patient include:  Patient Care Team:  Fuentes Darby MD as PCP - General (Internal Medicine)  Fuentes Darby MD as Assigned PCP  Wilner Garcia MD as Assigned Heart and Vascular Provider  Jae Douglas RPH as Pharmacist (Pharmacist Clinician- Clinical Pharmacy Specialist)  Jae Douglas RPH as Assigned MTM Pharmacist  Amy Anne LICSW as Assigned Behavioral Health Provider    The following health maintenance items are reviewed in Epic and correct as of today:  Health Maintenance   Topic Date Due    ZOSTER IMMUNIZATION (1 of 2) Never done    RSV VACCINE (Pregnancy & 60+) (1 - 1-dose 60+ series) Never done    DTAP/TDAP/TD IMMUNIZATION (2 - Td or Tdap) 10/16/2022    COVID-19 Vaccine (5 - 2023-24 season) 03/01/2024    COLORECTAL CANCER SCREENING   "04/02/2024    LIPID  08/18/2024    ANNUAL REVIEW OF HM ORDERS  08/18/2024    MEDICARE ANNUAL WELLNESS VISIT  08/18/2024    TSH W/FREE T4 REFLEX  08/18/2024    MAMMO SCREENING  08/30/2024    INFLUENZA VACCINE (1) 09/01/2024    ASTHMA CONTROL TEST  02/26/2025    ASTHMA ACTION PLAN  03/15/2025    FALL RISK ASSESSMENT  08/26/2025    GLUCOSE  12/18/2026    ADVANCE CARE PLANNING  08/18/2028    DEXA  09/03/2035    Pneumococcal Vaccine: 65+ Years  Completed    Medicare Annual MTM Pharmacist Visit (once per calendar year)  Completed    HPV IMMUNIZATION  Aged Out    MENINGITIS IMMUNIZATION  Aged Out    RSV MONOCLONAL ANTIBODY  Aged Out    HEPATITIS C SCREENING  Discontinued         Review of Systems  CONSTITUTIONAL: NEGATIVE for fever, chills, change in weight  INTEGUMENTARY/SKIN: NEGATIVE for worrisome rashes, moles or lesions  EYES: NEGATIVE for vision changes or irritation  ENT/MOUTH: NEGATIVE for ear, mouth and throat problems  RESP: NEGATIVE for significant cough or SOB  BREAST: NEGATIVE for masses, tenderness or discharge  CV: NEGATIVE for chest pain, palpitations or peripheral edema  GI: NEGATIVE for nausea, abdominal pain, heartburn, or change in bowel habits  : NEGATIVE for frequency, dysuria, or hematuria  MUSCULOSKELETAL: NEGATIVE for significant arthralgias or myalgia  MUSCULOSKELETAL:toes are cold again   NEURO: NEGATIVE for weakness, dizziness or paresthesias  ENDOCRINE: NEGATIVE for temperature intolerance, skin/hair changes  HEME: NEGATIVE for bleeding problems  PSYCHIATRIC: NEGATIVE for changes in mood or affect     Objective    Exam  /60   Pulse 72   Temp 97.8  F (36.6  C)   Resp 14   Ht 1.55 m (5' 1.02\")   Wt 57.4 kg (126 lb 8 oz)   LMP  (LMP Unknown)   SpO2 98%   BMI 23.88 kg/m     Estimated body mass index is 23.88 kg/m  as calculated from the following:    Height as of this encounter: 1.55 m (5' 1.02\").    Weight as of this encounter: 57.4 kg (126 lb 8 oz).    Physical Exam  GENERAL: " alert and no distress  EYES: Eyes grossly normal to inspection, PERRL and conjunctivae and sclerae normal  HENT: ear canals and TM's normal, nose and mouth without ulcers or lesions  NECK: no adenopathy, no asymmetry, masses, or scars  RESP: lungs clear to auscultation - no rales, rhonchi or wheezes  CV: regular rate and rhythm, normal S1 S2, no S3 or S4, no murmur, click or rub, no peripheral edema  ABDOMEN: soft, nontender, no hepatosplenomegaly, no masses and bowel sounds normal  MS: toes are dusky decreased pulses, no sores,   SKIN: no suspicious lesions or rashes  NEURO: Normal strength and tone, mentation intact and speech normal  PSYCH: mentation appears normal, affect normal/bright        Signed Electronically by: Fuentes Darby MD    Answers submitted by the patient for this visit:  Patient Health Questionnaire (Submitted on 8/26/2024)  If you checked off any problems, how difficult have these problems made it for you to do your work, take care of things at home, or get along with other people?: Somewhat difficult  PHQ9 TOTAL SCORE: 6

## 2024-08-28 ASSESSMENT — PATIENT HEALTH QUESTIONNAIRE - PHQ9
SUM OF ALL RESPONSES TO PHQ QUESTIONS 1-9: 5
10. IF YOU CHECKED OFF ANY PROBLEMS, HOW DIFFICULT HAVE THESE PROBLEMS MADE IT FOR YOU TO DO YOUR WORK, TAKE CARE OF THINGS AT HOME, OR GET ALONG WITH OTHER PEOPLE: SOMEWHAT DIFFICULT
SUM OF ALL RESPONSES TO PHQ QUESTIONS 1-9: 5

## 2024-08-29 ENCOUNTER — TELEPHONE (OUTPATIENT)
Dept: GASTROENTEROLOGY | Facility: CLINIC | Age: 69
End: 2024-08-29
Payer: COMMERCIAL

## 2024-08-29 ENCOUNTER — VIRTUAL VISIT (OUTPATIENT)
Dept: PSYCHOLOGY | Facility: CLINIC | Age: 69
End: 2024-08-29
Payer: COMMERCIAL

## 2024-08-29 DIAGNOSIS — F33.0 MAJOR DEPRESSIVE DISORDER, RECURRENT EPISODE, MILD (H): Primary | ICD-10-CM

## 2024-08-29 PROCEDURE — 90834 PSYTX W PT 45 MINUTES: CPT | Mod: 95 | Performed by: MARRIAGE & FAMILY THERAPIST

## 2024-08-29 NOTE — PROGRESS NOTES
Answers submitted by the patient for this visit:  Patient Health Questionnaire (Submitted on 2024)  If you checked off any problems, how difficult have these problems made it for you to do your work, take care of things at home, or get along with other people?: Somewhat difficult  PHQ9 TOTAL SCORE: 5      [facility]                                     Progress Note    Patient Name: Brissa Mayer  Date: 24         Service Type: Individual      Session Start Time: 11:31a  Session End Time: 12:21p     Session Length: 50    Session #: 2    Attendees: Client attended alone    Service Modality:  Video Visit:      Provider verified identity through the following two step process.  Patient provided:  Patient     Telemedicine Visit: The patient's condition can be safely assessed and treated via synchronous audio and visual telemedicine encounter.      Reason for Telemedicine Visit: Services only offered telehealth    Originating Site (Patient Location): Patient's home    Distant Site (Provider Location): Provider Remote Setting- Home Office    Consent:  The patient/guardian has verbally consented to: the potential risks and benefits of telemedicine (video visit) versus in person care; bill my insurance or make self-payment for services provided; and responsibility for payment of non-covered services.     Patient would like the video invitation sent by:  Send to e-mail at: [email or web address]    Mode of Communication:  Video Conference via [employer]    Distant Location (Provider):  Off-site    As the provider I attest to compliance with applicable laws and regulations related to telemedicine.    DATA  Interactive Complexity: No  Crisis: No        Progress Since Last Session (Related to Symptoms / Goals / Homework):   Symptoms: Improving MOod around 7/10.  Anxiety sx 8.5/10    Homework: Completed in session      Episode of Care Goals: Satisfactory progress - ACTION (Actively working towards  change); Intervened by reinforcing change plan / affirming steps taken     Current / Ongoing Stressors and Concerns:   Last session 8/22, overall things have been improving both at home and with work.    Goal: CBT log and challenging worries     Treatment Objective(s) Addressed in This Session:   use cognitive strategies identified in therapy to challenge anxious thoughts       Intervention:   Motivational Interviewing    MI Intervention: Supported Autonomy, Collaboration, Evocation and Permission to raise concern or advise     Change Talk Expressed by the Patient: Reasons to change Need to change    Provider Response to Change Talk: E - Evoked more info from patient about behavior change and A - Affirmed patient's thoughts, decisions, or attempts at behavior change    Assessments completed prior to visit:  The following assessments were completed by patient for this visit:  PHQ9:       12/28/2023     9:20 AM 1/30/2024     9:49 AM 3/15/2024    10:08 AM 8/21/2024     1:18 PM 8/26/2024     7:52 AM 8/26/2024     8:07 AM 8/28/2024     7:14 PM   PHQ-9 SCORE   PHQ-9 Total Score MyChart 6 (Mild depression) 6 (Mild depression)  7 (Mild depression)  6 (Mild depression) 5 (Mild depression)   PHQ-9 Total Score 6 6 2 7 6 6 5         ASSESSMENT: Current Emotional / Mental Status (status of significant symptoms):   Risk status (Self / Other harm or suicidal ideation)   Patient denies current fears or concerns for personal safety.   Patient denies current or recent suicidal ideation or behaviors.   Patient denies current or recent homicidal ideation or behaviors.   Patient denies current or recent self injurious behavior or ideation.   Patient denies other safety concerns.   Patient reports there has been no change in risk factors since their last session.     Patient reports there has been no change in protective factors since their last session.     Recommended that patient call 911 or go to the local ED should there be a change  in any of these risk factors.     Appearance:   Appropriate    Eye Contact:   Good    Psychomotor Behavior: Normal    Attitude:   Cooperative    Orientation:   All   Speech    Rate / Production: Normal     Volume:  Normal    Mood:    Normal   Affect:    Appropriate    Thought Content:  Clear    Thought Form:  Coherent  Logical    Insight:    Good      Medication Review:   No changes to current psychiatric medication(s)     Medication Compliance:   Yes     Changes in Health Issues:   None reported     Chemical Use Review:   Substance Use: Chemical use reviewed, no active concerns identified      Tobacco Use: No current tobacco use.      Diagnosis:  JOCELINE    Collateral Reports Completed:   Not Applicable    PLAN: (Patient Tasks / Therapist Tasks / Other)  CBT log and challenging worries    Lenard Zeng, LMFT  8/29/24                                                         ______________________________________________________________________    Individual Treatment Plan    Patient's Name: Brissa Mayer  YOB: 1955    Date of Creation: 8/29/24  Date Treatment Plan Last Reviewed/Revised: 11/29/24    DSM5 Diagnoses: 296.31 (F33.0) Major Depressive Disorder, Recurrent Episode, Mild _  Psychosocial / Contextual Factors:   PROMIS (reviewed every 90 days):     Referral / Collaboration:  Referral to another professional/service is not indicated at this time..    Anticipated number of session for this episode of care: 3-6 sessions  Anticipation frequency of session: Biweekly  Anticipated Duration of each session: 38-52 minutes  Treatment plan will be reviewed in 90 days or when goals have been changed.       MeasurableTreatment Goal(s) related to diagnosis / functional impairment(s)  Goal 1: Patient will engage in using CBT skills      Objective #A (Patient Action)    Patient will Increase interest, engagement, and pleasure in doing things.  Status: New - Date: 8/29/24      Intervention(s)  Therapist  will teach emotional regulation skills.   .        Patient has reviewed and agreed to the above plan.      Lenard Zeng, DC  August 29, 2024

## 2024-08-29 NOTE — TELEPHONE ENCOUNTER
"Endoscopy Scheduling Screen    Have you had a positive Covid test in the last 14 days?  No    What is your communication preference for Instructions and/or Bowel Prep?   MyChart    What insurance is in the chart?  Other:       Ordering/Referring Provider: VEDA CARPIO    (If ordering provider performs procedure, schedule with ordering provider unless otherwise instructed. )    BMI: Estimated body mass index is 23.88 kg/m  as calculated from the following:    Height as of 8/26/24: 1.55 m (5' 1.02\").    Weight as of 8/26/24: 57.4 kg (126 lb 8 oz).     Sedation Ordered  moderate sedation.   If patient BMI > 50 do not schedule in [facility].    If patient BMI > 45 do not schedule at [facility].    Are you taking methadone or Suboxone?  No    Have you had difficulties, pain, or discomfort during past endoscopy procedures?  No    Are you taking any prescription medications for pain 3 or more times per week?   NO, No RN review required.    Do you have a history of malignant hyperthermia?  No    (Females) Are you currently pregnant?   No     Have you been diagnosed or told you have pulmonary hypertension?   No    Do you have an LVAD?  No    Have you been told you have moderate to severe sleep apnea?  No    Have you been told you have COPD, asthma, or any other lung disease?  Yes     What breathing problems do you have?  Asthma     Do you use home oxygen?  No    Have your breathing problems required an ED visit or hospitalization in the last year?  No    Do you have any heart conditions?  No     Have you ever had or are you waiting for an organ transplant?  No. Continue scheduling, no site restrictions.    Have you had a stroke or transient ischemic attack (TIA aka \"mini stroke\" in the last 6 months?   No    Have you been diagnosed with or been told you have cirrhosis of the liver?   No    Are you currently on dialysis?   No    Do you need assistance transferring?   No    BMI: Estimated body mass index is 23.88 kg/m  as calculated " "from the following:    Height as of 8/26/24: 1.55 m (5' 1.02\").    Weight as of 8/26/24: 57.4 kg (126 lb 8 oz).     Is patients BMI > 40 and scheduling location [facility]?  No    Do you take an injectable medication for weight loss or diabetes (excluding insulin)?  No    Do you take the medication Naltrexone?  No    Do you take blood thinners?  Yes     Are you taking Effient/Prasugrel?  No, you must contact your prescribing provider for direction on holding or bridging with a different medication.       Prep   Are you currently on dialysis or do you have chronic kidney disease?  No    Do you have a diagnosis of diabetes?  No    Do you have a diagnosis of cystic fibrosis (CF)?  No    On a regular basis do you go 3 -5 days between bowel movements?  No    BMI > 40?  No    Preferred Pharmacy:    [facility] [address] [address] [id number]  Phone: [phone] Fax: [phone]      Final Scheduling Details     Procedure scheduled  Colonoscopy    Surgeon:  Ike      Date of procedure:  10/22/2024      Pre-OP / PAC:   No - Not required for this site.    Location  [facility] - Per order.    Sedation   MAC/Deep Sedation  per loc       Patient Reminders:   You will receive a call from a Nurse to review instructions and health history.  This assessment must be completed prior to your procedure.  Failure to complete the Nurse assessment may result in the procedure being cancelled.      On the day of your procedure, please designate an adult(s) who can drive you home stay with you for the next 24 hours. The medicines used in the exam will make you sleepy. You will not be able to drive.      You cannot take public transportation, ride share services, or non-medical taxi service without a responsible caregiver.  Medical transport services are allowed with the requirement that a responsible caregiver will receive you at your destination.  We require that drivers and caregivers are " confirmed prior to your procedure.

## 2024-09-12 ENCOUNTER — VIRTUAL VISIT (OUTPATIENT)
Dept: PSYCHOLOGY | Facility: CLINIC | Age: 69
End: 2024-09-12
Payer: COMMERCIAL

## 2024-09-12 DIAGNOSIS — F41.1 GENERALIZED ANXIETY DISORDER: Primary | ICD-10-CM

## 2024-09-12 PROCEDURE — 90834 PSYTX W PT 45 MINUTES: CPT | Mod: 95 | Performed by: MARRIAGE & FAMILY THERAPIST

## 2024-09-12 NOTE — PROGRESS NOTES
[facility]                                     Progress Note    Patient Name: Brissa Mayer  Date: 24         Service Type: Individual      Session Start Time: 11:31a  Session End Time: 12:21p     Session Length: 50    Session #: 3    Attendees: Client attended alone    Service Modality:  Video Visit:      Provider verified identity through the following two step process.  Patient provided:  Patient     Telemedicine Visit: The patient's condition can be safely assessed and treated via synchronous audio and visual telemedicine encounter.      Reason for Telemedicine Visit: Services only offered telehealth    Originating Site (Patient Location): Patient's home    Distant Site (Provider Location): Provider Remote Setting- Home Office    Consent:  The patient/guardian has verbally consented to: the potential risks and benefits of telemedicine (video visit) versus in person care; bill my insurance or make self-payment for services provided; and responsibility for payment of non-covered services.     Patient would like the video invitation sent by:  Send to e-mail at: [email or web address]    Mode of Communication:  Video Conference via Amwell    Distant Location (Provider):  Off-site    As the provider I attest to compliance with applicable laws and regulations related to telemedicine.    DATA  Interactive Complexity: No  Crisis: No        Progress Since Last Session (Related to Symptoms / Goals / Homework):   Symptoms: No change, mood around 7/10  Anxiety sx have reduced to around 6/10     Homework: Completed in session      Episode of Care Goals: Satisfactory progress - ACTION (Actively working towards change); Intervened by reinforcing change plan / affirming steps taken     Current / Ongoing Stressors and Concerns:   Last session , overall things have been trending in a positive direction. Co worker is on leave which has allowed more time in front of business, work continues to be a  stable place. Looking forward to convention in 2 weeks.      Treatment Objective(s) Addressed in This Session:   use cognitive strategies identified in therapy to challenge anxious thoughts       Intervention:   Motivational Interviewing    MI Intervention: Supported Autonomy, Collaboration, Evocation and Permission to raise concern or advise     Change Talk Expressed by the Patient: Reasons to change Need to change    Provider Response to Change Talk: E - Evoked more info from patient about behavior change and A - Affirmed patient's thoughts, decisions, or attempts at behavior change      ASSESSMENT: Current Emotional / Mental Status (status of significant symptoms):   Risk status (Self / Other harm or suicidal ideation)   Patient denies current fears or concerns for personal safety.   Patient denies current or recent suicidal ideation or behaviors.   Patient denies current or recent homicidal ideation or behaviors.   Patient denies current or recent self injurious behavior or ideation.   Patient denies other safety concerns.   Patient reports there has been no change in risk factors since their last session.     Patient reports there has been no change in protective factors since their last session.     Recommended that patient call 911 or go to the local ED should there be a change in any of these risk factors.     Appearance:   Appropriate    Eye Contact:   Good    Psychomotor Behavior: Normal    Attitude:   Cooperative    Orientation:   All   Speech    Rate / Production: Normal     Volume:  Normal    Mood:    Normal   Affect:    Appropriate    Thought Content:  Clear    Thought Form:  Coherent  Logical    Insight:    Good      Medication Review:   No changes to current psychiatric medication(s)     Medication Compliance:   Yes     Changes in Health Issues:   None reported     Chemical Use Review:   Substance Use: Chemical use reviewed, no active concerns identified      Tobacco Use: No current tobacco use.       Diagnosis:  JOCELINE    Collateral Reports Completed:   Not Applicable    PLAN: (Patient Tasks / Therapist Tasks / Other)  Healthy coping skills    DC Driver  9/12/24                                                         ______________________________________________________________________    Individual Treatment Plan    Patient's Name: Brissa Mayer  YOB: 1955    Date of Creation: 8/29/24  Date Treatment Plan Last Reviewed/Revised: 11/29/24    DSM5 Diagnoses: 296.31 (F33.0) Major Depressive Disorder, Recurrent Episode, Mild _  Psychosocial / Contextual Factors:   PROMIS (reviewed every 90 days):     Referral / Collaboration:  Referral to another professional/service is not indicated at this time..    Anticipated number of session for this episode of care: 3-6 sessions  Anticipation frequency of session: Biweekly  Anticipated Duration of each session: 38-52 minutes  Treatment plan will be reviewed in 90 days or when goals have been changed.       MeasurableTreatment Goal(s) related to diagnosis / functional impairment(s)  Goal 1: Patient will engage in using CBT skills      Objective #A (Patient Action)    Patient will Increase interest, engagement, and pleasure in doing things.  Status: New - Date: 8/29/24      Intervention(s)  Therapist will teach emotional regulation skills.   .        Patient has reviewed and agreed to the above plan.      DC Driver  August 29, 2024

## 2024-10-01 NOTE — TELEPHONE ENCOUNTER
Standard Miralax Bowel Prep recommended due to standard bowel prep. Instructions were sent via [employer].

## 2024-10-03 ENCOUNTER — VIRTUAL VISIT (OUTPATIENT)
Dept: PSYCHOLOGY | Facility: CLINIC | Age: 69
End: 2024-10-03
Payer: COMMERCIAL

## 2024-10-03 DIAGNOSIS — F41.1 GENERALIZED ANXIETY DISORDER: Primary | ICD-10-CM

## 2024-10-03 PROCEDURE — 90832 PSYTX W PT 30 MINUTES: CPT | Mod: 95 | Performed by: MARRIAGE & FAMILY THERAPIST

## 2024-10-03 NOTE — PROGRESS NOTES
[facility]                                     Progress Note    Patient Name: Brissa Mayer  Date: 10/3/24         Service Type: Individual      Session Start Time: 11:33a  Session End Time: 11:50a     Session Length: 17    Session #: 4    Attendees: Client attended alone    Service Modality:  Video Visit:      Provider verified identity through the following two step process.  Patient provided:  Patient     Telemedicine Visit: The patient's condition can be safely assessed and treated via synchronous audio and visual telemedicine encounter.      Reason for Telemedicine Visit: Services only offered telehealth    Originating Site (Patient Location): Patient's home    Distant Site (Provider Location): Provider Remote Setting- Home Office    Consent:  The patient/guardian has verbally consented to: the potential risks and benefits of telemedicine (video visit) versus in person care; bill my insurance or make self-payment for services provided; and responsibility for payment of non-covered services.     Patient would like the video invitation sent by:  Send to e-mail at: [email or web address]    Mode of Communication:  Video Conference via Amwell    Distant Location (Provider):  Off-site    As the provider I attest to compliance with applicable laws and regulations related to telemedicine.    DATA  Interactive Complexity: No  Crisis: No        Progress Since Last Session (Related to Symptoms / Goals / Homework):   Symptoms: Improving, mood around 8.5/10     Homework: Completed in session      Episode of Care Goals: Satisfactory progress - ACTION (Actively working towards change); Intervened by reinforcing change plan / affirming steps taken     Current / Ongoing Stressors and Concerns:   Last session , overall things have been going very well. Had a positive experience at the convention and co worker did not attend which made it positive.        Treatment Objective(s) Addressed in This  Session:   use cognitive strategies identified in therapy to challenge anxious thoughts       Intervention:   Motivational Interviewing    MI Intervention: Supported Autonomy, Collaboration, Evocation and Permission to raise concern or advise     Change Talk Expressed by the Patient: Reasons to change Need to change    Provider Response to Change Talk: E - Evoked more info from patient about behavior change and A - Affirmed patient's thoughts, decisions, or attempts at behavior change      ASSESSMENT: Current Emotional / Mental Status (status of significant symptoms):   Risk status (Self / Other harm or suicidal ideation)   Patient denies current fears or concerns for personal safety.   Patient denies current or recent suicidal ideation or behaviors.   Patient denies current or recent homicidal ideation or behaviors.   Patient denies current or recent self injurious behavior or ideation.   Patient denies other safety concerns.   Patient reports there has been no change in risk factors since their last session.     Patient reports there has been no change in protective factors since their last session.     Recommended that patient call 911 or go to the local ED should there be a change in any of these risk factors.     Appearance:   Appropriate    Eye Contact:   Good    Psychomotor Behavior: Normal    Attitude:   Cooperative    Orientation:   All   Speech    Rate / Production: Normal     Volume:  Normal    Mood:    Normal   Affect:    Appropriate    Thought Content:  Clear    Thought Form:  Coherent  Logical    Insight:    Good      Medication Review:   No changes to current psychiatric medication(s)     Medication Compliance:   Yes     Changes in Health Issues:   None reported     Chemical Use Review:   Substance Use: Chemical use reviewed, no active concerns identified      Tobacco Use: No current tobacco use.      Diagnosis:  JOCELINE    Collateral Reports Completed:   Not Applicable    PLAN: (Patient Tasks / Therapist  Tasks / Other)  Discharge from Located within [facility] services    DC Driver  10/3/24                                                         ______________________________________________________________________    Individual Treatment Plan    Patient's Name: Brissa Mayer  YOB: 1955    Date of Creation: 8/29/24  Date Treatment Plan Last Reviewed/Revised: 11/29/24    DSM5 Diagnoses: 296.31 (F33.0) Major Depressive Disorder, Recurrent Episode, Mild _  Psychosocial / Contextual Factors:   PROMIS (reviewed every 90 days):     Referral / Collaboration:  Referral to another professional/service is not indicated at this time..    Anticipated number of session for this episode of care: 3-6 sessions  Anticipation frequency of session: Biweekly  Anticipated Duration of each session: 38-52 minutes  Treatment plan will be reviewed in 90 days or when goals have been changed.       MeasurableTreatment Goal(s) related to diagnosis / functional impairment(s)  Goal 1: Patient will engage in using CBT skills      Objective #A (Patient Action)    Patient will Increase interest, engagement, and pleasure in doing things.  Status: New - Date: 8/29/24      Intervention(s)  Therapist will teach emotional regulation skills.   .        Patient has reviewed and agreed to the above plan.      DC Driver  August 29, 2024

## 2024-10-09 ENCOUNTER — TELEPHONE (OUTPATIENT)
Dept: OTHER | Facility: CLINIC | Age: 69
End: 2024-10-09
Payer: COMMERCIAL

## 2024-10-09 NOTE — TELEPHONE ENCOUNTER
----- Message from Wilner Garcia sent at 10/9/2024  8:53 AM CDT -----  Regarding: FW: Eliquis  I am fine holding AC for a day prior . Please inform the patient of the above.    CF  ----- Message -----  From: Lalita Berman RN  Sent: 10/8/2024   7:12 PM CDT  To: Wilner Garcia MD  Subject: Eliquis                                          Dear provider, this patient is scheduled for a  colonoscopy  on 10/22.  Our preoperative guidelines indicate that the patient holds Eliquis for 1 day.     If your patient has not already been advised, please reach out to the patient to make a plan for holding this medication, to prevent any delay in care.    Thank You,   Same Day Surgery

## 2024-10-21 NOTE — H&P
[facility] Anesthesia Pre-op History and Physical    Brissa Mayer MRN# [id number]   Age: 69 year old YOB: 1955      Date of Surgery: 10/22/2024 Location [facility]      Date of Exam 10/22/2024 Facility (In hospital)       Home clinic: [facility]  Primary care provider: Fuentes Darby         Chief Complaint and/or Reason for Procedure:   No chief complaint on file.  Colonoscopy  2019 adenoma       Active problem list:     Patient Active Problem List    Diagnosis Date Noted    Adjustment disorder with mixed anxiety and depressed mood 04/03/2018     Priority: Medium    Gastroesophageal reflux disease without esophagitis 10/10/2016     Priority: Medium    Hypothyroidism 08/01/2016     Priority: Medium    Major depressive disorder, recurrent episode, mild (HCC) 01/22/2016     Priority: Medium    Generalized anxiety disorder 10/07/2013     Priority: Medium    Hyperlipidemia LDL goal <130 10/23/2012     Priority: Medium    Anxiety state 08/31/2012     Priority: Medium     Problem list name updated by automated process. Provider to review      CARDIOVASCULAR SCREENING; LDL GOAL LESS THAN 160 10/31/2010     Priority: Medium    Asthma, mild intermittent      Priority: Medium    Anorexia nervosa (H) 06/04/2007     Priority: Medium    Other bipolar disorder (H) 03/30/2006     Priority: Medium    Asthma 05/17/2005     Priority: Medium    Allergic rhinitis 03/31/2004     Priority: Medium            Medications (include herbals and vitamins):   Any Plavix use in the last 7 days? No     No current facility-administered medications for this encounter.     Current Outpatient Medications   Medication Sig Dispense Refill    albuterol (VENTOLIN HFA) 108 (90 Base) MCG/ACT inhaler INHALE TWO PUFFS BY MOUTH EVERY 6 HOURS AS NEEDED FOR SHORTNESS OF BREATH/DYSPNEA 18 g 3    amLODIPine (NORVASC) 2.5 MG tablet Take 1 tablet (2.5 mg) by mouth daily 90 tablet 3     apixaban ANTICOAGULANT (ELIQUIS ANTICOAGULANT) 5 MG tablet Take 1 tablet (5 mg) by mouth 2 times daily 180 tablet 3    atorvastatin (LIPITOR) 20 MG tablet Take 0.5 tablets (10 mg) by mouth every other day      DULoxetine (CYMBALTA) 30 MG capsule Take 1 capsule (30 mg) by mouth daily for 1 week, then increase to 2 capsules by mouth daily thereafter 180 capsule 3    fluticasone-salmeterol (ADVAIR DISKUS) 250-50 MCG/ACT inhaler INHALE ONE PUFF BY MOUTH TWICE A  each 3    ipratropium - albuterol 0.5 mg/2.5 mg/3 mL (DUONEB) 0.5-2.5 (3) MG/3ML neb solution Take 1 vial (3 mLs) by nebulization every 6 hours as needed for shortness of breath, wheezing or cough 90 mL 1    levothyroxine (SYNTHROID/LEVOTHROID) 50 MCG tablet TAKE ONE TABLET BY MOUTH ONCE DAILY 90 tablet 3    magnesium oxide (MAG-OX) 400 MG tablet Take 400 mg by mouth daily               Allergies:      Allergies   Allergen Reactions    Compazine      Anxiety    Flagyl [Metronidazole Hcl] Nausea and Vomiting     Allergy to Latex? No  Allergy to tape?   No  Intolerances:             Physical Exam:   All vitals have been reviewed  No data found.  No intake/output data recorded.  Lungs:   No increased work of breathing, good air exchange, clear to auscultation bilaterally, no crackles or wheezing     Cardiovascular:   Normal apical impulse, regular rate and rhythm, normal S1 and S2, no S3 or S4, and no murmur noted             Lab / Radiology Results:            Anesthetic risk and/or ASA classification:       Brent Ramires MD

## 2024-10-22 ENCOUNTER — ANESTHESIA EVENT (OUTPATIENT)
Dept: GASTROENTEROLOGY | Facility: CLINIC | Age: 69
End: 2024-10-22
Payer: COMMERCIAL

## 2024-10-22 ENCOUNTER — ANESTHESIA (OUTPATIENT)
Dept: GASTROENTEROLOGY | Facility: CLINIC | Age: 69
End: 2024-10-22
Payer: COMMERCIAL

## 2024-10-22 ENCOUNTER — HOSPITAL ENCOUNTER (OUTPATIENT)
Facility: CLINIC | Age: 69
Discharge: HOME | End: 2024-10-22
Attending: INTERNAL MEDICINE | Admitting: INTERNAL MEDICINE
Payer: COMMERCIAL

## 2024-10-22 VITALS
OXYGEN SATURATION: 98 % | SYSTOLIC BLOOD PRESSURE: 90 MMHG | TEMPERATURE: 98.1 F | DIASTOLIC BLOOD PRESSURE: 77 MMHG | HEART RATE: 95 BPM | RESPIRATION RATE: 18 BRPM

## 2024-10-22 LAB — COLONOSCOPY: NORMAL

## 2024-10-22 PROCEDURE — 45380 COLONOSCOPY AND BIOPSY: CPT | Performed by: INTERNAL MEDICINE

## 2024-10-22 PROCEDURE — 88305 TISSUE EXAM BY PATHOLOGIST: CPT | Mod: TC | Performed by: INTERNAL MEDICINE

## 2024-10-22 PROCEDURE — 370N000017 HC ANESTHESIA TECHNICAL FEE, PER MIN: Performed by: INTERNAL MEDICINE

## 2024-10-22 PROCEDURE — 250N000009 HC RX 250: Performed by: NURSE ANESTHETIST, CERTIFIED REGISTERED

## 2024-10-22 PROCEDURE — 45385 COLONOSCOPY W/LESION REMOVAL: CPT | Performed by: INTERNAL MEDICINE

## 2024-10-22 PROCEDURE — 88305 TISSUE EXAM BY PATHOLOGIST: CPT | Mod: 26 | Performed by: PATHOLOGY

## 2024-10-22 PROCEDURE — 250N000011 HC RX IP 250 OP 636: Performed by: NURSE ANESTHETIST, CERTIFIED REGISTERED

## 2024-10-22 RX ORDER — LIDOCAINE HYDROCHLORIDE 20 MG/ML
INJECTION, SOLUTION INFILTRATION; PERINEURAL PRN
Status: DISCONTINUED | OUTPATIENT
Start: 2024-10-22 | End: 2024-10-22

## 2024-10-22 RX ORDER — PROPOFOL 10 MG/ML
INJECTION, EMULSION INTRAVENOUS CONTINUOUS PRN
Status: DISCONTINUED | OUTPATIENT
Start: 2024-10-22 | End: 2024-10-22

## 2024-10-22 RX ORDER — LIDOCAINE 40 MG/G
CREAM TOPICAL
Status: DISCONTINUED | OUTPATIENT
Start: 2024-10-22 | End: 2024-10-22 | Stop reason: HOSPADM

## 2024-10-22 RX ORDER — PROPOFOL 10 MG/ML
INJECTION, EMULSION INTRAVENOUS PRN
Status: DISCONTINUED | OUTPATIENT
Start: 2024-10-22 | End: 2024-10-22

## 2024-10-22 RX ADMIN — PROPOFOL 60 MG: 10 INJECTION, EMULSION INTRAVENOUS at 11:41

## 2024-10-22 RX ADMIN — PROPOFOL 40 MG: 10 INJECTION, EMULSION INTRAVENOUS at 11:43

## 2024-10-22 RX ADMIN — PROPOFOL 30 MG: 10 INJECTION, EMULSION INTRAVENOUS at 11:46

## 2024-10-22 RX ADMIN — PROPOFOL 150 MCG/KG/MIN: 10 INJECTION, EMULSION INTRAVENOUS at 11:42

## 2024-10-22 RX ADMIN — LIDOCAINE HYDROCHLORIDE 60 MG: 20 INJECTION, SOLUTION INFILTRATION; PERINEURAL at 11:41

## 2024-10-22 ASSESSMENT — ACTIVITIES OF DAILY LIVING (ADL)
ADLS_ACUITY_SCORE: 35
ADLS_ACUITY_SCORE: 35

## 2024-10-22 NOTE — ANESTHESIA PREPROCEDURE EVALUATION
Anesthesia Pre-Procedure Evaluation    Patient: Brissa Mayer   MRN: [id number] : 1955        Procedure : Procedure(s):  Colonoscopy          Past Medical History:   Diagnosis Date    Anxiety     Arthritis     Asthma, mild intermittent     Major depressive disorder, single episode     Migraines     Tension headaches     Unspecified hypothyroidism       Past Surgical History:   Procedure Laterality Date    BUNIONECTOMY      COLONOSCOPY      ESOPHAGOSCOPY, GASTROSCOPY, DUODENOSCOPY (EGD), COMBINED N/A 2019    Procedure: ESOPHAGOSCOPY, GASTROSCOPY, DUODENOSCOPY (EGD);  Surgeon: Ochoa Cherry DO;  Location:  GI    OTHER SURGICAL HISTORY      Bunionectomy      Allergies   Allergen Reactions    Compazine      Anxiety    Flagyl [Metronidazole Hcl] Nausea and Vomiting      Social History     Tobacco Use    Smoking status: Never    Smokeless tobacco: Never   Substance Use Topics    Alcohol use: Yes     Comment: occ      Wt Readings from Last 1 Encounters:   24 57.4 kg (126 lb 8 oz)        Anesthesia Evaluation   Pt has had prior anesthetic. Type: MAC.        ROS/MED HX  ENT/Pulmonary:     (+)                     Intermittent, asthma                  Neurologic:     (+)      migraines,                          Cardiovascular: Comment: Raynaud's with a history of micro clots      (+) Dyslipidemia - -   -  - -   Taking blood thinners Pt has received instructions: Instructions Given to patient: stopped .                                 METS/Exercise Tolerance:     Hematologic:       Musculoskeletal:       GI/Hepatic:     (+) GERD, Asymptomatic on medication,      bowel prep,            Renal/Genitourinary:       Endo:     (+)          thyroid problem, hypothyroidism,           Psychiatric/Substance Use:     (+) psychiatric history anxiety and depression (anorexia nervosa)       Infectious Disease:       Malignancy:       Other:            Physical Exam    Airway  airway exam normal     "  Mallampati: I   TM distance: > 3 FB   Neck ROM: full   Mouth opening: > 3 cm    Respiratory Devices and Support         Dental       (+) Minor Abnormalities - some fillings, tiny chips      Cardiovascular   cardiovascular exam normal       Rhythm and rate: regular and normal     Pulmonary   pulmonary exam normal        breath sounds clear to auscultation           OUTSIDE LABS:  CBC:   Lab Results   Component Value Date    WBC 4.4 12/18/2023    WBC 5.4 01/02/2022    HGB 12.9 12/18/2023    HGB 13.0 01/02/2022    HCT 39.6 12/18/2023    HCT 39.5 01/02/2022     12/18/2023     01/02/2022     BMP:   Lab Results   Component Value Date     08/26/2024     12/18/2023    POTASSIUM 4.2 08/26/2024    POTASSIUM 4.2 12/18/2023    CHLORIDE 102 08/26/2024    CHLORIDE 104 12/18/2023    CO2 24 08/26/2024    CO2 28 12/18/2023    BUN 22.5 08/26/2024    BUN 18.2 12/18/2023    CR 0.64 08/26/2024    CR 0.60 12/18/2023     (H) 08/26/2024     (H) 12/18/2023     COAGS:   Lab Results   Component Value Date    PTT 29 05/18/2005    INR 0.97 05/18/2005     POC: No results found for: \"BGM\", \"HCG\", \"HCGS\"  HEPATIC:   Lab Results   Component Value Date    ALBUMIN 4.7 08/26/2024    PROTTOTAL 7.8 08/26/2024    ALT 13 08/26/2024    AST 24 08/26/2024    ALKPHOS 64 08/26/2024    BILITOTAL 0.3 08/26/2024     OTHER:   Lab Results   Component Value Date    LACT 1.3 05/27/2021    NAY 9.4 08/26/2024    MAG 2.4 (H) 12/18/2023    LIPASE 103 09/02/2021    AMYLASE 84 04/01/2014    TSH 1.32 08/26/2024    T4 1.12 02/23/2010    CRP <2.9 11/03/2021    SED 13 03/10/2023       Anesthesia Plan    ASA Status:  2    NPO Status:  NPO Appropriate    Anesthesia Type: MAC.     - Reason for MAC: straight local not clinically adequate   Induction: Propofol.   Maintenance: TIVA.        Consents    Anesthesia Plan(s) and associated risks, benefits, and realistic alternatives discussed. Questions answered and patient/representative(s) " expressed understanding.     - Discussed: Risks, Benefits and Alternatives for BOTH SEDATION and the PROCEDURE were discussed     - Discussed with:  Patient      - Extended Intubation/Ventilatory Support Discussed: No.      - Patient is DNR/DNI Status: No     Use of blood products discussed: No .     Postoperative Care    Pain management: IV analgesics.   PONV prophylaxis: Background Propofol Infusion     Comments:               HAZLE Rutledge CRNA    I have reviewed the pertinent notes and labs in the chart from the past 30 days and (re)examined the patient.  Any updates or changes from those notes are reflected in this note.                           # Asthma: noted on problem list

## 2024-10-22 NOTE — LETTER
October 28, 2024      Lara Mayer  [address] [facility] [address]        Dear ,    We are writing to inform you of your test results.    Multiple adenomas (benign) removed. A repeat exam in 3--5 yr is suggested.    Resulted Orders   Surgical Pathology Exam   Result Value Ref Range    Case Report       Surgical Pathology Report                         Case: [id number]                                  Authorizing Provider:  Brent Ramires MD        Collected:           10/22/2024 11:51 AM          Ordering Location:     [facility]          Received:            10/22/2024 12:23 PM                                 [facility]                                                          Pathologist:           Hermes Murray MD                                                                           Specimen:    Large Intestine, Colon, Cecum/Ascending, Cecal/ascending colon polyps                      Final Diagnosis       Large intestine, cecal/ascending, polyps, biopsy/polypectomy:  - Tubular adenomas negative for high-grade dysplasia and malignancy.       Clinical Information       Procedure:  COLONOSCOPY, WITH POLYPECTOMY      Gross Description       A(1). Large Intestine, Colon, Cecum/Ascending, Cecal/ascending colon polyps:  The specimen is received in formalin, labeled with the patient's name, medical record number and other identifying information and designated  cecal/ascending colon polyp . It consists of multiple tan soft tissue fragments ranging from 0.2-0.5 cm. Entirely submitted in one cassette.   ASA Le(ASCP) 10/23/2024 7:41 AM       Microscopic Description       A microscopic examination is performed.       Performing Labs       The technical component of this testing was completed at [facility].    Stain controls for  all stains resulted within this report have been reviewed and show appropriate reactivity.       Case Images         If you have any questions or concerns, please call the clinic at the number listed above.       Sincerely,      Brent Ramires MD

## 2024-10-22 NOTE — ANESTHESIA POSTPROCEDURE EVALUATION
Patient: Brissa Mayer    Procedure: Procedure(s):  COLONOSCOPY, WITH POLYPECTOMY       Anesthesia Type:  MAC    Note:  Disposition: Outpatient   Postop Pain Control: Uneventful            Sign Out: Well controlled pain   PONV: No   Neuro/Psych: Uneventful            Sign Out: Acceptable/Baseline neuro status   Airway/Respiratory: Uneventful            Sign Out: Acceptable/Baseline resp. status   CV/Hemodynamics: Uneventful            Sign Out: Acceptable CV status; No obvious hypovolemia; No obvious fluid overload   Other NRE: NONE   DID A NON-ROUTINE EVENT OCCUR? No           Last vitals:  Vitals Value Taken Time   /78 10/22/24 1220   Temp     Pulse 76 10/22/24 1220   Resp     SpO2 98 % 10/22/24 1211   Vitals shown include unfiled device data.    Electronically Signed By: HAZEL Rutledge CRNA  October 22, 2024  12:25 PM

## 2024-10-22 NOTE — DISCHARGE INSTRUCTIONS
[facility]    Home Care Following Endoscopy          Activity:  You have just undergone an endoscopic procedure under sedation.  Do not work or operate machinery (including a car) for at least 12 hours.      Diet:  Return to the diet you were on before your procedure but eat lightly for the first 12-24 hours.  Drink plenty of water.  Resume any regular medications unless otherwise advised by your physician.  Please begin any new medication prescribed as a result of your procedure as directed by your physician.   If you had any biopsy or polyp removed please refrain from aspirin or aspirin products for 2 days.  If on Coumadin please restart as instructed by your physician.   Pain:  You may take Tylenol as needed for pain.  Expected Recovery:  You can expect some mild abdominal fullness and/or discomfort due to the air used to inflate your intestinal tract. I encourage you to walk and attempt to pass air as soon as possible.        Call Your Physician if You Have:  After Colonoscopy:  Worsening persisting abdominal pain which is worse with activity.  Fevers (>101 degrees F), chills or shakes.  Passage of continued blood with bowel movements.   Any questions or concerns about your recovery, please call [phone] or after hours [phone]) [phone] Nurse Advice Line.    Follow-up Care:  If you had polyps/biopsy tissue sample(s) removed, the polyps/biopsy tissue sample(s) will be sent to pathology.  You should receive a letter in your My Chart with your results, within 1-2 weeks. If you do not participate in My Chart a physical letter will come in the mail in 2-3 weeks.  Please call if you have not received a notification of your results.

## 2024-10-22 NOTE — ANESTHESIA CARE TRANSFER NOTE
Patient: Brissa Mayer    Procedure: Procedure(s):  COLONOSCOPY, WITH POLYPECTOMY       Diagnosis: Screen for colon cancer [Z12.11]  Diagnosis Additional Information: No value filed.    Anesthesia Type:   MAC     Note:    Oropharynx: oropharynx clear of all foreign objects  Level of Consciousness: awake  Oxygen Supplementation: room air    Independent Airway: airway patency satisfactory and stable  Dentition: dentition unchanged  Vital Signs Stable: post-procedure vital signs reviewed and stable  Report to RN Given: handoff report given  Patient transferred to: Phase II    Handoff Report: Identifed the Patient, Identified the Reponsible Provider, Reviewed the pertinent medical history, Discussed the surgical course, Reviewed Intra-OP anesthesia mangement and issues during anesthesia, Set expectations for post-procedure period and Allowed opportunity for questions and acknowledgement of understanding      Vitals:  Vitals Value Taken Time   /77 10/22/24 1207   Temp     Pulse 100 10/22/24 1207   Resp     SpO2 98 % 10/22/24 1210   Vitals shown include unfiled device data.    Electronically Signed By: HAZEL Rutledge CRNA  October 22, 2024  12:12 PM

## 2024-10-24 LAB
PATH REPORT.COMMENTS IMP SPEC: NORMAL
PATH REPORT.COMMENTS IMP SPEC: NORMAL
PATH REPORT.FINAL DX SPEC: NORMAL
PATH REPORT.GROSS SPEC: NORMAL
PATH REPORT.MICROSCOPIC SPEC OTHER STN: NORMAL
PATH REPORT.RELEVANT HX SPEC: NORMAL
PHOTO IMAGE: NORMAL

## 2024-11-06 ENCOUNTER — PATIENT OUTREACH (OUTPATIENT)
Dept: GASTROENTEROLOGY | Facility: CLINIC | Age: 69
End: 2024-11-06
Payer: COMMERCIAL

## 2024-11-18 ENCOUNTER — HOSPITAL ENCOUNTER (EMERGENCY)
Facility: CLINIC | Age: 69
Discharge: HOME | End: 2024-11-18
Attending: EMERGENCY MEDICINE | Admitting: EMERGENCY MEDICINE
Payer: COMMERCIAL

## 2024-11-18 ENCOUNTER — APPOINTMENT (OUTPATIENT)
Dept: CT IMAGING | Facility: CLINIC | Age: 69
End: 2024-11-18
Attending: EMERGENCY MEDICINE
Payer: COMMERCIAL

## 2024-11-18 ENCOUNTER — NURSE TRIAGE (OUTPATIENT)
Dept: INTERNAL MEDICINE | Facility: CLINIC | Age: 69
End: 2024-11-18
Payer: COMMERCIAL

## 2024-11-18 VITALS
RESPIRATION RATE: 29 BRPM | SYSTOLIC BLOOD PRESSURE: 130 MMHG | WEIGHT: 123 LBS | HEART RATE: 90 BPM | TEMPERATURE: 97.2 F | OXYGEN SATURATION: 97 % | DIASTOLIC BLOOD PRESSURE: 73 MMHG | BODY MASS INDEX: 21.79 KG/M2 | HEIGHT: 63 IN

## 2024-11-18 DIAGNOSIS — R91.8 GROUND GLASS OPACITY PRESENT ON IMAGING OF LUNG: ICD-10-CM

## 2024-11-18 DIAGNOSIS — R04.2 HEMOPTYSIS: ICD-10-CM

## 2024-11-18 DIAGNOSIS — K13.79 ACQUIRED ANOMALY OF UVULA: ICD-10-CM

## 2024-11-18 LAB
ABO/RH(D): NORMAL
ANION GAP SERPL CALCULATED.3IONS-SCNC: 13 MMOL/L (ref 7–15)
ANTIBODY SCREEN: NEGATIVE
APTT PPP: 34 SECONDS (ref 22–38)
ATRIAL RATE - MUSE: 94 BPM
BASOPHILS # BLD AUTO: 0 10E3/UL (ref 0–0.2)
BASOPHILS NFR BLD AUTO: 0 %
BUN SERPL-MCNC: 15.4 MG/DL (ref 8–23)
CALCIUM SERPL-MCNC: 9.5 MG/DL (ref 8.8–10.4)
CHLORIDE SERPL-SCNC: 105 MMOL/L (ref 98–107)
CREAT SERPL-MCNC: 0.66 MG/DL (ref 0.51–0.95)
DIASTOLIC BLOOD PRESSURE - MUSE: NORMAL MMHG
EGFRCR SERPLBLD CKD-EPI 2021: >90 ML/MIN/1.73M2
EOSINOPHIL # BLD AUTO: 0.2 10E3/UL (ref 0–0.7)
EOSINOPHIL NFR BLD AUTO: 5 %
ERYTHROCYTE [DISTWIDTH] IN BLOOD BY AUTOMATED COUNT: 12 % (ref 10–15)
GLUCOSE SERPL-MCNC: 161 MG/DL (ref 70–99)
HCO3 SERPL-SCNC: 23 MMOL/L (ref 22–29)
HCT VFR BLD AUTO: 36.9 % (ref 35–47)
HGB BLD-MCNC: 12.6 G/DL (ref 11.7–15.7)
IMM GRANULOCYTES # BLD: 0 10E3/UL
IMM GRANULOCYTES NFR BLD: 1 %
INR PPP: 1.29 (ref 0.85–1.15)
INTERPRETATION ECG - MUSE: NORMAL
LYMPHOCYTES # BLD AUTO: 1.4 10E3/UL (ref 0.8–5.3)
LYMPHOCYTES NFR BLD AUTO: 37 %
MCH RBC QN AUTO: 31.6 PG (ref 26.5–33)
MCHC RBC AUTO-ENTMCNC: 34.1 G/DL (ref 31.5–36.5)
MCV RBC AUTO: 93 FL (ref 78–100)
MONOCYTES # BLD AUTO: 0.7 10E3/UL (ref 0–1.3)
MONOCYTES NFR BLD AUTO: 19 %
NEUTROPHILS # BLD AUTO: 1.4 10E3/UL (ref 1.6–8.3)
NEUTROPHILS NFR BLD AUTO: 38 %
NRBC # BLD AUTO: 0 10E3/UL
NRBC BLD AUTO-RTO: 0 /100
P AXIS - MUSE: 79 DEGREES
PLATELET # BLD AUTO: 188 10E3/UL (ref 150–450)
POTASSIUM SERPL-SCNC: 3.9 MMOL/L (ref 3.4–5.3)
PR INTERVAL - MUSE: 148 MS
QRS DURATION - MUSE: 78 MS
QT - MUSE: 358 MS
QTC - MUSE: 447 MS
R AXIS - MUSE: 71 DEGREES
RBC # BLD AUTO: 3.99 10E6/UL (ref 3.8–5.2)
SODIUM SERPL-SCNC: 141 MMOL/L (ref 135–145)
SPECIMEN EXPIRATION DATE: NORMAL
SYSTOLIC BLOOD PRESSURE - MUSE: NORMAL MMHG
T AXIS - MUSE: 75 DEGREES
VENTRICULAR RATE- MUSE: 94 BPM
WBC # BLD AUTO: 3.7 10E3/UL (ref 4–11)

## 2024-11-18 PROCEDURE — 250N000011 HC RX IP 250 OP 636: Performed by: EMERGENCY MEDICINE

## 2024-11-18 PROCEDURE — 96375 TX/PRO/DX INJ NEW DRUG ADDON: CPT

## 2024-11-18 PROCEDURE — 94640 AIRWAY INHALATION TREATMENT: CPT

## 2024-11-18 PROCEDURE — 93005 ELECTROCARDIOGRAM TRACING: CPT | Performed by: EMERGENCY MEDICINE

## 2024-11-18 PROCEDURE — 85610 PROTHROMBIN TIME: CPT | Performed by: EMERGENCY MEDICINE

## 2024-11-18 PROCEDURE — 999N000157 HC STATISTIC RCP TIME EA 10 MIN

## 2024-11-18 PROCEDURE — 71275 CT ANGIOGRAPHY CHEST: CPT

## 2024-11-18 PROCEDURE — 86900 BLOOD TYPING SEROLOGIC ABO: CPT | Performed by: EMERGENCY MEDICINE

## 2024-11-18 PROCEDURE — 250N000013 HC RX MED GY IP 250 OP 250 PS 637: Performed by: FAMILY MEDICINE

## 2024-11-18 PROCEDURE — 80048 BASIC METABOLIC PNL TOTAL CA: CPT | Performed by: EMERGENCY MEDICINE

## 2024-11-18 PROCEDURE — 82435 ASSAY OF BLOOD CHLORIDE: CPT | Performed by: EMERGENCY MEDICINE

## 2024-11-18 PROCEDURE — 85004 AUTOMATED DIFF WBC COUNT: CPT | Performed by: EMERGENCY MEDICINE

## 2024-11-18 PROCEDURE — 99285 EMERGENCY DEPT VISIT HI MDM: CPT | Mod: 25

## 2024-11-18 PROCEDURE — 86850 RBC ANTIBODY SCREEN: CPT | Performed by: EMERGENCY MEDICINE

## 2024-11-18 PROCEDURE — 85730 THROMBOPLASTIN TIME PARTIAL: CPT | Performed by: EMERGENCY MEDICINE

## 2024-11-18 PROCEDURE — 96374 THER/PROPH/DIAG INJ IV PUSH: CPT | Mod: 59

## 2024-11-18 PROCEDURE — 84295 ASSAY OF SERUM SODIUM: CPT | Performed by: EMERGENCY MEDICINE

## 2024-11-18 PROCEDURE — 250N000009 HC RX 250: Performed by: EMERGENCY MEDICINE

## 2024-11-18 PROCEDURE — 36415 COLL VENOUS BLD VENIPUNCTURE: CPT | Performed by: EMERGENCY MEDICINE

## 2024-11-18 RX ORDER — AZITHROMYCIN 500 MG/1
500 TABLET, FILM COATED ORAL ONCE
Status: COMPLETED | OUTPATIENT
Start: 2024-11-18 | End: 2024-11-18

## 2024-11-18 RX ORDER — IPRATROPIUM BROMIDE AND ALBUTEROL SULFATE 2.5; .5 MG/3ML; MG/3ML
3 SOLUTION RESPIRATORY (INHALATION) ONCE
Status: COMPLETED | OUTPATIENT
Start: 2024-11-18 | End: 2024-11-18

## 2024-11-18 RX ORDER — IOPAMIDOL 755 MG/ML
90 INJECTION, SOLUTION INTRAVASCULAR ONCE
Status: COMPLETED | OUTPATIENT
Start: 2024-11-18 | End: 2024-11-18

## 2024-11-18 RX ORDER — AZITHROMYCIN 250 MG/1
250 TABLET, FILM COATED ORAL DAILY
Qty: 4 TABLET | Refills: 0 | Status: SHIPPED | OUTPATIENT
Start: 2024-11-19

## 2024-11-18 RX ORDER — AZITHROMYCIN 250 MG/1
250 TABLET, FILM COATED ORAL DAILY
Qty: 4 TABLET | Refills: 0 | Status: SHIPPED | OUTPATIENT
Start: 2024-11-19 | End: 2024-11-18

## 2024-11-18 RX ORDER — METHYLPREDNISOLONE SODIUM SUCCINATE 125 MG/2ML
125 INJECTION INTRAMUSCULAR; INTRAVENOUS ONCE
Status: COMPLETED | OUTPATIENT
Start: 2024-11-18 | End: 2024-11-18

## 2024-11-18 RX ADMIN — PANTOPRAZOLE SODIUM 80 MG: 40 INJECTION, POWDER, FOR SOLUTION INTRAVENOUS at 14:04

## 2024-11-18 RX ADMIN — AZITHROMYCIN DIHYDRATE 500 MG: 500 TABLET, FILM COATED ORAL at 16:18

## 2024-11-18 RX ADMIN — IPRATROPIUM BROMIDE AND ALBUTEROL SULFATE 3 ML: .5; 3 SOLUTION RESPIRATORY (INHALATION) at 13:33

## 2024-11-18 RX ADMIN — IOPAMIDOL 90 ML: 755 INJECTION, SOLUTION INTRAVENOUS at 14:17

## 2024-11-18 RX ADMIN — METHYLPREDNISOLONE SODIUM SUCCINATE 125 MG: 125 INJECTION, POWDER, FOR SOLUTION INTRAMUSCULAR; INTRAVENOUS at 14:02

## 2024-11-18 RX ADMIN — TRANEXAMIC ACID 500 MG: 1 INJECTION, SOLUTION INTRAVENOUS at 13:36

## 2024-11-18 ASSESSMENT — COLUMBIA-SUICIDE SEVERITY RATING SCALE - C-SSRS
6. HAVE YOU EVER DONE ANYTHING, STARTED TO DO ANYTHING, OR PREPARED TO DO ANYTHING TO END YOUR LIFE?: NO
1. IN THE PAST MONTH, HAVE YOU WISHED YOU WERE DEAD OR WISHED YOU COULD GO TO SLEEP AND NOT WAKE UP?: NO
2. HAVE YOU ACTUALLY HAD ANY THOUGHTS OF KILLING YOURSELF IN THE PAST MONTH?: NO

## 2024-11-18 ASSESSMENT — ACTIVITIES OF DAILY LIVING (ADL)
ADLS_ACUITY_SCORE: 0

## 2024-11-18 NOTE — ED PROVIDER NOTES
EMERGENCY DEPARTMENT ENCOUNTER      NAME: Brissa Mayer  AGE: 69 year old female  YOB: 1955  MRN: [id number]  EVALUATION DATE & TIME: 11/18/2024 12:49 PM    PCP: Fuentes Darby    ED PROVIDER: Fabrizio Dobbins MD      Chief Complaint   Patient presents with    Hemoptysis         FINAL IMPRESSION:  1. Hemoptysis    2. Acquired anomaly of uvula          ED COURSE & MEDICAL DECISION MAKING:    Pertinent Labs & Imaging studies reviewed. (See chart for details)    69 year old female presents to the Emergency Department for evaluation of coughing up blood    Currently protecting airway.  No ongoing hemoptysis.  Speaks in complete sentences.    Patient is adamant this is not a mopped assist.  Has a history of duodenitis in the past ever given Protonix.    Patient shows me some tissue with blood on it from the ER here.  Coarse breath sounds.  Sore on uvula that appears to be source of recent bleed    Coarse breath sounds with patient states this feels like her usual asthma attack will give DuoNeb, Solu-Medrol    For likely mopped assist given nebulized TXA, will obtain CTA PE to look for pulmonary emboli, aVL or hemorrhage, bronchitis, pneumonia    Plan for CBC, INR, coags, BMP, type and screen    Early pulmonary consult.          1:15 PM I met with the patient, obtained history, performed an initial exam, and discussed options and plan for diagnostics and treatment here in the ED.    ED Course as of 11/18/24 1409   Mon Nov 18, 2024   1408 Spoke with pulmonary about possible need for bronchoscopy.  Pulmonary recommends calling them after the CT     Patient signed out to my partner pending completion of CTA PE and discussion with pulmonary    Medical Decision Making  Obtained supplemental history:Supplemental history obtained?: No  Reviewed external records: External records reviewed?: Documented in chart  Care impacted by chronic illness:Documented in Chart  Did you consider but not order tests?: Work up  considered but not performed and documented in chart, if applicable  Did you interpret images independently?: Independent interpretation of ECG and images noted in documentation, when applicable.  Consultation discussion with other provider:Did you involve another provider (consultant, , pharmacy, etc.)?: I discussed the care with another health care provider, see documentation for details.  Admission considered. Patient was signed out to the oncoming physician, disposition pending.    MIPS: CT Pulmonary Angiogram:CT PA was ordered for reason(s) other than PE.            At the conclusion of the encounter I discussed the results of all of the tests and the disposition. The questions were answered. The patient or family acknowledged understanding and was agreeable with the care plan.         MEDICATIONS GIVEN IN THE EMERGENCY:  Medications   iopamidol (ISOVUE-370) solution 90 mL (has no administration in time range)   tranexamic acid (CYKLOKAPRON) 100 mg/mL inhalation solution 500 mg (500 mg Nebulization $Given 11/18/24 1336)   pantoprazole (PROTONIX) IV push injection 80 mg (80 mg Intravenous $Given 11/18/24 1404)   ipratropium - albuterol 0.5 mg/2.5 mg/3 mL (DUONEB) neb solution 3 mL (3 mLs Nebulization $Given 11/18/24 1333)   methylPREDNISolone Na Suc (solu-MEDROL) injection 125 mg (125 mg Intravenous $Given 11/18/24 1402)       NEW PRESCRIPTIONS STARTED AT TODAY'S ER VISIT  New Prescriptions    No medications on file          =================================================================    TRIAGE ASSESSMENT:  Patient presents to the ED with coughing up blood for the last 30 minutes. EKG done in triage for feeling short of breath. Blood is bright red and thick. 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 Denies any recent travel. COVID exposure at work but denies any fevers at home. Patient is on eliquis BID. Patient is very restless and anxious in triage due to the blood.           "    HPI    Patient information was obtained from: patient     Use of : N/A      Brissa Mayer is a 69 year old female with a pertinent history of asthma, anxiety, depression, HLD, hypothyroidism, and GERD who presents to this ED by walking for evaluation of hemoptysis    Patient stated that she was at work today when she started coughing up blood all of the sudden. Patient noted that she has been having shortness of breath with this, and said that it feels like her typical asthma attack. She noted that she can't feel any bleed and that she just feels like she has \"bile\" in her throat. She used an inhaler this morning as she normally does in the morning and evening. She does not have any pain, but noted that she has a pressure on inhalation.    Patient mentioned an episode of heavy coughing, lasting 15 min, after asphyxiating some water about one week ago. She did not report having hemoptysis at that time.   Patient also mentioned that she had been drinking more heavily for an unspecified period of time. She described drinking about 1 bottle of wine each day on the weekend and 1-2 glasses per night during the week. She noted that she cut back on her drinking after starting a new medication.     Patient denied any vomiting, diarrhea, nausea, recent travel, history of cancer, history of liver disease, use of cigarettes, chest pain, and did not mention any other symptoms.    She denies black stools.  Is adamant she is not vomiting up blood, denies trauma to uvula    Patient is on Eliquis secondary to clots from COVID-19 and Raynaud's in the past    REVIEW OF SYSTEMS   Review of Systems     PAST MEDICAL HISTORY:  Past Medical History:   Diagnosis Date    Anxiety     Arthritis     Asthma, mild intermittent     Major depressive disorder, single episode     Migraines     Tension headaches     Unspecified hypothyroidism        PAST SURGICAL HISTORY:  Past Surgical History:   Procedure Laterality Date    " BUNIONECTOMY      COLONOSCOPY      COLONOSCOPY N/A 10/22/2024    Procedure: COLONOSCOPY, WITH POLYPECTOMY;  Surgeon: Brent Ramires MD;  Location: [facility]    ESOPHAGOSCOPY, GASTROSCOPY, DUODENOSCOPY (EGD), COMBINED N/A 4/2/2019    Procedure: ESOPHAGOSCOPY, GASTROSCOPY, DUODENOSCOPY (EGD);  Surgeon: Ochoa Cherry DO;  Location:  GI    OTHER SURGICAL HISTORY  1980    Bunionectomy           CURRENT MEDICATIONS:    albuterol (VENTOLIN HFA) 108 (90 Base) MCG/ACT inhaler  amLODIPine (NORVASC) 2.5 MG tablet  apixaban ANTICOAGULANT (ELIQUIS ANTICOAGULANT) 5 MG tablet  atorvastatin (LIPITOR) 20 MG tablet  DULoxetine (CYMBALTA) 30 MG capsule  fluticasone-salmeterol (ADVAIR DISKUS) 250-50 MCG/ACT inhaler  ipratropium - albuterol 0.5 mg/2.5 mg/3 mL (DUONEB) 0.5-2.5 (3) MG/3ML neb solution  levothyroxine (SYNTHROID/LEVOTHROID) 50 MCG tablet  magnesium oxide (MAG-OX) 400 MG tablet        ALLERGIES:  Allergies   Allergen Reactions    Compazine      Anxiety    Flagyl [Metronidazole Hcl] Nausea and Vomiting       FAMILY HISTORY:  Family History   Problem Relation Age of Onset    Asthma Mother     Diabetes Mother     Cancer - colorectal Maternal Grandmother     Heart Disease Father         MI at age 55    Prostate Cancer Father     Asthma Father     Asthma Sister     Obesity Sister     Diabetes Sister     Coronary Artery Disease Sister     Asthma Sister     Obesity Sister     Asthma Sister     Asthma Brother        SOCIAL HISTORY:   Social History     Socioeconomic History    Marital status: Single   Tobacco Use    Smoking status: Never    Smokeless tobacco: Never   Vaping Use    Vaping status: Never Used   Substance and Sexual Activity    Alcohol use: Yes     Comment: occ    Drug use: No    Sexual activity: Not Currently     Partners: Male     Birth control/protection: None   Other Topics Concern    Parent/sibling w/ CABG, MI or angioplasty before 65F 55M? No     Social Drivers of Health     Financial Resource Strain:  Low Risk  (8/26/2024)    Financial Resource Strain     Within the past 12 months, have you or your family members you live with been unable to get utilities (heat, electricity) when it was really needed?: No   Food Insecurity: Low Risk  (8/26/2024)    Food Insecurity     Within the past 12 months, did you worry that your food would run out before you got money to buy more?: No     Within the past 12 months, did the food you bought just not last and you didn t have money to get more?: No   Transportation Needs: Low Risk  (8/26/2024)    Transportation Needs     Within the past 12 months, has lack of transportation kept you from medical appointments, getting your medicines, non-medical meetings or appointments, work, or from getting things that you need?: No   Physical Activity: Insufficiently Active (8/26/2024)    Exercise Vital Sign     Days of Exercise per Week: 2 days     Minutes of Exercise per Session: 10 min   Stress: Stress Concern Present (8/26/2024)    Citizen of [place] [employer] - Occupational Stress Questionnaire     Feeling of Stress : To some extent   Social Connections: Unknown (8/26/2024)    Social Connection and Isolation Panel [NHANES]     Frequency of Social Gatherings with Friends and Family: Once a week   Interpersonal Safety: Low Risk  (10/22/2024)    Interpersonal Safety     Do you feel physically and emotionally safe where you currently live?: Yes     Within the past 12 months, have you been hit, slapped, kicked or otherwise physically hurt by someone?: No     Within the past 12 months, have you been humiliated or emotionally abused in other ways by your partner or ex-partner?: No   Housing Stability: Low Risk  (8/26/2024)    Housing Stability     Do you have housing? : Yes     Are you worried about losing your housing?: No       VITALS:  BP (!) 154/78 (BP Location: Left arm, Patient Position: Semi-Jensen's, Cuff Size: Adult Regular)   Pulse 116   Temp 97.2  F (36.2  C) (Temporal)  "  Resp 29   Ht 1.6 m (5' 3\")   Wt 55.8 kg (123 lb)   LMP  (LMP Unknown)   SpO2 96%   BMI 21.79 kg/m      PHYSICAL EXAM      Vitals: BP (!) 154/78 (BP Location: Left arm, Patient Position: Semi-Jensen's, Cuff Size: Adult Regular)   Pulse 116   Temp 97.2  F (36.2  C) (Temporal)   Resp 29   Ht 1.6 m (5' 3\")   Wt 55.8 kg (123 lb)   LMP  (LMP Unknown)   SpO2 96%   BMI 21.79 kg/m    General: Appears in no acute distress, awake, alert, interactive.  Eyes: Conjunctivae non-injected. Sclera anicteric.  HENT: Atraumatic.  No current mopped assist but in the room and there are tissues with drops of blood.  Uvula has bright red sore consistent with recent bleeding.  Did not wash off of gargling ice water  Neck: Supple.  Respiratory/Chest: Respiration unlabored.  Coarse breath sounds  Abdomen: non distended.  No abdominal  Musculoskeletal: Normal extremities. No edema or erythema.  Skin: Normal color. No rash or diaphoresis.  Neurologic: Face symmetric, moves all extremities spontaneously. Speech clear.  Psychiatric: Oriented to person, place, and time. Affect appropriate.    LAB:  All pertinent labs reviewed and interpreted.  Results for orders placed or performed during the hospital encounter of 11/18/24   Basic metabolic panel   Result Value Ref Range    Sodium 141 135 - 145 mmol/L    Potassium 3.9 3.4 - 5.3 mmol/L    Chloride 105 98 - 107 mmol/L    Carbon Dioxide (CO2) 23 22 - 29 mmol/L    Anion Gap 13 7 - 15 mmol/L    Urea Nitrogen 15.4 8.0 - 23.0 mg/dL    Creatinine 0.66 0.51 - 0.95 mg/dL    GFR Estimate >90 >60 mL/min/1.73m2    Calcium 9.5 8.8 - 10.4 mg/dL    Glucose 161 (H) 70 - 99 mg/dL   Result Value Ref Range    INR 1.29 (H) 0.85 - 1.15   Result Value Ref Range    aPTT 34 22 - 38 Seconds   CBC with platelets and differential   Result Value Ref Range    WBC Count 3.7 (L) 4.0 - 11.0 10e3/uL    RBC Count 3.99 3.80 - 5.20 10e6/uL    Hemoglobin 12.6 11.7 - 15.7 g/dL    Hematocrit 36.9 35.0 - 47.0 %    MCV 93 " 78 - 100 fL    MCH 31.6 26.5 - 33.0 pg    MCHC 34.1 31.5 - 36.5 g/dL    RDW 12.0 10.0 - 15.0 %    Platelet Count 188 150 - 450 10e3/uL    % Neutrophils 38 %    % Lymphocytes 37 %    % Monocytes 19 %    % Eosinophils 5 %    % Basophils 0 %    % Immature Granulocytes 1 %    NRBCs per 100 WBC 0 <1 /100    Absolute Neutrophils 1.4 (L) 1.6 - 8.3 10e3/uL    Absolute Lymphocytes 1.4 0.8 - 5.3 10e3/uL    Absolute Monocytes 0.7 0.0 - 1.3 10e3/uL    Absolute Eosinophils 0.2 0.0 - 0.7 10e3/uL    Absolute Basophils 0.0 0.0 - 0.2 10e3/uL    Absolute Immature Granulocytes 0.0 <=0.4 10e3/uL    Absolute NRBCs 0.0 10e3/uL   Adult Type and Screen   Result Value Ref Range    SPECIMEN EXPIRATION DATE [id number]        RADIOLOGY:  Reviewed all pertinent imaging. Please see official radiology report.  CT Chest Pulmonary Embolism w Contrast    (Results Pending)             I, Javier Joseph, am serving as a scribe to document services personally performed by Fabrizio Dobbins MD based on my observation and the provider's statements to me. I, Fabrizio Dobbins MD, attest that Jvaier Joseph is acting in a scribe capacity, has observed my performance of the services and has documented them in accordance with my direction.    Fabrizio Dobbins MD  [facility] EMERGENCY ROOM  [address] [facility] [id number] [phone]       Fabrizio Dobbins MD  11/18/24 3929

## 2024-11-18 NOTE — PROGRESS NOTES
Pt given Duoneb as ordered, pt on RA, o2 sats 96%, RR 20, BS clear diminished.  Pt given Cyklokapron 500 mg (5ml) via inhalation as ordered.  Pt hector well, BS remain clear, diminished, o2 sats 99%, RR 18, HR 83  Will continue to monitor

## 2024-11-18 NOTE — TELEPHONE ENCOUNTER
Nurse Triage SBAR    Is this a 2nd Level Triage? NO    Situation: Coughing up blood    Background: Patient coughing up blood - large clots of blood per patient.     Assessment: While on phone with writer patient coughed up blood clots two different times.     Protocol Recommended Disposition:   Call  Now Patient states she will go to the ED now.     Recommendation:  ED Now -Patient agreed to be seen in ED    Sushila Callahan RN on 11/18/2024 at 12:28 PM      Reason for Disposition   Coughed up blood and large amount (Such as: 'a half cup of blood')    Protocols used: Coughing Up Blood-A-OH

## 2024-11-18 NOTE — ED TRIAGE NOTES
Patient presents to the ED with coughing up blood for the last 30 minutes. EKG done in triage for feeling short of breath. Blood is bright red and thick. 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 Denies any recent travel. COVID exposure at work but denies any fevers at home. Patient is on eliquis BID. Patient is very restless and anxious in triage due to the blood.

## 2024-11-18 NOTE — DISCHARGE INSTRUCTIONS
Start azithromycin tomorrow afternoon    Do not take your Eliquis for the next 2 days    Soft diet for 48-hours    Return to the emergency department if you have shortness of breath, bleeding that does not stop, or other concern

## 2024-11-18 NOTE — ED NOTES
ED SIGNOUT  Date/Time:11/18/2024 3:06 PM    ED Course/MDM:    2:26 PM  Signout accepted from Dr. Dobbins.  Prior records were reviewed.  Labs, x-ray, CT, EKG from this visit are reviewed.  Patient presents today with hemoptysis, she is on blood thinners.  No further episodes of hemoptysis while in the department.  Labs independently interpreted by me with normal basic panel, normal hemoglobin, INR slightly elevated consistent with anticoagulation status.  CT of the chest independently interpreted by me with some patchy opacities primarily in the right lung.  Radiology interpretation is pending  3:11 PM reviewed radiology interpretation of CT PE study with multiple groundglass opacities in the right lung, infectious versus inflammatory.  Will discuss with pulmonology.  3:33 PM care discussed with Dr. Raman, pulmonology, who recommends observation, no bronch at this time, cover for atypicals.  3:48 PM patient rechecked, oxygen saturation 97% on room air, has had no further episodes of coughing up blood in the emergency department.  Discussed findings on CT and recommendation for admission.  Patient declines admission.  We discussed risks of return of hemoptysis, breathing difficulty if patient discharges and she is still wanting to go home.  She will be started on azithromycin, hold Eliquis for 2 days, soft diet for 48 hours.  She does have an area of fresh clot on the left side of the uvula and suspect that this may be the source of her bleeding as the area of groundglass opacity in the right lung is small and unlikely to account for reported volume of bloody    I discussed with patient the utility, limitations and findings of the exam/interventions/studies done during this visit as well as the list of differential diagnosis and symptoms to monitor/return to ER for.  Additional verbal discharge instructions were provided.     DIAGNOSIS  1. Hemoptysis    2. Acquired anomaly of uvula    3. Ground glass opacity present on  "imaging of lung        New Prescriptions    AZITHROMYCIN (ZITHROMAX Z-SHAVON) 250 MG TABLET    Take 1 tablet (250 mg) by mouth daily for 4 days.       HPI    Briefly, this is a 69 year old female with a pertinent history of asthma, anxiety, depression, HLD, hypothyroidism, and GERD who presents to this ED by walking for evaluation of hemoptysis.     Physical Exam:  /57   Pulse 81   Temp 97.2  F (36.2  C) (Temporal)   Resp 29   Ht 1.6 m (5' 3\")   Wt 55.8 kg (123 lb)   LMP  (LMP Unknown)   SpO2 92%   BMI 21.79 kg/m    Physical Exam  Vitals and nursing note reviewed.   Constitutional:       Appearance: Normal appearance.   HENT:      Head: Normocephalic and atraumatic.      Right Ear: External ear normal.      Left Ear: External ear normal.      Nose: Nose normal.      Mouth/Throat:      Comments: 1 mm clot on the left side of the uvula with no active bleed  Eyes:      Extraocular Movements: Extraocular movements intact.      Conjunctiva/sclera: Conjunctivae normal.   Pulmonary:      Effort: Pulmonary effort is normal.   Musculoskeletal:         General: Normal range of motion.      Cervical back: Normal range of motion.      Right lower leg: No edema.      Left lower leg: No edema.   Skin:     General: Skin is warm and dry.   Neurological:      General: No focal deficit present.      Mental Status: She is alert and oriented to person, place, and time. Mental status is at baseline.   Psychiatric:         Mood and Affect: Mood normal.         Behavior: Behavior normal.         Thought Content: Thought content normal.          Results for orders placed or performed during the hospital encounter of 11/18/24   CT Chest Pulmonary Embolism w Contrast    Impression    IMPRESSION:  1.  No pulmonary emboli.  2.  Multiple groundglass opacities throughout the right lung, likely infectious or inflammatory.     Basic metabolic panel   Result Value Ref Range    Sodium 141 135 - 145 mmol/L    Potassium 3.9 3.4 - 5.3 mmol/L "    Chloride 105 98 - 107 mmol/L    Carbon Dioxide (CO2) 23 22 - 29 mmol/L    Anion Gap 13 7 - 15 mmol/L    Urea Nitrogen 15.4 8.0 - 23.0 mg/dL    Creatinine 0.66 0.51 - 0.95 mg/dL    GFR Estimate >90 >60 mL/min/1.73m2    Calcium 9.5 8.8 - 10.4 mg/dL    Glucose 161 (H) 70 - 99 mg/dL   Result Value Ref Range    INR 1.29 (H) 0.85 - 1.15   Result Value Ref Range    aPTT 34 22 - 38 Seconds   CBC with platelets and differential   Result Value Ref Range    WBC Count 3.7 (L) 4.0 - 11.0 10e3/uL    RBC Count 3.99 3.80 - 5.20 10e6/uL    Hemoglobin 12.6 11.7 - 15.7 g/dL    Hematocrit 36.9 35.0 - 47.0 %    MCV 93 78 - 100 fL    MCH 31.6 26.5 - 33.0 pg    MCHC 34.1 31.5 - 36.5 g/dL    RDW 12.0 10.0 - 15.0 %    Platelet Count 188 150 - 450 10e3/uL    % Neutrophils 38 %    % Lymphocytes 37 %    % Monocytes 19 %    % Eosinophils 5 %    % Basophils 0 %    % Immature Granulocytes 1 %    NRBCs per 100 WBC 0 <1 /100    Absolute Neutrophils 1.4 (L) 1.6 - 8.3 10e3/uL    Absolute Lymphocytes 1.4 0.8 - 5.3 10e3/uL    Absolute Monocytes 0.7 0.0 - 1.3 10e3/uL    Absolute Eosinophils 0.2 0.0 - 0.7 10e3/uL    Absolute Basophils 0.0 0.0 - 0.2 10e3/uL    Absolute Immature Granulocytes 0.0 <=0.4 10e3/uL    Absolute NRBCs 0.0 10e3/uL   ECG 12-LEAD WITH MUSE (LHE)   Result Value Ref Range    Systolic Blood Pressure  mmHg    Diastolic Blood Pressure  mmHg    Ventricular Rate 94 BPM    Atrial Rate 94 BPM    MN Interval 148 ms    QRS Duration 78 ms     ms    QTc 447 ms    P Axis 79 degrees    R AXIS 71 degrees    T Axis 75 degrees    Interpretation ECG       Sinus rhythm  Nonspecific ST abnormality  Abnormal ECG  When compared with ECG of 18-Dec-2023 09:08,  No significant change was found  Confirmed by SEE ED PROVIDER NOTE FOR, ECG INTERPRETATION ([id number]),  ADELINE HEATON ([id number]) on 11/18/2024 2:45:07 PM     Adult Type and Screen   Result Value Ref Range    ABO/RH(D) O POS     Antibody Screen Negative Negative    SPECIMEN EXPIRATION DATE  [id number]        CT Chest Pulmonary Embolism w Contrast    Result Date: 11/18/2024  EXAM: CT CHEST PULMONARY EMBOLISM W CONTRAST LOCATION: [facility] DATE: 11/18/2024 INDICATION: hemoptosis COMPARISON: 3/22/2021 TECHNIQUE: CT chest pulmonary angiogram during arterial phase injection of IV contrast. Multiplanar reformats and MIP reconstructions were performed. Dose reduction techniques were used. CONTRAST: Isovue 370 90ml FINDINGS: ANGIOGRAM CHEST: Pulmonary arteries are normal caliber and negative for pulmonary emboli. Thoracic aorta is negative for dissection. No CT evidence of right heart strain. LUNGS AND PLEURA: Multiple groundglass opacities throughout the right lung. No pleural effusion or pneumothorax. MEDIASTINUM/AXILLAE: Normal. CORONARY ARTERY CALCIFICATION: Mild. UPPER ABDOMEN: Normal. MUSCULOSKELETAL: Normal.     IMPRESSION: 1.  No pulmonary emboli. 2.  Multiple groundglass opacities throughout the right lung, likely infectious or inflammatory.       Kwaku Salazar M.D.  [facility] Emergency Department     Kwaku Salazar MD  11/18/24 [id number]

## 2024-12-05 ENCOUNTER — TELEPHONE (OUTPATIENT)
Dept: PHARMACY | Facility: OTHER | Age: 69
End: 2024-12-05
Payer: COMMERCIAL

## 2024-12-05 NOTE — TELEPHONE ENCOUNTER
Pt lvm on 12/4 @ 10:00 am to schedule an appt from [employer] message from Damien.   Returned call to pt on 12/5 and left message for a return call.  12/4/24 [facility] coordinators we re @ a work retreat, so unable to return call the next business day.    Sujatha Ornelas [facility]   [phone]

## 2024-12-18 ENCOUNTER — NURSE ONLY (OUTPATIENT)
Dept: LAB | Age: 69
End: 2024-12-18
Payer: MEDICARE

## 2024-12-18 DIAGNOSIS — Z23 NEED FOR VACCINATION: Primary | ICD-10-CM

## 2024-12-18 PROCEDURE — 91320 SARSCV2 VAC 30MCG TRS-SUC IM: CPT | Performed by: FAMILY MEDICINE

## 2024-12-22 ENCOUNTER — HEALTH MAINTENANCE LETTER (OUTPATIENT)
Age: 69
End: 2024-12-22

## 2024-12-24 ENCOUNTER — ALLIED HEALTH/NURSE VISIT (OUTPATIENT)
Dept: FAMILY MEDICINE | Facility: CLINIC | Age: 69
End: 2024-12-24
Payer: COMMERCIAL

## 2024-12-24 DIAGNOSIS — Z23 ENCOUNTER FOR IMMUNIZATION: Primary | ICD-10-CM

## 2024-12-24 PROCEDURE — 99207 PR NO CHARGE NURSE ONLY: CPT

## 2024-12-24 PROCEDURE — 90662 IIV NO PRSV INCREASED AG IM: CPT

## 2024-12-24 PROCEDURE — 90471 IMMUNIZATION ADMIN: CPT

## 2024-12-24 PROCEDURE — 91320 SARSCV2 VAC 30MCG TRS-SUC IM: CPT

## 2024-12-24 PROCEDURE — 90480 ADMN SARSCOV2 VAC 1/ONLY CMP: CPT

## 2024-12-24 NOTE — PROGRESS NOTES
Prior to immunization administration, verified patients identity using patient s name and date of birth. Please see Immunization Activity for additional information.     Is the patient's temperature normal (100.5 or less)? Yes     Patient MEETS CRITERIA. PROCEED with vaccine administration.      Patient instructed to remain in clinic for 15 minutes afterwards, and to report any adverse reactions.      Link to Ancillary Visit Immunization Standing Orders SmartSet     Screening performed by Hattie Sol MA on 12/24/2024 at 7:50 AM.

## 2024-12-31 ENCOUNTER — HOSPITAL ENCOUNTER (OUTPATIENT)
Dept: GENERAL RADIOLOGY | Age: 69
Discharge: HOME | End: 2025-01-02
Attending: FAMILY MEDICINE
Payer: MEDICARE

## 2024-12-31 ENCOUNTER — OFFICE VISIT (OUTPATIENT)
Dept: PRIMARY CARE CLINIC | Age: 69
End: 2024-12-31
Payer: MEDICARE

## 2024-12-31 VITALS
OXYGEN SATURATION: 95 % | HEART RATE: 76 BPM | BODY MASS INDEX: 21.43 KG/M2 | DIASTOLIC BLOOD PRESSURE: 72 MMHG | RESPIRATION RATE: 20 BRPM | WEIGHT: 121 LBS | SYSTOLIC BLOOD PRESSURE: 130 MMHG | TEMPERATURE: 98.8 F

## 2024-12-31 DIAGNOSIS — R05.1 ACUTE COUGH: ICD-10-CM

## 2024-12-31 DIAGNOSIS — K21.9 GASTROESOPHAGEAL REFLUX DISEASE WITHOUT ESOPHAGITIS: Primary | Chronic | ICD-10-CM

## 2024-12-31 PROCEDURE — 1159F MED LIST DOCD IN RCRD: CPT | Performed by: FAMILY MEDICINE

## 2024-12-31 PROCEDURE — 3017F COLORECTAL CA SCREEN DOC REV: CPT | Performed by: FAMILY MEDICINE

## 2024-12-31 PROCEDURE — 1160F RVW MEDS BY RX/DR IN RCRD: CPT | Performed by: FAMILY MEDICINE

## 2024-12-31 PROCEDURE — 99214 OFFICE O/P EST MOD 30 MIN: CPT | Performed by: FAMILY MEDICINE

## 2024-12-31 PROCEDURE — 99213 OFFICE O/P EST LOW 20 MIN: CPT | Performed by: FAMILY MEDICINE

## 2024-12-31 PROCEDURE — 1036F TOBACCO NON-USER: CPT | Performed by: FAMILY MEDICINE

## 2024-12-31 PROCEDURE — G8420 CALC BMI NORM PARAMETERS: HCPCS | Performed by: FAMILY MEDICINE

## 2024-12-31 PROCEDURE — 71046 X-RAY EXAM CHEST 2 VIEWS: CPT

## 2024-12-31 PROCEDURE — G8400 PT W/DXA NO RESULTS DOC: HCPCS | Performed by: FAMILY MEDICINE

## 2024-12-31 PROCEDURE — 1090F PRES/ABSN URINE INCON ASSESS: CPT | Performed by: FAMILY MEDICINE

## 2024-12-31 PROCEDURE — 1123F ACP DISCUSS/DSCN MKR DOCD: CPT | Performed by: FAMILY MEDICINE

## 2024-12-31 PROCEDURE — G8484 FLU IMMUNIZE NO ADMIN: HCPCS | Performed by: FAMILY MEDICINE

## 2024-12-31 PROCEDURE — G8427 DOCREV CUR MEDS BY ELIG CLIN: HCPCS | Performed by: FAMILY MEDICINE

## 2024-12-31 RX ORDER — ESOMEPRAZOLE MAGNESIUM 40 MG/1
40 CAPSULE, DELAYED RELEASE ORAL
Qty: 90 CAPSULE | Refills: 0 | Status: SHIPPED | OUTPATIENT
Start: 2024-12-31

## 2024-12-31 RX ORDER — FAMOTIDINE 40 MG/1
40 TABLET, FILM COATED ORAL EVERY EVENING
Qty: 90 TABLET | Refills: 0 | Status: SHIPPED | OUTPATIENT
Start: 2024-12-31

## 2024-12-31 RX ORDER — FLUTICASONE PROPIONATE 50 MCG
1 SPRAY, SUSPENSION (ML) NASAL 2 TIMES DAILY
Qty: 16 G | Refills: 0 | Status: SHIPPED | OUTPATIENT
Start: 2024-12-31

## 2024-12-31 ASSESSMENT — ENCOUNTER SYMPTOMS
VOMITING: 0
SORE THROAT: 1
TROUBLE SWALLOWING: 1
WHEEZING: 0
ABDOMINAL PAIN: 1
CHEST TIGHTNESS: 0
COUGH: 1
SHORTNESS OF BREATH: 0
SINUS PRESSURE: 0
NAUSEA: 0
DIARRHEA: 0
CONSTIPATION: 0
CHOKING: 0

## 2024-12-31 NOTE — PROGRESS NOTES
[facility]  [address]  [place] [address]  Dept: [phone]  Dept Fax: [phone]    Dalila Pringle  is a 69 y.o. female who presents today for her medical conditions/complaints as noted below.  Dalila Pringle is c/o of   Chief Complaint   Patient presents with    Sore Throat     Sore throat for 2 months. Keeps getting worse with cough. Pt says it isnt painful        HPI:     History of Present Illness  The patient is a 69-year-old female who presents today for a sore throat and a cough.    She has been experiencing persistent symptoms of a sore throat and a cough for over 2 months, which have not shown any signs of improvement. She reports constant throat clearing, difficulty swallowing, and occasional expectoration of phlegm. Her symptoms are more pronounced in the morning upon waking and after meals. She has not experienced any fevers, with her temperature consistently around 96.6 degrees. She does not report significant sinus congestion or shortness of breath but admits to wheezing. She has not been prescribed any antibiotics for her cough since its onset. She has not had any recent x-rays, but a mammogram was performed. Her physicians at [facility] did not find any abnormalities in her lungs and did not see a need for a chest x-ray. She has not been using Flonase regularly. A previous scope procedure in August 2023 revealed some blockage in her throat, but no intervention was deemed necessary at the time.    She also reports experiencing heartburn but has not had any episodes of nausea or vomiting. She has chronic headaches and feels fatigued, although she continues to exercise. She has been taking Nexium 20 mg daily and Pepcid in the morning for her reflux. She had an EGD done in [place] this past spring.             Past Medical History:   Diagnosis Date

## 2025-01-07 ENCOUNTER — TRANSFERRED RECORDS (OUTPATIENT)
Dept: HEALTH INFORMATION MANAGEMENT | Facility: CLINIC | Age: 70
End: 2025-01-07
Payer: COMMERCIAL

## 2025-01-14 NOTE — PATIENT INSTRUCTIONS
Zofran for nausea  Increase fluid and electrolytes  Follow bland diet for the next couple days and advance as tolerated.  If anxiety/nervousness continues, follow up with PCP to discuss  follow up with PCP or return to the clinic for no improvement and or worsening of symptoms.  
no

## 2025-01-22 ENCOUNTER — NURSE TRIAGE (OUTPATIENT)
Dept: INTERNAL MEDICINE | Facility: CLINIC | Age: 70
End: 2025-01-22
Payer: COMMERCIAL

## 2025-01-22 NOTE — TELEPHONE ENCOUNTER
If no more blood then ok to wait til tomorrow.     Blood in urine or stool then go to ER, if passing out go to ER.

## 2025-01-22 NOTE — TELEPHONE ENCOUNTER
Nurse Triage SBAR    Is this a 2nd Level Triage? Yes     Situation: Patient calling with concern for blood in urine.     Background: Single occurrence today.     Assessment: Patient had sudden diarrhea this AM. Felt like going to pass out at that time. Felt nauseous, did not throw up. She was feeling better drinking lots of water, but this PM she when to void and noted leda color urine. She is denying all symptoms currently. No fever.     Protocol Recommended Disposition:   Go to Office Now    Recommendation: Patient schedule clinic visit tomorrow, she is on eliquis. Does she need to go to UC/ED tonight, or are we ok to wait for clinic visit tomorrow.  RN did review red flag symptoms needed ED visit tonight. Patient verbalized understanding.     Regino Vidales RN on 1/22/2025 at 4:03 PM      Routed to provider        Does the patient meet one of the following criteria for ADS visit consideration? 16+ years old, with an [facility] PCP     TIP  Providers, please consider if this condition is appropriate for management at one of our Acute and Diagnostic Services sites.     If patient is a good candidate, please use dotphrase <dot>triageresponse and select Refer to ADS to document.    Reason for Disposition   All other patients with blood in urine  (Exception: Could be normal menstrual bleeding.)   Taking Coumadin (warfarin) or other strong blood thinner, or known bleeding disorder (e.g., thrombocytopenia)    Additional Information   Negative: Recent back or abdominal injury   Negative: Recent genital injury   Negative: Unable to urinate (or only a few drops) > 4 hours and bladder feels very full (e.g., palpable bladder or strong urge to urinate)   Negative: Diffuse (all over) muscle pains in the shoulders, arms, legs, and back and dark (cola or tea-colored) or red-colored urine   Negative: Passing pure blood or large blood clots (i.e., larger than a dime or grape)   Negative: Fever > 100.4 F (38.0 C)   Negative: Patient  sounds very sick or weak to the triager   Negative: Known sickle cell disease    Protocols used: Urine - Blood In-A-OH

## 2025-01-22 NOTE — TELEPHONE ENCOUNTER
Called and spoke with patient. Advised of providers message below. Patient agrees with plan. Has appointment scheduled for tomorrow.    Martha Corley RN on 1/22/2025 at 4:56 PM

## 2025-01-23 ENCOUNTER — OFFICE VISIT (OUTPATIENT)
Dept: FAMILY MEDICINE | Facility: CLINIC | Age: 70
End: 2025-01-23
Payer: COMMERCIAL

## 2025-01-23 VITALS
HEIGHT: 61 IN | HEART RATE: 85 BPM | RESPIRATION RATE: 23 BRPM | OXYGEN SATURATION: 97 % | WEIGHT: 116.6 LBS | TEMPERATURE: 98.1 F | BODY MASS INDEX: 22.01 KG/M2 | SYSTOLIC BLOOD PRESSURE: 142 MMHG | DIASTOLIC BLOOD PRESSURE: 81 MMHG

## 2025-01-23 DIAGNOSIS — R31.9 HEMATURIA, UNSPECIFIED TYPE: Primary | ICD-10-CM

## 2025-01-23 DIAGNOSIS — Z79.01 CHRONIC ANTICOAGULATION: ICD-10-CM

## 2025-01-23 DIAGNOSIS — R10.32 ABDOMINAL PAIN, LEFT LOWER QUADRANT: ICD-10-CM

## 2025-01-23 DIAGNOSIS — I75.023 BLUE TOE SYNDROME OF BOTH LOWER EXTREMITIES (H): ICD-10-CM

## 2025-01-23 LAB
ALBUMIN SERPL BCG-MCNC: 4.8 G/DL (ref 3.5–5.2)
ALBUMIN UR-MCNC: 30 MG/DL
ALP SERPL-CCNC: 62 U/L (ref 40–150)
ALT SERPL W P-5'-P-CCNC: 18 U/L (ref 0–50)
ANION GAP SERPL CALCULATED.3IONS-SCNC: 12 MMOL/L (ref 7–15)
APPEARANCE UR: ABNORMAL
AST SERPL W P-5'-P-CCNC: 25 U/L (ref 0–45)
BASOPHILS # BLD AUTO: 0 10E3/UL (ref 0–0.2)
BASOPHILS NFR BLD AUTO: 0 %
BILIRUB SERPL-MCNC: 0.3 MG/DL
BILIRUB UR QL STRIP: NEGATIVE
BUN SERPL-MCNC: 19.2 MG/DL (ref 8–23)
CALCIUM SERPL-MCNC: 9.6 MG/DL (ref 8.8–10.4)
CHLORIDE SERPL-SCNC: 103 MMOL/L (ref 98–107)
COLOR UR AUTO: YELLOW
CREAT SERPL-MCNC: 0.62 MG/DL (ref 0.51–0.95)
EGFRCR SERPLBLD CKD-EPI 2021: >90 ML/MIN/1.73M2
EOSINOPHIL # BLD AUTO: 0.1 10E3/UL (ref 0–0.7)
EOSINOPHIL NFR BLD AUTO: 2 %
ERYTHROCYTE [DISTWIDTH] IN BLOOD BY AUTOMATED COUNT: 13 % (ref 10–15)
GLUCOSE SERPL-MCNC: 90 MG/DL (ref 70–99)
GLUCOSE UR STRIP-MCNC: NEGATIVE MG/DL
HCO3 SERPL-SCNC: 27 MMOL/L (ref 22–29)
HCT VFR BLD AUTO: 37.9 % (ref 35–47)
HGB BLD-MCNC: 12.7 G/DL (ref 11.7–15.7)
HGB UR QL STRIP: ABNORMAL
IMM GRANULOCYTES # BLD: 0.1 10E3/UL
IMM GRANULOCYTES NFR BLD: 2 %
KETONES UR STRIP-MCNC: 20 MG/DL
LEUKOCYTE ESTERASE UR QL STRIP: NEGATIVE
LYMPHOCYTES # BLD AUTO: 2 10E3/UL (ref 0.8–5.3)
LYMPHOCYTES NFR BLD AUTO: 44 %
MCH RBC QN AUTO: 31.3 PG (ref 26.5–33)
MCHC RBC AUTO-ENTMCNC: 33.5 G/DL (ref 31.5–36.5)
MCV RBC AUTO: 93 FL (ref 78–100)
MONOCYTES # BLD AUTO: 1 10E3/UL (ref 0–1.3)
MONOCYTES NFR BLD AUTO: 22 %
MUCOUS THREADS #/AREA URNS LPF: PRESENT /LPF
NEUTROPHILS # BLD AUTO: 1.4 10E3/UL (ref 1.6–8.3)
NEUTROPHILS NFR BLD AUTO: 31 %
NITRATE UR QL: NEGATIVE
NRBC # BLD AUTO: 0 10E3/UL
NRBC BLD AUTO-RTO: 0 /100
PH UR STRIP: 5.5 [PH] (ref 5–7)
PLATELET # BLD AUTO: 190 10E3/UL (ref 150–450)
POTASSIUM SERPL-SCNC: 4.2 MMOL/L (ref 3.4–5.3)
PROT SERPL-MCNC: 7.5 G/DL (ref 6.4–8.3)
RBC # BLD AUTO: 4.06 10E6/UL (ref 3.8–5.2)
RBC URINE: >182 /HPF
SODIUM SERPL-SCNC: 142 MMOL/L (ref 135–145)
SP GR UR STRIP: 1.03 (ref 1–1.03)
SQUAMOUS EPITHELIAL: 1 /HPF
UROBILINOGEN UR STRIP-MCNC: 2 MG/DL
WBC # BLD AUTO: 4.7 10E3/UL (ref 4–11)
WBC URINE: 10 /HPF

## 2025-01-23 ASSESSMENT — PAIN SCALES - GENERAL: PAINLEVEL_OUTOF10: NO PAIN (0)

## 2025-01-23 NOTE — TELEPHONE ENCOUNTER
Patient calling back with update regarding her symptoms. She had blood in urine last night, none this morning.   She has not felt like she is going to pass out. Wondering if she should still keep her appointment for today.    Informed patient it would be recommended she still be seen in office today given the situation that occurred yesterday. She is going to continue to monitor throughout the day and keep appointment for now.     Lula ERAZON, RN

## 2025-01-23 NOTE — PROGRESS NOTES
Assessment & Plan     Hematuria, unspecified type  - UA Macroscopic with reflex to Microscopic and Culture - Lab Collect; Future  - UA Macroscopic with reflex to Microscopic and Culture - Lab Collect  - CT Abdomen Pelvis w/o Contrast; Future  - CBC with Platelets & Differential; Future  - Comprehensive metabolic panel (BMP + Alb, Alk Phos, ALT, AST, Total. Bili, TP); Future  - CBC with Platelets & Differential  - Comprehensive metabolic panel (BMP + Alb, Alk Phos, ALT, AST, Total. Bili, TP)    Abdominal pain, left lower quadrant  - CT Abdomen Pelvis w/o Contrast; Future    Blue toe syndrome of both lower extremities (H)    Chronic anticoagulation    Lara presents today with concerns of hematuria and single episode of nausea, diarrhea and severe lower abdominal pain. She continues to have some blood in the urine, no evidence of infection. No ongoing pain at this point. Hemoglobin is stable, BMP is normal as well. She is currently anticoagulated with Eliquis for her blue toe syndrome. She denies any recent changes to her medications in this regard. CT of the abdomen and Pelvis were obtained today without significant findings. Discussed given normal imaging and ongoing hematuria, we will refer to urology for ongoing work-up and management. Continue to monitor symptoms and push fluids. Follow-up if symptoms worsen with persistent bleeding, light-headedness, dizziness or worsening pain.     I explained my diagnostic considerations and recommendations to the patient, who voiced understanding and agreement with the assessment and treatment plan. All questions were answered to patient's apparent satisfaction. We discussed potential side effects of any prescribed or recommended therapies, as well as expectations for response to treatments and importance of lifestyle measures that may improve symptoms. Patient was advised to contact our office if there are new symptoms or no improvement or worsening of conditions or  "symptoms.    The longitudinal plan of care for the diagnosis(es)/condition(s) as documented were addressed during this visit. Due to the added complexity in care, I will continue to support Lara in the subsequent management and with ongoing continuity of care.    Greater than 60 minutes were spent today with more than 50% dedicated to counseling and coordination of care of the above mentioned problems.        Subjective   Lara is a 69 year old, presenting for the following health issues:  Kidney Problem        1/23/2025     4:05 PM   Additional Questions   Roomed by Matt     History of Present Illness       Reason for visit:  Illness  Symptom onset:  1-3 days ago  Symptom intensity:  Moderate  Symptom progression:  Staying the same  Had these symptoms before:  No  What makes it worse:  Eating  What makes it better:  Nothing   She is taking medications regularly.     Lara presents today with concerns of symptoms of diarrhea yesterday. She reports having a single episode of diarrhea that was \"nonstop\". She reports sudden onset, painful diarrhea. She felt weak, light-headed and as though she was going to pass out. She did not vomit but was nauseated. She did have some improvement after this. She had been drinking fluids, she did not have much appetite for food. Later in the afternoon she noticed her urine appeared \"like rust\". At that time her pain had resolved. She continued to have blood in her urine last night. This morning she did not have any blood in the urine but notes that has been prominent throughout the day. She reports she doesn't \"feel great\" however is not in any pain. She notes she has been having some cold/hot chills. She denies any additional back pain, flank pain, vomiting, fever, chest pain or difficulty breathing. She did have an episode of hemoptysis in November, 2024. At that time she was seen in the ED and CT of the chest was completed revealing ground glass opacities in the right lung. She was " treated with Azithromycin at that time with no further episodes. Lara also notes she has been taking ibuprofen for shoulder pain, 9 pills yesterday. She has been taking ibuprofen regularly for some time. She did recently have a shoulder injection done for these symptoms.     Patient Active Problem List   Diagnosis    Other bipolar disorder (H)    Asthma, mild intermittent    CARDIOVASCULAR SCREENING; LDL GOAL LESS THAN 160    Anxiety state    Hyperlipidemia LDL goal <130    Generalized anxiety disorder    Major depressive disorder, recurrent episode, mild (HCC)    Hypothyroidism    Gastroesophageal reflux disease without esophagitis    Adjustment disorder with mixed anxiety and depressed mood    Allergic rhinitis    Anorexia nervosa (H)    Asthma     Current Outpatient Medications   Medication Sig Dispense Refill    albuterol (VENTOLIN HFA) 108 (90 Base) MCG/ACT inhaler INHALE TWO PUFFS BY MOUTH EVERY 6 HOURS AS NEEDED FOR SHORTNESS OF BREATH/DYSPNEA 18 g 3    amLODIPine (NORVASC) 2.5 MG tablet Take 1 tablet (2.5 mg) by mouth daily 90 tablet 3    apixaban ANTICOAGULANT (ELIQUIS ANTICOAGULANT) 5 MG tablet Take 1 tablet (5 mg) by mouth 2 times daily 180 tablet 3    atorvastatin (LIPITOR) 20 MG tablet Take 0.5 tablets (10 mg) by mouth every other day      ipratropium - albuterol 0.5 mg/2.5 mg/3 mL (DUONEB) 0.5-2.5 (3) MG/3ML neb solution Take 1 vial (3 mLs) by nebulization every 6 hours as needed for shortness of breath, wheezing or cough 90 mL 1    levothyroxine (SYNTHROID/LEVOTHROID) 50 MCG tablet TAKE ONE TABLET BY MOUTH ONCE DAILY 90 tablet 3    magnesium oxide (MAG-OX) 400 MG tablet Take 400 mg by mouth daily      azithromycin (ZITHROMAX Z-SHAVON) 250 MG tablet Take 1 tablet (250 mg) by mouth daily. (Patient not taking: Reported on 1/23/2025) 4 tablet 0    DULoxetine (CYMBALTA) 30 MG capsule Take 1 capsule (30 mg) by mouth daily for 1 week, then increase to 2 capsules by mouth daily thereafter (Patient not taking:  "Reported on 1/23/2025) 180 capsule 3    fluticasone-salmeterol (ADVAIR DISKUS) 250-50 MCG/ACT inhaler INHALE ONE PUFF BY MOUTH TWICE A DAY (Patient not taking: Reported on 1/23/2025) 180 each 3     No current facility-administered medications for this visit.                 Review of Systems  Constitutional, HEENT, cardiovascular, pulmonary, gi and gu systems are negative, except as otherwise noted.      Objective    BP (!) 142/81   Pulse 85   Temp 98.1  F (36.7  C) (Temporal)   Resp 23   Ht 1.543 m (5' 0.75\")   Wt 52.9 kg (116 lb 9.6 oz)   LMP  (LMP Unknown)   SpO2 97%   BMI 22.21 kg/m    Body mass index is 22.21 kg/m .  Physical Exam   GENERAL: alert and no distress  NECK: no adenopathy, no asymmetry, masses, or scars  RESP: lungs clear to auscultation - no rales, rhonchi or wheezes  CV: regular rate and rhythm, normal S1 S2, no S3 or S4, no murmur, click or rub, no peripheral edema  ABDOMEN: soft, nontender, no hepatosplenomegaly, no masses and bowel sounds normal  MS: no gross musculoskeletal defects noted, no edema  NEURO: Normal strength and tone, mentation intact and speech normal  PSYCH: mentation appears normal, affect normal/bright    Results for orders placed or performed in visit on 01/23/25 (from the past 24 hours)   UA Macroscopic with reflex to Microscopic and Culture - Lab Collect    Specimen: Urine, Midstream   Result Value Ref Range    Color Urine Yellow Colorless, Straw, Light Yellow, Yellow    Appearance Urine Slightly Cloudy (A) Clear    Glucose Urine Negative Negative mg/dL    Bilirubin Urine Negative Negative    Ketones Urine 20 (A) Negative mg/dL    Specific Gravity Urine 1.026 1.003 - 1.035    Blood Urine Large (A) Negative    pH Urine 5.5 5.0 - 7.0    Protein Albumin Urine 30 (A) Negative mg/dL    Urobilinogen Urine 2.0 Normal, 2.0 mg/dL    Nitrite Urine Negative Negative    Leukocyte Esterase Urine Negative Negative    Mucus Urine Present (A) None Seen /LPF    RBC Urine >182 (H) " <=2 /HPF    WBC Urine 10 (H) <=5 /HPF    Squamous Epithelials Urine 1 <=1 /HPF    Narrative    Urine Culture not indicated   CBC with Platelets & Differential    Narrative    The following orders were created for panel order CBC with Platelets & Differential.  Procedure                               Abnormality         Status                     ---------                               -----------         ------                     CBC with platelets and d...[[id number]]  Abnormal            Final result                 Please view results for these tests on the individual orders.   Comprehensive metabolic panel (BMP + Alb, Alk Phos, ALT, AST, Total. Bili, TP)   Result Value Ref Range    Sodium 142 135 - 145 mmol/L    Potassium 4.2 3.4 - 5.3 mmol/L    Carbon Dioxide (CO2) 27 22 - 29 mmol/L    Anion Gap 12 7 - 15 mmol/L    Urea Nitrogen 19.2 8.0 - 23.0 mg/dL    Creatinine 0.62 0.51 - 0.95 mg/dL    GFR Estimate >90 >60 mL/min/1.73m2    Calcium 9.6 8.8 - 10.4 mg/dL    Chloride 103 98 - 107 mmol/L    Glucose 90 70 - 99 mg/dL    Alkaline Phosphatase 62 40 - 150 U/L    AST 25 0 - 45 U/L    ALT 18 0 - 50 U/L    Protein Total 7.5 6.4 - 8.3 g/dL    Albumin 4.8 3.5 - 5.2 g/dL    Bilirubin Total 0.3 <=1.2 mg/dL   CBC with platelets and differential   Result Value Ref Range    WBC Count 4.7 4.0 - 11.0 10e3/uL    RBC Count 4.06 3.80 - 5.20 10e6/uL    Hemoglobin 12.7 11.7 - 15.7 g/dL    Hematocrit 37.9 35.0 - 47.0 %    MCV 93 78 - 100 fL    MCH 31.3 26.5 - 33.0 pg    MCHC 33.5 31.5 - 36.5 g/dL    RDW 13.0 10.0 - 15.0 %    Platelet Count 190 150 - 450 10e3/uL    % Neutrophils 31 %    % Lymphocytes 44 %    % Monocytes 22 %    % Eosinophils 2 %    % Basophils 0 %    % Immature Granulocytes 2 %    NRBCs per 100 WBC 0 <1 /100    Absolute Neutrophils 1.4 (L) 1.6 - 8.3 10e3/uL    Absolute Lymphocytes 2.0 0.8 - 5.3 10e3/uL    Absolute Monocytes 1.0 0.0 - 1.3 10e3/uL    Absolute Eosinophils 0.1 0.0 - 0.7 10e3/uL    Absolute Basophils 0.0  0.0 - 0.2 10e3/uL    Absolute Immature Granulocytes 0.1 <=0.4 10e3/uL    Absolute NRBCs 0.0 10e3/uL           Signed Electronically by: Harper Nuñez NP

## 2025-01-25 ENCOUNTER — MYC MEDICAL ADVICE (OUTPATIENT)
Dept: INTERNAL MEDICINE | Facility: CLINIC | Age: 70
End: 2025-01-25
Payer: COMMERCIAL

## 2025-01-25 DIAGNOSIS — I75.023 BLUE TOE SYNDROME OF BOTH LOWER EXTREMITIES (H): ICD-10-CM

## 2025-01-25 DIAGNOSIS — J45.20 MILD INTERMITTENT ASTHMA WITHOUT COMPLICATION: ICD-10-CM

## 2025-01-25 DIAGNOSIS — J31.0 CHRONIC RHINITIS: ICD-10-CM

## 2025-01-25 DIAGNOSIS — I73.00 RAYNAUD'S DISEASE WITHOUT GANGRENE: ICD-10-CM

## 2025-01-27 DIAGNOSIS — J45.30 MILD PERSISTENT ASTHMA WITHOUT COMPLICATION: ICD-10-CM

## 2025-01-27 RX ORDER — AMLODIPINE BESYLATE 2.5 MG/1
2.5 TABLET ORAL DAILY
Qty: 90 TABLET | Refills: 3 | Status: SHIPPED | OUTPATIENT
Start: 2025-01-27

## 2025-01-27 RX ORDER — FLUTICASONE PROPIONATE AND SALMETEROL 250; 50 UG/1; UG/1
POWDER RESPIRATORY (INHALATION)
Qty: 180 EACH | Refills: 3 | Status: SHIPPED | OUTPATIENT
Start: 2025-01-27

## 2025-01-27 RX ORDER — FLUTICASONE PROPIONATE 50 MCG
2 SPRAY, SUSPENSION (ML) NASAL DAILY
Qty: 16 G | Refills: 3 | Status: SHIPPED | OUTPATIENT
Start: 2025-01-27

## 2025-01-27 NOTE — TELEPHONE ENCOUNTER
Patient calling to ask if meds were fille dyet. Spoke with Dr. Darby and he filled them. Sent patient a [email or web address] message to update.    Nehemias Canales RN on 1/27/2025 at 4:54 PM

## 2025-01-27 NOTE — TELEPHONE ENCOUNTER
amLODIPine (NORVASC) 2.5 MG tablet - FAILED [facility] protocol    Last Written Prescription Date:  01/29/2024  Last Fill Quantity: 90,  # refills: 3   Last office visit: 1/29/2024 ; last virtual visit: 2/27/2024 with prescribing provider:        Routing to provider to review.

## 2025-01-28 RX ORDER — ALBUTEROL SULFATE 90 UG/1
INHALANT RESPIRATORY (INHALATION)
Qty: 18 G | Refills: 0 | Status: SHIPPED | OUTPATIENT
Start: 2025-01-28

## 2025-02-03 ENCOUNTER — OFFICE VISIT (OUTPATIENT)
Dept: UROLOGY | Facility: CLINIC | Age: 70
End: 2025-02-03
Attending: NURSE PRACTITIONER
Payer: COMMERCIAL

## 2025-02-03 VITALS
DIASTOLIC BLOOD PRESSURE: 72 MMHG | SYSTOLIC BLOOD PRESSURE: 118 MMHG | WEIGHT: 116 LBS | HEIGHT: 61 IN | TEMPERATURE: 98.7 F | BODY MASS INDEX: 21.9 KG/M2

## 2025-02-03 DIAGNOSIS — R31.0 GROSS HEMATURIA: Primary | ICD-10-CM

## 2025-02-03 PROCEDURE — 52000 CYSTOURETHROSCOPY: CPT | Performed by: UROLOGY

## 2025-02-03 PROCEDURE — 99204 OFFICE O/P NEW MOD 45 MIN: CPT | Mod: 25 | Performed by: UROLOGY

## 2025-02-03 ASSESSMENT — PAIN SCALES - GENERAL: PAINLEVEL_OUTOF10: NO PAIN (0)

## 2025-02-03 NOTE — PROGRESS NOTES
Urology Note            S:  Brissa Mayer is a 69 year old female who was seen in a consultation at the request of Harper Nuñez for hematuria.   Patient has intermittent gross hematuria for several weeks.  She has no other voiding symptoms in addition to hematuria.  She has no flank pain.  She has no history of kidney stone.  She denies any trauma.  Recent UA showed many rbc/hpf.  Recent CT scan without contrast was negative.  She is not a smoker.  She is on eliquis for Raynaud's.  Past Medical History:   Diagnosis Date    Anxiety     Arthritis     Asthma, mild intermittent     Major depressive disorder, single episode     Migraines     Tension headaches     Unspecified hypothyroidism      Current Outpatient Medications   Medication Sig Dispense Refill    albuterol (VENTOLIN HFA) 108 (90 Base) MCG/ACT inhaler INHALE TWO PUFFS BY MOUTH EVERY 6 HOURS AS NEEDED FOR SHORTNESS OF BREATH/DYSPNEA 18 g 0    amLODIPine (NORVASC) 2.5 MG tablet TAKE 1 TABLET (2.5 MG) BY MOUTH DAILY 90 tablet 3    apixaban ANTICOAGULANT (ELIQUIS ANTICOAGULANT) 5 MG tablet Take 1 tablet (5 mg) by mouth 2 times daily 180 tablet 3    atorvastatin (LIPITOR) 20 MG tablet Take 0.5 tablets (10 mg) by mouth every other day      fluticasone (FLONASE) 50 MCG/ACT nasal spray Spray 2 sprays into both nostrils daily. 16 g 3    fluticasone-salmeterol (ADVAIR DISKUS) 250-50 MCG/ACT inhaler INHALE ONE PUFF BY MOUTH TWICE A  each 3    ipratropium - albuterol 0.5 mg/2.5 mg/3 mL (DUONEB) 0.5-2.5 (3) MG/3ML neb solution Take 1 vial (3 mLs) by nebulization every 6 hours as needed for shortness of breath, wheezing or cough 90 mL 1    levothyroxine (SYNTHROID/LEVOTHROID) 50 MCG tablet TAKE ONE TABLET BY MOUTH ONCE DAILY 90 tablet 3    magnesium oxide (MAG-OX) 400 MG tablet Take 400 mg by mouth daily       Past Surgical History:   Procedure Laterality Date    BUNIONECTOMY      COLONOSCOPY      COLONOSCOPY N/A 10/22/2024    Procedure: COLONOSCOPY, WITH  POLYPECTOMY;  Surgeon: Brent Ramires MD;  Location:  GI    ESOPHAGOSCOPY, GASTROSCOPY, DUODENOSCOPY (EGD), COMBINED N/A 4/2/2019    Procedure: ESOPHAGOSCOPY, GASTROSCOPY, DUODENOSCOPY (EGD);  Surgeon: Ochoa Cherry DO;  Location:  GI    OTHER SURGICAL HISTORY  1980    Bunionectomy      Social History     Socioeconomic History    Marital status: Single     Spouse name: Not on file    Number of children: Not on file    Years of education: Not on file    Highest education level: Not on file   Occupational History    Not on file   Tobacco Use    Smoking status: Never    Smokeless tobacco: Never   Vaping Use    Vaping status: Never Used   Substance and Sexual Activity    Alcohol use: Yes     Comment: occ    Drug use: No    Sexual activity: Not Currently     Partners: Male     Birth control/protection: None   Other Topics Concern    Parent/sibling w/ CABG, MI or angioplasty before 65F 55M? No   Social History Narrative    Not on file     Social Drivers of Health     Financial Resource Strain: Low Risk  (8/26/2024)    Financial Resource Strain     Within the past 12 months, have you or your family members you live with been unable to get utilities (heat, electricity) when it was really needed?: No   Food Insecurity: Low Risk  (8/26/2024)    Food Insecurity     Within the past 12 months, did you worry that your food would run out before you got money to buy more?: No     Within the past 12 months, did the food you bought just not last and you didn t have money to get more?: No   Transportation Needs: Low Risk  (8/26/2024)    Transportation Needs     Within the past 12 months, has lack of transportation kept you from medical appointments, getting your medicines, non-medical meetings or appointments, work, or from getting things that you need?: No   Physical Activity: Insufficiently Active (8/26/2024)    Exercise Vital Sign     Days of Exercise per Week: 2 days     Minutes of Exercise per Session: 10 min    Stress: Stress Concern Present (8/26/2024)    [employer] - Occupational Stress Questionnaire     Feeling of Stress : To some extent   Social Connections: Unknown (8/26/2024)    Social Connection and Isolation Panel [NHANES]     Frequency of Communication with Friends and Family: Not on file     Frequency of Social Gatherings with Friends and Family: Once a week     Attends Mosque Services: Not on file     Active Member of Clubs or Organizations: Not on file     Attends Club or Organization Meetings: Not on file     Marital Status: Not on file   Interpersonal Safety: Low Risk  (10/22/2024)    Interpersonal Safety     Do you feel physically and emotionally safe where you currently live?: Yes     Within the past 12 months, have you been hit, slapped, kicked or otherwise physically hurt by someone?: No     Within the past 12 months, have you been humiliated or emotionally abused in other ways by your partner or ex-partner?: No   Housing Stability: Low Risk  (8/26/2024)    Housing Stability     Do you have housing? : Yes     Are you worried about losing your housing?: No     Family History   Problem Relation Age of Onset    Asthma Mother     Diabetes Mother     Cancer - colorectal Maternal Grandmother     Heart Disease Father         MI at age 55    Prostate Cancer Father     Asthma Father     Asthma Sister     Obesity Sister     Diabetes Sister     Coronary Artery Disease Sister     Asthma Sister     Obesity Sister     Asthma Sister     Asthma Brother           REVIEW OF SYSTEMS  =================  C: NEGATIVE for fever, chills, change in weight  I: NEGATIVE for worrisome rashes, moles or lesions  E/M: NEGATIVE for ear, mouth and throat problems  R: NEGATIVE for significant cough or SHORTNESS OF BREATH  CV:  NEGATIVE for chest pain, palpitations or peripheral edema  GI: NEGATIVE for nausea, abdominal pain, heartburn, or change in bowel habits  NEURO: NEGATIVE numbness/weakness  : see  "HPI  PSYCH: NEGATIVE depression/anxiety  LYmph: no new enlarged lymph nodes  Ortho: no new trauma/movements           O: Exam:/72 (BP Location: Right arm, Patient Position: Sitting, Cuff Size: Adult Regular)   Temp 98.7  F (37.1  C) (Temporal)   Ht 1.543 m (5' 0.75\")   Wt 52.6 kg (116 lb)   LMP  (LMP Unknown)   BMI 22.10 kg/m     Constitutional: healthy, alert and no distress  Cardiovascular: negative, PMI normal.   Respiratory: negative, no evidence of respiratory distress  Gastrointestinal: Abdomen soft, non-tender. BS normal. No masses, organomegaly  : normal vaginal introitus.  Normal urethra.  Musculoskeletal: extremities normal- no gross deformities noted, gait normal and normal muscle tone  Skin: no suspicious lesions or rashes  Neurologic: Alert and oriented  Musculaskeletal: moving all extremities  Psychiatric: mentation appears normal. and affect normal/bright  Hematologic/Lymphatic/Immunologic: normal ant/post cervical, axillary, supraclavicular and inguinal nodes    Cysto done today    Patient is prepped and draped.  Flexible cystoscope placed under direct vision.      Cysto: The anterior urethra is normal     In the bladder there is normal mucosa.    Assessment/Plan:  (R31.0) Gross hematuria  (primary encounter diagnosis)  Comment:    Plan:   Neg cysto  CT urogram next  Urine cytology    "

## 2025-02-03 NOTE — PATIENT INSTRUCTIONS
Please call imaging services [phone] to schedule your imaging appointment.   Please make an appointment with lab to have your urine cytology done.  Please call [phone] with any questions regarding today's visit.

## 2025-02-09 ENCOUNTER — HOSPITAL ENCOUNTER (EMERGENCY)
Facility: CLINIC | Age: 70
Discharge: HOME | End: 2025-02-09
Attending: EMERGENCY MEDICINE | Admitting: EMERGENCY MEDICINE
Payer: COMMERCIAL

## 2025-02-09 VITALS
TEMPERATURE: 98.5 F | RESPIRATION RATE: 18 BRPM | BODY MASS INDEX: 21.9 KG/M2 | DIASTOLIC BLOOD PRESSURE: 88 MMHG | HEART RATE: 74 BPM | HEIGHT: 61 IN | OXYGEN SATURATION: 99 % | WEIGHT: 116 LBS | SYSTOLIC BLOOD PRESSURE: 144 MMHG

## 2025-02-09 DIAGNOSIS — M25.511 RIGHT SHOULDER PAIN, UNSPECIFIED CHRONICITY: ICD-10-CM

## 2025-02-09 DIAGNOSIS — M79.621 PAIN OF RIGHT UPPER ARM: ICD-10-CM

## 2025-02-09 DIAGNOSIS — R23.3 PETECHIAE: ICD-10-CM

## 2025-02-09 LAB
ALBUMIN SERPL BCG-MCNC: 4.6 G/DL (ref 3.5–5.2)
ALP SERPL-CCNC: 95 U/L (ref 40–150)
ALT SERPL W P-5'-P-CCNC: 17 U/L (ref 0–50)
ANION GAP SERPL CALCULATED.3IONS-SCNC: 12 MMOL/L (ref 7–15)
APTT PPP: 32 SECONDS (ref 22–38)
AST SERPL W P-5'-P-CCNC: 22 U/L (ref 0–45)
BASOPHILS # BLD AUTO: 0 10E3/UL (ref 0–0.2)
BASOPHILS NFR BLD AUTO: 0 %
BILIRUB SERPL-MCNC: 0.2 MG/DL
BUN SERPL-MCNC: 17.1 MG/DL (ref 8–23)
CALCIUM SERPL-MCNC: 9.3 MG/DL (ref 8.8–10.4)
CHLORIDE SERPL-SCNC: 103 MMOL/L (ref 98–107)
CREAT SERPL-MCNC: 0.61 MG/DL (ref 0.51–0.95)
EGFRCR SERPLBLD CKD-EPI 2021: >90 ML/MIN/1.73M2
EOSINOPHIL # BLD AUTO: 0.1 10E3/UL (ref 0–0.7)
EOSINOPHIL NFR BLD AUTO: 2 %
ERYTHROCYTE [DISTWIDTH] IN BLOOD BY AUTOMATED COUNT: 13.3 % (ref 10–15)
ETHANOL SERPL-MCNC: <0.01 G/DL
GLUCOSE SERPL-MCNC: 102 MG/DL (ref 70–99)
HCO3 SERPL-SCNC: 26 MMOL/L (ref 22–29)
HCT VFR BLD AUTO: 37.1 % (ref 35–47)
HGB BLD-MCNC: 12.5 G/DL (ref 11.7–15.7)
IMM GRANULOCYTES # BLD: 0 10E3/UL
IMM GRANULOCYTES NFR BLD: 1 %
INR PPP: 1.12 (ref 0.85–1.15)
LYMPHOCYTES # BLD AUTO: 1.5 10E3/UL (ref 0.8–5.3)
LYMPHOCYTES NFR BLD AUTO: 45 %
MCH RBC QN AUTO: 31.1 PG (ref 26.5–33)
MCHC RBC AUTO-ENTMCNC: 33.7 G/DL (ref 31.5–36.5)
MCV RBC AUTO: 92 FL (ref 78–100)
MONOCYTES # BLD AUTO: 0.9 10E3/UL (ref 0–1.3)
MONOCYTES NFR BLD AUTO: 27 %
NEUTROPHILS # BLD AUTO: 0.9 10E3/UL (ref 1.6–8.3)
NEUTROPHILS NFR BLD AUTO: 26 %
NRBC # BLD AUTO: 0 10E3/UL
NRBC BLD AUTO-RTO: 0 /100
PLATELET # BLD AUTO: 186 10E3/UL (ref 150–450)
POTASSIUM SERPL-SCNC: 3.7 MMOL/L (ref 3.4–5.3)
PROT SERPL-MCNC: 7.2 G/DL (ref 6.4–8.3)
RBC # BLD AUTO: 4.02 10E6/UL (ref 3.8–5.2)
SODIUM SERPL-SCNC: 141 MMOL/L (ref 135–145)
WBC # BLD AUTO: 3.4 10E3/UL (ref 4–11)

## 2025-02-09 PROCEDURE — 36415 COLL VENOUS BLD VENIPUNCTURE: CPT | Performed by: EMERGENCY MEDICINE

## 2025-02-09 PROCEDURE — 85610 PROTHROMBIN TIME: CPT | Performed by: EMERGENCY MEDICINE

## 2025-02-09 PROCEDURE — 85730 THROMBOPLASTIN TIME PARTIAL: CPT | Performed by: EMERGENCY MEDICINE

## 2025-02-09 PROCEDURE — 82077 ASSAY SPEC XCP UR&BREATH IA: CPT | Performed by: EMERGENCY MEDICINE

## 2025-02-09 PROCEDURE — 99284 EMERGENCY DEPT VISIT MOD MDM: CPT | Performed by: EMERGENCY MEDICINE

## 2025-02-09 PROCEDURE — 80053 COMPREHEN METABOLIC PANEL: CPT | Performed by: EMERGENCY MEDICINE

## 2025-02-09 PROCEDURE — 85004 AUTOMATED DIFF WBC COUNT: CPT | Performed by: EMERGENCY MEDICINE

## 2025-02-09 PROCEDURE — 99283 EMERGENCY DEPT VISIT LOW MDM: CPT | Performed by: EMERGENCY MEDICINE

## 2025-02-09 RX ORDER — METHYLPREDNISOLONE 4 MG/1
TABLET ORAL
Qty: 21 TABLET | Refills: 0 | Status: SHIPPED | OUTPATIENT
Start: 2025-02-09

## 2025-02-09 ASSESSMENT — ACTIVITIES OF DAILY LIVING (ADL): ADLS_ACUITY_SCORE: 41

## 2025-02-09 ASSESSMENT — COLUMBIA-SUICIDE SEVERITY RATING SCALE - C-SSRS
1. IN THE PAST MONTH, HAVE YOU WISHED YOU WERE DEAD OR WISHED YOU COULD GO TO SLEEP AND NOT WAKE UP?: NO
6. HAVE YOU EVER DONE ANYTHING, STARTED TO DO ANYTHING, OR PREPARED TO DO ANYTHING TO END YOUR LIFE?: NO
2. HAVE YOU ACTUALLY HAD ANY THOUGHTS OF KILLING YOURSELF IN THE PAST MONTH?: NO

## 2025-02-10 NOTE — DISCHARGE INSTRUCTIONS
Continue ice, lidocaine.    Start Medrol Dosepak as prescribed.  Take with food or milk.    Follow-up with orthopedics as scheduled.      If you have any significant worsening, changes or concerns return at any time.    I hope that this starts to resolve quickly!!

## 2025-02-10 NOTE — ED PROVIDER NOTES
History     Chief Complaint   Patient presents with    Shoulder Pain     HPI  History per patient, medical records    This is a 69-year-old female, history of Raynaud's on Eliquis, anxiety/depression, GERD, asthma, anxiety presenting with shoulder pain.  Patient has been struggling with right shoulder pain for 2 months.  She was seen by [facility] on 1/8/2025 and had an x-ray without acute abnormalities along with an injection.  She states that the injection helped for just 1 day then the pain returned.  She has been trying to manage with lidocaine patches and oral ibuprofen.  She has been taking 600 mg of ibuprofen 3 times a day.  She tried topical Voltaren but her arm started swelling so she has not used it for a couple of weeks.  She did do some physical therapy this week and notes the pain increased.  She took a shower today and afterwards noted some purple discoloration to the upper arm.  Pain has been excruciating causing her to have difficulty falling asleep and finding a comfortable position.  Pain is constant, increases with certain movements, especially typing or trying to reach across her body.  Patient is right-handed.  She denies any fevers or chills.  She is not feel that she has neck pain.  No specific trauma but she does do a lot of repetitive motion at work.  No numbness or tingling.    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 Provider to review      CARDIOVASCULAR SCREENING; LDL GOAL LESS THAN 160 10/31/2010     Priority: Medium    Asthma, mild intermittent      Priority: Medium    Anorexia nervosa (H) 06/04/2007     Priority: Medium    Other bipolar disorder (H) 03/30/2006     Priority: Medium    Asthma 05/17/2005     Priority: Medium    Allergic rhinitis 03/31/2004     Priority: Medium        Past Medical History:    Past Medical History:   Diagnosis Date    Anxiety     Arthritis     Asthma, mild intermittent     Major depressive disorder, single episode     Migraines     Tension headaches     Unspecified hypothyroidism        Past Surgical History:    Past Surgical History:   Procedure Laterality Date    BUNIONECTOMY      COLONOSCOPY      COLONOSCOPY N/A 10/22/2024    Procedure: COLONOSCOPY, WITH POLYPECTOMY;  Surgeon: Brent Ramires MD;  Location: [facility]    ESOPHAGOSCOPY, GASTROSCOPY, DUODENOSCOPY (EGD), COMBINED N/A 4/2/2019    Procedure: ESOPHAGOSCOPY, GASTROSCOPY, DUODENOSCOPY (EGD);  Surgeon: Ochoa Cherry DO;  Location: [facility]    OTHER SURGICAL HISTORY  1980    Bunionectomy       Family History:    Family History   Problem Relation Age of Onset    Asthma Mother     Diabetes Mother     Cancer - colorectal Maternal Grandmother     Heart Disease Father         MI at age 55    Prostate Cancer Father     Asthma Father     Asthma Sister     Obesity Sister     Diabetes Sister     Coronary Artery Disease Sister     Asthma Sister     Obesity Sister     Asthma Sister     Asthma Brother        Social History:  Marital Status:  Single [1]  Social History     Tobacco Use    Smoking status: Never    Smokeless tobacco: Never   Vaping Use    Vaping status: Never Used   Substance Use Topics    Alcohol use: Yes     Comment:  "occ    Drug use: No        Medications:    methylPREDNISolone (MEDROL DOSEPAK) 4 MG tablet therapy pack  albuterol (VENTOLIN HFA) 108 (90 Base) MCG/ACT inhaler  amLODIPine (NORVASC) 2.5 MG tablet  apixaban ANTICOAGULANT (ELIQUIS ANTICOAGULANT) 5 MG tablet  atorvastatin (LIPITOR) 20 MG tablet  fluticasone (FLONASE) 50 MCG/ACT nasal spray  fluticasone-salmeterol (ADVAIR DISKUS) 250-50 MCG/ACT inhaler  ipratropium - albuterol 0.5 mg/2.5 mg/3 mL (DUONEB) 0.5-2.5 (3) MG/3ML neb solution  levothyroxine (SYNTHROID/LEVOTHROID) 50 MCG tablet  magnesium oxide (MAG-OX) 400 MG tablet          Review of Systems  All other ROS reviewed and are negative or non-contributory except as stated in HPI.     Physical Exam   BP: (!) 144/88  Pulse: 74  Temp: 98.5  F (36.9  C)  Resp: 18  Height: 154.9 cm (5' 1\")  Weight: 52.6 kg (116 lb)  SpO2: 99 %      Physical Exam  Vitals and nursing note reviewed.   Constitutional:       Comments: Very thin, petite older female sitting in chair   HENT:      Head: Normocephalic.   Eyes:      Extraocular Movements: Extraocular movements intact.   Cardiovascular:      Rate and Rhythm: Normal rate and regular rhythm.      Pulses: Normal pulses.   Pulmonary:      Effort: Pulmonary effort is normal.   Musculoskeletal:      Cervical back: Normal range of motion and neck supple. No tenderness.      Comments: Patient has pain with all range of motion of the right shoulder although she notes the pain seems to be along the medial mid upper arm.  Skin changes as noted below.  CMS intact at the fingers, wrist, elbow.  Clavicle normal.  No significant tenderness with palpation around the shoulder joint itself.  Most of the pain is in the area of skin discoloration   Skin:     Comments: Purple discoloration/petechiae in the right upper arm, some minimal linear area.  Please see picture.   Neurological:      General: No focal deficit present.      Mental Status: She is alert.   Psychiatric:         Mood and Affect: " Mood normal.         Behavior: Behavior normal.         ED Course (with Medical Decision Making)    Pt seen and examined by me.  RN and EPIC notes reviewed.       Patient presenting with nontraumatic right shoulder/right upper arm pain.  New skin changes.  She is on blood thinners.  There is a linear area of petechial like lesion that seems to be congruent with blood vessel area.  I do not think that she has any evidence for thrombus.  There is no warmth to suggest infection.  Previous x-ray done so I do not think there is a bony abnormality.  This may be a bursitis, tendinitis, ligamentous strain, muscular issue.    We did talk about a lot of options.  She has been struggling with recent hematuria and previously had some episodes of hemoptysis a couple months ago.  I did check basic labs to be sure if she had normal platelets, coagulation studies.    Labs show normal INR, normal PTT, normal platelets and hemoglobin.  With blood cell count slightly low at 3.4.  CMP normal.    Plan will be to Rx with a Medrol Dosepak.  She is going to continue her topical products and ice which seems to help.  She has an appointment for follow-up with orthopedics in 3 days.  She declines stronger pain medications.  If she has any significant worsening, changes or concerns return at any time.          Procedures    Results for orders placed or performed during the hospital encounter of 02/09/25 (from the past 24 hours)   CBC with platelets differential    Narrative    The following orders were created for panel order CBC with platelets differential.  Procedure                               Abnormality         Status                     ---------                               -----------         ------                     CBC with platelets and d...[[id number]]  Abnormal            Final result                 Please view results for these tests on the individual orders.   INR   Result Value Ref Range    INR 1.12 0.85 - 1.15   Partial  thromboplastin time   Result Value Ref Range    aPTT 32 22 - 38 Seconds   Comprehensive metabolic panel   Result Value Ref Range    Sodium 141 135 - 145 mmol/L    Potassium 3.7 3.4 - 5.3 mmol/L    Carbon Dioxide (CO2) 26 22 - 29 mmol/L    Anion Gap 12 7 - 15 mmol/L    Urea Nitrogen 17.1 8.0 - 23.0 mg/dL    Creatinine 0.61 0.51 - 0.95 mg/dL    GFR Estimate >90 >60 mL/min/1.73m2    Calcium 9.3 8.8 - 10.4 mg/dL    Chloride 103 98 - 107 mmol/L    Glucose 102 (H) 70 - 99 mg/dL    Alkaline Phosphatase 95 40 - 150 U/L    AST 22 0 - 45 U/L    ALT 17 0 - 50 U/L    Protein Total 7.2 6.4 - 8.3 g/dL    Albumin 4.6 3.5 - 5.2 g/dL    Bilirubin Total 0.2 <=1.2 mg/dL   Ethyl Alcohol Level   Result Value Ref Range    Alcohol ethyl <0.01 <=0.01 g/dL   CBC with platelets and differential   Result Value Ref Range    WBC Count 3.4 (L) 4.0 - 11.0 10e3/uL    RBC Count 4.02 3.80 - 5.20 10e6/uL    Hemoglobin 12.5 11.7 - 15.7 g/dL    Hematocrit 37.1 35.0 - 47.0 %    MCV 92 78 - 100 fL    MCH 31.1 26.5 - 33.0 pg    MCHC 33.7 31.5 - 36.5 g/dL    RDW 13.3 10.0 - 15.0 %    Platelet Count 186 150 - 450 10e3/uL    % Neutrophils 26 %    % Lymphocytes 45 %    % Monocytes 27 %    % Eosinophils 2 %    % Basophils 0 %    % Immature Granulocytes 1 %    NRBCs per 100 WBC 0 <1 /100    Absolute Neutrophils 0.9 (L) 1.6 - 8.3 10e3/uL    Absolute Lymphocytes 1.5 0.8 - 5.3 10e3/uL    Absolute Monocytes 0.9 0.0 - 1.3 10e3/uL    Absolute Eosinophils 0.1 0.0 - 0.7 10e3/uL    Absolute Basophils 0.0 0.0 - 0.2 10e3/uL    Absolute Immature Granulocytes 0.0 <=0.4 10e3/uL    Absolute NRBCs 0.0 10e3/uL       Medications - No data to display    Assessments & Plan     I have reviewed the findings, diagnosis, plan and need for follow up with the patient.  Discharge Medication List as of 2/9/2025  7:42 PM        START taking these medications    Details   methylPREDNISolone (MEDROL DOSEPAK) 4 MG tablet therapy pack Follow Package Directions, Disp-21 tablet, R-0, [facility]              Final diagnoses:   Right shoulder pain, unspecified chronicity   Pain of right upper arm   Petechiae     Disposition: Patient discharged home in stable condition.  Plan as above.  Return for concerns.     Note: Chart documentation done in part with Dragon Voice Recognition software. Although reviewed after completion, some word and grammatical errors may remain.   2/9/2025   [facility] EMERGENCY DEPT       Dina Liz MD  02/09/25 2018

## 2025-02-10 NOTE — ED TRIAGE NOTES
Pt reports that she has had shoulder pain for the past two months and has been doing PT and shoulder exercises, but pain worsened over the weekend due to increased activity.      Triage Assessment (Adult)       Row Name 02/09/25 1824          Triage Assessment    Airway WDL WDL        Respiratory WDL    Respiratory WDL WDL        Skin Circulation/Temperature WDL    Skin Circulation/Temperature WDL WDL        Cardiac WDL    Cardiac WDL WDL        Peripheral/Neurovascular WDL    Peripheral Neurovascular WDL WDL        Cognitive/Neuro/Behavioral WDL    Cognitive/Neuro/Behavioral WDL WDL

## 2025-02-12 ENCOUNTER — TRANSFERRED RECORDS (OUTPATIENT)
Dept: HEALTH INFORMATION MANAGEMENT | Facility: CLINIC | Age: 70
End: 2025-02-12
Payer: COMMERCIAL

## 2025-02-16 DIAGNOSIS — J45.30 MILD PERSISTENT ASTHMA WITHOUT COMPLICATION: ICD-10-CM

## 2025-02-16 DIAGNOSIS — I75.023 BLUE TOE SYNDROME OF BOTH LOWER EXTREMITIES (H): ICD-10-CM

## 2025-02-17 RX ORDER — APIXABAN 5 MG/1
5 TABLET, FILM COATED ORAL 2 TIMES DAILY
Qty: 180 TABLET | Refills: 3 | Status: SHIPPED | OUTPATIENT
Start: 2025-02-17

## 2025-02-17 RX ORDER — ALBUTEROL SULFATE 90 UG/1
INHALANT RESPIRATORY (INHALATION)
Qty: 18 G | Refills: 3 | Status: SHIPPED | OUTPATIENT
Start: 2025-02-17

## 2025-02-17 NOTE — TELEPHONE ENCOUNTER
apixaban ANTICOAGULANT (ELIQUIS ANTICOAGULANT) 5 MG tablet     FAILED [facility] protocol    Last Written Prescription Date:  01/29/2024  Last Fill Quantity: 180,  # refills: 3   Last office visit: 1/29/2024 ; last virtual visit: 2/27/2024 with prescribing provider    Med & pharm loaded.  Routing to provider to review.

## 2025-02-19 ENCOUNTER — TRANSFERRED RECORDS (OUTPATIENT)
Dept: HEALTH INFORMATION MANAGEMENT | Facility: CLINIC | Age: 70
End: 2025-02-19
Payer: COMMERCIAL

## 2025-02-20 ENCOUNTER — TELEPHONE (OUTPATIENT)
Dept: SURGERY | Facility: CLINIC | Age: 70
End: 2025-02-20

## 2025-02-20 ENCOUNTER — LAB (OUTPATIENT)
Dept: LAB | Facility: CLINIC | Age: 70
End: 2025-02-20
Payer: COMMERCIAL

## 2025-02-20 DIAGNOSIS — R31.0 GROSS HEMATURIA: Primary | ICD-10-CM

## 2025-02-20 DIAGNOSIS — R31.0 GROSS HEMATURIA: ICD-10-CM

## 2025-02-20 NOTE — TELEPHONE ENCOUNTER
----- Message -----  From: Brent Shafer MD  Sent: 2/20/2025   9:05 AM CST  To:  Urology Patient Care Pool    Normal.  CC patient.  Repeat UA in several months.    Patient informed of message below. Patient verbalized understanding.  Parris Crouch RN on 2/20/2025 at 10:10 AM

## 2025-02-24 LAB
PATH REPORT.COMMENTS IMP SPEC: NORMAL
PATH REPORT.FINAL DX SPEC: NORMAL
PATH REPORT.GROSS SPEC: NORMAL
PATH REPORT.MICROSCOPIC SPEC OTHER STN: NORMAL
PATH REPORT.RELEVANT HX SPEC: NORMAL

## 2025-03-30 RX ORDER — FAMOTIDINE 40 MG/1
40 TABLET, FILM COATED ORAL EVERY EVENING
Qty: 90 TABLET | Refills: 0 | OUTPATIENT
Start: 2025-03-30

## 2025-04-08 ENCOUNTER — TELEPHONE (OUTPATIENT)
Dept: PHARMACY | Facility: OTHER | Age: 70
End: 2025-04-08
Payer: COMMERCIAL

## 2025-04-08 NOTE — TELEPHONE ENCOUNTER
MT Recruitment: [facility]     Referral outreach attempt #1 on April 8, 2025      Outcome: left voicemail- Call back number [phone]. Also sent [employer] message.     Trisha Randall CPhT  [facility]

## 2025-04-17 ENCOUNTER — VIRTUAL VISIT (OUTPATIENT)
Dept: PHARMACY | Facility: CLINIC | Age: 70
End: 2025-04-17
Payer: COMMERCIAL

## 2025-04-17 DIAGNOSIS — Z78.9 TAKES DIETARY SUPPLEMENTS: ICD-10-CM

## 2025-04-17 DIAGNOSIS — F41.1 GENERALIZED ANXIETY DISORDER: ICD-10-CM

## 2025-04-17 DIAGNOSIS — R52 PAIN: ICD-10-CM

## 2025-04-17 DIAGNOSIS — E78.5 HYPERLIPIDEMIA LDL GOAL <130: ICD-10-CM

## 2025-04-17 DIAGNOSIS — I75.029 BLUE TOE SYNDROME, UNSPECIFIED LATERALITY (H): Primary | ICD-10-CM

## 2025-04-17 DIAGNOSIS — E03.9 HYPOTHYROIDISM, UNSPECIFIED TYPE: ICD-10-CM

## 2025-04-17 DIAGNOSIS — F33.0 MAJOR DEPRESSIVE DISORDER, RECURRENT EPISODE, MILD: ICD-10-CM

## 2025-04-17 DIAGNOSIS — J45.20 MILD INTERMITTENT ASTHMA WITHOUT COMPLICATION: ICD-10-CM

## 2025-04-17 NOTE — PATIENT INSTRUCTIONS
"Recommendations from today's MTM visit:                                                    MTM (medication therapy management) is a service provided by a clinical pharmacist designed to help you get the most of out of your medicines.      Consider re-establishing with your therapist given ongoing life/work stressors.  Consider follow-up with vascular surgery team due to changes in toe color.     Follow-up: 1 year or sooner if needed    It was great speaking with you today.  I value your experience and would be very thankful for your time in providing feedback in our clinic survey. In the next few days, you may receive an email or text message from [employer] with a link to a survey related to your  clinical pharmacist.\"     To schedule another MTM appointment, please call the clinic directly or you may call the MTM scheduling line at [phone] or toll-free at [phone].     My Clinical Pharmacist's contact information:                                                      Please feel free to contact me with any questions or concerns you have.      Damien Douglas, Tevin, BCACP  Medication Therapy Management Pharmacist  Voicemail: [phone]  "

## 2025-04-17 NOTE — PROGRESS NOTES
Medication Therapy Management (MTM) Encounter    ASSESSMENT:                            Medication Adherence/Access: See below for considerations.    Blue Toe Syndrome/Pain: Blood pressures have been up related to pain. Would likely benefit from further monitoring/potentially following up with vascular surgery.     Hyperlipidemia: Stable.     Asthma: Stable.      Hypothyroidism: Stable. Last TSH is within normal limits.    Anxiety and Depression: Anxiety is up related to work. Would benefit from re-establishing with therapy as she had previous success.     Supplements: Stable.     PLAN:                            Consider re-establishing with your therapist given ongoing life/work stressors.  Consider follow-up with vascular surgery team due to changes in toe color.     Follow-up: 1 year or sooner if needed    SUBJECTIVE/OBJECTIVE:                          Lara Mayer is a 70 year old female seen for a follow-up visit.       Reason for visit: Comprehensive medication review.    Allergies/ADRs: Reviewed in chart  Past Medical History: Reviewed in chart  Tobacco: She reports that she has never smoked. She has never used smokeless tobacco.  Alcohol: 2-3 beverages / week    Medication Adherence/Access: Patient takes medications directly from bottles.  Patient takes medications 2 time(s) per day.   Per patient, misses medication 0 times per week.   Medication barriers: affording medications  The patient fills medications at [facility]: YES.    Hypertension   Blue Toe Syndrome/Pain:   Eliquis 5 mg twice daily  Amlodipine 2.5 mg daily  Patient reports no current medication side effects  Patient does not self-monitor blood pressure.    Follows with Dr. Garcia in vascular surgery. QC Kinetix helping with joint pain now.  Toes do seem to be discolored more frequently.      BP Readings from Last 3 Encounters:   02/09/25 (!) 144/88   02/03/25 118/72   01/23/25 (!) 142/81     Hyperlipidemia   Atorvastatin 10 mg every other  day  Patient reports myalgias since on medication, but not improved with trials off.  The ASCVD Risk score (Gracia YOON, et al., 2019) failed to calculate for the following reasons:    The valid HDL cholesterol range is 20 to 100 mg/dL     Recent Labs   Lab Test 08/26/24  0855 08/18/23  1035   CHOL 190 190   HDL 93 123   LDL 88 59   TRIG 43 42       Asthma   Fluticasone-salmeterol 250-50 mcg - one puff twice daily  Albuterol HFA every 6 hours as needed  Duonebs every 6 hours as needed  Fluticasone nasal spray daily  Patient rinses their mouth after using steroid inhaler.   Patient reports no current medication side effects.    .  Patient reports the following symptoms: none.       Hypothyroidism   Levothyroxine 50 mcg daily.   Patient is having the following symptoms: none.      TSH   Date Value Ref Range Status   08/26/2024 1.32 0.30 - 4.20 uIU/mL Final   01/02/2022 1.80 0.40 - 4.00 mU/L Final   03/19/2021 1.31 0.40 - 4.00 mU/L Final     Mental Health   Anxiety and Depression  Duloxetine 60 mg every morning  Patient reports no current medication side effects.  Patient reports symptoms have improved. Right now just a lot of stress and responsibility for work. Has previously tried fluoxetine, paroxetine, sertraline, citalopram, and escitalopram.       Supplements: Patient is taking QC Kinetix every morning and magnesium every evening. Denies any issues    Today's Vitals: LMP  (LMP Unknown)     Wt Readings from Last 5 Encounters:   02/09/25 116 lb (52.6 kg)   02/03/25 116 lb (52.6 kg)   01/23/25 116 lb 9.6 oz (52.9 kg)   11/18/24 123 lb (55.8 kg)   08/26/24 126 lb 8 oz (57.4 kg)     ----------------      I spent 13 minutes with this patient today.     A summary of these recommendations was sent via [employer].    Damien Douglas, PharmD, BCACP  Medication Therapy Management Pharmacist  Voicemail: [phone]      Telemedicine Visit Details  The patient's medications can be safely assessed via a telemedicine encounter.  Type of  service:  Telephone visit  Originating Location (pt. Location): Home    Distant Location (provider location):  Off-site  Start Time:  2:30 PM  End Time:  2:43 PM     Medication Therapy Recommendations  No medication therapy recommendations to display

## 2025-06-11 ENCOUNTER — NURSE TRIAGE (OUTPATIENT)
Dept: INTERNAL MEDICINE | Facility: CLINIC | Age: 70
End: 2025-06-11
Payer: COMMERCIAL

## 2025-06-11 NOTE — TELEPHONE ENCOUNTER
S:Patient is having urgency, blood in her urine, and pain with urination.    B: This started this morning.    A: She is having some back pain. She states she has bloating. No fever.    R: Per protocol patient advised to be seen today. Patient is driving home and states she can wait to be seen until tomorrow.    Luz Martínez RN on 6/11/2025 at 4:20 PM      Reason for Disposition   Side (flank) or lower back pain present    Additional Information   Negative: Shock suspected (e.g., cold/pale/clammy skin, too weak to stand, low BP, rapid pulse)   Negative: Sounds like a life-threatening emergency to the triager   Negative: Followed a female genital area injury (e.g., labia, vagina, vulva)   Negative: Followed a male genital area injury (penis, scrotum)   Negative: Vaginal discharge   Negative: Pus (white, yellow) or bloody discharge from end of penis   Negative: Pain or burning with passing urine (urination) and pregnant   Negative: Pain or burning with passing urine (urination) and female   Negative: Pain or burning with passing urine (urination) and male   Negative: Pain or itching in the vulvar area   Negative: Pain in scrotum is main symptom   Negative: Blood in the urine is main symptom   Negative: Symptoms arising from use of a urinary catheter (e.g., coude, Garcia)   Negative: Unable to urinate (or only a few drops) > 4 hours and bladder feels very full (e.g., palpable bladder or strong urge to urinate)   Negative: Decreased urination and drinking very little and dehydration suspected (e.g., dark urine, no urine > 12 hours, very dry mouth, very lightheaded)   Negative: Patient sounds very sick or weak to the triager   Negative: Fever > 100.4 F  (38.0 C)    Protocols used: Urinary Symptoms-A-OH

## 2025-06-12 ENCOUNTER — OFFICE VISIT (OUTPATIENT)
Dept: FAMILY MEDICINE | Facility: CLINIC | Age: 70
End: 2025-06-12
Payer: COMMERCIAL

## 2025-06-12 ENCOUNTER — RESULTS FOLLOW-UP (OUTPATIENT)
Dept: FAMILY MEDICINE | Facility: CLINIC | Age: 70
End: 2025-06-12

## 2025-06-12 VITALS
SYSTOLIC BLOOD PRESSURE: 129 MMHG | HEART RATE: 73 BPM | HEIGHT: 61 IN | WEIGHT: 116 LBS | DIASTOLIC BLOOD PRESSURE: 80 MMHG | RESPIRATION RATE: 17 BRPM | OXYGEN SATURATION: 97 % | BODY MASS INDEX: 21.9 KG/M2 | TEMPERATURE: 97.5 F

## 2025-06-12 DIAGNOSIS — N30.01 ACUTE CYSTITIS WITH HEMATURIA: ICD-10-CM

## 2025-06-12 DIAGNOSIS — R35.0 URINE FREQUENCY: Primary | ICD-10-CM

## 2025-06-12 LAB
ALBUMIN UR-MCNC: 100 MG/DL
APPEARANCE UR: ABNORMAL
BACTERIA #/AREA URNS HPF: ABNORMAL /HPF
BILIRUB UR QL STRIP: NEGATIVE
COLOR UR AUTO: YELLOW
GLUCOSE UR STRIP-MCNC: NEGATIVE MG/DL
HGB UR QL STRIP: ABNORMAL
KETONES UR STRIP-MCNC: ABNORMAL MG/DL
LEUKOCYTE ESTERASE UR QL STRIP: ABNORMAL
MUCOUS THREADS #/AREA URNS LPF: PRESENT /LPF
NITRATE UR QL: NEGATIVE
PH UR STRIP: 8 [PH] (ref 5–7)
RBC URINE: >182 /HPF
SP GR UR STRIP: 1.02 (ref 1–1.03)
SQUAMOUS EPITHELIAL: <1 /HPF
TRANSITIONAL EPI: 1 /HPF
UROBILINOGEN UR STRIP-MCNC: 2 MG/DL
WBC CLUMPS #/AREA URNS HPF: PRESENT /HPF
WBC URINE: 85 /HPF

## 2025-06-12 RX ORDER — NITROFURANTOIN 25; 75 MG/1; MG/1
100 CAPSULE ORAL 2 TIMES DAILY
Qty: 10 CAPSULE | Refills: 0 | Status: SHIPPED | OUTPATIENT
Start: 2025-06-12 | End: 2025-06-17

## 2025-06-12 RX ORDER — ATORVASTATIN CALCIUM 20 MG/1
10 TABLET, FILM COATED ORAL EVERY OTHER DAY
Status: CANCELLED | OUTPATIENT
Start: 2025-06-12

## 2025-06-12 ASSESSMENT — ASTHMA QUESTIONNAIRES
QUESTION_4 LAST FOUR WEEKS HOW OFTEN HAVE YOU USED YOUR RESCUE INHALER OR NEBULIZER MEDICATION (SUCH AS ALBUTEROL): TWO OR THREE TIMES PER WEEK
QUESTION_2 LAST FOUR WEEKS HOW OFTEN HAVE YOU HAD SHORTNESS OF BREATH: ONCE OR TWICE A WEEK
QUESTION_5 LAST FOUR WEEKS HOW WOULD YOU RATE YOUR ASTHMA CONTROL: WELL CONTROLLED
ACT_TOTALSCORE: 18
QUESTION_1 LAST FOUR WEEKS HOW MUCH OF THE TIME DID YOUR ASTHMA KEEP YOU FROM GETTING AS MUCH DONE AT WORK, SCHOOL OR AT HOME: SOME OF THE TIME
QUESTION_3 LAST FOUR WEEKS HOW OFTEN DID YOUR ASTHMA SYMPTOMS (WHEEZING, COUGHING, SHORTNESS OF BREATH, CHEST TIGHTNESS OR PAIN) WAKE YOU UP AT NIGHT OR EARLIER THAN USUAL IN THE MORNING: ONCE OR TWICE

## 2025-06-12 ASSESSMENT — PATIENT HEALTH QUESTIONNAIRE - PHQ9
SUM OF ALL RESPONSES TO PHQ QUESTIONS 1-9: 0
10. IF YOU CHECKED OFF ANY PROBLEMS, HOW DIFFICULT HAVE THESE PROBLEMS MADE IT FOR YOU TO DO YOUR WORK, TAKE CARE OF THINGS AT HOME, OR GET ALONG WITH OTHER PEOPLE: NOT DIFFICULT AT ALL
SUM OF ALL RESPONSES TO PHQ QUESTIONS 1-9: 0

## 2025-06-12 ASSESSMENT — PAIN SCALES - GENERAL: PAINLEVEL_OUTOF10: MODERATE PAIN (4)

## 2025-06-12 NOTE — PROGRESS NOTES
Assessment & Plan     Urine frequency  - UA Macroscopic with reflex to Microscopic and Culture - Lab Collect; Future  - UA Macroscopic with reflex to Microscopic and Culture - Lab Collect    ASSESSMENT & PLAN    Urinary tract infection:  Symptoms consistent with a urinary tract infection, including burning sensation and frequent urination. Previous episodes of gross hematuria were investigated with no findings. Current symptoms likely related to UTI.  Conduct urinalysis. If positive, prescribe antibiotics, likely Macrobid, to be taken twice a day for 5 days. Recommend increased water intake to flush the system. Suggest AZO over-the-counter for pain relief, with a warning about potential orange discoloration of urine.    Allergy to Flagyl:  Allergy to Flagyl confirmed, with symptoms of nausea and vomiting.  Avoid prescribing Flagyl. Consider alternative antibiotics such as Bactrim or Macrobid.        Follow-up       Duarte Bermudez is a 70 year old, presenting for the following health issues:  UTI      6/12/2025     6:48 AM   Additional Questions   Roomed by Helga         6/12/2025     6:48 AM   Patient Reported Additional Medications   Patient reports taking the following new medications none     UTI    History of Present Illness       Reason for visit:  UTI  Symptom onset:  1-3 days ago  Symptoms include:  Burning with urination, blood in urine, urgency, frequency  Symptom intensity:  Moderate  Symptom progression:  Staying the same  Had these symptoms before:  No  What makes it worse:  Voiding  What makes it better:  Drinking water   She is taking medications regularly.      DUARTE Bermudez, a 70-year-old female, reports experiencing symptoms consistent with a urinary tract infection (UTI) that began suddenly on June 11, 2025. She describes a burning sensation during urination and the need to urinate approximately every 20 minutes. She also noticed blood in her urine a few times. Despite drinking a lot of  "water, she had two accidents at home last night. The symptoms have slightly improved by today, with less burning and no further blood in the urine.    She mentions feeling bloated and experiencing lower back pain on both sides yesterday. However, she denies any pain near the rib cage area. Lara recalls a previous episode of blood in her urine a few months ago, for which she underwent testing, but no cause was identified. She consulted a urologist who advised a follow-up in three months. An ER doctor previously suggested that the blood in her urine could be related to Eliquis, a medication she is taking. She also has a history of Raynaud's phenomenon, which worsened after brandon COVID-19 several years ago.    Lara is allergic to Flagyl, which causes her nausea and vomiting. She also discusses her social history, noting that she works in [place] and has caretaking responsibilities for her sister and multiple pets. She mentions a recent habit of drinking Diet Coke at work due to its availability, although she usually drinks water with ice.  OBJECTIVE    Test results:    - Urinalysis ordered on June 12, 2025                    Objective    /80 (BP Location: Right arm, Patient Position: Sitting, Cuff Size: Adult Small)   Pulse 73   Temp 97.5  F (36.4  C) (Temporal)   Resp 17   Ht 1.549 m (5' 0.98\")   Wt 52.6 kg (116 lb)   LMP  (LMP Unknown)   SpO2 97%   BMI 21.93 kg/m    Body mass index is 21.93 kg/m .  Physical Exam   GENERAL: alert and no distress  EYES: Eyes grossly normal to inspection, PERRL and conjunctivae and sclerae normal  MS: no gross musculoskeletal defects noted, no edema  SKIN: no suspicious lesions or rashes  NEURO: Normal strength and tone, mentation intact and speech normal  PSYCH: mentation appears normal, affect normal/bright    Lab on 02/20/2025   Component Date Value Ref Range Status    Final Diagnosis 02/20/2025    Final                    Value:Specimen A Urinary Bladder, " , Urine Cytology:     Interpretation:      Negative for High Grade Urothelial Carcinoma     Other Findings:      Crystals present.     Adequacy:     Satisfactory for evaluation          Clinical Information 02/20/2025    Final                    Value:  Hematuria            Gross Description 02/20/2025    Final                    Value:A(A). Urinary Bladder, :A. Urinary Bladder, , Urine Cytology:  Received 100 ml of clear, garcia yellow fluid, processed as 1 Pap stained Autocyte.                 Microscopic Description 02/20/2025    Final                    Value:Microscopic examination was performed.        Performing Labs 02/20/2025    Final                    Value:The technical component of this testing was completed at [facility].     Stain controls for all stains resulted within this report have been reviewed and show appropriate reactivity.        Results for orders placed or performed during the hospital encounter of 02/20/25   CT Urogram wo & w Contrast     Status: None    Narrative    EXAM: CT UROGRAM WO and W CONTRAST  LOCATION: [facility], [facility]  DATE: 2/20/2025    INDICATION:  Gross hematuria  COMPARISON: 1/23/2025  TECHNIQUE: CT scan of the abdomen and pelvis using urogram technique with pre contrast, post contrast, and delayed images. Multiplanar reformats were obtained. Dose reduction techniques were used.   CONTRAST: ISOVUE 370, 100 mL    FINDINGS:   LOWER CHEST: No infiltrates or effusions.    HEPATOBILIARY: No significant mass or bile duct dilatation. No calcified gallstones.     PANCREAS: No significant mass, duct dilatation, or inflammatory change.    SPLEEN: Normal size.    ADRENAL GLANDS: No significant nodules.    RIGHT KIDNEY/URETER: No urolithiasis. No mass. No hydronephrosis. No suspicious filling defects within the intrarenal collecting system or ureter to suggest urothelial  malignancy.    LEFT KIDNEY/URETER: No urolithiasis. Stable angiomyolipoma in the inferior aspect of the left kidney. No hydronephrosis. No suspicious filling defects within the intrarenal collecting system or ureter to suggest urothelial malignancy.    BLADDER: No stones or filling defects.    BOWEL: No obstruction or inflammatory change.    LYMPH NODES: No lymphadenopathy.    VASCULATURE: No abdominal aortic aneurysm.    PELVIC ORGANS: No pelvic masses.    MUSCULOSKELETAL: No frankly destructive bony lesions.      Impression    IMPRESSION:  1.  No urolithiasis.  2.  No suspicious filling defects within the intrarenal collecting system, ureters, or bladder to suggest urothelial malignancy.  3.  Stable angiomyolipoma in the inferior left kidney.       No results found.        Signed Electronically by: Heidi Benítez PA-C

## 2025-07-28 ENCOUNTER — PATIENT OUTREACH (OUTPATIENT)
Dept: CARE COORDINATION | Facility: CLINIC | Age: 70
End: 2025-07-28
Payer: COMMERCIAL

## 2025-09-04 DIAGNOSIS — F33.0 MAJOR DEPRESSIVE DISORDER, RECURRENT EPISODE, MILD: ICD-10-CM

## 2025-09-04 RX ORDER — DULOXETIN HYDROCHLORIDE 60 MG/1
60 CAPSULE, DELAYED RELEASE ORAL DAILY
Qty: 90 CAPSULE | Refills: 1 | Status: SHIPPED | OUTPATIENT
Start: 2025-09-04

## (undated) DEVICE — ENDO TRAP POLYP E-TRAP 00711099

## (undated) DEVICE — KIT ENDO TURNOVER/PROCEDURE CARRY-ON 101822

## (undated) DEVICE — TUBING SUCTION 6"X3/16" N56A

## (undated) DEVICE — SNARE CAPIVATOR ROUND COLD SNR BX10 M00561101

## (undated) DEVICE — SOL WATER IRRIG 1000ML BOTTLE 2F7114

## (undated) DEVICE — SUCTION MANIFOLD NEPTUNE 2 SYS 1 PORT 702-025-000